# Patient Record
Sex: MALE | Race: BLACK OR AFRICAN AMERICAN | Employment: OTHER | URBAN - METROPOLITAN AREA
[De-identification: names, ages, dates, MRNs, and addresses within clinical notes are randomized per-mention and may not be internally consistent; named-entity substitution may affect disease eponyms.]

---

## 2017-10-27 ENCOUNTER — HOSPITAL ENCOUNTER (OUTPATIENT)
Dept: OTHER | Age: 62
Discharge: HOME | End: 2017-10-27
Attending: INTERNAL MEDICINE | Admitting: INTERNAL MEDICINE

## 2017-10-27 DIAGNOSIS — M47.26 OSTEOARTHRITIS OF SPINE WITH RADICULOPATHY, LUMBAR REGION: ICD-10-CM

## 2018-10-29 ENCOUNTER — HOSPITAL ENCOUNTER (OUTPATIENT)
Age: 63
Discharge: HOME | End: 2018-10-29
Payer: MEDICARE

## 2018-10-29 ENCOUNTER — HOSPITAL ENCOUNTER (OUTPATIENT)
Dept: GENERAL RADIOLOGY | Age: 63
Discharge: HOME | End: 2018-10-29
Payer: MEDICARE

## 2018-10-29 DIAGNOSIS — M47.9 OSTEOARTHRITIS OF SPINE, UNSPECIFIED SPINAL OSTEOARTHRITIS COMPLICATION STATUS, UNSPECIFIED SPINAL REGION: ICD-10-CM

## 2018-10-29 PROCEDURE — 72100 X-RAY EXAM L-S SPINE 2/3 VWS: CPT

## 2019-02-25 ENCOUNTER — HOSPITAL ENCOUNTER (EMERGENCY)
Age: 64
Discharge: HOME | End: 2019-02-25
Attending: EMERGENCY MEDICINE
Payer: MEDICARE

## 2019-02-25 ENCOUNTER — APPOINTMENT (OUTPATIENT)
Dept: GENERAL RADIOLOGY | Age: 64
End: 2019-02-25
Payer: MEDICARE

## 2019-02-25 VITALS
TEMPERATURE: 97 F | HEIGHT: 74 IN | WEIGHT: 298 LBS | SYSTOLIC BLOOD PRESSURE: 155 MMHG | DIASTOLIC BLOOD PRESSURE: 89 MMHG | OXYGEN SATURATION: 96 % | HEART RATE: 97 BPM | BODY MASS INDEX: 38.24 KG/M2 | RESPIRATION RATE: 18 BRPM

## 2019-02-25 DIAGNOSIS — J20.9 ACUTE BRONCHITIS, UNSPECIFIED ORGANISM: Primary | ICD-10-CM

## 2019-02-25 PROCEDURE — 94640 AIRWAY INHALATION TREATMENT: CPT

## 2019-02-25 PROCEDURE — 99283 EMERGENCY DEPT VISIT LOW MDM: CPT

## 2019-02-25 PROCEDURE — 94664 DEMO&/EVAL PT USE INHALER: CPT

## 2019-02-25 PROCEDURE — 94760 N-INVAS EAR/PLS OXIMETRY 1: CPT

## 2019-02-25 PROCEDURE — 71046 X-RAY EXAM CHEST 2 VIEWS: CPT

## 2019-02-25 PROCEDURE — 6360000002 HC RX W HCPCS: Performed by: EMERGENCY MEDICINE

## 2019-02-25 RX ORDER — AZITHROMYCIN 250 MG/1
250 TABLET, FILM COATED ORAL SEE ADMIN INSTRUCTIONS
Qty: 6 TABLET | Refills: 0 | Status: SHIPPED | OUTPATIENT
Start: 2019-02-25 | End: 2019-03-02

## 2019-02-25 RX ORDER — ALBUTEROL SULFATE 2.5 MG/3ML
5 SOLUTION RESPIRATORY (INHALATION) ONCE
Status: COMPLETED | OUTPATIENT
Start: 2019-02-25 | End: 2019-02-25

## 2019-02-25 RX ORDER — ALBUTEROL SULFATE 90 UG/1
2 AEROSOL, METERED RESPIRATORY (INHALATION) EVERY 4 HOURS PRN
Qty: 1 INHALER | Refills: 0 | Status: SHIPPED | OUTPATIENT
Start: 2019-02-25 | End: 2020-02-07

## 2019-02-25 RX ORDER — METOPROLOL SUCCINATE 50 MG/1
50 TABLET, EXTENDED RELEASE ORAL DAILY
COMMUNITY

## 2019-02-25 RX ORDER — PREDNISONE 10 MG/1
TABLET ORAL
Qty: 20 TABLET | Refills: 0 | Status: SHIPPED | OUTPATIENT
Start: 2019-02-25 | End: 2019-03-07

## 2019-02-25 RX ADMIN — ALBUTEROL SULFATE 5 MG: 2.5 SOLUTION RESPIRATORY (INHALATION) at 08:03

## 2019-03-13 ENCOUNTER — OFFICE VISIT (OUTPATIENT)
Dept: ORTHOPEDIC SURGERY | Age: 64
End: 2019-03-13
Payer: MEDICARE

## 2019-03-13 VITALS
RESPIRATION RATE: 16 BRPM | BODY MASS INDEX: 38.24 KG/M2 | HEART RATE: 74 BPM | DIASTOLIC BLOOD PRESSURE: 83 MMHG | WEIGHT: 298 LBS | HEIGHT: 74 IN | SYSTOLIC BLOOD PRESSURE: 136 MMHG

## 2019-03-13 DIAGNOSIS — G56.03 BILATERAL CARPAL TUNNEL SYNDROME: Primary | ICD-10-CM

## 2019-03-13 PROCEDURE — 99215 OFFICE O/P EST HI 40 MIN: CPT | Performed by: ORTHOPAEDIC SURGERY

## 2019-03-13 RX ORDER — METOPROLOL SUCCINATE 25 MG/1
TABLET, EXTENDED RELEASE ORAL
COMMUNITY
Start: 2019-02-06 | End: 2019-07-01 | Stop reason: ALTCHOICE

## 2019-03-13 RX ORDER — IBUPROFEN 800 MG/1
TABLET ORAL
COMMUNITY
Start: 2019-03-12 | End: 2019-06-06

## 2019-03-13 RX ORDER — METFORMIN HYDROCHLORIDE 500 MG/1
1000 TABLET, EXTENDED RELEASE ORAL
COMMUNITY
Start: 2019-02-06 | End: 2021-09-28

## 2019-03-13 RX ORDER — MELOXICAM 7.5 MG/1
TABLET ORAL
COMMUNITY
Start: 2019-03-12 | End: 2019-06-06

## 2019-03-13 RX ORDER — BACLOFEN 10 MG/1
TABLET ORAL
COMMUNITY
Start: 2019-02-06 | End: 2020-02-07

## 2019-03-14 ENCOUNTER — TELEPHONE (OUTPATIENT)
Dept: ORTHOPEDIC SURGERY | Age: 64
End: 2019-03-14

## 2019-04-04 ENCOUNTER — HOSPITAL ENCOUNTER (OUTPATIENT)
Dept: NEUROLOGY | Age: 64
Discharge: HOME | End: 2019-04-04
Payer: MEDICARE

## 2019-04-04 PROCEDURE — 95911 NRV CNDJ TEST 9-10 STUDIES: CPT | Performed by: PSYCHIATRY & NEUROLOGY

## 2019-04-04 PROCEDURE — 95886 MUSC TEST DONE W/N TEST COMP: CPT | Performed by: PSYCHIATRY & NEUROLOGY

## 2019-04-04 PROCEDURE — 95861 NEEDLE EMG 2 EXTREMITIES: CPT

## 2019-04-04 PROCEDURE — 95911 NRV CNDJ TEST 9-10 STUDIES: CPT

## 2019-04-24 ENCOUNTER — OFFICE VISIT (OUTPATIENT)
Dept: ORTHOPEDIC SURGERY | Age: 64
End: 2019-04-24
Payer: MEDICARE

## 2019-04-24 VITALS — HEIGHT: 74 IN | BODY MASS INDEX: 38.24 KG/M2 | RESPIRATION RATE: 16 BRPM | WEIGHT: 298 LBS

## 2019-04-24 DIAGNOSIS — G56.21 CUBITAL TUNNEL SYNDROME ON RIGHT: ICD-10-CM

## 2019-04-24 DIAGNOSIS — G56.03 BILATERAL CARPAL TUNNEL SYNDROME: Primary | ICD-10-CM

## 2019-04-24 PROCEDURE — 99214 OFFICE O/P EST MOD 30 MIN: CPT | Performed by: ORTHOPAEDIC SURGERY

## 2019-04-24 PROCEDURE — 20526 THER INJECTION CARP TUNNEL: CPT | Performed by: ORTHOPAEDIC SURGERY

## 2019-04-24 NOTE — PATIENT INSTRUCTIONS
Information & Instructions   After Finger, Hand, Wrist, or Elbow Injection    Diamond Santana MD    You have received an injection of local anesthetic (Bupivicaine without Epinephrine) for comfort & a steroid (Kenalog) for its strong anti-inflammatory effects. In order to give the medication a chance to reduce your inflammation and discomfort, it is recommended that you take it easy for a day or so. You may use your hand and arm as you feel comfortable, but you should avoid highly strenuous activity and heavy use for several days. Relief from the injection will often not begin for several days, and you may not feel full relief for up to one month. It is not uncommon to experience some local discomfort or pain at or around the injection site for a few days. To relieve these symptoms you may do the following if you feel necessary:       Apply ice to the affected area 20 minutes on and 20 minutes off. Do not apply ice directly to the skin. Use a thin layer (T-shirt, pillowcase, towel, etc.) to protect the skin. - If allowed by your other medical physicians, you may take -     2 Tylenol extra strength tablets every 4-6 hours       1-2 Aleve tablets twice a day     2-3 Advil tablets two to three times a day    If you are diabetic, the steroid medication may increase your blood sugar, so you are advised to monitor your sugar more closely so you can adjust it accordingly for a few days following your injection. If you need assistance with the control of you blood sugar, please contact you primary care physician for further advice. I will request that you please call the office one month after your injection at [phone] if you have not experienced relief of your symptoms (unless I have instructed you otherwise). If your injection has given you good relief of you symptoms as expected, then you only need to call the office if your symptoms return.

## 2019-04-24 NOTE — PROGRESS NOTES
Mr. Jana Crump returns today in follow-up of his previously treated  bilateral carpal tunnel symptoms. He was last seen in March, 2019 at which time he was treated with Conservative Measures and EMG & NCS was ordered. He experienced minimal relief of his initial symptoms. He  has noticed symptom persistence over the last several weeks. He returns today with worsened symptoms of bilateral carpal tunnel syndrome  and with unchanged symptoms of right Ulnar Nerve Entrapment, requesting further treatment. The patient's , past medical history, medications, allergies,  family history, social history, and review of systems have been reviewed and are recorded in the chart. Physical Exam:  Vitals  Resp: 16  Height: 6' 2\" (188 cm)  Weight: 298 lb (135.2 kg)  Mr. Jana Crump appears well, he is in no apparent distress, he demonstrates appropriate mood & affect. Skin: Skin color, texture, turgor normal. No rashes or lesions bilaterally  Digital range of motion is limited by pain bilaterally  Wrist range of motion is equal bilateral   Elbow range of motion is equal bilateral   There is no evidence of gross joint instability bilaterally. Sensation is subjectively tingling and painful in the Whole Hand on the Right, greater than Left and objectively normal in the same distribution bilaterally  Vascular examination reveals normal and good capillary refill bilaterally  Swelling is mild in the medial elbow, there is no tenderness at the medial epicondyle bilaterally  Examination for Carpal Tunnel Syndrome shows Carpal Tunnel Compression Test to be mildly positive on the right & mildly positive on the left. The patient displays moderate baseline symptoms to potentially confound the exam.  The thenar musculature is mildly atrophied & weakened. Examination for Cubital Tunnel Syndrome shows no tenderness to palpation at the medial epicondyle on the Right, normal on the Left.   The Ulnar Nerve rests behind the medial carpal tunnel was injected with a combination of 1 ml of 0.25% Bupivacaine without Epinephrine and 40 mg of Triamcinolone (40 mg/ml). There was good filling of the carpal tunnel. A  dry, sterile bandage was applied and he tolerated the injection without difficulty. I advised him of the expected response, possible reactions and the instructions for care of the hand. Procedure: left Carpal Tunnel Injection  [first Injection]: After full discussion of the nature of this process and outlining a treatment plan with Mr. Felicitas Lyons, we discussed the complications, limitations, expectations, alternatives, and risks of injection to the carpal tunnel. He understood this information well and verbally consented to this treatment. The skin of the symptomatic extremity was prepped with Isopropyl Alcohol and under aseptic conditions the carpal tunnel was injected with a combination of 1 ml of 0.25% Bupivacaine without Epinephrine and 40 mg of Triamcinolone (40 mg/ml). There was good filling of the carpal tunnel. A  dry, sterile bandage was applied and he tolerated the injection without difficulty. I advised him of the expected response, possible reactions and the instructions for care of the hand. I have also discussed with Mr. Felicitas Lyons the other treatment options available to him for this condition. We have today selected to proceed with treatment by injection with steroid medication. He and I have agreed that if our current course of Injection treatment does not prove to be effective over the short term future, that he will schedule a follow-up appointment to discuss and select an alternate course of therapy including possibly further conservative treatment or surgical treatment.        After discussion of the treatment options available for cubital tunnel syndrome, I have outlined for Mr. Felicitas Lyons a conservative plan of treatment consisting of: the use of wrist splints, activity modification, and the judicious use of over-the-counter anti-inflammatory medications. Appropriately sized removable carpal tunnel orthosis already has. Instructions were given regarding splint use and wear as well as suggestions for use of the other modalities discussed. He voiced an appropriate understanding of our discussion and of the expectations of treatment. I have explained to Mr. Kassi Moy that despite successful treatment (surgical decompression or otherwise) of his nerve entrapment, that due to his severe nerve damage, that he is likely to have some permanent residual symptoms that do not improve long term. I have also explained that maximal recovery of nerve function may take a full year or longer to realize. He voiced a clear understanding of this. Mr. Kassi Moy has been given a full verbal list of instructions and precautions related to his present condition. I have asked him to followup with me in the office at the prescribed time. He is also specifically requested to call or return to the office sooner if his symptoms change or worsen prior to the next scheduled appointment.

## 2019-04-24 NOTE — Clinical Note
Dear  Iesha Nurs you very much for your referral or . Helen Barbosa to me for evaluation and treatment of his Hand & Wrist condition. I appreciate your confidence in me and thank you for allowing me the opportunity to care for your patients. If I can be of any further assistance to you on this or any other patient, please do not hesitate to contact me. Sincerely,Esa Amaro MD

## 2019-06-06 ENCOUNTER — HOSPITAL ENCOUNTER (EMERGENCY)
Age: 64
Discharge: HOME | End: 2019-06-06
Payer: MEDICARE

## 2019-06-06 VITALS
RESPIRATION RATE: 18 BRPM | SYSTOLIC BLOOD PRESSURE: 175 MMHG | HEIGHT: 74 IN | TEMPERATURE: 97 F | DIASTOLIC BLOOD PRESSURE: 100 MMHG | OXYGEN SATURATION: 96 % | WEIGHT: 285 LBS | BODY MASS INDEX: 36.57 KG/M2 | HEART RATE: 94 BPM

## 2019-06-06 DIAGNOSIS — M51.36 DEGENERATIVE DISC DISEASE, LUMBAR: ICD-10-CM

## 2019-06-06 DIAGNOSIS — G89.29 ACUTE EXACERBATION OF CHRONIC LOW BACK PAIN: Primary | ICD-10-CM

## 2019-06-06 DIAGNOSIS — M54.50 ACUTE EXACERBATION OF CHRONIC LOW BACK PAIN: Primary | ICD-10-CM

## 2019-06-06 DIAGNOSIS — I10 ELEVATED BLOOD PRESSURE READING WITH DIAGNOSIS OF HYPERTENSION: ICD-10-CM

## 2019-06-06 PROCEDURE — 99282 EMERGENCY DEPT VISIT SF MDM: CPT

## 2019-06-06 RX ORDER — NAPROXEN 500 MG/1
500 TABLET ORAL 2 TIMES DAILY PRN
Qty: 20 TABLET | Refills: 0 | Status: SHIPPED | OUTPATIENT
Start: 2019-06-06 | End: 2019-07-01

## 2019-06-06 RX ORDER — METHOCARBAMOL 750 MG/1
750 TABLET, FILM COATED ORAL EVERY 8 HOURS PRN
Qty: 30 TABLET | Refills: 0 | Status: SHIPPED | OUTPATIENT
Start: 2019-06-06 | End: 2019-06-16

## 2019-06-06 RX ORDER — LIDOCAINE 50 MG/G
1 PATCH TOPICAL DAILY
Qty: 30 PATCH | Refills: 0 | Status: SHIPPED | OUTPATIENT
Start: 2019-06-06 | End: 2019-07-01

## 2019-06-06 RX ORDER — HYDROCODONE BITARTRATE AND ACETAMINOPHEN 5; 325 MG/1; MG/1
1 TABLET ORAL EVERY 6 HOURS PRN
Qty: 12 TABLET | Refills: 0 | Status: SHIPPED | OUTPATIENT
Start: 2019-06-06 | End: 2019-06-09

## 2019-06-06 ASSESSMENT — ENCOUNTER SYMPTOMS
BACK PAIN: 1
VOMITING: 0
DIARRHEA: 0
ABDOMINAL PAIN: 0
CHEST TIGHTNESS: 0
NAUSEA: 0
SHORTNESS OF BREATH: 0

## 2019-06-06 ASSESSMENT — PAIN DESCRIPTION - PAIN TYPE: TYPE: ACUTE PAIN

## 2019-06-06 ASSESSMENT — PAIN SCALES - GENERAL: PAINLEVEL_OUTOF10: 9

## 2019-06-06 ASSESSMENT — PAIN DESCRIPTION - ORIENTATION: ORIENTATION: LOWER

## 2019-06-06 ASSESSMENT — PAIN DESCRIPTION - LOCATION: LOCATION: BACK

## 2019-06-06 NOTE — ED PROVIDER NOTES
**EVALUATED BY ADVANCED PRACTICE PROVIDER**        [facility]      Pt Name: Jameson Lane  [facility]:[id number]  [place] 1955  Date of evaluation: 6/6/2019  Provider: Huey Garcia PA-C      Chief Complaint:    Chief Complaint   Patient presents with    Back Pain     Pt. comes in today with lower back pain that got worse this morning. Pt. reports he is suppose to have MRI done on Saturday. Pt. states the pain shoots down both of his legs. Pt. states it feels like pins and needles in his feet. Nursing Notes, Past Medical Hx, Past Surgical Hx, Social Hx, Allergies, and Family Hx were all reviewed and agreed with or any disagreements were addressed in the HPI.    HPI:  (Location, Duration, Timing, Severity,Quality, Assoc Sx, Context, Modifying factors)  This is a  66 y.o. male presents the emergency department with reports of continued difficulty as it pertains to low back pain. Patient goes on to report he has had difficulties with this for some time. He is under the active care of one of the physicians or the since he. He states he's having increasing pain and discomfort despite the fact that he recently had been on corticosteroids. He states he does not have any pain medicine. He reports he is taking baclofen without any significant relief. He is sent to have an upcoming MRI to see what needs to be done with his back. Patient's currently reporting pain and discomfort radiated to be 9 out of 10. He states he spell nothing to make the symptoms better and are worse and with this he presents. He has no red flags for back pain. He denies bowel or bladder dysfunction. He denies saddle anesthesia. He has numbness and tingling in his feet and pain that shoots down his legs but denies radicular symptoms.     PastMedical/Surgical History:      Diagnosis Date    Arthritis     CAD (coronary artery disease)     Hyperlipidemia     Hypertension          Procedure Laterality Date    BACK SURGERY  200    lower lumbar surgery    CORONARY ANGIOPLASTY WITH STENT PLACEMENT  2012       Medications:  Previous Medications    ALBUTEROL SULFATE HFA (PROVENTIL HFA) 108 (90 BASE) MCG/ACT INHALER    Inhale 2 puffs into the lungs every 4 hours as needed for Wheezing or Shortness of Breath (Space out to every 6 hours as symptoms improve) Space out to every 6 hours as symptoms improve. ASPIRIN 325 MG TABLET    Take 325 mg by mouth daily    ATORVASTATIN (LIPITOR) 40 MG TABLET    Take 1 tablet by mouth daily. BACLOFEN (LIORESAL) 10 MG TABLET        CARVEDILOL (COREG) 12.5 MG TABLET    Take 25 mg by mouth daily     LISINOPRIL-HYDROCHLOROTHIAZIDE (PRINZIDE;ZESTORETIC) 20-12.5 MG PER TABLET    Take 1 tablet by mouth daily. METFORMIN (GLUCOPHAGE) 500 MG TABLET    Take 1 tablet by mouth 2 times daily (with meals)    METFORMIN (GLUCOPHAGE-XR) 500 MG EXTENDED RELEASE TABLET        METOPROLOL SUCCINATE (TOPROL XL) 25 MG EXTENDED RELEASE TABLET        METOPROLOL SUCCINATE (TOPROL XL) 50 MG EXTENDED RELEASE TABLET    Take 50 mg by mouth daily    SPACER/AERO CHAMBER MOUTHPIECE MISC    1 each by Does not apply route once as needed (wheeze/cough)       Review of Systems:  Review of Systems   Constitutional: Negative for activity change, chills and fever. Respiratory: Negative for chest tightness and shortness of breath. Cardiovascular: Negative for chest pain. Gastrointestinal: Negative for abdominal pain, diarrhea, nausea and vomiting. Genitourinary: Negative for dysuria and flank pain. Musculoskeletal: Positive for back pain and myalgias. Negative for gait problem, neck pain and neck stiffness. Skin: Negative for rash and wound. Neurological: Negative for seizures, syncope and headaches. Positives and Pertinent negatives as per HPI. Except as noted above in the ROS, problem specific ROS was completed and is negative.     Physical werenegative at this time or not returned at the time of this note. RADIOLOGY:   Non-plain film images such as CT, Ultrasound and MRI are read by the radiologist. Daniel Franco PA-C have directly visualized the radiologic plain film image(s) with the below findings:        Interpretation per the Radiologist below, if available at the time of thisnote:    No orders to display        No results found. MEDICAL DECISION MAKING / ED COURSE:      PROCEDURES:   Procedures  None    Patient was given:  Medications - No data to display    The patient's detailed history of present illness is documented as above. Upon arrival to the emergency department the patient's vital signs are as documented. The patient is noted to be hemodynamically stable and afebrile. Physical examination findings are as above. Has not had any recent injuries and or falls and this is an acute exacerbation of a chronic problem for him. He recently finished a Medrol Dosepak but this did not help his symptoms. He is in need of a work note as well as medications to be able to control his symptoms and he can follow back with orthopedics. He states he did call Dr. Gaetano Mustafa and does not have an appointment at her until the middle of June. I discussed with the patient ongoing care management on an outpatient basis and follow-up with auto since he. Dispense with the medications as below. He states the baclofen is not beneficial for him so we will substitute out Robaxin in the short-term. Despite having diclofenac, ibuprofen, and meloxicam all documented on his chart he states that he intermittently takes any antiplatelet medicine. I explained him that this will be a mainstay and narcotic only for severe pain. I've also dispensed Lidoderm patches for pain relief and he will follow with auto on an outpatient basis.     I estimate there is low risk for cauda equina or central cord compression syndrome, epidural lesion or abscess, meningitis or acute/severe spinal stenosis requiring emergent treatment and or any additional pathology that would require further emergency advanced imaging or admission and therefor I consider the discharge disposition most reasonable. The patient and/or family and I have discussed the diagnosis and risks, and we agree with discharging home to follow-up with their primary doctor. We also discussed returning to the emergency department immediately if new or worsening symptoms occur. We have discussed the symptoms which are most concerning (e.g., numbness or weakness of the arms or legs, saddle anesthesia, urinary or bowel incontinence or retention, changing or worsening pain) that would necessitate an immediate return. The patient tolerated their visit well. I evaluated the patient. The physician was available for consultation as needed. The patient and / or the family were informed of the results of anytests, a time was given to answer questions, a plan was proposed and they agreed with plan. CLINICAL IMPRESSION:  1. Acute exacerbation of chronic low back pain    2. Degenerative disc disease, lumbar    3. Elevated blood pressure reading with diagnosis of hypertension        DISPOSITION Decision To Discharge 06/06/2019 11:09:05 AM      PATIENT REFERRED TO:  No follow-up provider specified. DISCHARGE MEDICATIONS:  New Prescriptions    HYDROCODONE-ACETAMINOPHEN (NORCO) 5-325 MG PER TABLET    Take 1 tablet by mouth every 6 hours as needed for Pain for up to 3 days. LIDOCAINE (LIDODERM) 5 %    Place 1 patch onto the skin daily 12 hours on, 12 hours off.     METHOCARBAMOL (ROBAXIN-750) 750 MG TABLET    Take 1 tablet by mouth every 8 hours as needed (muscle cramps or pain)    NAPROXEN (NAPROSYN) 500 MG TABLET    Take 1 tablet by mouth 2 times daily as needed for Pain       DISCONTINUED MEDICATIONS:  Discontinued Medications    DICLOFENAC SODIUM 1 % GEL         MG TABLET        MELOXICAM (MOBIC) 7.5 MG TABLET        TRAMADOL (ULTRAM) 50 MG TABLET    Take 1 tablet by mouth every 6 hours as needed for Pain            (Please note the MDM and HPI sections of this note were completed with a voice recognition program.  Efforts weremade to edit the dictations but occasionally words are mis-transcribed.)    Electronically signed, Juany Almaraz PA-C,          Richie Swain PA-C  06/06/19 1706

## 2019-06-06 NOTE — LETTER
[facility] Emergency Department  [address], [facility], [address]             June 6, 2019    Patient: Shane Gaston   YOB: 1955   Date of Visit: 6/6/2019       To Whom It May Concern:    Shane Gaston was seen and treated in our emergency department on 6/6/2019. He may return to work on 6/8/19.       Sincerely,         Andrei CHRISTIANSONN, RN

## 2019-06-06 NOTE — ED NOTES
Discharge instructions discussed with no questions or concerns. Pt. Lee Branch understanding to follow up with PCP. Pt. Ambulatory upon discharge with no signs of distress noted.        Yani Miller RN  06/06/19 [id number]

## 2019-06-08 ENCOUNTER — HOSPITAL ENCOUNTER (OUTPATIENT)
Dept: MRI IMAGING | Age: 64
Discharge: HOME | End: 2019-06-08
Payer: MEDICARE

## 2019-06-08 DIAGNOSIS — M51.36 ANNULAR TEAR OF LUMBAR DISC: ICD-10-CM

## 2019-06-08 PROCEDURE — 72148 MRI LUMBAR SPINE W/O DYE: CPT

## 2019-07-01 ENCOUNTER — APPOINTMENT (OUTPATIENT)
Dept: GENERAL RADIOLOGY | Age: 64
End: 2019-07-01
Payer: MEDICARE

## 2019-07-01 ENCOUNTER — HOSPITAL ENCOUNTER (EMERGENCY)
Age: 64
Discharge: HOME | End: 2019-07-01
Payer: MEDICARE

## 2019-07-01 VITALS
DIASTOLIC BLOOD PRESSURE: 114 MMHG | TEMPERATURE: 98 F | SYSTOLIC BLOOD PRESSURE: 174 MMHG | RESPIRATION RATE: 16 BRPM | HEART RATE: 88 BPM | OXYGEN SATURATION: 98 %

## 2019-07-01 DIAGNOSIS — M50.30 DEGENERATIVE DISC DISEASE, CERVICAL: Primary | ICD-10-CM

## 2019-07-01 LAB
EKG ATRIAL RATE: 89 BPM
EKG DIAGNOSIS: NORMAL
EKG P AXIS: 54 DEGREES
EKG P-R INTERVAL: 178 MS
EKG Q-T INTERVAL: 368 MS
EKG QRS DURATION: 104 MS
EKG QTC CALCULATION (BAZETT): 447 MS
EKG R AXIS: -28 DEGREES
EKG T AXIS: 78 DEGREES
EKG VENTRICULAR RATE: 89 BPM

## 2019-07-01 PROCEDURE — 99283 EMERGENCY DEPT VISIT LOW MDM: CPT

## 2019-07-01 PROCEDURE — 93005 ELECTROCARDIOGRAM TRACING: CPT | Performed by: EMERGENCY MEDICINE

## 2019-07-01 PROCEDURE — 6370000000 HC RX 637 (ALT 250 FOR IP): Performed by: PHYSICIAN ASSISTANT

## 2019-07-01 PROCEDURE — 72040 X-RAY EXAM NECK SPINE 2-3 VW: CPT

## 2019-07-01 PROCEDURE — 93010 ELECTROCARDIOGRAM REPORT: CPT | Performed by: INTERNAL MEDICINE

## 2019-07-01 RX ORDER — LIDOCAINE 4 G/G
1 PATCH TOPICAL DAILY
Status: DISCONTINUED | OUTPATIENT
Start: 2019-07-01 | End: 2019-07-01 | Stop reason: HOSPADM

## 2019-07-01 RX ORDER — NAPROXEN 250 MG/1
500 TABLET ORAL ONCE
Status: COMPLETED | OUTPATIENT
Start: 2019-07-01 | End: 2019-07-01

## 2019-07-01 RX ORDER — LIDOCAINE 50 MG/G
1 PATCH TOPICAL DAILY
Qty: 30 PATCH | Refills: 0 | Status: SHIPPED | OUTPATIENT
Start: 2019-07-01 | End: 2020-02-07

## 2019-07-01 RX ORDER — METHOCARBAMOL 750 MG/1
750 TABLET, FILM COATED ORAL EVERY 8 HOURS PRN
Qty: 30 TABLET | Refills: 0 | Status: SHIPPED | OUTPATIENT
Start: 2019-07-01 | End: 2019-07-11

## 2019-07-01 RX ORDER — HYDROCODONE BITARTRATE AND ACETAMINOPHEN 5; 325 MG/1; MG/1
1 TABLET ORAL EVERY 6 HOURS PRN
Status: ON HOLD | COMMUNITY
End: 2021-01-31 | Stop reason: ALTCHOICE

## 2019-07-01 RX ORDER — NAPROXEN 500 MG/1
500 TABLET ORAL 2 TIMES DAILY PRN
Qty: 20 TABLET | Refills: 0 | Status: SHIPPED | OUTPATIENT
Start: 2019-07-01 | End: 2020-02-07

## 2019-07-01 RX ADMIN — NAPROXEN 500 MG: 250 TABLET ORAL at 08:04

## 2019-07-01 ASSESSMENT — PAIN SCALES - GENERAL
PAINLEVEL_OUTOF10: 8
PAINLEVEL_OUTOF10: 8

## 2019-07-01 ASSESSMENT — ENCOUNTER SYMPTOMS
BACK PAIN: 1
SHORTNESS OF BREATH: 0
VOMITING: 0
DIARRHEA: 0
ABDOMINAL PAIN: 0
NAUSEA: 0
CHEST TIGHTNESS: 0

## 2019-07-01 ASSESSMENT — PAIN DESCRIPTION - LOCATION: LOCATION: NECK

## 2019-07-01 NOTE — ED PROVIDER NOTES
**EVALUATED BY ADVANCED PRACTICE PROVIDER**        [facility]      Pt Name: Alok Elias  EWO:[id number]  [place] 1955  Date of evaluation: 7/1/2019  Provider: Eleanor Faustin PA-C      Chief Complaint:    Chief Complaint   Patient presents with    Torticollis     has been working out on [employer] and is having neck pain that hurts to hurt from side to side since Friday morning       Nursing Notes, Past Medical Hx, Past Surgical Hx, Social Hx, Allergies, and Family Hx were all reviewed and agreed with or any disagreements were addressed in the HPI.    HPI:  (Location, Duration, Timing, Severity,Quality, Assoc Sx, Context, Modifying factors)  This is a  60 y.o. male presents the emergency department with difficulties as a pertains to cervicalgia. Patient states he has been working out regularly with a [email or web address] and has been doing some overhead activities. He is doing lap pull downs and has had increasing levels of pain in his neck since the weekend. Patient states it feels very similar to musculoskeletal pain that he is experienced in his back. He goes on to report he has associated stiffness that is increased with movement. Patient states that he has had some left over medicine in the form of a muscle relaxant from his back that he took which was noted to be beneficial.  He states he is not experiencing fevers and or chills. He is not having symptoms of meningismus and denies any is having radicular symptoms. He denies weakness in her numbness and tingling in his hands or fingers. He goes on to report he has not having difficulties with his voice and or change in relation to this. Current level of pain and discomfort rates to be 8 out of 10. He states medications in the home environment have been beneficial but he is now out of them and presents to the emergency department for evaluation and treatment.   Upon further questioning the outpatient basis. He will be discharged with medications as below. The patient has been made aware of the signs and symptoms which would necessitate an immediate return to the emergency department and verbalizes an understanding of these signs and symptoms. I estimate there is low risk for cauda equina or central cord compression syndrome, epidural lesion or abscess, meningitis or acute/severe spinal stenosis requiring emergent treatment and or any additional pathology that would require further emergency advanced imaging or admission and therefor I consider the discharge disposition most reasonable. The patient and/or family and I have discussed the diagnosis and risks, and we agree with discharging home to follow-up with their primary doctor. We also discussed returning to the emergency department immediately if new or worsening symptoms occur. We have discussed the symptoms which are most concerning (e.g., numbness or weakness of the arms or legs, saddle anesthesia, urinary or bowel incontinence or retention, changing or worsening pain) that would necessitate an immediate return. The patient tolerated their visit well. I evaluated the patient. The physician was available for consultation as needed. The patient and / or the family were informed of the results of anytests, a time was given to answer questions, a plan was proposed and they agreed with plan. CLINICAL IMPRESSION:  1. Degenerative disc disease, cervical        DISPOSITION: Discharged to home      PATIENT REFERRED TO:  [address]  [address] [place] [address]  [address] [place] [id number]  [phone]    Schedule an appointment as soon as possible for a visit       [facility] Emergency Department  [address] [facility]  [phone]    If symptoms worsen      DISCHARGE MEDICATIONS:  New Prescriptions    LIDOCAINE (LIDODERM) 5 %    Place 1 patch onto the skin daily 12 hours on, 12 hours off.     METHOCARBAMOL

## 2020-01-30 NOTE — PROGRESS NOTES
[address]   Cardiac Evaluation      Patient: Roseann Aranda  YOB: 1955       Chief Complaint   Patient presents with   Kesh[facility] Cardiologist     needs cardiac clearance for carpel tunnel surgery       Referring provider: [address] [place] [address] Drive    History of Present Illness:  Mr. Denise Alfaro is a former patient of Dr. Xiomy Martinez with a history of CAD (stent 2012 -- had back pain), HTN, HLD, A. Fib/flutter. He reuiqres a cardiology evaluation prior to carpal tunnel surgery. He is a smoker. Today, he states his main issues are multiple orthopedic pains inc back, right shoulder, knee -- he cannot exercise the way he used to. He walks a lot on his job without any chest pain or dyspnea. He denies exertional chest pain, HERNANDEZ/PND, palpitations, light-headedness, edema. He continues to smoke. With regard to medication therapy he/she has been compliant with prescribed regimen and has tolerated therapy to date. Past Medical History:   has a past medical history of Arthritis, Atrial fibrillation ([address] CAD (coronary artery disease), Hyperlipidemia, and Hypertension. Surgical History:   has a past surgical history that includes back surgery (200) and Coronary angioplasty with stent (2012). Current Outpatient Medications   Medication Sig Dispense Refill    HYDROcodone-acetaminophen (NORCO) 5-325 MG per tablet Take 1 tablet by mouth every 6 hours as needed for Pain.  metFORMIN (GLUCOPHAGE-XR) 500 MG extended release tablet Take 500 mg by mouth daily (with breakfast)       metoprolol succinate (TOPROL XL) 50 MG extended release tablet Take 50 mg by mouth daily      aspirin 325 MG tablet Take 325 mg by mouth daily      atorvastatin (LIPITOR) 40 MG tablet Take 1 tablet by mouth daily. 30 tablet 5    lisinopril-hydrochlorothiazide (PRINZIDE;ZESTORETIC) 20-12.5 MG per tablet Take 1 tablet by mouth daily.  (Patient taking differently: Take 2 tablets by mouth daily ) 30 tablet 0     Physical activity:     Days per week: Not on file     Minutes per session: Not on file    Stress: Not on file   Relationships    Social connections:     Talks on phone: Not on file     Gets together: Not on file     Attends Quaker service: Not on file     Active member of club or organization: Not on file     Attends meetings of clubs or organizations: Not on file     Relationship status: Not on file    Intimate partner violence:     Fear of current or ex partner: Not on file     Emotionally abused: Not on file     Physically abused: Not on file     Forced sexual activity: Not on file   Other Topics Concern    Not on file   Social History Narrative    Not on file       Family History:   Family History   Problem Relation Age of Onset    Heart Disease Mother     Diabetes Mother     Heart Disease Father     Diabetes Father     Diabetes Sister     Diabetes Brother     Diabetes Maternal Grandmother     Diabetes Maternal Grandfather     Diabetes Paternal Grandmother     Diabetes Paternal Grandfather      Family history has been reviewed and not pertinent except as noted above. Review of Systems:   · Constitutional: there has been no unanticipated weight loss. No change in energy or activity level   · Eyes: No visual changes   · ENT: No Headaches, hearing loss or vertigo. No mouth sores or sore throat. · Cardiovascular: Reviewed in HPI  · Respiratory: No cough or wheezing, no sputum production. · Gastrointestinal: No abdominal pain, appetite loss, blood in stools. No change in bowel or bladder habits. · Genitourinary: No nocturia, dysuria, trouble voiding  · Musculoskeletal:  No gait disturbance, weakness or joint complaints. · Integumentary: No rash or pruritis. · Neurological: No headache, change in muscle strength, numbness or tingling. No change in gait, balance, coordination, mood, affect, memory, mentation, behavior.   · Psychiatric: No anxiety or depression  · Endocrine: No malaise or fever  · Hematologic/Lymphatic: No abnormal bruising or bleeding, blood clots or swollen lymph nodes. · Allergic/Immunologic: No nasal congestion or hives. Physical Examination:    Vitals:    02/07/20 0801 02/07/20 0806   BP: (!) 156/90 (!) 154/90   Site: Right Upper Arm Right Upper Arm   Position: Sitting Sitting   Cuff Size: Large Adult Large Adult   Pulse: 66    Weight: 265 lb 6.4 oz (120.4 kg)    Height: 6' 2\" (1.88 m)      Body mass index is 34.08 kg/m². Wt Readings from Last 3 Encounters:   02/07/20 265 lb 6.4 oz (120.4 kg)   06/06/19 285 lb (129.3 kg)   04/24/19 298 lb (135.2 kg)      BP Readings from Last 3 Encounters:   02/07/20 (!) 154/90   07/01/19 (!) 174/114   06/06/19 (!) 175/100      Physical Examination:    · CONSTITUTIONAL: Well developed, well nourished  · EYES: PERRLA. No xanthelasma, sclera non icteric  · EARS,NOSE,MOUTH,THROAT:  Mucous membranes moist, normal hearing  · NECK: Supple, JVP normal, thyroid not enlarged. Carotids 2+ without bruits  · RESPIRATORY: Normal effort, no rales or rhonchi  · CARDIOVASCULAR: Normal PMI, regular rate and rhythm, no murmurs, rub or gallop. No edema. Radial pulses present and equal  · CHEST: No scar or masses  · ABDOMEN: Normal bowel sounds. No masses or tenderness. No bruit  · MUSCULOSKELETAL: No clubbing or cyanosis. Moves all extremities well. Normal gait  · SKIN:  Warm and dry. No rashes. Large raised area left mid back  · NEUROLOGIC: Cranial nerves intact. Alert and oriented  · PSYCHIATRIC: Calm affect. Appears to have normal judgement and insight    All testing and labs listed below were personally reviewed by myself. [facility] 5/9/2012 (Dr. Gilford Collins)  EF 50%  99% midLAD -- stent   50% midLAD  20% prox LAD    Assessment:    Preop CV Exam  Carpal tunnel surgery with Dr. Donna Desir. EKG today 2/7/2020>   Stable cardiac status -- may proceed with surgery. Low risk for lower risk surgery.     CAD  Stable without anginal symptoms  S/p PCI LAD in 2012  Takes adult asa -- cut back to baby asa but do not hold for surgery    Hypertension  Stable  Blood pressure (!) 154/90, pulse 66, height 6' 2\" (1.88 m), weight 265 lb 6.4 oz (120.4 kg). Hyperlipidemia  Managed per PCP  Taking Lipitor 40mg  Needs rechecked    Atrial fib/flutter  S/p a flutter/SVT ablation 2016  H/o non-compliance with asa therapy    Tobacco use  Encouraged to quit      No problem-specific Assessment & Plan notes found for this encounter. Orders Placed This Encounter   Procedures    EKG 12 lead     Plan:  1. Reduce reduce 325 to 81mg asa  2. Lipid panel soon  3. RTO 6 months  4. Letter and EKG faxed to Dr. Maria Alejandra Jaramillo office ([facility]) and to the [facility] (pt states he is going to need spinal surgery at some point). Copies given to patient as well. 4. Recommend eval by ortho for his right shoulder pain  5. Recommend dermatology to assess raised area on his back    Joselito Strickland MD    Thank you for allowing to me to participate in the care of Keara Mejia. This note was scribed in the presence of Dr. Chavez Horan by Walter Blake RN.

## 2020-02-07 ENCOUNTER — OFFICE VISIT (OUTPATIENT)
Dept: CARDIOLOGY CLINIC | Age: 65
End: 2020-02-07
Payer: MEDICARE

## 2020-02-07 VITALS
HEIGHT: 74 IN | HEART RATE: 66 BPM | DIASTOLIC BLOOD PRESSURE: 90 MMHG | BODY MASS INDEX: 34.06 KG/M2 | SYSTOLIC BLOOD PRESSURE: 154 MMHG | WEIGHT: 265.4 LBS

## 2020-02-07 PROCEDURE — 99204 OFFICE O/P NEW MOD 45 MIN: CPT | Performed by: INTERNAL MEDICINE

## 2020-02-07 PROCEDURE — 93000 ELECTROCARDIOGRAM COMPLETE: CPT | Performed by: INTERNAL MEDICINE

## 2020-02-07 RX ORDER — ASPIRIN 81 MG/1
81 TABLET ORAL DAILY
Qty: 30 TABLET | Refills: 5
Start: 2020-02-07 | End: 2020-08-07

## 2020-02-07 NOTE — LETTER
[facility] CARDIOLOGY[address]  Dept: [phone]  Dept Fax: [phone]      2020    Patient:Reno Epps  : 1955  DOS: 2020     To: Dr. Tarsha Stevens:    Donald Haney has been evaluated for cardiac clearance. Based on my exam today, he is considered at a low cardiac risk for surgery. I do not recommend he hold his baby aspirin. Please let my office know if you have any questions or concerns.             Juan Carlos Jones MD               [facility] [place] and [place]

## 2020-02-07 NOTE — LETTER
[facility] CARDIOLOGY[address]  Dept: [phone]  Dept Fax: [phone]      2020    Patient:Reno Knox  : 1955  DOS: 2020    To Dr. Madeleine Dudley:    Trisha Fernández has been evaluated for cardiac clearance. Based on my exam today, he is considered at a low cardiac risk for surgery. I do not recommend he hold his baby aspirin. Please let my office know if you have any questions or concerns.             Idalmis Lowry MD                           [facility] serving [place]

## 2020-02-09 ENCOUNTER — HOSPITAL ENCOUNTER (EMERGENCY)
Age: 65
Discharge: HOME | End: 2020-02-09
Payer: MEDICARE

## 2020-02-09 VITALS
DIASTOLIC BLOOD PRESSURE: 103 MMHG | SYSTOLIC BLOOD PRESSURE: 171 MMHG | WEIGHT: 265 LBS | OXYGEN SATURATION: 99 % | HEART RATE: 90 BPM | TEMPERATURE: 98.8 F | BODY MASS INDEX: 34.02 KG/M2 | RESPIRATION RATE: 14 BRPM

## 2020-02-09 PROCEDURE — 99282 EMERGENCY DEPT VISIT SF MDM: CPT

## 2020-02-09 PROCEDURE — 90471 IMMUNIZATION ADMIN: CPT | Performed by: PHYSICIAN ASSISTANT

## 2020-02-09 PROCEDURE — 6360000002 HC RX W HCPCS: Performed by: PHYSICIAN ASSISTANT

## 2020-02-09 PROCEDURE — 90715 TDAP VACCINE 7 YRS/> IM: CPT | Performed by: PHYSICIAN ASSISTANT

## 2020-02-09 RX ORDER — LIDOCAINE HYDROCHLORIDE AND EPINEPHRINE 20; 5 MG/ML; UG/ML
20 INJECTION, SOLUTION EPIDURAL; INFILTRATION; INTRACAUDAL; PERINEURAL ONCE
Status: DISCONTINUED | OUTPATIENT
Start: 2020-02-09 | End: 2020-02-09 | Stop reason: HOSPADM

## 2020-02-09 RX ORDER — SULFAMETHOXAZOLE AND TRIMETHOPRIM 800; 160 MG/1; MG/1
1 TABLET ORAL 2 TIMES DAILY
Qty: 20 TABLET | Refills: 0 | Status: SHIPPED | OUTPATIENT
Start: 2020-02-09 | End: 2020-02-19

## 2020-02-09 RX ORDER — LIDOCAINE HYDROCHLORIDE AND EPINEPHRINE BITARTRATE 20; .01 MG/ML; MG/ML
INJECTION, SOLUTION SUBCUTANEOUS
Status: DISCONTINUED
Start: 2020-02-09 | End: 2020-02-09 | Stop reason: HOSPADM

## 2020-02-09 RX ORDER — CEPHALEXIN 500 MG/1
500 CAPSULE ORAL 4 TIMES DAILY
Qty: 40 CAPSULE | Refills: 0 | Status: SHIPPED | OUTPATIENT
Start: 2020-02-09 | End: 2020-02-19

## 2020-02-09 RX ADMIN — TETANUS TOXOID, REDUCED DIPHTHERIA TOXOID AND ACELLULAR PERTUSSIS VACCINE, ADSORBED 0.5 ML: 5; 2.5; 8; 8; 2.5 SUSPENSION INTRAMUSCULAR at 10:37

## 2020-02-09 ASSESSMENT — ENCOUNTER SYMPTOMS
SHORTNESS OF BREATH: 0
CHEST TIGHTNESS: 0
COLOR CHANGE: 1
NAUSEA: 0
VOMITING: 0
ABDOMINAL PAIN: 0
DIARRHEA: 0

## 2020-02-09 ASSESSMENT — PAIN DESCRIPTION - PAIN TYPE: TYPE: ACUTE PAIN

## 2020-02-09 ASSESSMENT — PAIN DESCRIPTION - LOCATION: LOCATION: BACK

## 2020-02-09 NOTE — ED PROVIDER NOTES
needed for Pain. LISINOPRIL-HYDROCHLOROTHIAZIDE (PRINZIDE;ZESTORETIC) 20-12.5 MG PER TABLET    Take 1 tablet by mouth daily. METFORMIN (GLUCOPHAGE-XR) 500 MG EXTENDED RELEASE TABLET    Take 500 mg by mouth daily (with breakfast)     METOPROLOL SUCCINATE (TOPROL XL) 50 MG EXTENDED RELEASE TABLET    Take 50 mg by mouth daily       Review of Systems:  Review of Systems   Constitutional: Negative for activity change, chills and fever. Respiratory: Negative for chest tightness and shortness of breath. Cardiovascular: Negative for chest pain. Gastrointestinal: Negative for abdominal pain, diarrhea, nausea and vomiting. Genitourinary: Negative for dysuria and flank pain. Skin: Positive for color change and wound. Neurological: Negative for seizures, syncope and headaches. Positives and Pertinent negatives as per HPI. Except as noted above in the ROS, problem specific ROS was completed and is negative. Physical Exam:  Physical Exam  Vitals signs and nursing note reviewed. Constitutional:       General: He is not in acute distress. Appearance: He is well-developed. He is not diaphoretic. HENT:      Head: Normocephalic and atraumatic. Right Ear: External ear normal.      Left Ear: External ear normal.   Eyes:      General: No scleral icterus. Right eye: No discharge. Left eye: No discharge. Conjunctiva/sclera: Conjunctivae normal.   Neck:      Musculoskeletal: Normal range of motion. Vascular: No JVD. Cardiovascular:      Rate and Rhythm: Normal rate and regular rhythm. Heart sounds: No murmur. No friction rub. No gallop. Pulmonary:      Effort: Pulmonary effort is normal. No accessory muscle usage or respiratory distress. Breath sounds: Normal breath sounds. No wheezing, rhonchi or rales. Skin:     General: Skin is warm and dry. Neurological:      Mental Status: He is alert and oriented to person, place, and time.       GCS: GCS eye

## 2020-02-18 ENCOUNTER — HOSPITAL ENCOUNTER (OUTPATIENT)
Dept: MRI IMAGING | Age: 65
Discharge: HOME | End: 2020-02-18
Payer: MEDICARE

## 2020-02-18 PROCEDURE — 73221 MRI JOINT UPR EXTREM W/O DYE: CPT

## 2020-06-11 ENCOUNTER — TELEPHONE (OUTPATIENT)
Dept: CARDIOLOGY CLINIC | Age: 65
End: 2020-06-11

## 2020-06-22 ENCOUNTER — OFFICE VISIT (OUTPATIENT)
Dept: CARDIOLOGY CLINIC | Age: 65
End: 2020-06-22
Payer: MEDICAID

## 2020-06-22 VITALS
BODY MASS INDEX: 31.83 KG/M2 | WEIGHT: 248 LBS | DIASTOLIC BLOOD PRESSURE: 80 MMHG | HEART RATE: 98 BPM | HEIGHT: 74 IN | SYSTOLIC BLOOD PRESSURE: 120 MMHG | OXYGEN SATURATION: 97 %

## 2020-06-22 PROCEDURE — 4004F PT TOBACCO SCREEN RCVD TLK: CPT | Performed by: INTERNAL MEDICINE

## 2020-06-22 PROCEDURE — 99214 OFFICE O/P EST MOD 30 MIN: CPT | Performed by: INTERNAL MEDICINE

## 2020-06-22 PROCEDURE — 3017F COLORECTAL CA SCREEN DOC REV: CPT | Performed by: INTERNAL MEDICINE

## 2020-06-22 PROCEDURE — G8417 CALC BMI ABV UP PARAM F/U: HCPCS | Performed by: INTERNAL MEDICINE

## 2020-06-22 PROCEDURE — G8427 DOCREV CUR MEDS BY ELIG CLIN: HCPCS | Performed by: INTERNAL MEDICINE

## 2020-06-22 PROCEDURE — 93000 ELECTROCARDIOGRAM COMPLETE: CPT | Performed by: INTERNAL MEDICINE

## 2020-06-22 NOTE — LETTER
Physically abused: Not on file     Forced sexual activity: Not on file   Other Topics Concern    Not on file   Social History Narrative    Not on file       Family History:   Family History   Problem Relation Age of Onset    Heart Disease Mother     Diabetes Mother     Heart Disease Father     Diabetes Father     Diabetes Sister     Diabetes Brother     Diabetes Maternal Grandmother     Diabetes Maternal Grandfather     Diabetes Paternal Grandmother     Diabetes Paternal Grandfather      Family history has been reviewed and not pertinent except as noted above. Review of Systems:   · Constitutional: there has been no unanticipated weight loss. No change in energy or activity level   · Eyes: No visual changes   · ENT: No Headaches, hearing loss or vertigo. No mouth sores or sore throat. · Cardiovascular: Reviewed in HPI  · Respiratory: No cough or wheezing, no sputum production. · Gastrointestinal: No abdominal pain, appetite loss, blood in stools. No change in bowel or bladder habits. · Genitourinary: No nocturia, dysuria, trouble voiding  · Musculoskeletal:  No gait disturbance, weakness or joint complaints. · Integumentary: No rash or pruritis. · Neurological: No headache, change in muscle strength, numbness or tingling. No change in gait, balance, coordination, mood, affect, memory, mentation, behavior. · Psychiatric: No anxiety or depression  · Endocrine: No malaise or fever  · Hematologic/Lymphatic: No abnormal bruising or bleeding, blood clots or swollen lymph nodes. · Allergic/Immunologic: No nasal congestion or hives. Physical Examination:    Vitals:    06/22/20 1425   BP: 120/80   Site: Left Upper Arm   Position: Sitting   Cuff Size: Medium Adult   Pulse: 98   SpO2: 97%   Weight: 248 lb (112.5 kg)   Height: 6' 2\" (1.88 m)     Body mass index is 31.84 kg/m².      Wt Readings from Last 3 Encounters:   06/22/20 248 lb (112.5 kg)   02/09/20 265 lb (120.2 kg) 02/07/20 265 lb 6.4 oz (120.4 kg)      BP Readings from Last 3 Encounters:   06/22/20 120/80   02/09/20 (!) 171/103   02/07/20 (!) 154/90      Physical Examination:    · CONSTITUTIONAL: Well developed, well nourished  · EYES: PERRLA. No xanthelasma, sclera non icteric  · EARS,NOSE,MOUTH,THROAT:  Mucous membranes moist, normal hearing  · NECK: Supple, JVP normal, thyroid not enlarged. Carotids 2+ without bruits  · RESPIRATORY: Normal effort, no rales or rhonchi  · CARDIOVASCULAR: Normal PMI, regular rate and rhythm, no murmurs, rub or gallop. No edema. Radial pulses present and equal  · CHEST: No scar or masses  · ABDOMEN: Normal bowel sounds. No masses or tenderness. No bruit  · MUSCULOSKELETAL: No clubbing or cyanosis. Moves all extremities well. Normal gait  · SKIN:  Warm and dry. No rashes. Large raised area left mid back  · NEUROLOGIC: Cranial nerves intact. Alert and oriented  · PSYCHIATRIC: Calm affect. Appears to have normal judgement and insight    All testing and labs listed below were personally reviewed by myself. [facility] 5/9/2012 (Dr. Jayson Butts)  EF 50%  99% midLAD -- stent   50% midLAD  20% prox LAD    Assessment:    Preop CV Exam  Shoulder Surgery  Stable cardiac status -- may proceed with surgery. Low risk for low risk surgery. Please continue aspirin uninterrupted. CAD  Stable without anginal symptoms  S/p PCI LAD in 2012  Cont aspirin, statin. Hypertension  Stable  Blood pressure 120/80, pulse 98, height 6' 2\" (1.88 m), weight 248 lb (112.5 kg), SpO2 97 %. Hyperlipidemia  Managed per PCP  Taking Lipitor 40mg    Atrial fib/flutter  S/p a flutter/SVT ablation 2016  H/o non-compliance with asa therapy    Tobacco use  Encouraged to quit    Orders Placed This Encounter   Procedures    EKG 12 lead     Plan:  Continue all medications  Low risk for procedure. Follow up in 1 year    Prashant Beverly MD    Thank you for allowing to me to participate in the care of Danielle García.

## 2020-07-30 ENCOUNTER — HOSPITAL ENCOUNTER (OUTPATIENT)
Dept: CT IMAGING | Age: 65
Discharge: HOME | End: 2020-07-30
Payer: MEDICAID

## 2020-07-30 PROCEDURE — 72131 CT LUMBAR SPINE W/O DYE: CPT

## 2020-08-05 NOTE — PROGRESS NOTES
MG extended release tablet Take 50 mg by mouth daily      atorvastatin (LIPITOR) 40 MG tablet Take 1 tablet by mouth daily. 30 tablet 5    lisinopril-hydrochlorothiazide (PRINZIDE;ZESTORETIC) 20-12.5 MG per tablet Take 1 tablet by mouth daily. 30 tablet 0     No current facility-administered medications for this visit. Allergies:  Patient has no known allergies. Social History:  Social History     Socioeconomic History    Marital status: Single     Spouse name: Not on file    Number of children: Not on file    Years of education: Not on file    Highest education level: Not on file   Occupational History    Not on file   Social Needs    Financial resource strain: Not on file    Food insecurity     Worry: Not on file     Inability: Not on file    Transportation needs     Medical: Not on file     Non-medical: Not on file   Tobacco Use    Smoking status: Current Every Day Smoker     Packs/day: 1.00     Types: Cigarettes    Smokeless tobacco: Never Used   Substance and Sexual Activity    Alcohol use:  Yes     Alcohol/week: 20.0 standard drinks     Types: 20 Cans of beer per week     Comment: occasionally    Drug use: No    Sexual activity: Never   Lifestyle    Physical activity     Days per week: Not on file     Minutes per session: Not on file    Stress: Not on file   Relationships    Social connections     Talks on phone: Not on file     Gets together: Not on file     Attends Jain service: Not on file     Active member of club or organization: Not on file     Attends meetings of clubs or organizations: Not on file     Relationship status: Not on file    Intimate partner violence     Fear of current or ex partner: Not on file     Emotionally abused: Not on file     Physically abused: Not on file     Forced sexual activity: Not on file   Other Topics Concern    Not on file   Social History Narrative    Not on file       Family History:   Family History   Problem Relation Age of Onset  Heart Disease Mother     Diabetes Mother     Heart Disease Father     Diabetes Father     Diabetes Sister     Diabetes Brother     Diabetes Maternal Grandmother     Diabetes Maternal Grandfather     Diabetes Paternal Grandmother     Diabetes Paternal Grandfather      Family history has been reviewed and not pertinent except as noted above. Review of Systems:   · Constitutional: there has been no unanticipated weight loss. No change in energy or activity level   · Eyes: No visual changes   · ENT: No Headaches, hearing loss or vertigo. No mouth sores or sore throat. · Cardiovascular: Reviewed in HPI  · Respiratory: No cough or wheezing, no sputum production. · Gastrointestinal: No abdominal pain, appetite loss, blood in stools. No change in bowel or bladder habits. · Genitourinary: No nocturia, dysuria, trouble voiding  · Musculoskeletal:  No gait disturbance, weakness or joint complaints. · Integumentary: No rash or pruritis. · Neurological: No headache, change in muscle strength, numbness or tingling. No change in gait, balance, coordination, mood, affect, memory, mentation, behavior. · Psychiatric: No anxiety or depression  · Endocrine: No malaise or fever  · Hematologic/Lymphatic: No abnormal bruising or bleeding, blood clots or swollen lymph nodes. · Allergic/Immunologic: No nasal congestion or hives. Physical Examination:    Vitals:    08/07/20 0851   BP: 124/82   Site: Right Upper Arm   Position: Sitting   Cuff Size: Large Adult   Pulse: 80   SpO2: 97%   Weight: 244 lb (110.7 kg)   Height: 6' 2\" (1.88 m)     Body mass index is 31.33 kg/m². Wt Readings from Last 3 Encounters:   08/07/20 244 lb (110.7 kg)   06/22/20 248 lb (112.5 kg)   02/09/20 265 lb (120.2 kg)      BP Readings from Last 3 Encounters:   08/07/20 124/82   06/22/20 120/80   02/09/20 (!) 171/103      Physical Examination:    · CONSTITUTIONAL: Well developed, well nourished  · EYES: PERRLA.  No xanthelasma, sclera non icteric  · EARS,NOSE,MOUTH,THROAT:  Mucous membranes moist, normal hearing  · NECK: Supple, JVP normal, thyroid not enlarged. Carotids 2+ without bruits  · RESPIRATORY: Normal effort, no rales or rhonchi  · CARDIOVASCULAR: Normal PMI, regular rate and rhythm, no murmurs, rub or gallop. No edema. Radial pulses present and equal  · CHEST: No scar or masses  · ABDOMEN: Normal bowel sounds. No masses or tenderness. No bruit  · MUSCULOSKELETAL: No clubbing or cyanosis. Moves all extremities well. Normal gait  · SKIN:  Warm and dry. No rashes. · NEUROLOGIC: Cranial nerves intact. Alert and oriented  · PSYCHIATRIC: Calm affect. Appears to have normal judgement and insight    All testing and labs listed below were personally reviewed by myself. TTE 7/21/20 ([facility])  Summary:  Overall left ventricular ejection fraction is estimated to be 55-60%. There is  moderate concentric left ventricular hypertrophy. The left ventricular wall  motion is normal.  The mitral valve leaflets are mildly thickened in appearance. There is trace  mitral regurgitation. Aortic Valve leaflets appear thickened. The Aortic Valve is mildly calcified. C 5/9/2012 (Dr. Ana Brown)  EF 50%  99% midLAD -- stent   50% midLAD  20% prox LAD    Assessment:    Atrial fib/flutter  S/p a flutter/SVT ablation 2016 by Dr. Miguel Gallardo  H/o non-compliance with asa therapy  [id number] score 3, hx of CAD, HTN, DM   Afib reoccurrence post op. Now in NSR. On Eliquis, toprol xl for stroke prevention, rate control. Denies bleeding concerns but wants to stop eliquis. 30 day MCOT to evaluate afib burden    CAD  Stable without anginal symptoms  S/p PCI LAD in 2012  Cont aspirin, statin. Hypertension  Stable on current regimen. Blood pressure 124/82, pulse 80, height 6' 2\" (1.88 m), weight 244 lb (110.7 kg), SpO2 97 %.     Hyperlipidemia  Managed per PCP  Taking Lipitor 40mg    Tobacco use  Encouraged to quit    Plan:  30 day event monitor  No medication changes- continue Eliquis at this time  Can consider stopping Eliquis if no afib recurrence on 30 day monitor. If there is afib, cont eliquis and refer to EP  Follow up in 6 months          Orders Placed This Encounter   Procedures    Cardiac event monitor    EKG 12 lead     Ree Parekh MD    Thank you for allowing to me to participate in the care of Kirk Castaneda. Scribe's Attestation: This note was scribed in the presence of Dr. Lay Mosher MD by Maria Dolores Crespo RN.    I, Dr. Rebecca Seaman, personally performed the services described in this documentation, as scribed by the above signed scribe in my presence. It is both accurate and complete to my knowledge. I agree with the details independently gathered by the clinical support staff, while the remaining scribed note accurately describes my personal service to the patient.

## 2020-08-06 ENCOUNTER — HOSPITAL ENCOUNTER (OUTPATIENT)
Dept: MRI IMAGING | Age: 65
Discharge: HOME | End: 2020-08-06
Payer: MEDICAID

## 2020-08-06 PROCEDURE — 72148 MRI LUMBAR SPINE W/O DYE: CPT

## 2020-08-07 ENCOUNTER — OFFICE VISIT (OUTPATIENT)
Dept: CARDIOLOGY CLINIC | Age: 65
End: 2020-08-07
Payer: MEDICAID

## 2020-08-07 VITALS
DIASTOLIC BLOOD PRESSURE: 82 MMHG | SYSTOLIC BLOOD PRESSURE: 124 MMHG | WEIGHT: 244 LBS | OXYGEN SATURATION: 97 % | HEART RATE: 80 BPM | HEIGHT: 74 IN | BODY MASS INDEX: 31.32 KG/M2

## 2020-08-07 PROCEDURE — 93000 ELECTROCARDIOGRAM COMPLETE: CPT | Performed by: INTERNAL MEDICINE

## 2020-08-07 PROCEDURE — 99214 OFFICE O/P EST MOD 30 MIN: CPT | Performed by: INTERNAL MEDICINE

## 2020-08-07 PROCEDURE — G8427 DOCREV CUR MEDS BY ELIG CLIN: HCPCS | Performed by: INTERNAL MEDICINE

## 2020-08-07 PROCEDURE — G8417 CALC BMI ABV UP PARAM F/U: HCPCS | Performed by: INTERNAL MEDICINE

## 2020-08-07 PROCEDURE — 3017F COLORECTAL CA SCREEN DOC REV: CPT | Performed by: INTERNAL MEDICINE

## 2020-08-07 PROCEDURE — 4004F PT TOBACCO SCREEN RCVD TLK: CPT | Performed by: INTERNAL MEDICINE

## 2020-08-07 NOTE — LETTER
[address] Cardiology [place] [address] [address] [address]  Phone: [phone]  Fax: [phone]    Stan Adamson MD        August 7, 2020     [address] [place] [address] Drive  [place] [id number]    Patient: Dickson Meyer  MR Number: [id number]  YOB: 1955  Date of Visit: 8/7/2020    Dear  [facility] [address]:    Below are the relevant portions of my assessment and plan of care. [facility]   Cardiac Evaluation      Patient: Dickson Meyer  YOB: 1955       Chief Complaint   Patient presents with    Coronary Artery Disease     discuss afib and medication    Atrial Fibrillation    Follow-up      Referring provider: [address] [place] [address]    History of Present Illness:  Mr. Migdalia Kang is a former patient of Dr. Samantha Molina with a history of CAD (stent 2012 -- had back pain), HTN, HLD, A. Fib/flutter. At the last visit, he required a cardiology evaluation prior to shoulder surgery this past July. He is a smoker. Today, here is here in hospital follow up after developing Afib RVR post op after his right shoulder arthroplasty at [facility]. He was started on Eliquis and rate controlled with beta blocker. He is not sure how long he remained in afib. He can feel when he goes into afib- becomes short of breath and fatigued. He has felt good since admission and states his shoulder is doing better. Believes he has not been in afib since surgery. He denies chest pain, shortness of breath, palpitations, or dizziness. He does plan to have back surgery in the future. With regard to medication therapy he/she has been compliant with prescribed regimen and has tolerated therapy to date. Past Medical History:   has a past medical history of Arthritis, Atrial fibrillation ([address] CAD (coronary artery disease), Hyperlipidemia, and Hypertension.     Surgical History: has a past surgical history that includes back surgery (200) and Coronary angioplasty with stent (2012). Current Outpatient Medications   Medication Sig Dispense Refill    apixaban (ELIQUIS) 5 MG TABS tablet Take 5 mg by mouth 2 times daily      HYDROcodone-acetaminophen (NORCO) 5-325 MG per tablet Take 1 tablet by mouth every 6 hours as needed for Pain.  metFORMIN (GLUCOPHAGE-XR) 500 MG extended release tablet Take 500 mg by mouth daily (with breakfast)       metoprolol succinate (TOPROL XL) 50 MG extended release tablet Take 50 mg by mouth daily      atorvastatin (LIPITOR) 40 MG tablet Take 1 tablet by mouth daily. 30 tablet 5    lisinopril-hydrochlorothiazide (PRINZIDE;ZESTORETIC) 20-12.5 MG per tablet Take 1 tablet by mouth daily. 30 tablet 0     No current facility-administered medications for this visit. Allergies:  Patient has no known allergies. Social History:  Social History     Socioeconomic History    Marital status: Single     Spouse name: Not on file    Number of children: Not on file    Years of education: Not on file    Highest education level: Not on file   Occupational History    Not on file   Social Needs    Financial resource strain: Not on file    Food insecurity     Worry: Not on file     Inability: Not on file    Transportation needs     Medical: Not on file     Non-medical: Not on file   Tobacco Use    Smoking status: Current Every Day Smoker     Packs/day: 1.00     Types: Cigarettes    Smokeless tobacco: Never Used   Substance and Sexual Activity    Alcohol use:  Yes     Alcohol/week: 20.0 standard drinks     Types: 20 Cans of beer per week     Comment: occasionally    Drug use: No    Sexual activity: Never   Lifestyle    Physical activity     Days per week: Not on file     Minutes per session: Not on file    Stress: Not on file   Relationships    Social connections     Talks on phone: Not on file     Gets together: Not on file Attends Bahai service: Not on file     Active member of club or organization: Not on file     Attends meetings of clubs or organizations: Not on file     Relationship status: Not on file    Intimate partner violence     Fear of current or ex partner: Not on file     Emotionally abused: Not on file     Physically abused: Not on file     Forced sexual activity: Not on file   Other Topics Concern    Not on file   Social History Narrative    Not on file       Family History:   Family History   Problem Relation Age of Onset    Heart Disease Mother     Diabetes Mother     Heart Disease Father     Diabetes Father     Diabetes Sister     Diabetes Brother     Diabetes Maternal Grandmother     Diabetes Maternal Grandfather     Diabetes Paternal Grandmother     Diabetes Paternal Grandfather      Family history has been reviewed and not pertinent except as noted above. Review of Systems:   · Constitutional: there has been no unanticipated weight loss. No change in energy or activity level   · Eyes: No visual changes   · ENT: No Headaches, hearing loss or vertigo. No mouth sores or sore throat. · Cardiovascular: Reviewed in HPI  · Respiratory: No cough or wheezing, no sputum production. · Gastrointestinal: No abdominal pain, appetite loss, blood in stools. No change in bowel or bladder habits. · Genitourinary: No nocturia, dysuria, trouble voiding  · Musculoskeletal:  No gait disturbance, weakness or joint complaints. · Integumentary: No rash or pruritis. · Neurological: No headache, change in muscle strength, numbness or tingling. No change in gait, balance, coordination, mood, affect, memory, mentation, behavior. · Psychiatric: No anxiety or depression  · Endocrine: No malaise or fever  · Hematologic/Lymphatic: No abnormal bruising or bleeding, blood clots or swollen lymph nodes. · Allergic/Immunologic: No nasal congestion or hives.     Physical Examination:    Vitals:    08/07/20 5341 BP: 124/82   Site: Right Upper Arm   Position: Sitting   Cuff Size: Large Adult   Pulse: 80   SpO2: 97%   Weight: 244 lb (110.7 kg)   Height: 6' 2\" (1.88 m)     Body mass index is 31.33 kg/m². Wt Readings from Last 3 Encounters:   08/07/20 244 lb (110.7 kg)   06/22/20 248 lb (112.5 kg)   02/09/20 265 lb (120.2 kg)      BP Readings from Last 3 Encounters:   08/07/20 124/82   06/22/20 120/80   02/09/20 (!) 171/103      Physical Examination:    · CONSTITUTIONAL: Well developed, well nourished  · EYES: PERRLA. No xanthelasma, sclera non icteric  · EARS,NOSE,MOUTH,THROAT:  Mucous membranes moist, normal hearing  · NECK: Supple, JVP normal, thyroid not enlarged. Carotids 2+ without bruits  · RESPIRATORY: Normal effort, no rales or rhonchi  · CARDIOVASCULAR: Normal PMI, regular rate and rhythm, no murmurs, rub or gallop. No edema. Radial pulses present and equal  · CHEST: No scar or masses  · ABDOMEN: Normal bowel sounds. No masses or tenderness. No bruit  · MUSCULOSKELETAL: No clubbing or cyanosis. Moves all extremities well. Normal gait  · SKIN:  Warm and dry. No rashes. · NEUROLOGIC: Cranial nerves intact. Alert and oriented  · PSYCHIATRIC: Calm affect. Appears to have normal judgement and insight    All testing and labs listed below were personally reviewed by myself. TTE 7/21/20 ([facility])  Summary:  Overall left ventricular ejection fraction is estimated to be 55-60%. There is  moderate concentric left ventricular hypertrophy. The left ventricular wall  motion is normal.  The mitral valve leaflets are mildly thickened in appearance. There is trace  mitral regurgitation. Aortic Valve leaflets appear thickened. The Aortic Valve is mildly calcified.     [facility] 5/9/2012 (Dr. Kike Bertrand)  EF 50%  99% midLAD -- stent   50% midLAD  20% prox LAD    Assessment:    Atrial fib/flutter  S/p a flutter/SVT ablation 2016 by Dr. Indra Almaguer  H/o non-compliance with asa therapy  BIPPN3TFAT score 3, hx of CAD, HTN, DM Afib reoccurrence post op. Now in NSR. On Eliquis, toprol xl for stroke prevention, rate control. Denies bleeding concerns but wants to stop eliquis. 30 day MCOT to evaluate afib burden    CAD  Stable without anginal symptoms  S/p PCI LAD in 2012  Cont aspirin, statin. Hypertension  Stable on current regimen. Blood pressure 124/82, pulse 80, height 6' 2\" (1.88 m), weight 244 lb (110.7 kg), SpO2 97 %. Hyperlipidemia  Managed per PCP  Taking Lipitor 40mg    Tobacco use  Encouraged to quit    Plan:  30 day event monitor  No medication changes- continue Eliquis at this time  Can consider stopping Eliquis if no afib recurrence on 30 day monitor. If there is afib, cont eliquis and refer to EP  Follow up in 6 months          Orders Placed This Encounter   Procedures    Cardiac event monitor    EKG 12 lead     Williams Hanson MD    Thank you for allowing to me to participate in the care of Zbigniew Saldaña. Scribe's Attestation: This note was scribed in the presence of Dr. Raad Matute MD by Vreonica Pereira RN.    I, Dr. Nayana Sepulveda, personally performed the services described in this documentation, as scribed by the above signed scribe in my presence. It is both accurate and complete to my knowledge. I agree with the details independently gathered by the clinical support staff, while the remaining scribed note accurately describes my personal service to the patient. If you have questions, please do not hesitate to call me. I look forward to following Nadege Sampson along with you.     Sincerely,        Nayana Sepulveda MD

## 2020-08-07 NOTE — PATIENT INSTRUCTIONS
30 day event monitor  No medication changes- continue Eliquis at this time  Can consider stopping Eliquis if no afib recurrence   Follow up in 6 months no

## 2020-08-17 PROCEDURE — 93228 REMOTE 30 DAY ECG REV/REPORT: CPT | Performed by: INTERNAL MEDICINE

## 2020-09-17 PROCEDURE — 93228 REMOTE 30 DAY ECG REV/REPORT: CPT | Performed by: INTERNAL MEDICINE

## 2020-09-18 ENCOUNTER — TELEPHONE (OUTPATIENT)
Dept: CARDIOLOGY CLINIC | Age: 65
End: 2020-09-18

## 2021-01-31 ENCOUNTER — APPOINTMENT (OUTPATIENT)
Dept: GENERAL RADIOLOGY | Age: 66
DRG: 247 | End: 2021-01-31
Payer: MEDICAID

## 2021-01-31 ENCOUNTER — HOSPITAL ENCOUNTER (INPATIENT)
Age: 66
LOS: 1 days | Discharge: HOME | DRG: 247 | End: 2021-02-02
Attending: EMERGENCY MEDICINE | Admitting: INTERNAL MEDICINE
Payer: MEDICAID

## 2021-01-31 DIAGNOSIS — I24.9 ACS (ACUTE CORONARY SYNDROME) (HCC): Primary | ICD-10-CM

## 2021-01-31 PROBLEM — R07.9 CHEST PAIN: Status: ACTIVE | Noted: 2021-01-31

## 2021-01-31 LAB
ANION GAP SERPL CALCULATED.3IONS-SCNC: 13 MMOL/L (ref 3–16)
BASOPHILS ABSOLUTE: 0 K/UL (ref 0–0.2)
BASOPHILS RELATIVE PERCENT: 0.9 %
BUN BLDV-MCNC: 9 MG/DL (ref 7–20)
CALCIUM SERPL-MCNC: 8.2 MG/DL (ref 8.3–10.6)
CHLORIDE BLD-SCNC: 97 MMOL/L (ref 99–110)
CO2: 22 MMOL/L (ref 21–32)
CREAT SERPL-MCNC: 0.7 MG/DL (ref 0.8–1.3)
EKG ATRIAL RATE: 66 BPM
EKG DIAGNOSIS: NORMAL
EKG P AXIS: 50 DEGREES
EKG P-R INTERVAL: 208 MS
EKG Q-T INTERVAL: 396 MS
EKG QRS DURATION: 88 MS
EKG QTC CALCULATION (BAZETT): 415 MS
EKG R AXIS: -21 DEGREES
EKG T AXIS: 83 DEGREES
EKG VENTRICULAR RATE: 66 BPM
EOSINOPHILS ABSOLUTE: 0 K/UL (ref 0–0.6)
EOSINOPHILS RELATIVE PERCENT: 1.3 %
GFR AFRICAN AMERICAN: >60
GFR NON-AFRICAN AMERICAN: >60
GLUCOSE BLD-MCNC: 77 MG/DL (ref 70–99)
HCT VFR BLD CALC: 42.8 % (ref 40.5–52.5)
HEMOGLOBIN: 14 G/DL (ref 13.5–17.5)
LYMPHOCYTES ABSOLUTE: 0.9 K/UL (ref 1–5.1)
LYMPHOCYTES RELATIVE PERCENT: 29.1 %
MCH RBC QN AUTO: 31.9 PG (ref 26–34)
MCHC RBC AUTO-ENTMCNC: 32.7 G/DL (ref 31–36)
MCV RBC AUTO: 97.6 FL (ref 80–100)
MONOCYTES ABSOLUTE: 0.3 K/UL (ref 0–1.3)
MONOCYTES RELATIVE PERCENT: 9.6 %
NEUTROPHILS ABSOLUTE: 1.8 K/UL (ref 1.7–7.7)
NEUTROPHILS RELATIVE PERCENT: 59.1 %
PDW BLD-RTO: 16 % (ref 12.4–15.4)
PLATELET # BLD: 408 K/UL (ref 135–450)
PMV BLD AUTO: 6.4 FL (ref 5–10.5)
POTASSIUM SERPL-SCNC: 4 MMOL/L (ref 3.5–5.1)
RBC # BLD: 4.39 M/UL (ref 4.2–5.9)
SODIUM BLD-SCNC: 132 MMOL/L (ref 136–145)
TROPONIN: <0.01 NG/ML
WBC # BLD: 3 K/UL (ref 4–11)

## 2021-01-31 PROCEDURE — 84484 ASSAY OF TROPONIN QUANT: CPT

## 2021-01-31 PROCEDURE — 93010 ELECTROCARDIOGRAM REPORT: CPT | Performed by: INTERNAL MEDICINE

## 2021-01-31 PROCEDURE — 85025 COMPLETE CBC W/AUTO DIFF WBC: CPT

## 2021-01-31 PROCEDURE — G0378 HOSPITAL OBSERVATION PER HR: HCPCS

## 2021-01-31 PROCEDURE — 6370000000 HC RX 637 (ALT 250 FOR IP): Performed by: INTERNAL MEDICINE

## 2021-01-31 PROCEDURE — 93005 ELECTROCARDIOGRAM TRACING: CPT | Performed by: EMERGENCY MEDICINE

## 2021-01-31 PROCEDURE — 99284 EMERGENCY DEPT VISIT MOD MDM: CPT

## 2021-01-31 PROCEDURE — 99215 OFFICE O/P EST HI 40 MIN: CPT | Performed by: INTERNAL MEDICINE

## 2021-01-31 PROCEDURE — 94760 N-INVAS EAR/PLS OXIMETRY 1: CPT

## 2021-01-31 PROCEDURE — 85730 THROMBOPLASTIN TIME PARTIAL: CPT

## 2021-01-31 PROCEDURE — 80048 BASIC METABOLIC PNL TOTAL CA: CPT

## 2021-01-31 PROCEDURE — 71046 X-RAY EXAM CHEST 2 VIEWS: CPT

## 2021-01-31 PROCEDURE — 2580000003 HC RX 258: Performed by: INTERNAL MEDICINE

## 2021-01-31 PROCEDURE — 6360000002 HC RX W HCPCS: Performed by: INTERNAL MEDICINE

## 2021-01-31 PROCEDURE — 96365 THER/PROPH/DIAG IV INF INIT: CPT

## 2021-01-31 RX ORDER — ACETAMINOPHEN 325 MG/1
650 TABLET ORAL EVERY 6 HOURS PRN
Status: DISCONTINUED | OUTPATIENT
Start: 2021-01-31 | End: 2021-02-01

## 2021-01-31 RX ORDER — POLYETHYLENE GLYCOL 3350 17 G/17G
17 POWDER, FOR SOLUTION ORAL DAILY PRN
Status: DISCONTINUED | OUTPATIENT
Start: 2021-01-31 | End: 2021-02-02 | Stop reason: HOSPADM

## 2021-01-31 RX ORDER — SODIUM CHLORIDE 0.9 % (FLUSH) 0.9 %
10 SYRINGE (ML) INJECTION EVERY 12 HOURS SCHEDULED
Status: DISCONTINUED | OUTPATIENT
Start: 2021-01-31 | End: 2021-02-02 | Stop reason: HOSPADM

## 2021-01-31 RX ORDER — HYDROCHLOROTHIAZIDE 25 MG/1
12.5 TABLET ORAL DAILY
Status: DISCONTINUED | OUTPATIENT
Start: 2021-01-31 | End: 2021-02-02

## 2021-01-31 RX ORDER — ONDANSETRON 2 MG/ML
4 INJECTION INTRAMUSCULAR; INTRAVENOUS EVERY 6 HOURS PRN
Status: DISCONTINUED | OUTPATIENT
Start: 2021-01-31 | End: 2021-02-01

## 2021-01-31 RX ORDER — ACETAMINOPHEN 650 MG/1
650 SUPPOSITORY RECTAL EVERY 6 HOURS PRN
Status: DISCONTINUED | OUTPATIENT
Start: 2021-01-31 | End: 2021-02-02 | Stop reason: HOSPADM

## 2021-01-31 RX ORDER — LISINOPRIL AND HYDROCHLOROTHIAZIDE 20; 12.5 MG/1; MG/1
1 TABLET ORAL DAILY
Status: DISCONTINUED | OUTPATIENT
Start: 2021-01-31 | End: 2021-01-31

## 2021-01-31 RX ORDER — PROMETHAZINE HYDROCHLORIDE 25 MG/1
12.5 TABLET ORAL EVERY 6 HOURS PRN
Status: DISCONTINUED | OUTPATIENT
Start: 2021-01-31 | End: 2021-02-02 | Stop reason: HOSPADM

## 2021-01-31 RX ORDER — NICOTINE 21 MG/24HR
1 PATCH, TRANSDERMAL 24 HOURS TRANSDERMAL DAILY
Status: DISCONTINUED | OUTPATIENT
Start: 2021-01-31 | End: 2021-02-02 | Stop reason: HOSPADM

## 2021-01-31 RX ORDER — LISINOPRIL 20 MG/1
20 TABLET ORAL DAILY
Status: DISCONTINUED | OUTPATIENT
Start: 2021-01-31 | End: 2021-02-02 | Stop reason: HOSPADM

## 2021-01-31 RX ORDER — SODIUM CHLORIDE 0.9 % (FLUSH) 0.9 %
10 SYRINGE (ML) INJECTION PRN
Status: DISCONTINUED | OUTPATIENT
Start: 2021-01-31 | End: 2021-02-02 | Stop reason: HOSPADM

## 2021-01-31 RX ORDER — HEPARIN SODIUM 10000 [USP'U]/100ML
573-1000 INJECTION, SOLUTION INTRAVENOUS CONTINUOUS
Status: DISCONTINUED | OUTPATIENT
Start: 2021-01-31 | End: 2021-02-02

## 2021-01-31 RX ORDER — ASPIRIN 81 MG/1
81 TABLET, CHEWABLE ORAL DAILY
Status: DISCONTINUED | OUTPATIENT
Start: 2021-02-01 | End: 2021-02-02 | Stop reason: HOSPADM

## 2021-01-31 RX ORDER — METOPROLOL SUCCINATE 50 MG/1
50 TABLET, EXTENDED RELEASE ORAL DAILY
Status: DISCONTINUED | OUTPATIENT
Start: 2021-01-31 | End: 2021-02-02 | Stop reason: HOSPADM

## 2021-01-31 RX ORDER — HEPARIN SODIUM 1000 [USP'U]/ML
4000 INJECTION, SOLUTION INTRAVENOUS; SUBCUTANEOUS ONCE
Status: COMPLETED | OUTPATIENT
Start: 2021-01-31 | End: 2021-01-31

## 2021-01-31 RX ORDER — HEPARIN SODIUM 1000 [USP'U]/ML
2000 INJECTION, SOLUTION INTRAVENOUS; SUBCUTANEOUS PRN
Status: DISCONTINUED | OUTPATIENT
Start: 2021-01-31 | End: 2021-02-02

## 2021-01-31 RX ORDER — ATORVASTATIN CALCIUM 80 MG/1
40 TABLET, FILM COATED ORAL DAILY
Status: DISCONTINUED | OUTPATIENT
Start: 2021-02-01 | End: 2021-02-02 | Stop reason: HOSPADM

## 2021-01-31 RX ORDER — ASPIRIN 81 MG/1
81 TABLET, CHEWABLE ORAL DAILY
Status: DISCONTINUED | OUTPATIENT
Start: 2021-01-31 | End: 2021-01-31

## 2021-01-31 RX ORDER — HEPARIN SODIUM 1000 [USP'U]/ML
4000 INJECTION, SOLUTION INTRAVENOUS; SUBCUTANEOUS PRN
Status: DISCONTINUED | OUTPATIENT
Start: 2021-01-31 | End: 2021-02-02

## 2021-01-31 RX ADMIN — HEPARIN SODIUM 4000 UNITS: 1000 INJECTION INTRAVENOUS; SUBCUTANEOUS at 23:50

## 2021-01-31 RX ADMIN — HEPARIN SODIUM 12 UNITS/KG/HR: 10000 INJECTION, SOLUTION INTRAVENOUS at 23:50

## 2021-01-31 RX ADMIN — Medication 10 ML: at 20:20

## 2021-01-31 ASSESSMENT — PAIN SCALES - GENERAL
PAINLEVEL_OUTOF10: 0
PAINLEVEL_OUTOF10: 0

## 2021-01-31 NOTE — ED NOTES
Bed: 07  Expected date:   Expected time:   Means of arrival: [facility]  Comments:   [address] [facility]  01/31/21 9493

## 2021-01-31 NOTE — H&P
HOSPITALISTS HISTORY AND PHYSICAL    1/31/2021 11:28 AM    Patient Information:  Cayla Hernández is a 63 y.o. male [id number]  PCP:  Riana Tran (Tel: [phone] )    Chief complaint:    Chief Complaint   Patient presents with    Chest Pain     Pt brought in by [facility] from home. Pt reports being woke up this morning with 7/10 left sided chest pain. Per squad 324mg ASA given and nitro SL x2 given. Pt reports some relief from nitro. Also reports that his son is, \"stressing me out\"       History of Present Illness:  Chinmay Alfaro is a 63 y.o. male who 7:38 AM with stressed out about pain rent and had aches in his midsternal chest that was 5 out of 10 did not radiate anywhere was associated with sob lightheaded dizziness shakes and weakness but no sweats or nausea. The patient was able to walk into his house laid in the bed and symptoms did not resolve thus called EMS. EMS rhythm strips, show ST elevation patient was given nitroglycerin aspirin repeat strips reviewed showed resolved ST changes EKG at the hospital did not show any ST elevation this was discussed with cardiology the ED provider. Cardiology recommended observing the patient. The patient had coronary disease with stent to the LAD in 2012 supposed to be on aspirin per last office visit with cardiology states is not taking aspirin anymore is taken off of it. Patient was also told to be on Eliquis for A. fib state is not taking it and anymore although cardiology notes shows patient was on Eliquis    Does drink 2 beers a day and smokes a pack a day          REVIEW OF SYSTEMS:   Constitutional: Negative for fever,chills or night sweats  ENT: Negative for rhinorrhea, epistaxis, hoarseness, sore throat.   Respiratory: see aboive  Cardiovascular: see above   Gastrointestinal: Negative for nausea, vomiting, diarrhea  Genitourinary: and sister; Heart Disease in his father and mother. ,     Physical Exam:  BP (!) 141/82   Pulse 75   Temp 98.2 °F (36.8 °C) (Oral)   Resp 15   Ht 6' 2\" (1.88 m)   Wt 245 lb (111.1 kg)   SpO2 98%   BMI 31.46 kg/m²     General appearance:  Appears comfortable. AAOx3  HEENT: atraumatic, Pupils equal, muscous membranes moist, no masses appreciated  Cardiovascular: Regular rate and rhythm no murmurs appreciated  Respiratory: CTAB no wheezing  Gastrointestinal: Abdomen soft, non-tender, BS+  EXT: no edema  Neurology: no gross focal deficts  Psychiatry: Appropriate affect. Not agitated  Skin: Warm, dry, no rashes appreciated    Labs:  CBC:   Lab Results   Component Value Date    WBC 3.0 01/31/2021    RBC 4.39 01/31/2021    HGB 14.0 01/31/2021    HCT 42.8 01/31/2021    MCV 97.6 01/31/2021    MCH 31.9 01/31/2021    MCHC 32.7 01/31/2021    RDW 16.0 01/31/2021     01/31/2021    MPV 6.4 01/31/2021     BMP:    Lab Results   Component Value Date     01/31/2021    K 4.0 01/31/2021    CL 97 01/31/2021    CO2 22 01/31/2021    BUN 9 01/31/2021    CREATININE 0.7 01/31/2021    CALCIUM 8.2 01/31/2021    GFRAA >60 01/31/2021    GFRAA >60 02/19/2013    LABGLOM >60 01/31/2021    GLUCOSE 77 01/31/2021     XR CHEST (2 VW)   Final Result   No acute abnormality. Findings noted peripherally in the lungs which are similar to previous exam   and may be related to interstitial lung disease. Recent imaging reviewed    Problem List  Active Problems:    Chest pain  Resolved Problems:    * No resolved hospital problems.  *        Assessment/Plan:   Chest  Pain with ekg changes: now resolved  - intital troponin negative, will trend  - cards consult pending    Leukopenia: repeat laps in ab    Chronic afib s/p ablation in 2016, contineu home meds, will continue eliquis although patient states not taking but last note from cardiology states continue will have cards decide outpatient regimen    Cad sp stent 2012, not taking asa at home will restart    htn : home meds  DVT prophylaxis eliquis  Code status full code            Please note that some part of this chart was generated using Dragon dictation software. Although every effort was made to ensure the accuracy of this automated transcription, some errors in transcription may have occurred inadvertently. If you may need any clarification, please do not hesitate to contact me through [facility].        Hudson Dominique MD    1/31/2021 11:28 AM

## 2021-01-31 NOTE — ED NOTES
Report given to Ute RN at bedside, no further questions at this time. Pt alert and oriented and shows no signs of distress at time of report.      Macrina Cannon RN  01/31/21 7723

## 2021-01-31 NOTE — ED PROVIDER NOTES
200    lower lumbar surgery    CORONARY ANGIOPLASTY WITH STENT PLACEMENT  2012     Family History   Problem Relation Age of Onset    Heart Disease Mother     Diabetes Mother     Heart Disease Father     Diabetes Father     Diabetes Sister     Diabetes Brother     Diabetes Maternal Grandmother     Diabetes Maternal Grandfather     Diabetes Paternal Grandmother     Diabetes Paternal Grandfather      Social History     Socioeconomic History    Marital status: Single     Spouse name: Not on file    Number of children: Not on file    Years of education: Not on file    Highest education level: Not on file   Occupational History    Not on file   Social Needs    Financial resource strain: Not on file    Food insecurity     Worry: Not on file     Inability: Not on file   [employer] needs     Medical: Not on file     Non-medical: Not on file   Tobacco Use    Smoking status: Current Every Day Smoker     Packs/day: 1.00     Types: Cigarettes    Smokeless tobacco: Never Used   Substance and Sexual Activity    Alcohol use:  Yes     Alcohol/week: 20.0 standard drinks     Types: 20 Cans of beer per week     Comment: occasionally    Drug use: No    Sexual activity: Never   Lifestyle    Physical activity     Days per week: Not on file     Minutes per session: Not on file    Stress: Not on file   Relationships    Social connections     Talks on phone: Not on file     Gets together: Not on file     Attends [employer] service: Not on file     Active member of club or organization: Not on file     Attends meetings of clubs or organizations: Not on file     Relationship status: Not on file    Intimate partner violence     Fear of current or ex partner: Not on file     Emotionally abused: Not on file     Physically abused: Not on file     Forced sexual activity: Not on file   Other Topics Concern    Not on file   Social History Narrative    Not on file     No current facility-administered medications for this encounter. Current Outpatient Medications   Medication Sig Dispense Refill    aspirin 81 MG chewable tablet Take 1 tablet by mouth daily 30 tablet 3    clopidogrel (PLAVIX) 75 MG tablet Take 1 tablet by mouth daily 30 tablet 3    lisinopril (PRINIVIL;ZESTRIL) 20 MG tablet Take 2 tablets by mouth daily 30 tablet 3    NIFEdipine (PROCARDIA XL) 30 MG extended release tablet Take 1 tablet by mouth daily 90 tablet 0    metFORMIN (GLUCOPHAGE-XR) 500 MG extended release tablet Take 500 mg by mouth daily (with breakfast)       metoprolol succinate (TOPROL XL) 50 MG extended release tablet Take 50 mg by mouth daily      atorvastatin (LIPITOR) 40 MG tablet Take 1 tablet by mouth daily. 30 tablet 5     No Known Allergies    Nursing Notes Reviewed    Physical Exam:  Triage VS:    ED Triage Vitals [01/31/21 0900]   Enc Vitals Group      BP (!) 158/96      Pulse 66      Resp 18      Temp 98.2 °F (36.8 °C)      Temp Source Oral      SpO2 97 %      Weight 245 lb (111.1 kg)      Height 6' 2\" (1.88 m)      Head Circumference       Peak Flow       Pain Score       Pain Loc       Pain Edu? Excl. in [address]? My pulse ox interpretation is - normal    General appearance:  No acute distress. Skin:  Warm. Dry. Eye:  Extraocular movements intact. Ears, nose, mouth and throat:  Oral mucosa moist   Neck:  Trachea midline. Extremity:  No swelling. Normal ROM     Heart:  Regular rate and rhythm, normal S1 & S2, no extra heart sounds. Perfusion:  intact  Respiratory:  Lungs clear to auscultation bilaterally. Respirations nonlabored. Abdominal:  Normal bowel sounds. Soft. Nontender. Non distended. Neurological:  Alert and oriented times 3.              Psychiatric:  Appropriate    I have reviewed and interpreted all of the currently available lab results from this visit (if applicable):  Results for orders placed or performed during the hospital encounter of 58/31/43   Basic Metabolic Panel   Result Value Ref Range    Sodium 132 (L) 136 - 145 mmol/L    Potassium 4.0 3.5 - 5.1 mmol/L    Chloride 97 (L) 99 - 110 mmol/L    CO2 22 21 - 32 mmol/L    Anion Gap 13 3 - 16    Glucose 77 70 - 99 mg/dL    BUN 9 7 - 20 mg/dL    CREATININE 0.7 (L) 0.8 - 1.3 mg/dL    GFR Non-African American >60 >60    GFR African American >60 >60    Calcium 8.2 (L) 8.3 - 10.6 mg/dL   Troponin   Result Value Ref Range    Troponin <0.01 <0.01 ng/mL   CBC Auto Differential   Result Value Ref Range    WBC 3.0 (L) 4.0 - 11.0 K/uL    RBC 4.39 4.20 - 5.90 M/uL    Hemoglobin 14.0 13.5 - 17.5 g/dL    Hematocrit 42.8 40.5 - 52.5 %    MCV 97.6 80.0 - 100.0 fL    MCH 31.9 26.0 - 34.0 pg    MCHC 32.7 31.0 - 36.0 g/dL    RDW 16.0 (H) 12.4 - 15.4 %    Platelets 868 938 - 799 K/uL    MPV 6.4 5.0 - 10.5 fL    Neutrophils % 59.1 %    Lymphocytes % 29.1 %    Monocytes % 9.6 %    Eosinophils % 1.3 %    Basophils % 0.9 %    Neutrophils Absolute 1.8 1.7 - 7.7 K/uL    Lymphocytes Absolute 0.9 (L) 1.0 - 5.1 K/uL    Monocytes Absolute 0.3 0.0 - 1.3 K/uL    Eosinophils Absolute 0.0 0.0 - 0.6 K/uL    Basophils Absolute 0.0 0.0 - 0.2 K/uL   Troponin   Result Value Ref Range    Troponin <0.01 <0.01 ng/mL   Troponin   Result Value Ref Range    Troponin <0.01 <0.01 ng/mL   Troponin   Result Value Ref Range    Troponin <0.01 <0.01 ng/mL   Basic Metabolic Panel w/ Reflex to MG   Result Value Ref Range    Sodium 129 (L) 136 - 145 mmol/L    Potassium reflex Magnesium 4.5 3.5 - 5.1 mmol/L    Chloride 94 (L) 99 - 110 mmol/L    CO2 25 21 - 32 mmol/L    Anion Gap 10 3 - 16    Glucose 96 70 - 99 mg/dL    BUN 14 7 - 20 mg/dL    CREATININE 0.9 0.8 - 1.3 mg/dL    GFR Non-African American >60 >60    GFR African American >60 >60    Calcium 9.1 8.3 - 10.6 mg/dL   CBC auto differential   Result Value Ref Range    WBC 3.2 (L) 4.0 - 11.0 K/uL    RBC 4.09 (L) 4.20 - 5.90 M/uL    Hemoglobin 13.4 (L) 13.5 - 17.5 g/dL    Hematocrit 39.7 (L) 40.5 - 52.5 %    MCV 96.9 80.0 - 100.0 fL    MCH 32.7 26.0 - 34.0 pg    MCHC 33.8 31.0 - 36.0 g/dL    RDW 16.0 (H) 12.4 - 15.4 %    Platelets 982 606 - 374 K/uL    MPV 6.1 5.0 - 10.5 fL    Neutrophils % 53.0 %    Lymphocytes % 34.4 %    Monocytes % 10.1 %    Eosinophils % 1.7 %    Basophils % 0.8 %    Neutrophils Absolute 1.7 1.7 - 7.7 K/uL    Lymphocytes Absolute 1.1 1.0 - 5.1 K/uL    Monocytes Absolute 0.3 0.0 - 1.3 K/uL    Eosinophils Absolute 0.1 0.0 - 0.6 K/uL    Basophils Absolute 0.0 0.0 - 0.2 K/uL   APTT   Result Value Ref Range    aPTT 32.5 24.2 - 36.2 sec   APTT   Result Value Ref Range    aPTT 78.3 (H) 24.2 - 36.2 sec   CBC auto differential   Result Value Ref Range    WBC 2.5 (L) 4.0 - 11.0 K/uL    RBC 3.98 (L) 4.20 - 5.90 M/uL    Hemoglobin 12.6 (L) 13.5 - 17.5 g/dL    Hematocrit 38.4 (L) 40.5 - 52.5 %    MCV 96.6 80.0 - 100.0 fL    MCH 31.8 26.0 - 34.0 pg    MCHC 32.9 31.0 - 36.0 g/dL    RDW 15.9 (H) 12.4 - 15.4 %    Platelets 286 891 - 446 K/uL    MPV 6.6 5.0 - 10.5 fL    Neutrophils % 62.0 %    Lymphocytes % 21.8 %    Monocytes % 13.6 %    Eosinophils % 1.8 %    Basophils % 0.8 %    Neutrophils Absolute 1.6 (L) 1.7 - 7.7 K/uL    Lymphocytes Absolute 0.5 (L) 1.0 - 5.1 K/uL    Monocytes Absolute 0.3 0.0 - 1.3 K/uL    Eosinophils Absolute 0.0 0.0 - 0.6 K/uL    Basophils Absolute 0.0 0.0 - 0.2 K/uL   Basic Metabolic Panel w/ Reflex to MG   Result Value Ref Range    Sodium 129 (L) 136 - 145 mmol/L    Potassium reflex Magnesium 4.1 3.5 - 5.1 mmol/L    Chloride 94 (L) 99 - 110 mmol/L    CO2 25 21 - 32 mmol/L    Anion Gap 10 3 - 16    Glucose 84 70 - 99 mg/dL    BUN 12 7 - 20 mg/dL    CREATININE 0.9 0.8 - 1.3 mg/dL    GFR Non-African American >60 >60    GFR African American >60 >60    Calcium 8.9 8.3 - 10.6 mg/dL   POC ACT-Low Range   Result Value Ref Range    POC ACT  Not Establised sec   POC ACT-Low Range   Result Value Ref Range    POC ACT  Not Establised sec   POC ACT-Low Range   Result Value Ref Range    POC ACT  Not Establised sec POC ACT-Low Range   Result Value Ref Range    POC ACT  Not Establised sec   EKG 12 Lead   Result Value Ref Range    Ventricular Rate 66 BPM    Atrial Rate 66 BPM    P-R Interval 208 ms    QRS Duration 88 ms    Q-T Interval 396 ms    QTc Calculation (Bazett) 415 ms    P Axis 50 degrees    R Axis -21 degrees    T Axis 83 degrees    Diagnosis       Sinus rhythm with Premature atrial complexesAnteroseptal infarct , age undeterminedNonspecific T wave abnormalityAbnormal ECGConfirmed by SHANIA Alba MDERSON ([id number]) on 1/31/2021 2:14:54 PM   EKG 12 Lead   Result Value Ref Range    Ventricular Rate 65 BPM    Atrial Rate 65 BPM    P-R Interval 184 ms    QRS Duration 88 ms    Q-T Interval 398 ms    QTc Calculation (Bazett) 413 ms    P Axis 46 degrees    R Axis -19 degrees    T Axis 75 degrees    Diagnosis       Sinus rhythm with marked sinus arrhythmiaAnteroseptal infarct , age undeterminedAbnormal ECGNo change in ECG dating back to 36Confirmed by CASTRO SHARIF, [address] [facility] ([id number]) on 2/1/2021 10:08:31 AM      Radiographs (if obtained):  Radiologist's Report Reviewed:  Echo Complete    Result Date: 2/1/2021  Transthoracic Echocardiography Report (TTE)  Demographics   Patient Name      Shraddha Fjuul   Date of Study     02/01/2021          Gender              Male   Patient Number    [id number]          Date of Birth       1955   Visit Number      [id number]           Age                 63 year(s)   Accession Number  [id number]          Room Number         3495   Corporate ID      [id number]            Wilian Moran DO  Interpreting        Hayden Bonilla MD,  Physician                             Physician           [facility] [place], [address]  Procedure Type of Study   TTE procedure:ECHOCARDIOGRAM COMPLETE 2D W DOPPLER W COLOR.   Procedure Date Date: 02/01/2021 Start: 02:54 PM Study Location: [facility] - Echo Lab Technical Quality: Adequate visualization Indications:Chest pain. Patient Status: Routine Height: 74 inches Weight: 249.01 pounds BSA: 2.39 m2 BMI: 31.97 kg/m2 BP: 159/95 mmHg  Conclusions   Summary  -Normal left ventricle size and systolic function with an estimated ejection  fraction of 55%. There is moderate to severe concentric left ventricular  hypertrophy. Grade I diastolic dysfunction with normal LV filling pressures. -Mild mitral regurgitation. There is a trivial localized near right ventricle pericardial effusion  noted. Signature   ------------------------------------------------------------------  Electronically signed by Genevieve Delaney MD, [facility] [place], [address]  (Interpreting physician) on 02/01/2021 at 03:49 PM  ------------------------------------------------------------------   Findings   Left Ventricle  Normal left ventricle size and systolic function with an estimated ejection  fraction of 55%. There is moderate to severe concentric left ventricular  hypertrophy. Grade I diastolic dysfunction with normal LV filling pressures. Mitral Valve  The mitral valve normal in structure. Mild mitral regurgitation. Left Atrium  The left atrium is normal in size. Aortic Valve  The aortic valve is structurally normal. There is no significant aortic  valve regurgitation or stenosis. Aorta  The aortic root is normal in size. Right Ventricle  The right ventricle is normal in size and function. Tricuspid Valve  The tricuspid valve is normal in structure. Trivial tricuspid regurgitation. Right Atrium  The right atrial size is normal.   Pulmonic Valve  The pulmonic valve is not well visualized. There is no evidence of pulmonic valve regurgitation or stenosis. Pericardial Effusion  There is a trivial localized near right ventricle pericardial effusion  noted. Pleural Effusion  No pleural effusion. Miscellaneous  The inferior vena cava appears normal in size with normal respiratory  variation. M-Mode/2D Measurements (cm)   LV Diastolic Dimension: 8.91 cm LV Systolic Dimension: 8.26 cm  LV Septum Diastolic: 6.55 cm  LV PW Diastolic: 7.54 cm        AO Root Dimension: 3.1 cm                                  LA Dimension: 3.7 cm                                  LA Area: 20.9 cm2                                  LA volume/Index: 63.8 ml /27 ml/m2  Doppler Measurements                                  MV Peak E-Wave: 53.6 cm/s  LVOT Peak Velocity: 77.6 cm/s  MV Peak A-Wave: 65.1 cm/s                                 MV E/A Ratio: 0.82  TR Velocity:200 cm/s  TR Gradient:16 mmHg   E' Septal Velocity: 5.87 cm/s  E' Lateral Velocity: 6.09 cm/s  Aorta   Aortic Root: 3.1 cm      Xr Chest (2 Vw)    Result Date: 1/31/2021  EXAMINATION: TWO XRAY VIEWS OF THE CHEST 1/31/2021 8:51 am COMPARISON: February 25, 2019 HISTORY: ORDERING SYSTEM PROVIDED HISTORY: cp TECHNOLOGIST PROVIDED HISTORY: Reason for exam:->cp Reason for Exam: chest pain Acuity: Acute Type of Exam: Initial FINDINGS: Cardiac silhouette is normal in size. No pneumothorax. No pleural effusion. No consolidation. No acute bony abnormality. Again noted is somewhat coarsened appearance of interstitial markings peripherally, similar to previous exam.  No acute bony abnormality. No acute abnormality. Findings noted peripherally in the lungs which are similar to previous exam and may be related to interstitial lung disease. EKG (if obtained): (All EKG's are interpreted by myself in the absence of a cardiologist)  Sinus rhythm, normal axis, normal intervals nonspecific ST segment changes    MDM:  Patient presented to the ER for evaluation of her high pretest probability associated cardiac event he had a transient inferior lead ST segment elevation which was possible spasm versus resolved thrombus, he also had a single episode of this in the ER as well which was unable to be captured due to the transient nature of this.   Multiple EKGs were performed his initial troponin was unremarkable consultation with cardiology was performed as well as the admitting hospitalist physician, he was placed on Nitropaste weight-based heparin and will likely undergo cardiac catheterization for evaluation of suspected ACS. Total critical care time provided today was 31 minutes. This excludes seperately billable procedures and family discussion time. Critical care time provided for obtaining history, conducting a physical exam, performing and monitoring interventions, ordering, collecting and interpreting tests, and establishing medical decision-making. There was a potential for life/limb threatening pathology requiring close evaluation and intervention with concern for patient decompensation. Clinical Impression:  1. ACS (acute coronary syndrome) ([facility]      Disposition referral (if applicable):   [address]  [address] [facility] [address]  [phone]    In 1 week  follow up     Osmel Johnson MD  [address] [facility]  [phone]    In 1 week  follow up     Ailyn Nunes DO  [address] [place] [address]  [phone]    In 1 week  follow up     Disposition medications (if applicable):  Discharge Medication List as of 2/2/2021 10:16 AM      START taking these medications    Details   aspirin 81 MG chewable tablet Take 1 tablet by mouth daily, Disp-30 tablet, R-3Normal      clopidogrel (PLAVIX) 75 MG tablet Take 1 tablet by mouth daily, Disp-30 tablet, R-3Normal      lisinopril (PRINIVIL;ZESTRIL) 20 MG tablet Take 2 tablets by mouth daily, Disp-30 tablet, R-3Normal      NIFEdipine (PROCARDIA XL) 30 MG extended release tablet Take 1 tablet by mouth daily, Disp-90 tablet, R-0Normal             Comment: Please note this report has been produced using speech recognition software and may contain errors related to that system including errors in grammar, punctuation, and spelling, as well as words and phrases that may be

## 2021-02-01 LAB
ANION GAP SERPL CALCULATED.3IONS-SCNC: 10 MMOL/L (ref 3–16)
APTT: 32.5 SEC (ref 24.2–36.2)
APTT: 78.3 SEC (ref 24.2–36.2)
BASOPHILS ABSOLUTE: 0 K/UL (ref 0–0.2)
BASOPHILS RELATIVE PERCENT: 0.8 %
BUN BLDV-MCNC: 14 MG/DL (ref 7–20)
CALCIUM SERPL-MCNC: 9.1 MG/DL (ref 8.3–10.6)
CHLORIDE BLD-SCNC: 94 MMOL/L (ref 99–110)
CO2: 25 MMOL/L (ref 21–32)
CREAT SERPL-MCNC: 0.9 MG/DL (ref 0.8–1.3)
EKG ATRIAL RATE: 65 BPM
EKG DIAGNOSIS: NORMAL
EKG P AXIS: 46 DEGREES
EKG P-R INTERVAL: 184 MS
EKG Q-T INTERVAL: 398 MS
EKG QRS DURATION: 88 MS
EKG QTC CALCULATION (BAZETT): 413 MS
EKG R AXIS: -19 DEGREES
EKG T AXIS: 75 DEGREES
EKG VENTRICULAR RATE: 65 BPM
EOSINOPHILS ABSOLUTE: 0.1 K/UL (ref 0–0.6)
EOSINOPHILS RELATIVE PERCENT: 1.7 %
GFR AFRICAN AMERICAN: >60
GFR NON-AFRICAN AMERICAN: >60
GLUCOSE BLD-MCNC: 96 MG/DL (ref 70–99)
HCT VFR BLD CALC: 39.7 % (ref 40.5–52.5)
HEMOGLOBIN: 13.4 G/DL (ref 13.5–17.5)
LV EF: 55 %
LVEF MODALITY: NORMAL
LYMPHOCYTES ABSOLUTE: 1.1 K/UL (ref 1–5.1)
LYMPHOCYTES RELATIVE PERCENT: 34.4 %
MCH RBC QN AUTO: 32.7 PG (ref 26–34)
MCHC RBC AUTO-ENTMCNC: 33.8 G/DL (ref 31–36)
MCV RBC AUTO: 96.9 FL (ref 80–100)
MONOCYTES ABSOLUTE: 0.3 K/UL (ref 0–1.3)
MONOCYTES RELATIVE PERCENT: 10.1 %
NEUTROPHILS ABSOLUTE: 1.7 K/UL (ref 1.7–7.7)
NEUTROPHILS RELATIVE PERCENT: 53 %
PDW BLD-RTO: 16 % (ref 12.4–15.4)
PLATELET # BLD: 400 K/UL (ref 135–450)
PMV BLD AUTO: 6.1 FL (ref 5–10.5)
POC ACT LR: 209 SEC
POC ACT LR: 254 SEC
POC ACT LR: 277 SEC
POC ACT LR: 283 SEC
POTASSIUM REFLEX MAGNESIUM: 4.5 MMOL/L (ref 3.5–5.1)
RBC # BLD: 4.09 M/UL (ref 4.2–5.9)
SODIUM BLD-SCNC: 129 MMOL/L (ref 136–145)
TROPONIN: <0.01 NG/ML
WBC # BLD: 3.2 K/UL (ref 4–11)

## 2021-02-01 PROCEDURE — 2720000010 HC SURG SUPPLY STERILE

## 2021-02-01 PROCEDURE — 85730 THROMBOPLASTIN TIME PARTIAL: CPT

## 2021-02-01 PROCEDURE — C1769 GUIDE WIRE: HCPCS

## 2021-02-01 PROCEDURE — 99152 MOD SED SAME PHYS/QHP 5/>YRS: CPT

## 2021-02-01 PROCEDURE — 36415 COLL VENOUS BLD VENIPUNCTURE: CPT

## 2021-02-01 PROCEDURE — 027135Z DILATION OF CORONARY ARTERY, TWO ARTERIES WITH TWO DRUG-ELUTING INTRALUMINAL DEVICES, PERCUTANEOUS APPROACH: ICD-10-PCS | Performed by: INTERNAL MEDICINE

## 2021-02-01 PROCEDURE — 93306 TTE W/DOPPLER COMPLETE: CPT

## 2021-02-01 PROCEDURE — 6370000000 HC RX 637 (ALT 250 FOR IP): Performed by: INTERNAL MEDICINE

## 2021-02-01 PROCEDURE — C1887 CATHETER, GUIDING: HCPCS

## 2021-02-01 PROCEDURE — 93454 CORONARY ARTERY ANGIO S&I: CPT

## 2021-02-01 PROCEDURE — 80048 BASIC METABOLIC PNL TOTAL CA: CPT

## 2021-02-01 PROCEDURE — 85347 COAGULATION TIME ACTIVATED: CPT

## 2021-02-01 PROCEDURE — 4A023N7 MEASUREMENT OF CARDIAC SAMPLING AND PRESSURE, LEFT HEART, PERCUTANEOUS APPROACH: ICD-10-PCS | Performed by: INTERNAL MEDICINE

## 2021-02-01 PROCEDURE — B2111ZZ FLUOROSCOPY OF MULTIPLE CORONARY ARTERIES USING LOW OSMOLAR CONTRAST: ICD-10-PCS | Performed by: INTERNAL MEDICINE

## 2021-02-01 PROCEDURE — 92928 PRQ TCAT PLMT NTRAC ST 1 LES: CPT | Performed by: INTERNAL MEDICINE

## 2021-02-01 PROCEDURE — 99024 POSTOP FOLLOW-UP VISIT: CPT | Performed by: INTERNAL MEDICINE

## 2021-02-01 PROCEDURE — 93571 IV DOP VEL&/PRESS C FLO 1ST: CPT

## 2021-02-01 PROCEDURE — 6360000004 HC RX CONTRAST MEDICATION: Performed by: INTERNAL MEDICINE

## 2021-02-01 PROCEDURE — 96366 THER/PROPH/DIAG IV INF ADDON: CPT

## 2021-02-01 PROCEDURE — 93571 IV DOP VEL&/PRESS C FLO 1ST: CPT | Performed by: INTERNAL MEDICINE

## 2021-02-01 PROCEDURE — C1725 CATH, TRANSLUMIN NON-LASER: HCPCS

## 2021-02-01 PROCEDURE — 92928 PRQ TCAT PLMT NTRAC ST 1 LES: CPT

## 2021-02-01 PROCEDURE — 2500000003 HC RX 250 WO HCPCS

## 2021-02-01 PROCEDURE — 2580000003 HC RX 258: Performed by: INTERNAL MEDICINE

## 2021-02-01 PROCEDURE — 84484 ASSAY OF TROPONIN QUANT: CPT

## 2021-02-01 PROCEDURE — C1894 INTRO/SHEATH, NON-LASER: HCPCS

## 2021-02-01 PROCEDURE — 2709999900 HC NON-CHARGEABLE SUPPLY

## 2021-02-01 PROCEDURE — 2580000003 HC RX 258

## 2021-02-01 PROCEDURE — C1874 STENT, COATED/COV W/DEL SYS: HCPCS

## 2021-02-01 PROCEDURE — 99153 MOD SED SAME PHYS/QHP EA: CPT

## 2021-02-01 PROCEDURE — 4A033BC MEASUREMENT OF ARTERIAL PRESSURE, CORONARY, PERCUTANEOUS APPROACH: ICD-10-PCS | Performed by: INTERNAL MEDICINE

## 2021-02-01 PROCEDURE — 93010 ELECTROCARDIOGRAM REPORT: CPT | Performed by: INTERNAL MEDICINE

## 2021-02-01 PROCEDURE — 85025 COMPLETE CBC W/AUTO DIFF WBC: CPT

## 2021-02-01 PROCEDURE — 93454 CORONARY ARTERY ANGIO S&I: CPT | Performed by: INTERNAL MEDICINE

## 2021-02-01 PROCEDURE — 6370000000 HC RX 637 (ALT 250 FOR IP)

## 2021-02-01 PROCEDURE — 1200000000 HC SEMI PRIVATE

## 2021-02-01 PROCEDURE — 6360000002 HC RX W HCPCS

## 2021-02-01 RX ORDER — OXYCODONE HYDROCHLORIDE AND ACETAMINOPHEN 5; 325 MG/1; MG/1
1 TABLET ORAL EVERY 4 HOURS PRN
Status: DISCONTINUED | OUTPATIENT
Start: 2021-02-01 | End: 2021-02-02 | Stop reason: HOSPADM

## 2021-02-01 RX ORDER — OXYCODONE HYDROCHLORIDE AND ACETAMINOPHEN 5; 325 MG/1; MG/1
2 TABLET ORAL EVERY 4 HOURS PRN
Status: DISCONTINUED | OUTPATIENT
Start: 2021-02-01 | End: 2021-02-02 | Stop reason: HOSPADM

## 2021-02-01 RX ORDER — ONDANSETRON 2 MG/ML
4 INJECTION INTRAMUSCULAR; INTRAVENOUS EVERY 6 HOURS PRN
Status: DISCONTINUED | OUTPATIENT
Start: 2021-02-01 | End: 2021-02-02 | Stop reason: HOSPADM

## 2021-02-01 RX ORDER — CLOPIDOGREL BISULFATE 75 MG/1
75 TABLET ORAL DAILY
Status: DISCONTINUED | OUTPATIENT
Start: 2021-02-02 | End: 2021-02-02 | Stop reason: HOSPADM

## 2021-02-01 RX ORDER — SODIUM CHLORIDE 0.9 % (FLUSH) 0.9 %
10 SYRINGE (ML) INJECTION EVERY 12 HOURS SCHEDULED
Status: DISCONTINUED | OUTPATIENT
Start: 2021-02-01 | End: 2021-02-02 | Stop reason: HOSPADM

## 2021-02-01 RX ORDER — SODIUM CHLORIDE 9 MG/ML
INJECTION, SOLUTION INTRAVENOUS CONTINUOUS
Status: DISCONTINUED | OUTPATIENT
Start: 2021-02-01 | End: 2021-02-01

## 2021-02-01 RX ORDER — ACETAMINOPHEN 325 MG/1
650 TABLET ORAL EVERY 4 HOURS PRN
Status: DISCONTINUED | OUTPATIENT
Start: 2021-02-01 | End: 2021-02-02 | Stop reason: HOSPADM

## 2021-02-01 RX ORDER — SODIUM CHLORIDE 0.9 % (FLUSH) 0.9 %
10 SYRINGE (ML) INJECTION PRN
Status: DISCONTINUED | OUTPATIENT
Start: 2021-02-01 | End: 2021-02-02 | Stop reason: HOSPADM

## 2021-02-01 RX ORDER — SODIUM CHLORIDE 9 MG/ML
INJECTION, SOLUTION INTRAVENOUS CONTINUOUS
Status: ACTIVE | OUTPATIENT
Start: 2021-02-01 | End: 2021-02-01

## 2021-02-01 RX ADMIN — ACETAMINOPHEN 650 MG: 325 TABLET ORAL at 08:17

## 2021-02-01 RX ADMIN — ACETAMINOPHEN 650 MG: 325 TABLET ORAL at 00:03

## 2021-02-01 RX ADMIN — IOPAMIDOL 133 ML: 755 INJECTION, SOLUTION INTRAVENOUS at 11:31

## 2021-02-01 RX ADMIN — LISINOPRIL 20 MG: 20 TABLET ORAL at 08:17

## 2021-02-01 RX ADMIN — SODIUM CHLORIDE: 9 INJECTION, SOLUTION INTRAVENOUS at 11:44

## 2021-02-01 RX ADMIN — ASPIRIN 81 MG: 81 TABLET, CHEWABLE ORAL at 08:18

## 2021-02-01 RX ADMIN — ATORVASTATIN CALCIUM 40 MG: 80 TABLET, FILM COATED ORAL at 08:18

## 2021-02-01 RX ADMIN — METOPROLOL SUCCINATE 50 MG: 50 TABLET, EXTENDED RELEASE ORAL at 08:18

## 2021-02-01 RX ADMIN — ACETAMINOPHEN 650 MG: 325 TABLET ORAL at 21:20

## 2021-02-01 RX ADMIN — Medication 10 ML: at 08:19

## 2021-02-01 ASSESSMENT — PAIN DESCRIPTION - LOCATION
LOCATION: BACK
LOCATION: SHOULDER

## 2021-02-01 ASSESSMENT — PAIN DESCRIPTION - PAIN TYPE
TYPE: CHRONIC PAIN
TYPE: CHRONIC PAIN

## 2021-02-01 ASSESSMENT — PAIN SCALES - GENERAL
PAINLEVEL_OUTOF10: 8
PAINLEVEL_OUTOF10: 0
PAINLEVEL_OUTOF10: 9
PAINLEVEL_OUTOF10: 4

## 2021-02-01 ASSESSMENT — PAIN DESCRIPTION - ORIENTATION: ORIENTATION: RIGHT

## 2021-02-01 NOTE — PROGRESS NOTES
moist, no masses appreciated  Cardiovascular: Regular rate and rhythm no murmurs appreciated  Respiratory: CTAB no wheezing  Gastrointestinal: Abdomen soft, non-tender, BS+  EXT: no edema  Neurology: no gross focal deficts  Psychiatry: Appropriate affect. Not agitated  Skin: Warm, dry, no rashes appreciated    Labs and Tests:  CBC:   Recent Labs     01/31/21  0913 02/01/21  0443   WBC 3.0* 3.2*   HGB 14.0 13.4*    400     BMP:    Recent Labs     01/31/21  0913 02/01/21  0443   * 129*   K 4.0 4.5   CL 97* 94*   CO2 22 25   BUN 9 14   CREATININE 0.7* 0.9   GLUCOSE 77 96     Hepatic: No results for input(s): AST, ALT, ALB, BILITOT, ALKPHOS in the last 72 hours. XR CHEST (2 VW)   Final Result   No acute abnormality. Findings noted peripherally in the lungs which are similar to previous exam   and may be related to interstitial lung disease. Recent imaging reviewed    Problem List  Active Problems:    Chest pain  Resolved Problems:    * No resolved hospital problems.  *       Assessment & Plan:   Typical angina:    -  Asa, stating, bb , heparin, plan for cath    Hyponatremia: IVF: repeat labs in am    Afib: monitor per cards not on anticoagulation as ouatpeint    Nsvt: tele, keep mag>2 , k+>4    Leukopenia: improving     Diet: Diet NPO, After Midnight Exceptions are: Sips of Water with Meds, Ice Chips  Code:Full Code  DVT PPXheparin gtt  Disposition home 1-2 days      Wicho Stevenson MD   2/1/2021 9:06 AM

## 2021-02-01 NOTE — OP NOTE
Patient:  Francis Lucero   :   1955    Procedural Summary  ~Consent:   Obtained written and verbal consent      Risks/benefits explained in detail  ~Procedure:    Left Heart Catheterization  ~Medications:    Procedural sedation with minimal conscious sedation  ~Complications:   None  ~Blood Loss:    <10cc  ~Specimens:    None obtained  ~Pre-sedation re-evaluation: Performed immediately prior to procedure. Medication and Procedural Reconciliation:  An independent trained observer pushed medications at my direction. We monitored the patient's level of consciousness and vital signs/physiologic status throughout the procedure duration (see start and stop times below). Sedation: 5 mg Versed, 200 mcg Fentanyl  Sedation start: 952  Sedation stop:     Cardiac Cath PCI, FFR:  Anatomy:   LM-nml   LAD-mid 60%. D1 prox 90%. D2 ostial occluded (stent custodial) with R to L collaterals  Cx-dominant, normal  OM- nml  RCA-non dominant prox 99%  LPDA- nml    FFR LAD 0.87    Intervention  ~Successful PCI to D1 with 2.5x15 DORIE to 16tam. PCI to RCA with 2.5x12 DORIE to 18atm. Excellent Result. Contrast: 133  Flouro Time: 23.5  Access: R radial a    Impression  ~Coronary Angiography w/ Severe branch CAD  ~Successful complex angioplasty and stenting of D1, RCA        Recommendation  ~Aggressive medical treatment and risk factor modification  ~1. Stop heparin gtt. Post cath IVF. Bedrest.  2. Recommend beta blocker, high potency statin, aspirin and plavix for 6-12 months  3. Referral to cardiac rehab placed  4. Patient has been advised on the importance of regular exercise of at least 20-30 minutes daily. 5. Patient counseled about and offered assistance for smoking cessation   6. OK to discharge home today. IM to address hyponatremia.  Follow up in 1-2 weeks with cardiology            Jami Hay MD 2021 11:23 AM

## 2021-02-01 NOTE — CONSULTS
[address] [facility]  [phone]      Chief Complaint   Patient presents with    Chest Pain     Pt brought in by [facility] from home. Pt reports being woke up this morning with 7/10 left sided chest pain. Per squad 324mg ASA given and nitro SL x2 given. Pt reports some relief from nitro. Also reports that his son is, \"stressing me out\"       History of Present Illness:  Priya Snell is a 63 y.o. patient who presented to the hospital with complaints of chest pain. He reports that on 2021 he developed central chest pressure with exertion after having emotional encounter with his son. He states that he was short of breath and dizzy. He reports that he called 911. He was given a nitroglycerin, which improved his pain. He states that it lasted several minutes. There was concern for ST elevation on the ekg and elevated blood pressure. Windy Alex He reports that he is currently pain free. He believes that he had \"too much medicine. \" He states that his wife recently  and he has been depressed about that. He also worries about his son who has psychosis and using drugs. Past Medical History:   has a past medical history of Arthritis, Atrial fibrillation ([address] CAD (coronary artery disease), Hyperlipidemia, and Hypertension. Surgical History:   has a past surgical history that includes back surgery () and Coronary angioplasty with stent (). Social History:   reports that he has been smoking cigarettes. He has been smoking about 1.00 pack per day. He has never used smokeless tobacco. He reports current alcohol use of about 20.0 standard drinks of alcohol per week. He reports that he does not use drugs. Family History:  No family history of premature coronary artery disease, aortic disease, or valve disease. Home Medications:  Were reviewed and are listed in nursing record. and/or listed below  Prior to Admission medications    Medication Sig Start Date End Date Taking?  Authorizing Provider   metFORMIN (GLUCOPHAGE-XR) 500 MG extended release tablet Take 500 mg by mouth daily (with breakfast)  2/6/19  Yes Historical Provider, MD   metoprolol succinate (TOPROL XL) 50 MG extended release tablet Take 50 mg by mouth daily   Yes Historical Provider, MD   atorvastatin (LIPITOR) 40 MG tablet Take 1 tablet by mouth daily. 2/20/14  Yes DOUGLAS Urbina CNP   lisinopril-hydrochlorothiazide (PRINZIDE;ZESTORETIC) 20-12.5 MG per tablet Take 1 tablet by mouth daily.  8/20/13  Yes DOUGLAS Michel CNP        Current Medications:  Current Facility-Administered Medications   Medication Dose Route Frequency Provider Last Rate Last Admin    [START ON 2/1/2021] atorvastatin (LIPITOR) tablet 40 mg  40 mg Oral Daily Thanh Mcmahon MD        metoprolol succinate (TOPROL XL) extended release tablet 50 mg  50 mg Oral Daily Thanh Mcmahon MD        sodium chloride flush 0.9 % injection 10 mL  10 mL Intravenous 2 times per day Thanh Mcmahon MD   10 mL at 01/31/21 2020    sodium chloride flush 0.9 % injection 10 mL  10 mL Intravenous PRN Thanh Mcmahon MD        promethazine (PHENERGAN) tablet 12.5 mg  12.5 mg Oral Q6H PRN Thanh Mcmahon MD        Or    ondansetron (ZOFRAN) injection 4 mg  4 mg Intravenous Q6H PRN Thanh Mcmahon MD        polyethylene glycol (GLYCOLAX) packet 17 g  17 g Oral Daily PRN Thanh Mcmahon MD        acetaminophen (TYLENOL) tablet 650 mg  650 mg Oral Q6H PRN Thanh Mcmahon MD        Or    acetaminophen (TYLENOL) suppository 650 mg  650 mg Rectal Q6H PRN MD Reyna Rodriguez Ko ON 2/1/2021] aspirin chewable tablet 81 mg  81 mg Oral Daily Thanh Mcmahon MD        lisinopril (PRINIVIL;ZESTRIL) tablet 20 mg  20 mg Oral Daily Thanh Mcmahon MD        hydroCHLOROthiazide (HYDRODIURIL) tablet 12.5 mg  12.5 mg Oral Daily Thanh Mcmahon MD        nicotine (NICODERM CQ) 21 MG/24HR 1 patch  1 patch Transdermal Daily Neeraj Sams MD CHIVO   1 patch at 01/31/21 1902        Allergies:  Patient has no known allergies. Review of Systems:     · Constitutional: there has been no unanticipated weight loss. There's been no change in energy level, sleep pattern, or activity level. · Eyes: No visual changes or diplopia. No scleral icterus. · ENT: No Headaches, hearing loss or vertigo. No mouth sores or sore throat. · Cardiovascular: +chest pain, + short of breath, +dizzy  · Respiratory: No cough or wheezing, no sputum production. No hematemesis. · Gastrointestinal: No abdominal pain, appetite loss, blood in stools. No change in bowel or bladder habits. · Genitourinary: No dysuria, trouble voiding, or hematuria. · Musculoskeletal:  No gait disturbance, weakness or joint complaints. · Integumentary: No rash or pruritis. · Neurological: No headache, diplopia, change in muscle strength, numbness or tingling. No change in gait, balance, coordination, mood, affect, memory, mentation, behavior. · Psychiatric: No anxiety, +depression  · Endocrine: No malaise, fatigue or temperature intolerance. No excessive thirst, fluid intake, or urination. No tremor. · Hematologic/Lymphatic: No abnormal bruising or bleeding, blood clots or swollen lymph nodes. · Allergic/Immunologic: No nasal congestion or hives.   ·     Physical Examination:    Vitals:    01/31/21 2201   BP:    Pulse:    Resp:    Temp:    SpO2: 97%    Weight: 252 lb 6.8 oz (114.5 kg)         General Appearance:  Alert, cooperative, no distress, appears stated age   Head:  Normocephalic, without obvious abnormality, atraumatic   Eyes:  PERRL, conjunctiva/corneas clear       Nose: Nares normal, no drainage or sinus tenderness   Throat: Lips, mucosa, and tongue normal   Neck: Supple, symmetrical, trachea midline, no adenopathy, thyroid: not enlarged, symmetric, no tenderness/mass/nodules, no carotid bruit or JVD       Lungs:   Clear to auscultation bilaterally, respirations unlabored   Chest Wall:  No tenderness or deformity   Heart:  Regular rate and rhythm, S1, S2 normal, no murmur, rub or gallop   Abdomen:   Soft, non-tender, bowel sounds active all four quadrants,  no masses, no organomegaly           Extremities: Extremities normal, atraumatic, no cyanosis or edema   Pulses: 2+ and symmetric   Skin: Skin color, texture, turgor normal, no rashes or lesions   Pysch: Normal mood and affect   Neurologic: Normal gross motor and sensory exam.         Labs  CBC:   Lab Results   Component Value Date    WBC 3.0 01/31/2021    RBC 4.39 01/31/2021    HGB 14.0 01/31/2021    HCT 42.8 01/31/2021    MCV 97.6 01/31/2021    RDW 16.0 01/31/2021     01/31/2021     CMP:    Lab Results   Component Value Date     01/31/2021    K 4.0 01/31/2021    CL 97 01/31/2021    CO2 22 01/31/2021    BUN 9 01/31/2021    CREATININE 0.7 01/31/2021    GFRAA >60 01/31/2021    GFRAA >60 02/19/2013    AGRATIO 1.2 04/14/2016    LABGLOM >60 01/31/2021    GLUCOSE 77 01/31/2021    PROT 7.4 04/14/2016    PROT 7.2 02/19/2013    CALCIUM 8.2 01/31/2021    BILITOT 1.2 04/14/2016    ALKPHOS 86 04/14/2016    AST 24 04/14/2016    ALT 23 04/14/2016     PT/INR:  No results found for: PTINR  Lab Results   Component Value Date    TROPONINI <0.01 01/31/2021       EKG:  I have reviewed EKG with the following interpretation:  Impression:  Sinus rhythm, anterior infarct    Echo: Technically marginal two dimensional echocardiogram with Doppler.    Left ventricular size is normal.  There is left ventricular   hypertrophy.  Overall left ventricular function is probably low   normal with ejection fraction around 50%.  No obvious regional   abnormalities are seen.  The mitral valve appears relatively normal.   The left atrium is normal.  The aortic valve is normal.  The aortic   root is slightly enlarged.  Right ventricular size and function is   normal.  The right atrium is normal.  The tricuspid and pulmonic   valves are normal.  There is no pericardial effusion.  Doppler study   reveals mild mitral insufficiency.     Technically marginal two dimensional echocardiogram with Doppler.    Left ventricular size is normal.  There is left ventricular   hypertrophy.  Overall left ventricular function is probably low   normal with ejection fraction around 50%.  No obvious regional   abnormalities are seen.  The mitral valve appears relatively normal.   The left atrium is normal.  The aortic valve is normal.  The aortic   root is slightly enlarged.  Right ventricular size and function is   normal.  The right atrium is normal.  The tricuspid and pulmonic   valves are normal.  There is no pericardial effusion.  Doppler study   reveals mild mitral insufficiency.       Stress:none  Cath: ~LM                 normal  ~LAD               prox 20%, mid 50%, mid 99% involving diagonal  ~Cx                  lumenal irregs, dominant  ~RCA               normal  ~LVG               50%(single beat, check echo)     Impression  ~Angiography w/ severe obstructive mid LAD lesion  ~LVEDP 12  ~LVG with 50%  MRI/EP/Other: nsvt, no afib  Old notes reviewed  Telemetry reviewd  Ekg personally interpreted  Chest xray personally reviewed  Echo and cath lab reports reviewed  Medications and labs reviewed  Moderate complexity/medical decision making due to extensive data review, extensive history review, independent review of data  Moderate  risk due to acute illness, evaluation of drug-drug interactions, medication management and diagnostic interventions      Assessment  Patient Active Problem List   Diagnosis    CAD (coronary artery disease)    Hypercholesteremia    Tobacco use    Paroxysmal atrial fibrillation ([address]    Tobacco abuse    Typical atrial flutter    Coronary artery disease due to lipid rich plaque    Morbid obesity due to excess calories (HCC)    Essential hypertension    Bilateral carpal tunnel syndrome    Chest pain         Plan:    I had the opportunity to review the clinical symptoms and presentation of Jignesh Alfaro. Assessment/Plan:  1. Typical angina  - high pretest probability, worsen with emotion, improved with nitro, ekg change, recent NSVT  - history of CAD with pci of lad, not taking aspirin   - smokes  Plan:  - trend troponin  - aspirin, statin, betablocker, heparin drip  - npo after midnight for cardiac cath  - echo    2. NSVT  - new problem  - on recent monitor  Plan:  - monitor on telemetry  - k >4 and mg >2    3. History of Atrial fibrillation  - not on anticoagulation  Plan:  - monitor on telemetry    I will address the patient's cardiac risk factors and adjusted pharmacologic treatment as needed. In addition, I have reinforced the need for patient directed risk factor modification. Tobacco use was discussed with the patient and educated on the negative effects. I have asked the patient to not utilize these agents. Thank you for allowing to us to participate in the care or Jignesh Alfaro. Further evaluation will be based upon the patient's clinical course and testing results. All questions and concerns were addressed to the patient/family. Alternatives to my treatment were discussed. The note was completed using EMR. Every effort was made to ensure accuracy; however, inadvertent computerized transcription errors may be present. All questions and concerns were addressed to the patient/family. Alternatives to my treatment were discussed. The note was completed using EMR. Every effort was made to ensure accuracy; however, inadvertent computerized transcription errors may be present.   Saira Rubi MD 1/31/2021 10:42 PM

## 2021-02-01 NOTE — PROGRESS NOTES
Shift assessment completed. VSS. Pt denies chest pain. POC updated with pt. Information sheet provided on angiogram. Heparin gtt started per MD order, rate at 12Units/kg/hr. Next aPTT check at 0600. Urinal provided per pt request. Pt being monitored on telemetry. Will continue to monitor.

## 2021-02-01 NOTE — PRE SEDATION
Willie Yeung MD   40 mg at 02/01/21 0818    metoprolol succinate (TOPROL XL) extended release tablet 50 mg  50 mg Oral Daily Federico Guzmán MD   50 mg at 02/01/21 0818    sodium chloride flush 0.9 % injection 10 mL  10 mL Intravenous 2 times per day Federico uGzmán MD   10 mL at 02/01/21 4533    sodium chloride flush 0.9 % injection 10 mL  10 mL Intravenous PRN Federico Guzmán MD        promethazine (PHENERGAN) tablet 12.5 mg  12.5 mg Oral Q6H PRN Federico Guzmán MD        Or    ondansetron (ZOFRAN) injection 4 mg  4 mg Intravenous Q6H PRN Federico Guzmán MD        polyethylene glycol (GLYCOLAX) packet 17 g  17 g Oral Daily PRN Federico Guzmán MD        acetaminophen (TYLENOL) tablet 650 mg  650 mg Oral Q6H PRN Federico Guzmán MD   650 mg at 02/01/21 8498    Or    acetaminophen (TYLENOL) suppository 650 mg  650 mg Rectal Q6H PRN Federico Guzmán MD        aspirin chewable tablet 81 mg  81 mg Oral Daily Federico Guzmán MD   81 mg at 02/01/21 0818    lisinopril (PRINIVIL;ZESTRIL) tablet 20 mg  20 mg Oral Daily Federico Guzmán MD   20 mg at 02/01/21 0817    hydroCHLOROthiazide (HYDRODIURIL) tablet 12.5 mg  12.5 mg Oral Daily Federico Guzmán MD        nicotine (NICODERM CQ) 21 MG/24HR 1 patch  1 patch Transdermal Daily Lidia WALDRON MD   1 patch at 02/01/21 0818    heparin (porcine) injection 4,000 Units  4,000 Units Intravenous PRN Petra Pedro, DO        heparin (porcine) injection 2,000 Units  2,000 Units Intravenous PRN Petra Pedro, DO        heparin 25,000 units in dextrose 5% 250 mL (premix) infusion  573-1,000 Units/hr Intravenous Continuous Petra Sanonaac, DO 11.5 mL/hr at 02/01/21 0508 10 Units/kg/hr at 02/01/21 5178    perflutren lipid microspheres (DEFINITY) injection 1.65 mg  1.5 mL Intravenous ONCE PRN Petra Sanonaac, DO               Pre-Sedation:    Pre-Sedation Documentation and Exam:  I have assessed the patient and agree with the H&P present on the chart.     Prior History of Anesthesia Complications:   none    Modified Mallampati:  II (soft palate, uvula, fauces visible)    ASA Classification:  Class 2 - A normal healthy patient with mild systemic disease      Charis Scale: Activity:  2 - Able to move 4 extremities voluntarily on command  Respiration:  2 - Able to breathe deeply and cough freely  Circulation:  2 - BP+/- 20mmHg of normal  Consciousness:  2 - Fully awake  Oxygen Saturation (color):  2 - Able to maintain oxygen saturation >92% on room air    Sedation/Anesthesia Plan:  Guard the patient's safety and welfare. Minimize physical discomfort and pain. Minimize negative psychological responses to treatment by providing sedation and analgesia and maximize the potential amnesia. Patient to meet pre-procedure discharge plan.     Medication Planned:  midazolam intravenously and fentanyl intravenously    Patient is an appropriate candidate for plan of sedation: yes      Electronically signed by Doris Snyder MD on 2/1/2021 at 9:28 AM

## 2021-02-01 NOTE — CARE COORDINATION
Patient admitted as  with an anticipated short hospitalization length of stay. Chart reviewed and it appears that patient has minimal needs for discharge at this time. Discussed with patients nurse and requested that case management be notified if discharge needs are identified. For Cardiac Cath   Case management will continue to follow progress and update discharge plan as needed.

## 2021-02-02 VITALS
HEIGHT: 74 IN | HEART RATE: 74 BPM | DIASTOLIC BLOOD PRESSURE: 81 MMHG | WEIGHT: 247.7 LBS | BODY MASS INDEX: 31.79 KG/M2 | OXYGEN SATURATION: 96 % | SYSTOLIC BLOOD PRESSURE: 172 MMHG | TEMPERATURE: 97.4 F | RESPIRATION RATE: 18 BRPM

## 2021-02-02 LAB
ANION GAP SERPL CALCULATED.3IONS-SCNC: 10 MMOL/L (ref 3–16)
BASOPHILS ABSOLUTE: 0 K/UL (ref 0–0.2)
BASOPHILS RELATIVE PERCENT: 0.8 %
BUN BLDV-MCNC: 12 MG/DL (ref 7–20)
CALCIUM SERPL-MCNC: 8.9 MG/DL (ref 8.3–10.6)
CHLORIDE BLD-SCNC: 94 MMOL/L (ref 99–110)
CO2: 25 MMOL/L (ref 21–32)
CREAT SERPL-MCNC: 0.9 MG/DL (ref 0.8–1.3)
EOSINOPHILS ABSOLUTE: 0 K/UL (ref 0–0.6)
EOSINOPHILS RELATIVE PERCENT: 1.8 %
GFR AFRICAN AMERICAN: >60
GFR NON-AFRICAN AMERICAN: >60
GLUCOSE BLD-MCNC: 84 MG/DL (ref 70–99)
HCT VFR BLD CALC: 38.4 % (ref 40.5–52.5)
HEMOGLOBIN: 12.6 G/DL (ref 13.5–17.5)
LYMPHOCYTES ABSOLUTE: 0.5 K/UL (ref 1–5.1)
LYMPHOCYTES RELATIVE PERCENT: 21.8 %
MCH RBC QN AUTO: 31.8 PG (ref 26–34)
MCHC RBC AUTO-ENTMCNC: 32.9 G/DL (ref 31–36)
MCV RBC AUTO: 96.6 FL (ref 80–100)
MONOCYTES ABSOLUTE: 0.3 K/UL (ref 0–1.3)
MONOCYTES RELATIVE PERCENT: 13.6 %
NEUTROPHILS ABSOLUTE: 1.6 K/UL (ref 1.7–7.7)
NEUTROPHILS RELATIVE PERCENT: 62 %
PDW BLD-RTO: 15.9 % (ref 12.4–15.4)
PLATELET # BLD: 350 K/UL (ref 135–450)
PMV BLD AUTO: 6.6 FL (ref 5–10.5)
POTASSIUM REFLEX MAGNESIUM: 4.1 MMOL/L (ref 3.5–5.1)
RBC # BLD: 3.98 M/UL (ref 4.2–5.9)
SODIUM BLD-SCNC: 129 MMOL/L (ref 136–145)
WBC # BLD: 2.5 K/UL (ref 4–11)

## 2021-02-02 PROCEDURE — 36415 COLL VENOUS BLD VENIPUNCTURE: CPT

## 2021-02-02 PROCEDURE — 6370000000 HC RX 637 (ALT 250 FOR IP): Performed by: INTERNAL MEDICINE

## 2021-02-02 PROCEDURE — 94760 N-INVAS EAR/PLS OXIMETRY 1: CPT

## 2021-02-02 PROCEDURE — 80048 BASIC METABOLIC PNL TOTAL CA: CPT

## 2021-02-02 PROCEDURE — 2580000003 HC RX 258: Performed by: INTERNAL MEDICINE

## 2021-02-02 PROCEDURE — 99233 SBSQ HOSP IP/OBS HIGH 50: CPT | Performed by: INTERNAL MEDICINE

## 2021-02-02 PROCEDURE — 85025 COMPLETE CBC W/AUTO DIFF WBC: CPT

## 2021-02-02 RX ORDER — CLOPIDOGREL BISULFATE 75 MG/1
75 TABLET ORAL DAILY
Qty: 30 TABLET | Refills: 3 | Status: SHIPPED | OUTPATIENT
Start: 2021-02-03 | End: 2021-05-05 | Stop reason: SDUPTHER

## 2021-02-02 RX ORDER — NIFEDIPINE 30 MG/1
30 TABLET, EXTENDED RELEASE ORAL DAILY
Qty: 90 TABLET | Refills: 0 | Status: SHIPPED | OUTPATIENT
Start: 2021-02-02 | End: 2021-05-05

## 2021-02-02 RX ORDER — NIFEDIPINE 30 MG/1
30 TABLET, EXTENDED RELEASE ORAL DAILY
Status: DISCONTINUED | OUTPATIENT
Start: 2021-02-02 | End: 2021-02-02 | Stop reason: HOSPADM

## 2021-02-02 RX ORDER — ASPIRIN 81 MG/1
81 TABLET, CHEWABLE ORAL DAILY
Qty: 30 TABLET | Refills: 3 | Status: SHIPPED | OUTPATIENT
Start: 2021-02-03 | End: 2021-09-28

## 2021-02-02 RX ORDER — LISINOPRIL 20 MG/1
40 TABLET ORAL DAILY
Qty: 30 TABLET | Refills: 3 | Status: SHIPPED | OUTPATIENT
Start: 2021-02-03 | End: 2022-10-21

## 2021-02-02 RX ADMIN — ATORVASTATIN CALCIUM 40 MG: 80 TABLET, FILM COATED ORAL at 08:33

## 2021-02-02 RX ADMIN — METOPROLOL SUCCINATE 50 MG: 50 TABLET, EXTENDED RELEASE ORAL at 08:33

## 2021-02-02 RX ADMIN — LISINOPRIL 20 MG: 20 TABLET ORAL at 08:32

## 2021-02-02 RX ADMIN — CLOPIDOGREL BISULFATE 75 MG: 75 TABLET ORAL at 08:33

## 2021-02-02 RX ADMIN — Medication 10 ML: at 08:33

## 2021-02-02 RX ADMIN — ASPIRIN 81 MG: 81 TABLET, CHEWABLE ORAL at 08:32

## 2021-02-02 RX ADMIN — Medication 10 ML: at 08:34

## 2021-02-02 ASSESSMENT — PAIN SCALES - GENERAL
PAINLEVEL_OUTOF10: 0

## 2021-02-02 NOTE — PROGRESS NOTES
[facility] Daily Progress Note      Admit Date:  1/31/2021    Chief Complaint   Patient presents with    Chest Pain     Pt brought in by [facility] from home. Pt reports being woke up this morning with 7/10 left sided chest pain. Per squad 324mg ASA given and nitro SL x2 given. Pt reports some relief from nitro. Also reports that his son is, \"stressing me out\"        Subjective:  Mr. Kaden Hamlin denies exertional chest pain, SOB/HERNANDEZ, PND, palpitations, light-headedness, or edema.      Objective:   BP (!) 172/81   Pulse 74   Temp 97.4 °F (36.3 °C) (Oral)   Resp 18   Ht 6' 2\" (1.88 m)   Wt 247 lb 11.2 oz (112.4 kg)   SpO2 96%   BMI 31.80 kg/m²       Intake/Output Summary (Last 24 hours) at 2/2/2021 1019  Last data filed at 2/1/2021 2135  Gross per 24 hour   Intake --   Output 1720 ml   Net -1720 ml       TELEMETRY: Sinus     Physical Exam:  General:  Awake, alert, oriented x 3, NAD  Skin:  Warm and dry  Neck:  JVD none  Chest:  normal air entry  Cardiovascular:  RRR S1S2, no S3, no mrmr  Abdomen:  Soft, ND, NT, No HSM  Extremities:  No edema    Medications:    NIFEdipine  30 mg Oral Daily    sodium chloride flush  10 mL Intravenous 2 times per day    clopidogrel  75 mg Oral Daily    atorvastatin  40 mg Oral Daily    metoprolol succinate  50 mg Oral Daily    sodium chloride flush  10 mL Intravenous 2 times per day    aspirin  81 mg Oral Daily    lisinopril  20 mg Oral Daily    nicotine  1 patch Transdermal Daily       sodium chloride flush, acetaminophen, oxyCODONE-acetaminophen **OR** oxyCODONE-acetaminophen, ondansetron, sodium chloride flush, promethazine **OR** [DISCONTINUED] ondansetron, polyethylene glycol, [DISCONTINUED] acetaminophen **OR** acetaminophen, perflutren lipid microspheres    Lab Data:  CBC:   Recent Labs     01/31/21  0913 02/01/21  0443 02/02/21  0529   WBC 3.0* 3.2* 2.5*   HGB 14.0 13.4* 12.6*   HCT 42.8 39.7* 38.4*   MCV 97.6 96.9 96.6    400 350     BMP:   Recent Labs     01/31/21  0913 02/01/21  0443 02/02/21  0529   * 129* 129*   K 4.0 4.5 4.1   CL 97* 94* 94*   CO2 22 25 25   BUN 9 14 12   CREATININE 0.7* 0.9 0.9     LIVER PROFILE: No results for input(s): AST, ALT, LIPASE, BILIDIR, BILITOT, ALKPHOS in the last 72 hours. Invalid input(s): AMYLASE,  ALB  PT/INR: No results for input(s): PROTIME, INR in the last 72 hours. APTT:   Recent Labs     01/31/21  2346 02/01/21  0443   APTT 32.5 78.3*     BNP:  No results for input(s): BNP in the last 72 hours. IMAGING:  Summary   -Normal left ventricle size and systolic function with an estimated ejection   fraction of 55%. There is moderate to severe concentric left ventricular   hypertrophy. Grade I diastolic dysfunction with normal LV filling pressures. -Mild mitral regurgitation. There is a trivial localized near right ventricle pericardial effusion   noted. Cardiac Cath PCI, FFR:  Anatomy:   LM-nml   LAD-mid 60%. D1 prox 90%. D2 ostial occluded (stent halfway) with R to L collaterals  Cx-dominant, normal  OM- nml  RCA-non dominant prox 99%  LPDA- nml     FFR LAD 0.87     Intervention  ~Successful PCI to D1 with 2.5x15 DORIE to 16tam. PCI to RCA with 2.5x12 DORIE to 18atm. Excellent Result. Assessment/Plan:  Active Problems:    Chest pain  Plan: typical angina. S/p LHC with PCI to D1, RCA. Cont DAPT with plavix. CAD. DAPT  HLD: statin  BP: still elevated. Cont home meds. Reevaluate as outpatient. May need to increase medication doses. OK to discharge home. FU with cardio as outpatient.         Froylan Fernando MD 2/2/2021 10:19 AM

## 2021-02-02 NOTE — PROGRESS NOTES
Data- discharge order received, pt verbalized agreement to discharge, disposition to previous residence, no needs for HHC/DME. Action- discharge instructions prepared and given to pt and daughter, pt verbalized understanding. Medication information packet given r/t NEW and/or CHANGED prescriptions emphasizing name/purpose/side effects, pt verbalized understanding. Discharge instruction summary: Diet- cardiac, Activity- as tolerated, following post cath protocol, Primary Care Physician as follows: Yaz Le [phone] f/u appointment in one week. Follow up with cardiology in one week, follow up with nephrology in one week, immunizations reviewed and updated, prescription medications filled home pharmacy. Inpatient surgical procedure precautions reviewed    Response- Pt belongings gathered, IV removed. Disposition is home (no HHC/DME needs), transported with daughter, taken to lobby via w/c w/ RN, no complications.

## 2021-02-02 NOTE — PROGRESS NOTES
Pt raising voice at RN stating they are leaving. Still awaiting sign off from nephrology before this pt can safely dc. Pt refusing to give urine sample ordered by nephrology. Nephrologist notified. Pt very upset. RN provided emotional support. RN called family to update. Pt requested IV be removed and tele be taken off.

## 2021-02-02 NOTE — DISCHARGE SUMMARY
Hospital Medicine Discharge Summary    Patient: Wendy Zavala     Gender: male  : 1955   Age: 63 y.o. MRN: [id number]    Admitting Physician: Yevgeniy Ann MD  Discharge Physician: Yevgeniy Ann MD     Code Status: Full Code     Admit Date: 2021   Discharge Date:   2021    Disposition:  Home  Time spent arranging discharge: 35 minutes    Discharge Diagnoses: Active Hospital Problems    Diagnosis Date Noted    Chest pain [R07.9] 2021   CAD  hyponatremia  Accelerated htn        Condition at Discharge:  [facility] [place] Course:   Patient was admitted to hospital with chest pain. Patient was started on heparin drip underwent cardiac cath with  Successful PCI to D1 with 2.5x15 DORIE to 16tam. PCI to RCA with 2.5x12 DORIE to 18atm    Patient was cleared for discharge by cardiology with aspirin and Plavix. Patient Eliquis was discontinued by cardiology. Did have hyponatremia stable at 129 patient did not want to stay for further work-up HCTZ was discontinued nifedipine was added instead. Patient will follow up with nephrology as outpatient    Discharge Exam:    BP (!) 172/81   Pulse 74   Temp 97.4 °F (36.3 °C) (Oral)   Resp 18   Ht 6' 2\" (1.88 m)   Wt 247 lb 11.2 oz (112.4 kg)   SpO2 96%   BMI 31.80 kg/m²   General appearance:  Appears comfortable. AAOx3  HEENT: atraumatic, Pupils equal, muscous membranes moist, no masses appreciated  Cardiovascular: Regular rate and rhythm no murmurs appreciated  Respiratory: CTAB no wheezing  Gastrointestinal: Abdomen soft, non-tender, BS+  EXT: no edema  Neurology: no gross focal deficts  Psychiatry: Appropriate affect.  Not agitated  Skin: Warm, dry, no rashes appreciated    Discharge Medications:   Current Discharge Medication List      START taking these medications    Details   aspirin 81 MG chewable tablet Take 1 tablet by mouth daily  Qty: 30 tablet, Refills: 3      clopidogrel (PLAVIX) 75 MG tablet Take 1 tablet by mouth daily  Qty: 30 tablet, Refills: 3      lisinopril (PRINIVIL;ZESTRIL) 20 MG tablet Take 2 tablets by mouth daily  Qty: 30 tablet, Refills: 3      NIFEdipine (PROCARDIA XL) 30 MG extended release tablet Take 1 tablet by mouth daily  Qty: 90 tablet, Refills: 0           Current Discharge Medication List        Current Discharge Medication List      CONTINUE these medications which have NOT CHANGED    Details   metFORMIN (GLUCOPHAGE-XR) 500 MG extended release tablet Take 500 mg by mouth daily (with breakfast)       metoprolol succinate (TOPROL XL) 50 MG extended release tablet Take 50 mg by mouth daily      atorvastatin (LIPITOR) 40 MG tablet Take 1 tablet by mouth daily. Qty: 30 tablet, Refills: 5           Current Discharge Medication List      STOP taking these medications       lisinopril-hydrochlorothiazide (PRINZIDE;ZESTORETIC) 20-12.5 MG per tablet Comments:   Reason for Stopping:               Labs:  For convenience and continuity at follow-up the following most recent labs are provided:    Lab Results   Component Value Date    WBC 2.5 02/02/2021    HGB 12.6 02/02/2021    HCT 38.4 02/02/2021    MCV 96.6 02/02/2021     02/02/2021     02/02/2021    K 4.1 02/02/2021    CL 94 02/02/2021    CO2 25 02/02/2021    BUN 12 02/02/2021    CREATININE 0.9 02/02/2021    CALCIUM 8.9 02/02/2021    ALKPHOS 86 04/14/2016    ALT 23 04/14/2016    AST 24 04/14/2016    BILITOT 1.2 04/14/2016    LABALBU 4.0 04/14/2016    LDLCALC 137 02/18/2014    TRIG 151 02/18/2014     No results found for: INR    Radiology:  Echo Complete    Result Date: 2/1/2021  Transthoracic Echocardiography Report (TTE)  Demographics   Patient Name      Shraddha GuideSpark   Date of Study     02/01/2021          Gender              Male   Patient Number    [id number]          Date of Birth       1955   Visit Number      [id number]           Age                 63 year(s)   Accession Number  [id number]          Room Number         7749   Corporate ID [id number]            [place] Madisyn,                                                            RDCS, RDMS   Ordering          Terrell Tai DO  Interpreting        Genevieve Delaney MD,  Physician                             Physician           [facility], RPVI  Procedure Type of Study   TTE procedure:ECHOCARDIOGRAM COMPLETE 2D W DOPPLER W COLOR. Procedure Date Date: 02/01/2021 Start: 02:54 PM Study Location: [facility] - Echo Lab Technical Quality: Adequate visualization Indications:Chest pain. Patient Status: Routine Height: 74 inches Weight: 249.01 pounds BSA: 2.39 m2 BMI: 31.97 kg/m2 BP: 159/95 mmHg  Conclusions   Summary  -Normal left ventricle size and systolic function with an estimated ejection  fraction of 55%. There is moderate to severe concentric left ventricular  hypertrophy. Grade I diastolic dysfunction with normal LV filling pressures. -Mild mitral regurgitation. There is a trivial localized near right ventricle pericardial effusion  noted. Signature   ------------------------------------------------------------------  Electronically signed by Genevieve Delaney MD, [facility], [address]  (Interpreting physician) on 02/01/2021 at 03:49 PM  ------------------------------------------------------------------   Findings   Left Ventricle  Normal left ventricle size and systolic function with an estimated ejection  fraction of 55%. There is moderate to severe concentric left ventricular  hypertrophy. Grade I diastolic dysfunction with normal LV filling pressures. Mitral Valve  The mitral valve normal in structure. Mild mitral regurgitation. Left Atrium  The left atrium is normal in size. Aortic Valve  The aortic valve is structurally normal. There is no significant aortic  valve regurgitation or stenosis. Aorta  The aortic root is normal in size. Right Ventricle  The right ventricle is normal in size and function. Tricuspid Valve  The tricuspid valve is normal in structure. Trivial tricuspid regurgitation. Right Atrium  The right atrial size is normal.   Pulmonic Valve  The pulmonic valve is not well visualized. There is no evidence of pulmonic valve regurgitation or stenosis. Pericardial Effusion  There is a trivial localized near right ventricle pericardial effusion  noted. Pleural Effusion  No pleural effusion. Miscellaneous  The inferior vena cava appears normal in size with normal respiratory  variation. M-Mode/2D Measurements (cm)   LV Diastolic Dimension: 4.06 cm LV Systolic Dimension: 0.56 cm  LV Septum Diastolic: 9.59 cm  LV PW Diastolic: 5.01 cm        AO Root Dimension: 3.1 cm                                  LA Dimension: 3.7 cm                                  LA Area: 20.9 cm2                                  LA volume/Index: 63.8 ml /27 ml/m2  Doppler Measurements                                  MV Peak E-Wave: 53.6 cm/s  LVOT Peak Velocity: 77.6 cm/s  MV Peak A-Wave: 65.1 cm/s                                 MV E/A Ratio: 0.82  TR Velocity:200 cm/s  TR Gradient:16 mmHg   E' Septal Velocity: 5.87 cm/s  E' Lateral Velocity: 6.09 cm/s  Aorta   Aortic Root: 3.1 cm      Xr Chest (2 Vw)    Result Date: 1/31/2021  EXAMINATION: TWO XRAY VIEWS OF THE CHEST 1/31/2021 8:51 am COMPARISON: February 25, 2019 HISTORY: ORDERING SYSTEM PROVIDED HISTORY: cp TECHNOLOGIST PROVIDED HISTORY: Reason for exam:->cp Reason for Exam: chest pain Acuity: Acute Type of Exam: Initial FINDINGS: Cardiac silhouette is normal in size. No pneumothorax. No pleural effusion. No consolidation. No acute bony abnormality. Again noted is somewhat coarsened appearance of interstitial markings peripherally, similar to previous exam.  No acute bony abnormality. No acute abnormality. Findings noted peripherally in the lungs which are similar to previous exam and may be related to interstitial lung disease.          Signed:    Wicho Stevenson MD   2/2/2021

## 2021-02-02 NOTE — CONSULTS
Office : [phone]     Fax :[phone]       Nephrology Consult Note      Patient's Name: Radha Peng  8:14 AM  2/2/2021    Reason for Consult:  Hyponatremia       Requesting Physician: [id number]      Chief Complaint:    Chief Complaint   Patient presents with    Chest Pain     Pt brought in by [facility] from home. Pt reports being woke up this morning with 7/10 left sided chest pain. Per squad 324mg ASA given and nitro SL x2 given. Pt reports some relief from nitro. Also reports that his son is, \"stressing me out\"           History of Present Ilness:    Radha Peng is a 63 y.o. male with h/o    CAD, HTN who presented th c/o chest pain . His lab work showed sodium level of 129 . I/O last 3 completed shifts:  In: -   Out: 1720 [Urine:1720]    Past Medical History:   Diagnosis Date    Arthritis     Atrial fibrillation ([address]     CAD (coronary artery disease)     Hyperlipidemia     Hypertension        Past Surgical History:   Procedure Laterality Date    BACK SURGERY  200    lower lumbar surgery    CORONARY ANGIOPLASTY WITH STENT PLACEMENT  2012       Family History   Problem Relation Age of Onset    Heart Disease Mother     Diabetes Mother     Heart Disease Father     Diabetes Father     Diabetes Sister     Diabetes Brother     Diabetes Maternal Grandmother     Diabetes Maternal Grandfather     Diabetes Paternal Grandmother     Diabetes Paternal Grandfather         reports that he has been smoking cigarettes. He has been smoking about 1.00 pack per day. He has never used smokeless tobacco. He reports current alcohol use of about 20.0 standard drinks of alcohol per week. He reports that he does not use drugs.         Allergies:  Patient has no 01/31/21  0913 02/01/21  0443 02/02/21  0529   WBC 3.0* 3.2* 2.5*   HGB 14.0 13.4* 12.6*    400 350     BMP:    Recent Labs     01/31/21  0913 02/01/21  0443 02/02/21  0529   * 129* 129*   K 4.0 4.5 4.1   CL 97* 94* 94*   CO2 22 25 25   BUN 9 14 12   CREATININE 0.7* 0.9 0.9   GLUCOSE 77 96 84     Ca/Mg/Phos:   Recent Labs     01/31/21  0913 02/01/21  0443 02/02/21  0529   CALCIUM 8.2* 9.1 8.9     Hepatic: No results for input(s): AST, ALT, ALB, BILITOT, ALKPHOS in the last 72 hours. Troponin:   Recent Labs     01/31/21  1253 01/31/21  2105 02/01/21  0222   TROPONINI <0.01 <0.01 <0.01     BNP: No results for input(s): BNP in the last 72 hours. Lipids: No results for input(s): CHOL, TRIG, HDL, LDLCALC, LABVLDL in the last 72 hours. ABGs: No results for input(s): PHART, PO2ART, FJA4KFO in the last 72 hours. INR: No results for input(s): INR in the last 72 hours. UA:No results for input(s): [place], GLUCOSEU, BILIRUBINUR, Darwyn Pineda, BLOODU, PHUR, PROTEINU, UROBILINOGEN, NITRU, LEUKOCYTESUR, LABMICR, URINETYPE in the last 72 hours. Urine Microscopic: No results for input(s): LABCAST, BACTERIA, COMU, HYALCAST, WBCUA, RBCUA, EPIU in the last 72 hours. Urine Culture: No results for input(s): LABURIN in the last 72 hours. Urine Chemistry: No results for input(s): Kallie Docker, PROTEINUR, NAUR in the last 72 hours. IMAGING:  XR CHEST (2 VW)   Final Result   No acute abnormality. Findings noted peripherally in the lungs which are similar to previous exam   and may be related to interstitial lung disease. Assessment/Plan :      1. Hyponatremia   DC HCTZ  Recheck levels     2. HTN  On lisinopril ,metoprolol.   Add amlodipine     He wants to go home  If cleared to g home then will follow up as outpt       D/w primary team      Thank you for allowing us to participate in care of Hernan Cox         Electronically signed by: Diane Gregorio MD, 2/2/2021, 8:14 AM      [facility] [address]  Office : [phone]  Fax :[phone]

## 2021-05-05 ENCOUNTER — OFFICE VISIT (OUTPATIENT)
Dept: CARDIOLOGY CLINIC | Age: 66
End: 2021-05-05
Payer: MEDICAID

## 2021-05-05 VITALS
HEIGHT: 74 IN | BODY MASS INDEX: 30.83 KG/M2 | HEART RATE: 108 BPM | SYSTOLIC BLOOD PRESSURE: 120 MMHG | DIASTOLIC BLOOD PRESSURE: 62 MMHG | WEIGHT: 240.2 LBS | OXYGEN SATURATION: 99 %

## 2021-05-05 DIAGNOSIS — E78.2 MIXED HYPERLIPIDEMIA: ICD-10-CM

## 2021-05-05 DIAGNOSIS — I10 ESSENTIAL HYPERTENSION: ICD-10-CM

## 2021-05-05 DIAGNOSIS — I25.10 CORONARY ARTERY DISEASE INVOLVING NATIVE CORONARY ARTERY OF NATIVE HEART WITHOUT ANGINA PECTORIS: Primary | ICD-10-CM

## 2021-05-05 DIAGNOSIS — R00.0 TACHYCARDIA: ICD-10-CM

## 2021-05-05 PROCEDURE — 99214 OFFICE O/P EST MOD 30 MIN: CPT | Performed by: NURSE PRACTITIONER

## 2021-05-05 PROCEDURE — G8417 CALC BMI ABV UP PARAM F/U: HCPCS | Performed by: NURSE PRACTITIONER

## 2021-05-05 PROCEDURE — G8427 DOCREV CUR MEDS BY ELIG CLIN: HCPCS | Performed by: NURSE PRACTITIONER

## 2021-05-05 PROCEDURE — 4004F PT TOBACCO SCREEN RCVD TLK: CPT | Performed by: NURSE PRACTITIONER

## 2021-05-05 PROCEDURE — 3017F COLORECTAL CA SCREEN DOC REV: CPT | Performed by: NURSE PRACTITIONER

## 2021-05-05 PROCEDURE — 93000 ELECTROCARDIOGRAM COMPLETE: CPT | Performed by: NURSE PRACTITIONER

## 2021-05-05 RX ORDER — TAMSULOSIN HYDROCHLORIDE 0.4 MG/1
0.2 CAPSULE ORAL DAILY
COMMUNITY

## 2021-05-05 RX ORDER — SPIRONOLACTONE 25 MG/1
25 TABLET ORAL DAILY
COMMUNITY
End: 2021-09-28

## 2021-05-05 RX ORDER — CLOPIDOGREL BISULFATE 75 MG/1
75 TABLET ORAL DAILY
Qty: 30 TABLET | Refills: 5 | Status: SHIPPED | OUTPATIENT
Start: 2021-05-05 | End: 2021-09-28

## 2021-05-05 RX ORDER — IBUPROFEN 800 MG/1
800 TABLET ORAL 2 TIMES DAILY PRN
COMMUNITY
End: 2021-09-28 | Stop reason: ALTCHOICE

## 2021-05-05 NOTE — PROGRESS NOTES
[facility]     Outpatient Follow Up Note    Lawyer Valenzuela is 63 y.o. male who presents today with a history of CAD s/p PTCA LAD '12, s/p PTCA diag-1 & RCA Jan '21, HTN, hyperlipidemia and atrial fib / flutter s/p SVT ablation '16. His other hx includes: hyponatremia with level 129, HCTZ stopped Jan '21    CHIEF COMPLAINT / HPI:  Follow Up secondary to coronary artery disease  His symptoms stent were SOB and couldn't stabilize his heart described as trying to calm himself down through breathing. Subjective:     He feels better some days than others. He feels listless which he attributes to his medications. He no longer snores since having an ablation '16. He denies significant chest pain. There is SOB at times experienced doing his normal routine. He likes to multi-task. The patient denies orthopnea/PND. The patient does not have swelling. The patients weight is stable . The patient is not experiencing palpitations. Sometimes he may get a little dizzy after exertion. He hasn't been checking his BP for the past week. It was running 180/ . He called the nurse's line at the clinic with c/o feeling jittery & dizzy. He was told to stop nifedipine and see his PCP. He then was started on spironolactone. These symptoms show no change since the last OV. With regard to medication therapy the patient has been compliant with prescribed regimen. They have tolerated therapy to date.      Past Medical History:   Diagnosis Date    Arthritis     Atrial fibrillation ([address]     CAD (coronary artery disease)     Hyperlipidemia     Hypertension      Social History:    Social History     Tobacco Use   Smoking Status Current Every Day Smoker    Packs/day: 1.00    Types: Cigarettes   Smokeless Tobacco Never Used     Current Medications:  Current Outpatient Medications   Medication Sig Dispense Refill    spironolactone (ALDACTONE) 25 MG tablet Take 25 mg by mouth daily      ibuprofen (ADVIL;MOTRIN) 800 standpoint    I have addresed the patient's cardiac risk factors and adjusted pharmacologic treatment as needed. In addition, I have reinforced the need for patient directed risk factor modification. Further evaluation will be based upon the patient's clinical course and testing results. All questions and concerns were addressed to the patient. Alternatives to my treatment were discussed. The patient is currently smoking: 3/4 ppd. The risks related to smoking were reviewed with the patient. Recommend maintaining a smoke-free lifestyle. Products available for smoking cessation were discussed in detail. Patient is on a beta-blocker  Patient is on an ace-i/ARB  Patient is on a statin    Dual Antiplatelet therapy has been recommended / prescribed for this patient. Education conducted on adverse reactions including bleeding was discussed. The patient verbalizes understanding not to stop medications without discussing with us. Discussed exercise: 30-60 minutes 7 days/week  Discussed Low saturated fat/STEPHANIE diet. Thank you for allowing to us to participate in the care of Tj Ragland.     DOUGLAS Mccabe    Documentation of today's visit sent to PCP

## 2021-08-18 ENCOUNTER — TELEPHONE (OUTPATIENT)
Dept: CARDIOLOGY CLINIC | Age: 66
End: 2021-08-18

## 2021-08-18 NOTE — TELEPHONE ENCOUNTER
[facility] is requesting office notes from 5/5/21 and 8/7/20.  Please fax to [phone]

## 2021-09-17 ENCOUNTER — APPOINTMENT (OUTPATIENT)
Dept: MRI IMAGING | Age: 66
End: 2021-09-17
Payer: MEDICAID

## 2021-09-17 ENCOUNTER — HOSPITAL ENCOUNTER (OUTPATIENT)
Dept: MRI IMAGING | Age: 66
Discharge: HOME | End: 2021-09-17
Payer: MEDICAID

## 2021-09-17 DIAGNOSIS — H47.291: ICD-10-CM

## 2021-09-17 LAB
CREAT SERPL-MCNC: 1 MG/DL (ref 0.8–1.3)
GFR AFRICAN AMERICAN: >60
GFR NON-AFRICAN AMERICAN: >60

## 2021-09-17 PROCEDURE — 36415 COLL VENOUS BLD VENIPUNCTURE: CPT

## 2021-09-17 PROCEDURE — 82565 ASSAY OF CREATININE: CPT

## 2021-09-28 ENCOUNTER — OFFICE VISIT (OUTPATIENT)
Dept: CARDIOLOGY CLINIC | Age: 66
End: 2021-09-28
Payer: MEDICAID

## 2021-09-28 VITALS
WEIGHT: 238.7 LBS | OXYGEN SATURATION: 96 % | BODY MASS INDEX: 30.63 KG/M2 | DIASTOLIC BLOOD PRESSURE: 80 MMHG | HEIGHT: 74 IN | SYSTOLIC BLOOD PRESSURE: 132 MMHG | HEART RATE: 82 BPM

## 2021-09-28 DIAGNOSIS — I10 ESSENTIAL HYPERTENSION: ICD-10-CM

## 2021-09-28 DIAGNOSIS — R00.0 TACHYCARDIA: ICD-10-CM

## 2021-09-28 DIAGNOSIS — E78.2 MIXED HYPERLIPIDEMIA: ICD-10-CM

## 2021-09-28 DIAGNOSIS — I25.10 CORONARY ARTERY DISEASE INVOLVING NATIVE CORONARY ARTERY OF NATIVE HEART WITHOUT ANGINA PECTORIS: Primary | ICD-10-CM

## 2021-09-28 PROCEDURE — G8427 DOCREV CUR MEDS BY ELIG CLIN: HCPCS | Performed by: NURSE PRACTITIONER

## 2021-09-28 PROCEDURE — 99214 OFFICE O/P EST MOD 30 MIN: CPT | Performed by: NURSE PRACTITIONER

## 2021-09-28 PROCEDURE — G8417 CALC BMI ABV UP PARAM F/U: HCPCS | Performed by: NURSE PRACTITIONER

## 2021-09-28 PROCEDURE — 4004F PT TOBACCO SCREEN RCVD TLK: CPT | Performed by: NURSE PRACTITIONER

## 2021-09-28 PROCEDURE — 3017F COLORECTAL CA SCREEN DOC REV: CPT | Performed by: NURSE PRACTITIONER

## 2021-09-28 RX ORDER — ACETAMINOPHEN 325 MG/1
650 TABLET ORAL EVERY 6 HOURS PRN
COMMUNITY
End: 2021-09-28 | Stop reason: ALTCHOICE

## 2021-09-28 RX ORDER — CELECOXIB 200 MG/1
200 CAPSULE ORAL DAILY
COMMUNITY
Start: 2021-09-10 | End: 2022-04-19

## 2021-09-28 RX ORDER — ASPIRIN 325 MG
325 TABLET ORAL DAILY
Status: ON HOLD | COMMUNITY
End: 2022-09-21 | Stop reason: HOSPADM

## 2021-09-28 NOTE — PROGRESS NOTES
[facility]     Outpatient Follow Up Note    Stephanie Blackwell is 61 y.o. male who presents today with a history of CAD s/p PTCA LAD '12, s/p PTCA diag-1 & RCA Jan '21, HTN, hyperlipidemia and atrial fib / flutter s/p SVT ablation '16. His other hx includes: hyponatremia with level 129, HCTZ stopped Jan '21    CHIEF COMPLAINT / HPI:  Follow Up secondary to coronary artery disease  His symptoms prior to his stent were SOB and couldn't stabilize his heart described as trying to calm himself down through breathing. He denies recurrence. He remembers being under a lot of stress at that time. Subjective:   He's had medication changes since last seen. He's off plavix, lisinopril dose decreased and ibuprofen changed to mobic  Since making these changes, he's felt great. He denies significant chest pain. There is no SOB The patient denies orthopnea/PND. The patient does not have swelling. The patients weight is stable . The patient is not experiencing palpitations. These symptoms show no change since the last OV. With regard to medication therapy the patient has (?) been compliant with prescribed regimen. They have tolerated therapy to date.      Past Medical History:   Diagnosis Date    Arthritis     Atrial fibrillation ([address]     CAD (coronary artery disease)     Hyperlipidemia     Hypertension      Social History:    Social History     Tobacco Use   Smoking Status Current Every Day Smoker    Packs/day: 1.00    Types: Cigarettes   Smokeless Tobacco Never Used     Current Medications:  Current Outpatient Medications   Medication Sig Dispense Refill    celecoxib (CELEBREX) 200 MG capsule Take 200 mg by mouth daily       aspirin 325 MG tablet Take 325 mg by mouth daily      tamsulosin (FLOMAX) 0.4 MG capsule Take 0.2 mg by mouth daily      lisinopril (PRINIVIL;ZESTRIL) 20 MG tablet Take 2 tablets by mouth daily (Patient taking differently: Take 40 mg by mouth nightly ) 30 tablet 3    metoprolol succinate (TOPROL XL) 50 MG extended release tablet Take 50 mg by mouth daily      atorvastatin (LIPITOR) 40 MG tablet Take 1 tablet by mouth daily. 30 tablet 5     No current facility-administered medications for this visit. REVIEW OF SYSTEMS:    CONSTITUTIONAL: No major weight gain or loss, fatigue, weakness, night sweats or fever. HEENT: No new vision difficulties or ringing in the ears. RESPIRATORY: No new SOB, PND, orthopnea or cough. CARDIOVASCULAR: See HPI  GI: No nausea, vomiting, diarrhea, constipation, abdominal pain or changes in bowel habits. : No urinary frequency, urgency, incontinence hematuria or dysuria. SKIN: No cyanosis or skin lesions. MUSCULOSKELETAL: No new muscle or joint pain. NEUROLOGICAL: No syncope or TIA-like symptoms. PSYCHIATRIC: No anxiety, pain, insomnia or depression    Objective:   PHYSICAL EXAM:        Vitals:    09/28/21 1101 09/28/21 1117   BP: 130/82 132/80   Site: Right Upper Arm    Position: Sitting    Cuff Size: Medium Adult    Pulse: 82    SpO2: 96%    Weight: 238 lb 11.2 oz (108.3 kg)    Height: 6' 2\" (1.88 m)        VITALS:  /82 (Site: Right Upper Arm, Position: Sitting, Cuff Size: Medium Adult)   Pulse 82   Ht 6' 2\" (1.88 m)   Wt 238 lb 11.2 oz (108.3 kg)   SpO2 96%   BMI 30.65 kg/m²   CONSTITUTIONAL: Cooperative, no apparent distress, and appears well nourished / developed  NEUROLOGIC:  Awake and orientated to person, place and time. PSYCH: Calm affect. SKIN: Warm and dry. HEENT: Sclera non-icteric, normocephalic, neck supple, no elevation of JVP, normal carotid pulses with no bruits and thyroid normal size. LUNGS:  No increased work of breathing and clear to auscultation, no crackles or wheezing  CARDIOVASCULAR:  Regular rate with ectopy 80 and rhythm with no murmurs, gallops, rubs, or abnormal heart sounds, normal PMI. The apical impulses not displaced  JVP less than 8 cm H2O  Heart tones are crisp and normal  Cervical veins are not engorged  The carotid upstroke is normal in amplitude and contour without delay or bruit  JVP is not elevated  ABDOMEN:  Normal bowel sounds, non-distended and non-tender to palpation  EXT: No edema, no calf tenderness. Pulses are present bilaterally. DATA:    Lab Results   Component Value Date    ALT 23 04/14/2016    AST 24 04/14/2016    ALKPHOS 86 04/14/2016    BILITOT 1.2 (H) 04/14/2016     Lab Results   Component Value Date    CREATININE 1.0 09/17/2021    BUN 12 02/02/2021     (L) 02/02/2021    K 4.1 02/02/2021    CL 94 (L) 02/02/2021    CO2 25 02/02/2021     No results found for: TSH, R4SIVMA, X3CQOGA, THYROIDAB  Lab Results   Component Value Date    WBC 2.5 (L) 02/02/2021    HGB 12.6 (L) 02/02/2021    HCT 38.4 (L) 02/02/2021    MCV 96.6 02/02/2021     02/02/2021     No components found for: CHLPL  Lab Results   Component Value Date    TRIG 151 (H) 02/18/2014    TRIG 190 (H) 02/19/2013    TRIG 176 (H) 05/09/2012     Lab Results   Component Value Date    HDL 45 02/18/2014    HDL 41 02/19/2013    HDL 33 (L) 05/09/2012     Lab Results   Component Value Date    LDLCALC 137 (H) 02/18/2014    LDLCALC 122 (H) 02/19/2013    LDLCALC 127 (H) 05/09/2012     Lab Results   Component Value Date    LABVLDL 30 02/18/2014    LABVLDL 38 02/19/2013    LABVLDL 35 05/09/2012     LABS: 3/5/21:   Na+ 137 K+ 4.2 chl 102 CO2 19 BUN 11 creatinine 1.01 glu 67    Radiology Review:  Pertinent images / reports were reviewed as a part of this visit and reveals the following:    Last Echo: Feb '21:  Summary   -Normal left ventricle size and systolic function with an estimated ejection   fraction of 55%. There is moderate to severe concentric left ventricular   hypertrophy. Grade I diastolic dysfunction with normal LV filling pressures. -Mild mitral regurgitation. There is a trivial localized near right ventricle pericardial effusion   noted. Last Angiogram: 2/2/21  Anatomy:   LM-nml   LAD-mid 60%. D1 prox 90%.  D2 ostial occluded (stent FCI) with R to L collaterals  Cx-dominant, normal  OM- nml  RCA-non dominant prox 99%  LPDA- nml     FFR LAD 0.87     Intervention  ~Successful PCI to D1 with 2.5x15 DORIE to 16tam. PCI to RCA with 2.5x12 DORIE to 18atm. Excellent Result. Assessment/Plan:  Active Problems:    Chest pain  Plan: typical angina. S/p LHC with PCI to D1, RCA. Cont DAPT with plavix.      Assessment:      Diagnosis Orders   1. Coronary artery disease involving native coronary artery of native heart without angina pectoris   ~stable : denies angina  ~s/p PTCA LAD '12, s/p PTCA diag-1 & RCA Jan '21  ~ASA / statin / BB    2. Tachycardia   ~stable : denies palpitations  ~AP ~ 80  ~hx of SVT ablation '16  ~metoprolol     3. Essential hypertension   ~controlled   ~reported nifedipine changed to spironolactone which is not currently taking    4. Mixed hyperlipidemia   ~profile unavailable  ~remains on atorvastatin         I had the opportunity to review the clinical symptoms and presentation of Tashi Rodriguez. Plan:     1. I have called his PCP for his records and for med clarification  2. F/U in 6 months    Overall the patient is stable from CV standpoint    I have addresed the patient's cardiac risk factors and adjusted pharmacologic treatment as needed. In addition, I have reinforced the need for patient directed risk factor modification. Further evaluation will be based upon the patient's clinical course and testing results. All questions and concerns were addressed to the patient. Alternatives to my treatment were discussed. The patient is currently smoking: 3/4 ppd. The risks related to smoking were reviewed with the patient. Recommend maintaining a smoke-free lifestyle. Products available for smoking cessation were discussed in detail.   He plans to check with his PCP about using Chantix    Patient is on a beta-blocker  Patient is on an ace-i/ARB  Patient is on a statin    Antiplatelet therapy has been recommended / prescribed for this patient. Education conducted on adverse reactions including bleeding was discussed. The patient verbalizes understanding not to stop medications without discussing with us. Discussed exercise: 30-60 minutes 7 days/week  Discussed Low saturated fat/STEPHANIE diet. Off metformin ; doesn't check BS    Thank you for allowing to us to participate in the care of Toi Hernández.     Sharla Holstein, DOUGLAS    Documentation of today's visit sent to PCP

## 2021-11-24 ENCOUNTER — HOSPITAL ENCOUNTER (OUTPATIENT)
Dept: CT IMAGING | Age: 66
Discharge: HOME | End: 2021-11-24
Payer: MEDICAID

## 2021-11-24 ENCOUNTER — TELEPHONE (OUTPATIENT)
Dept: CASE MANAGEMENT | Age: 66
End: 2021-11-24

## 2021-11-24 DIAGNOSIS — F17.210 CIGARETTE SMOKER: ICD-10-CM

## 2021-11-24 PROCEDURE — 71271 CT THORAX LUNG CANCER SCR C-: CPT

## 2021-12-01 ENCOUNTER — HOSPITAL ENCOUNTER (OUTPATIENT)
Age: 66
Discharge: HOME | End: 2021-12-01
Payer: MEDICAID

## 2021-12-01 ENCOUNTER — HOSPITAL ENCOUNTER (OUTPATIENT)
Dept: CT IMAGING | Age: 66
Discharge: HOME | End: 2021-12-01
Payer: MEDICAID

## 2021-12-01 DIAGNOSIS — H47.291 TEMPORAL PALLOR OF OPTIC DISC, RIGHT: ICD-10-CM

## 2021-12-01 LAB
BUN BLDV-MCNC: 23 MG/DL (ref 7–20)
CREAT SERPL-MCNC: 1.1 MG/DL (ref 0.8–1.3)
GFR AFRICAN AMERICAN: >60
GFR NON-AFRICAN AMERICAN: >60

## 2021-12-01 PROCEDURE — 36415 COLL VENOUS BLD VENIPUNCTURE: CPT

## 2021-12-01 PROCEDURE — 70482 CT ORBIT/EAR/FOSSA W/O&W/DYE: CPT

## 2021-12-01 PROCEDURE — 6360000004 HC RX CONTRAST MEDICATION: Performed by: OPHTHALMOLOGY

## 2021-12-01 PROCEDURE — 84520 ASSAY OF UREA NITROGEN: CPT

## 2021-12-01 PROCEDURE — 82565 ASSAY OF CREATININE: CPT

## 2021-12-01 PROCEDURE — 70470 CT HEAD/BRAIN W/O & W/DYE: CPT

## 2021-12-01 RX ADMIN — IOPAMIDOL 75 ML: 755 INJECTION, SOLUTION INTRAVENOUS at 08:03

## 2022-04-19 ENCOUNTER — OFFICE VISIT (OUTPATIENT)
Dept: CARDIOLOGY CLINIC | Age: 67
End: 2022-04-19
Payer: MEDICAID

## 2022-04-19 VITALS
SYSTOLIC BLOOD PRESSURE: 130 MMHG | HEART RATE: 84 BPM | RESPIRATION RATE: 19 BRPM | DIASTOLIC BLOOD PRESSURE: 70 MMHG | HEIGHT: 71 IN | OXYGEN SATURATION: 97 % | BODY MASS INDEX: 35 KG/M2 | WEIGHT: 250 LBS

## 2022-04-19 DIAGNOSIS — I25.10 CORONARY ARTERY DISEASE INVOLVING NATIVE CORONARY ARTERY OF NATIVE HEART WITHOUT ANGINA PECTORIS: Primary | ICD-10-CM

## 2022-04-19 DIAGNOSIS — I10 PRIMARY HYPERTENSION: ICD-10-CM

## 2022-04-19 DIAGNOSIS — R00.0 TACHYCARDIA: ICD-10-CM

## 2022-04-19 DIAGNOSIS — E78.2 MIXED HYPERLIPIDEMIA: ICD-10-CM

## 2022-04-19 PROCEDURE — G8427 DOCREV CUR MEDS BY ELIG CLIN: HCPCS | Performed by: NURSE PRACTITIONER

## 2022-04-19 PROCEDURE — 3017F COLORECTAL CA SCREEN DOC REV: CPT | Performed by: NURSE PRACTITIONER

## 2022-04-19 PROCEDURE — 99214 OFFICE O/P EST MOD 30 MIN: CPT | Performed by: NURSE PRACTITIONER

## 2022-04-19 PROCEDURE — G8417 CALC BMI ABV UP PARAM F/U: HCPCS | Performed by: NURSE PRACTITIONER

## 2022-04-19 PROCEDURE — 4004F PT TOBACCO SCREEN RCVD TLK: CPT | Performed by: NURSE PRACTITIONER

## 2022-04-19 RX ORDER — UMECLIDINIUM 62.5 UG/1
AEROSOL, POWDER ORAL
COMMUNITY
Start: 2022-03-16

## 2022-04-19 RX ORDER — CARBOXYMETHYLCELLULOSE SODIUM AND GLYCERIN 10; 9 MG/ML; MG/ML
SOLUTION/ DROPS OPHTHALMIC
COMMUNITY
Start: 2022-04-06

## 2022-04-19 RX ORDER — NICOTINE 21 MG/24HR
PATCH, TRANSDERMAL 24 HOURS TRANSDERMAL
COMMUNITY
Start: 2022-01-17 | End: 2022-04-19

## 2022-04-19 RX ORDER — GABAPENTIN 300 MG/1
CAPSULE ORAL
COMMUNITY
Start: 2022-03-15 | End: 2022-04-19

## 2022-04-19 RX ORDER — IBUPROFEN 800 MG/1
800 TABLET ORAL EVERY 6 HOURS PRN
COMMUNITY
End: 2022-04-19

## 2022-04-19 NOTE — PROGRESS NOTES
[facility]     Outpatient Follow Up Note    Janine Rome is 61 y.o. male who presents today with a history of CAD s/p PTCA LAD '12, s/p PTCA diag-1 & RCA Jan '21, HTN, hyperlipidemia and atrial fib / flutter s/p SVT ablation '16. His other hx includes: hyponatremia with level 129, HCTZ stopped Jan '21    CHIEF COMPLAINT / HPI:  Follow Up secondary to coronary artery disease    Subjective:   His symptoms prior to his stent were SOB and couldn't stabilize his heart described as trying to calm himself down through breathing. He denies recurrence. He denies significant chest pain. There is no SOB The patient denies orthopnea/PND. The patient does not have swelling. The patients weight is stable . The patient is not experiencing palpitations. These symptoms show no change since the last OV. With regard to medication therapy the patient has been (historically questionable) compliant with prescribed regimen. They have tolerated therapy to date. Past Medical History:   Diagnosis Date    Arthritis     Atrial fibrillation ([address]     CAD (coronary artery disease)     Hyperlipidemia     Hypertension      Social History:    Social History     Tobacco Use   Smoking Status Current Every Day Smoker    Packs/day: 1.00    Years: 40.00    Pack years: 40.00    Types: Cigarettes   Smokeless Tobacco Never Used     Current Medications:  Current Outpatient Medications   Medication Sig Dispense Refill    REFRESH OPTIVE 1-0.9 % GEL       diclofenac sodium (VOLTAREN) 1 % GEL       aspirin 325 MG tablet Take 325 mg by mouth daily      tamsulosin (FLOMAX) 0.4 MG capsule Take 0.2 mg by mouth daily      lisinopril (PRINIVIL;ZESTRIL) 20 MG tablet Take 2 tablets by mouth daily (Patient taking differently: Take 40 mg by mouth nightly ) 30 tablet 3    metoprolol succinate (TOPROL XL) 50 MG extended release tablet Take 50 mg by mouth daily      atorvastatin (LIPITOR) 40 MG tablet Take 1 tablet by mouth daily. 30 tablet 5    INCRUSE ELLIPTA 62.5 MCG/INH AEPB  (Patient not taking: Reported on 4/19/2022)       No current facility-administered medications for this visit. REVIEW OF SYSTEMS:    CONSTITUTIONAL: No major weight gain or loss, fatigue, weakness, night sweats or fever. HEENT: No new vision difficulties or ringing in the ears. RESPIRATORY: No new SOB, PND, orthopnea or cough. CARDIOVASCULAR: See HPI  GI: No nausea, vomiting, diarrhea, constipation, abdominal pain or changes in bowel habits. : No urinary frequency, urgency, incontinence hematuria or dysuria. SKIN: No cyanosis or skin lesions. MUSCULOSKELETAL: No new muscle or joint pain. NEUROLOGICAL: No syncope or TIA-like symptoms. PSYCHIATRIC: No anxiety, pain, insomnia or depression    Objective:   PHYSICAL EXAM:        Vitals:    04/19/22 1027 04/19/22 1056   BP: 128/82 130/70   Site: Left Upper Arm    Position: Sitting    Cuff Size: Large Adult    Pulse: 84    Resp: 19    SpO2: 97%    Weight: 250 lb (113.4 kg)    Height: 5' 11\" (1.803 m)        VITALS:  /82 (Site: Left Upper Arm, Position: Sitting, Cuff Size: Large Adult)   Pulse 84   Resp 19   Ht 5' 11\" (1.803 m)   Wt 250 lb (113.4 kg)   SpO2 97%   BMI 34.87 kg/m²   CONSTITUTIONAL: Cooperative, no apparent distress, and appears well nourished / developed  NEUROLOGIC:  Awake and orientated to person, place and time. PSYCH: Calm affect. SKIN: Warm and dry. HEENT: Sclera non-icteric, normocephalic, neck supple, no elevation of JVP, normal carotid pulses with no bruits and thyroid normal size. LUNGS:  No increased work of breathing and clear to auscultation, no crackles or wheezing  CARDIOVASCULAR:  Regular rate with ectopy 80 and rhythm with no murmurs, gallops, rubs, or abnormal heart sounds, normal PMI. The apical impulses not displaced  JVP less than 8 cm H2O  Heart tones are crisp and normal  Cervical veins are not engorged  The carotid upstroke is normal in amplitude and contour without delay or bruit  JVP is not elevated  ABDOMEN:  Normal bowel sounds, non-distended and non-tender to palpation  EXT: No edema, no calf tenderness. Pulses are present bilaterally. DATA:      Lab Results   Component Value Date    CREATININE 1.1 12/01/2021    BUN 23 (H) 12/01/2021     (L) 02/02/2021    K 4.1 02/02/2021    CL 94 (L) 02/02/2021    CO2 25 02/02/2021     No results found for: TSH, H7VEXSD, F5BQIHD, THYROIDAB  Lab Results   Component Value Date    WBC 2.5 (L) 02/02/2021    HGB 12.6 (L) 02/02/2021    HCT 38.4 (L) 02/02/2021    MCV 96.6 02/02/2021     02/02/2021     No components found for: CHLPL  Lab Results   Component Value Date    TRIG 151 (H) 02/18/2014    TRIG 190 (H) 02/19/2013    TRIG 176 (H) 05/09/2012     Lab Results   Component Value Date    HDL 45 02/18/2014    HDL 41 02/19/2013    HDL 33 (L) 05/09/2012     Lab Results   Component Value Date    LDLCALC 137 (H) 02/18/2014    LDLCALC 122 (H) 02/19/2013    LDLCALC 127 (H) 05/09/2012     Lab Results   Component Value Date    LABVLDL 30 02/18/2014    LABVLDL 38 02/19/2013    LABVLDL 35 05/09/2012     LABS: 3/5/21:   Na+ 137 K+ 4.2 chl 102 CO2 19 BUN 11 creatinine 1.01 glu 67    Radiology Review:  Pertinent images / reports were reviewed as a part of this visit and reveals the following:    Echo: Feb '21:  Summary   -Normal left ventricle size and systolic function with an estimated ejection   fraction of 55%. There is moderate to severe concentric left ventricular   hypertrophy. Grade I diastolic dysfunction with normal LV filling pressures. -Mild mitral regurgitation. There is a trivial localized near right ventricle pericardial effusion   noted. Angiogram: 2/2/21  Anatomy:   LM-nml   LAD-mid 60%. D1 prox 90%.  D2 ostial occluded (stent senior care) with R to L collaterals  Cx-dominant, normal  OM- nml  RCA-non dominant prox 99%  LPDA- nml     FFR LAD 0.87     Intervention  PCI to D1 with 2.5x15 DORIE to 16tam.   PCI to RCA with 2.5x12 DORIE to 18atm.        Assessment:      Diagnosis Orders   1. Coronary artery disease involving native coronary artery of native heart without angina pectoris   ~stable : denies recurrence of angina  ~s/p PTCA LAD '12, s/p PTCA diag-1 & RCA Jan '21  ~ASA / statin / BB  ~RF: tobacco abuse    2. Tachycardia   ~stable : denies palpitations  ~hx of SVT ablation '16  ~metoprolol / ASA    3. Primary hypertension   ~controlled on lisinopril / metoprolol   ~previously reported to had nifedipine changed to spironolactone : was not nor is taking. Would not be a good option with hx of non-compliance     4. Mixed hyperlipidemia   ~profile unavailable for review  ~remains on atorvastatin         I had the opportunity to review the clinical symptoms and presentation of Veronica Mane. Plan:     1. CMP / lipid profile today as routine   2. F/U in 6 months with Dr. Ethel Rosario    Overall the patient is stable from CV standpoint    I have addresed the patient's cardiac risk factors and adjusted pharmacologic treatment as needed. In addition, I have reinforced the need for patient directed risk factor modification. Further evaluation will be based upon the patient's clinical course and testing results. All questions and concerns were addressed to the patient. Alternatives to my treatment were discussed. The patient is currently smoking: ~ 1 ppd. The risks related to smoking were reviewed with the patient. Recommend maintaining a smoke-free lifestyle. Products available for smoking cessation were discussed in detail. Not ready to use Nicoderm    Patient is on a beta-blocker  Patient is on an ace-i/ARB  Patient is on a statin    Antiplatelet therapy has been recommended / prescribed for this patient. Education conducted on adverse reactions including bleeding was discussed. The patient verbalizes understanding not to stop medications without discussing with us.     Discussed exercise: 30-60 minutes 7 days/week : 3 x / week for 60 min  Discussed Low saturated fat/STEPHANIE diet. Thank you for allowing to us to participate in the care of Timur Mcfarland.     DOUGLAS Dawn    Documentation of today's visit sent to PCP

## 2022-08-22 ENCOUNTER — HOSPITAL ENCOUNTER (EMERGENCY)
Age: 67
Discharge: HOME | End: 2022-08-22
Payer: MEDICARE

## 2022-08-22 VITALS
HEART RATE: 78 BPM | WEIGHT: 252 LBS | DIASTOLIC BLOOD PRESSURE: 97 MMHG | HEIGHT: 74 IN | BODY MASS INDEX: 32.34 KG/M2 | OXYGEN SATURATION: 97 % | RESPIRATION RATE: 16 BRPM | TEMPERATURE: 97.8 F | SYSTOLIC BLOOD PRESSURE: 173 MMHG

## 2022-08-22 DIAGNOSIS — M54.50 ACUTE EXACERBATION OF CHRONIC LOW BACK PAIN: Primary | ICD-10-CM

## 2022-08-22 DIAGNOSIS — M54.9 MUSCULOSKELETAL BACK PAIN: ICD-10-CM

## 2022-08-22 DIAGNOSIS — G89.29 ACUTE EXACERBATION OF CHRONIC LOW BACK PAIN: Primary | ICD-10-CM

## 2022-08-22 PROCEDURE — 99283 EMERGENCY DEPT VISIT LOW MDM: CPT

## 2022-08-22 RX ORDER — PREDNISONE 10 MG/1
TABLET ORAL
Qty: 44 TABLET | Refills: 0 | Status: SHIPPED | OUTPATIENT
Start: 2022-08-22 | End: 2022-09-01

## 2022-08-22 RX ORDER — LIDOCAINE 50 MG/G
1 PATCH TOPICAL DAILY
Qty: 30 PATCH | Refills: 0 | Status: SHIPPED | OUTPATIENT
Start: 2022-08-22

## 2022-08-22 RX ORDER — METHOCARBAMOL 500 MG/1
500 TABLET, FILM COATED ORAL 3 TIMES DAILY PRN
Qty: 40 TABLET | Refills: 0 | Status: SHIPPED | OUTPATIENT
Start: 2022-08-22 | End: 2022-09-01

## 2022-08-22 RX ORDER — MELOXICAM 7.5 MG/1
7.5 TABLET ORAL DAILY
Qty: 90 TABLET | Refills: 1 | Status: SHIPPED | OUTPATIENT
Start: 2022-08-22

## 2022-08-22 ASSESSMENT — PAIN DESCRIPTION - PAIN TYPE: TYPE: CHRONIC PAIN

## 2022-08-22 ASSESSMENT — ENCOUNTER SYMPTOMS
NAUSEA: 0
WHEEZING: 0
DIARRHEA: 0
ABDOMINAL PAIN: 0
COUGH: 0
SHORTNESS OF BREATH: 0
VOMITING: 0
COLOR CHANGE: 0
BACK PAIN: 1

## 2022-08-22 ASSESSMENT — PAIN DESCRIPTION - LOCATION: LOCATION: BACK

## 2022-08-22 ASSESSMENT — PAIN DESCRIPTION - ORIENTATION: ORIENTATION: POSTERIOR

## 2022-08-22 ASSESSMENT — LIFESTYLE VARIABLES
HOW OFTEN DO YOU HAVE A DRINK CONTAINING ALCOHOL: 4 OR MORE TIMES A WEEK
HOW MANY STANDARD DRINKS CONTAINING ALCOHOL DO YOU HAVE ON A TYPICAL DAY: 3 OR 4

## 2022-08-22 ASSESSMENT — PAIN DESCRIPTION - FREQUENCY: FREQUENCY: INTERMITTENT

## 2022-08-22 ASSESSMENT — PAIN DESCRIPTION - DESCRIPTORS: DESCRIPTORS: ACHING

## 2022-08-22 ASSESSMENT — PAIN - FUNCTIONAL ASSESSMENT: PAIN_FUNCTIONAL_ASSESSMENT: 0-10

## 2022-08-22 ASSESSMENT — PAIN SCALES - GENERAL: PAINLEVEL_OUTOF10: 10

## 2022-08-22 NOTE — ED PROVIDER NOTES
**ADVANCED PRACTICE PROVIDER, I HAVE EVALUATED THIS PATIENT**        [facility]      Pt Name: Daryle Margarita  THP:[id number]  Jacobgfjanette 1955  Date of evaluation: 8/22/2022  Provider: DOUGLAS Arias CNP      Chief Complaint:    Chief Complaint   Patient presents with    Back Pain     Pt coming from home pt states that his lower back pain is seizing up his lower extremities when he tries to walk but also states that he is out of his medications,pt also states that he has pain in his (R) side, pt states that his pain is ongoing for a month , pt states that he has had surgey in the past and states that it never healed properly and that it has gotten worse. Pt has refferal to [facility] with no appointment yet. Pt has ortho doc that he has seen 2 weeks ago. Pt states that he needs          Nursing Notes, Past Medical Hx, Past Surgical Hx, Social Hx, Allergies, and Family Hx were all reviewed and agreed with or any disagreements were addressed in the HPI.    HPI: (Location, Duration, Timing, Severity, Quality, Assoc Sx, Context, Modifying factors)    Chief Complaint of right sided lower back pain     This is a  74 y.o. male who presents to the emergency department lower back pain, states that his back keeps easing up on him and then causing his lower extremities to be uncomfortable as well, patient is stating he is having more pain on the right side of his back rating down his leg has been going on for the past month, he has had back surgery in the past however states is never healed properly. Patient has had a referral to [address] [place] [address] however he is not followed up with an appointment. He does have an appointment with an orthopedic specialist he supposed to be in the next 2 weeks. He states that he is actually out of his medications as he not been on anything for couple months.   He rates that back pain a 10 on a 10, states it is mostly in the middle of his back that radiates in the lower back but more on the right side of the paraspinal muscles. Patient denies any new numbness or tingling or paresthesias, no loss of bowel bladder control or urinary retention. He denies any recent falls traumas or injuries. Rates the pain a 10 out of 10, states ambulation and movement worsens the pain. He has been taking Motrin around-the-clock, states it does ease up the pain. He denies any cough, congestion, fever or chills, no chest pain pleuritic chest pain or shortness of breath. He denies any additional symptoms or complaints. No additional aggravating relieving factors. Patient presents awake, alert and in no acute respiratory distress or toxic appearance    PastMedical/Surgical History:      Diagnosis Date    Arthritis     Atrial fibrillation (HCC)     CAD (coronary artery disease)     Hyperlipidemia     Hypertension          Procedure Laterality Date    BACK SURGERY  200    lower lumbar surgery    CORONARY ANGIOPLASTY WITH STENT PLACEMENT  2012       Medications:  Previous Medications    ASPIRIN 325 MG TABLET    Take 325 mg by mouth daily    ATORVASTATIN (LIPITOR) 40 MG TABLET    Take 1 tablet by mouth daily. DICLOFENAC SODIUM (VOLTAREN) 1 % GEL        INCRUSE ELLIPTA 62.5 MCG/INH AEPB        LISINOPRIL (PRINIVIL;ZESTRIL) 20 MG TABLET    Take 2 tablets by mouth daily    METOPROLOL SUCCINATE (TOPROL XL) 50 MG EXTENDED RELEASE TABLET    Take 50 mg by mouth daily    REFRESH OPTIVE 1-0.9 % GEL        TAMSULOSIN (FLOMAX) 0.4 MG CAPSULE    Take 0.2 mg by mouth daily         Review of Systems:  (2-9 systems needed)  Review of Systems   Constitutional:  Negative for chills and fever. HENT:  Negative for congestion. Respiratory:  Negative for cough, shortness of breath and wheezing. Cardiovascular:  Negative for chest pain. Gastrointestinal:  Negative for abdominal pain, diarrhea, nausea and vomiting.    Genitourinary:  Negative for difficulty urinating, dysuria and frequency. Musculoskeletal:  Positive for back pain and myalgias. Patient complains of lower back pain, states that his back keeps easing up on him and then causing his lower extremities to be uncomfortable as well, patient is stating he is having more pain on the right side of his back rating down his leg has been going on for the past month, he has had back surgery in the past however states is never healed properly. Skin:  Negative for color change. Neurological:  Negative for weakness, numbness and headaches. \"Positives and Pertinent negatives as per HPI\"    Physical Exam:  Physical Exam  Vitals and nursing note reviewed. Constitutional:       Appearance: He is well-developed. He is not diaphoretic. HENT:      Head: Normocephalic. Right Ear: External ear normal.      Left Ear: External ear normal.   Eyes:      General: No scleral icterus. Right eye: No discharge. Left eye: No discharge. Cardiovascular:      Rate and Rhythm: Normal rate. Comments: Normal S1 and 2, peripheral pulses 2+, no edema observed  Pulmonary:      Effort: Pulmonary effort is normal. No respiratory distress. Breath sounds: Normal breath sounds. Abdominal:      Palpations: Abdomen is soft. Musculoskeletal:         General: Tenderness present. Normal range of motion. Cervical back: Normal range of motion and neck supple. Comments: Patient has reproducible tenderness to the paraspinal muscles of the thoracic and lumbar spine, there is no rashes or lesions, no obvious deformity or break in skin integrity. He has no central cervical thoracic or lumbar spine tenderness or step-off and unremarkable neurological exam, he is able to ambulate without any difficulty. He has no numbness tingling or paresthesias, no saddle anesthesia, no neurodeficit   Skin:     General: Skin is warm. Capillary Refill: Capillary refill takes less than 2 seconds. Coloration: Skin is not pale. Neurological:      General: No focal deficit present. Mental Status: He is alert and oriented to person, place, and time. GCS: GCS eye subscore is 4. GCS verbal subscore is 5. GCS motor subscore is 6. Cranial Nerves: Cranial nerves are intact. Sensory: Sensation is intact. Motor: Motor function is intact. Deep Tendon Reflexes:      Reflex Scores:       Achilles reflexes are 2+ on the right side and 2+ on the left side. Comments: Patient is awake, alert, following commands correctly, neurologic intact no focal deficits. No numbness tingling or paresthesias. Unremarkable neurological exam with no acute focal deficits. Psychiatric:         Behavior: Behavior normal.       MEDICAL DECISION MAKING    Vitals:    Vitals:    08/22/22 1156 08/22/22 1203   BP: (!) 173/97 (!) 173/97   Pulse: 77 78   Resp: 16    Temp: 97.8 °F (36.6 °C) 97.8 °F (36.6 °C)   TempSrc: Oral Oral   SpO2: 97%    Weight: 252 lb (114.3 kg)    Height: 6' 2\" (1.88 m)        LABS:Labs Reviewed - No data to display     Remainder of labs reviewed and were negative at this time or not returned at the time of this note. RADIOLOGY:   Non-plain film images such as CT, Ultrasound and MRI are read by the radiologist. Sammie PALOMARES APRN - CNP have directly visualized the radiologic plain film image(s) with the below findings:      Interpretation per the Radiologist below, if available at the time of this note:    No orders to display        No results found.        MEDICAL DECISION MAKING / ED COURSE:      PROCEDURES:   Procedures    None    Patient was given:  Medications - No data to display    Patient complains of lower back pain, states that his back keeps easing up on him and then causing his lower extremities to be uncomfortable as well, patient is stating he is having more pain on the right side of his back rating down his leg has been going on for the past month, he has had back surgery in the past however states is never healed properly. After evaluation and examination the patient, it appears he is having exacerbations of his acute on chronic pain, I can see where he has been referred to [facility] however he has not made an appointment for follow-up. He had an MRI back in 2020 of his lumbar spine which shows eventually it was a stable exam with some degenerative disc. He also has some stenosis at that time. He states that the pain is not changed, he is not had any new neurodeficits, and he has no central cervical thoracic or lumbar spine tenderness or step-off, at this time I do not think  to do any additional diagnostic studies or imaging, I do however believe that he is having exacerbation of his pain and more importantly he needs to follow-up with a back specialist. Pt denies any history of new numbness, weakness, incontinence of bowel or bladder, constipation, saddle anesthesia or paresthesias. I estimate there is LOW risk for ABDOMINAL AORTIC ANEURYSM, CAUDA EQUINA SYNDROME, EPIDURAL MASS LESION, OR CORD COMPRESSION, thus I consider the discharge disposition reasonable. Therefore, shared medical decision was made between the patient and myself and we agreed the patient could be discharged home with outpatient follow-up. Patient was discharged home with referral back to PCP more importantly follow-up with a back specialist I gave him along with going to physical therapy. He was discharged home with prednisone, Robaxin, meloxicam and Lidoderm patches. Educated take all medicine as prescribed. He was also given various exercises to perform to help with some of the discomfort. Return to the ER for worsening or concerning symptoms. The patient tolerated their visit well. I evaluated the patient. The physician was available for consultation as needed.   The patient and / or the family were informed of the results of any tests, a time was given to answer questions, a plan was proposed and they agreed with plan. Patient verbalized understanding of discharge instructions and the patient was discharged from the department in stable condition    I am the Primary Clinician of Record. CLINICAL IMPRESSION:  1. Acute exacerbation of chronic low back pain    2. Musculoskeletal back pain        DISPOSITION Decision To Discharge 08/22/2022 12:20:15 PM      PATIENT REFERRED TO:  [address] [place] [address] Drive  [address] [facility]  [id number]  [facility]    Schedule an appointment as soon as possible for a visit   As needed    [address] [place] [address]  [phone]    Call the back specialist first thing today make an appointment    [address] [place] [address] [address]        DISCHARGE MEDICATIONS:  New Prescriptions    LIDOCAINE (LIDODERM) 5 %    Place 1 patch onto the skin daily 12 hours on, 12 hours off.     MELOXICAM (MOBIC) 7.5 MG TABLET    Take 1 tablet by mouth daily    METHOCARBAMOL (ROBAXIN) 500 MG TABLET    Take 1 tablet by mouth 3 times daily as needed (Musculoskeletal back pain)    PREDNISONE (DELTASONE) 10 MG TABLET    60 mg po x 5 days then   40 mg po x 2 days then  20 mg po x 2 days then  10 mg po x 2 days total of 11 days       DISCONTINUED MEDICATIONS:  Discontinued Medications    No medications on file              (Please note the MDM and HPI sections of this note were completed with a voice recognition program.  Efforts were made to edit the dictations but occasionally words are mis-transcribed.)    Electronically signed, DOUGLAS Grove CNP,           DOUGLAS Grove CNP  08/22/22 [id number]

## 2022-08-25 ENCOUNTER — TELEPHONE (OUTPATIENT)
Dept: ORTHOPEDIC SURGERY | Age: 67
End: 2022-08-25

## 2022-08-25 NOTE — TELEPHONE ENCOUNTER
Attempted to contact patient twice to get an appointment set up with a back and spine specialist. LVM with the back and spine call center for patient to callback at their convenience. [phone].

## 2022-09-07 ENCOUNTER — HOSPITAL ENCOUNTER (EMERGENCY)
Age: 67
Discharge: HOME | DRG: 092 | End: 2022-09-07
Payer: MEDICARE

## 2022-09-07 VITALS
WEIGHT: 256 LBS | HEIGHT: 74 IN | OXYGEN SATURATION: 99 % | RESPIRATION RATE: 18 BRPM | BODY MASS INDEX: 32.85 KG/M2 | TEMPERATURE: 97.6 F | HEART RATE: 80 BPM | SYSTOLIC BLOOD PRESSURE: 158 MMHG | DIASTOLIC BLOOD PRESSURE: 94 MMHG

## 2022-09-07 DIAGNOSIS — M54.41 CHRONIC RIGHT-SIDED LOW BACK PAIN WITH BILATERAL SCIATICA: Primary | ICD-10-CM

## 2022-09-07 DIAGNOSIS — G89.29 CHRONIC RIGHT-SIDED LOW BACK PAIN WITH BILATERAL SCIATICA: Primary | ICD-10-CM

## 2022-09-07 DIAGNOSIS — M25.511 CHRONIC PAIN OF BOTH SHOULDERS: ICD-10-CM

## 2022-09-07 DIAGNOSIS — M25.512 CHRONIC PAIN OF BOTH SHOULDERS: ICD-10-CM

## 2022-09-07 DIAGNOSIS — M54.42 CHRONIC RIGHT-SIDED LOW BACK PAIN WITH BILATERAL SCIATICA: Primary | ICD-10-CM

## 2022-09-07 DIAGNOSIS — G89.29 CHRONIC PAIN OF BOTH SHOULDERS: ICD-10-CM

## 2022-09-07 PROCEDURE — 6360000002 HC RX W HCPCS: Performed by: PHYSICIAN ASSISTANT

## 2022-09-07 RX ORDER — IBUPROFEN 400 MG/1
400 TABLET ORAL EVERY 6 HOURS PRN
Qty: 30 TABLET | Refills: 0 | Status: ON HOLD | OUTPATIENT
Start: 2022-09-07 | End: 2022-09-09 | Stop reason: HOSPADM

## 2022-09-07 RX ORDER — KETOROLAC TROMETHAMINE 30 MG/ML
15 INJECTION, SOLUTION INTRAMUSCULAR; INTRAVENOUS ONCE
Status: COMPLETED | OUTPATIENT
Start: 2022-09-07 | End: 2022-09-07

## 2022-09-07 RX ADMIN — KETOROLAC TROMETHAMINE 15 MG: 30 INJECTION, SOLUTION INTRAMUSCULAR at 11:00

## 2022-09-07 ASSESSMENT — PAIN - FUNCTIONAL ASSESSMENT: PAIN_FUNCTIONAL_ASSESSMENT: 0-10

## 2022-09-07 ASSESSMENT — LIFESTYLE VARIABLES
HOW OFTEN DO YOU HAVE A DRINK CONTAINING ALCOHOL: PATIENT DECLINED
HOW MANY STANDARD DRINKS CONTAINING ALCOHOL DO YOU HAVE ON A TYPICAL DAY: PATIENT DECLINED

## 2022-09-07 ASSESSMENT — PAIN SCALES - GENERAL: PAINLEVEL_OUTOF10: 10

## 2022-09-07 ASSESSMENT — PAIN DESCRIPTION - ORIENTATION: ORIENTATION: RIGHT;LEFT

## 2022-09-07 ASSESSMENT — PAIN DESCRIPTION - LOCATION: LOCATION: ARM;BACK

## 2022-09-07 NOTE — ED PROVIDER NOTES
LEILANI. I have evaluated this patient. My supervising physician was available for consultation. Chief Complaint:   Chief Complaint   Patient presents with    Numbness     Pt reports left arm numbness times one week, with back and shoulder pain. Pt also reports numbness in right arm. Pt states that he wants referral for spine doctor because this is an ongoing problem    Back Pain       ED Course & Medical Decision Making  74 y.o. male presenting with worsening back pain, shoulder pain, referral request      -toradol    MDM: This 53-year-old male presents with worsening low back pain, and shoulder pain. Has a long history of these problems. Recently worked up at [address] [place] [address] for shoulder pain and hand numbness. He was disappointed that they did not address his back pain at that visit. He wanted a referral to a \"good spine surgeon \", so they can address his low back pain. He is currently followed by an orthopedist, cardiologist, primary care provider. He is not interested in physical therapy. His problems are chronic in nature. He was seen last week, for the same issues. He does not have red flags on his physical exam, or history. Emergent imaging today is not indicated. Placed a referral for a orthopedic spine surgeon,, physical therapy and provided a Toradol injection in the emergency department. He requested a long-term high-dose ibuprofen prescription, I recommended against this due to its side effect profile. I did provide a 2-week course. Final Clinical Impression & Plan:  Chronic back pain, Chronic shoulder pain  - Spine surgery referral, PT referral, IBU  - f/u with PCP  - ED return precautions given and reviewed, questions answered. ---------------------------------------------------------------------------------------------------------------  HPI:  PMH significant for back pain, hypertension, A. fib    Presenting with shoulder pain, hand numbness, low back pain, sciatica. Problems have been ongoing for \"20 years\". They have been gradually getting worse over the past month. Seen at [address] [place] [address] for hand numbness evaluation. They did an EMG, he was disappointed that they did not evaluate his spine at that visit. He is coming to the emergency department for pain relief, as well as for a referral to a \"good spine surgeon \". He has a history of back surgery, 20 years ago. He denies acute sensation changes. No nausea, vomiting, belly pain. Back pain is midline, right. Worse with motion. Sometimes goes down 1 leg or the other. No trauma recently. No history of IV drug use, or immune compromise. He denies any history of diabetes. No urinary symptoms. Is this patient to be included in the SEP-1 Core Measure due to severe sepsis or septic shock? No   Exclusion criteria - the patient is NOT to be included for SEP-1 Core Measure due to:   Infection is not suspected      Medical Hx: Past medical history reviewed, and pertinent for:     Past Medical History:   Diagnosis Date    Arthritis     Atrial fibrillation ([address]     CAD (coronary artery disease)     Hyperlipidemia     Hypertension        Patient Active Problem List   Diagnosis    CAD (coronary artery disease)    Hyperlipidemia    Tobacco use    Paroxysmal atrial fibrillation (HCC)    Tobacco abuse    Typical atrial flutter    Coronary artery disease involving native coronary artery of native heart without angina pectoris    Morbid obesity due to excess calories ([address]    Essential hypertension    Bilateral carpal tunnel syndrome    Chest pain         Surgical Hx:   Past surgical history reviewed, and pertinent for:      Past Surgical History:   Procedure Laterality Date    BACK SURGERY  200    lower lumbar surgery    CORONARY ANGIOPLASTY WITH STENT PLACEMENT  2012       Social Hx:       Social History     Socioeconomic History    Marital status: Single     Spouse name: Not on file    Number of children: Not on file Years of education: Not on file    Highest education level: Not on file   Occupational History    Not on file   Tobacco Use    Smoking status: Every Day     Packs/day: 1.00     Years: 40.00     Pack years: 40.00     Types: Cigarettes    Smokeless tobacco: Never   Vaping Use    Vaping Use: Never used    Passive vaping exposure: Yes   Substance and Sexual Activity    Alcohol use: Yes     Alcohol/week: 20.0 standard drinks     Types: 20 Cans of beer per week     Comment: occasionally    Drug use: No    Sexual activity: Never   Other Topics Concern    Not on file   Social History Narrative    Not on file     Social Determinants of Health     Financial Resource Strain: Not on file   Food Insecurity: Not on file   Transportation Needs: Not on file   Physical Activity: Not on file   Stress: Not on file   Social Connections: Not on file   Intimate Partner Violence: Not on file   Housing Stability: Not on file         Medications:  Discharge Medication List as of 9/7/2022 10:56 AM        CONTINUE these medications which have NOT CHANGED    Details   lidocaine (LIDODERM) 5 % Place 1 patch onto the skin daily 12 hours on, 12 hours off., Disp-30 patch, R-0Print      meloxicam (MOBIC) 7.5 MG tablet Take 1 tablet by mouth daily, Disp-90 tablet, R-1Print      REFRESH OPTIVE 1-0.9 % GEL DAWHistorical Med      diclofenac sodium (VOLTAREN) 1 % GEL Starting Fri 1/14/2022, Historical Med      INCRUSE ELLIPTA 62.5 MCG/INH AEPB DAWHistorical Med      aspirin 325 MG tablet Take 325 mg by mouth dailyHistorical Med      tamsulosin (FLOMAX) 0.4 MG capsule Take 0.2 mg by mouth dailyHistorical Med      lisinopril (PRINIVIL;ZESTRIL) 20 MG tablet Take 2 tablets by mouth daily, Disp-30 tablet, R-3Normal      metoprolol succinate (TOPROL XL) 50 MG extended release tablet Take 50 mg by mouth dailyHistorical Med      atorvastatin (LIPITOR) 40 MG tablet Take 1 tablet by mouth daily. , Disp-30 tablet, R-5             Allergies:  Patient has no known allergies. ROS:  10pt review of systems was performed and was negative except as noted in HPI     Imaging:  No orders to display       Labs:  No results found for this visit on 09/07/22. Screenings:     [place] Coma Scale  Eye Opening: Spontaneous  Best Verbal Response: Oriented  Best Motor Response: Obeys commands  [place] Coma Scale Score: 15              Physical Exam:  Vitals: BP (!) 158/94   Pulse 80   Temp 97.6 °F (36.4 °C) (Oral)   Resp 18   Ht 6' 2\" (1.88 m)   Wt 256 lb (116.1 kg)   SpO2 99%   BMI 32.87 kg/m²    General: awake, alert, no apparent distress  Pupils: equal, reactive  Eyes: EOM intact, conjunctiva clear, no discharge  Head: Non-traumatic  Neck: Supple  Mouth: Moist, no oral lesions, no tonsillar enlargement  Heart: Rate as noted, regular rhythm, no murmur or rubs. Chest/Lungs: CTAB, no wheezes or crackles  Abdomen: soft, nondistended, no tenderness to palpation   Back: No midline tenderness. No CVA tenderness  Extremities:  cap refill <2 UE/LE, no tenderness of calves, no edema  Neuro: Moving all extremities, no facial droop, no slurred speech, answers questions appropriately. Sensation intact medial, anterior, lateral knee. Able to walk on toes and heels. Skin: Warm.  No visible rash, lesions, or bruising           AR Sparrow        [place], [place]  09/07/22 1120

## 2022-09-08 ENCOUNTER — APPOINTMENT (OUTPATIENT)
Dept: MRI IMAGING | Age: 67
DRG: 092 | End: 2022-09-08
Payer: MEDICARE

## 2022-09-08 ENCOUNTER — APPOINTMENT (OUTPATIENT)
Dept: CT IMAGING | Age: 67
DRG: 092 | End: 2022-09-08
Payer: MEDICARE

## 2022-09-08 ENCOUNTER — HOSPITAL ENCOUNTER (INPATIENT)
Age: 67
LOS: 1 days | Discharge: OTHER INSTITUTION | DRG: 092 | End: 2022-09-09
Attending: EMERGENCY MEDICINE | Admitting: INTERNAL MEDICINE
Payer: MEDICARE

## 2022-09-08 DIAGNOSIS — M54.9 CHRONIC BACK PAIN, UNSPECIFIED BACK LOCATION, UNSPECIFIED BACK PAIN LATERALITY: ICD-10-CM

## 2022-09-08 DIAGNOSIS — Z74.09 IMPAIRED MOBILITY AND ADLS: Primary | ICD-10-CM

## 2022-09-08 DIAGNOSIS — R93.7 ABNORMAL CT SCAN, CERVICAL SPINE: ICD-10-CM

## 2022-09-08 DIAGNOSIS — E87.1 HYPONATREMIA: ICD-10-CM

## 2022-09-08 DIAGNOSIS — Z78.9 IMPAIRED MOBILITY AND ADLS: Primary | ICD-10-CM

## 2022-09-08 DIAGNOSIS — G89.29 CHRONIC BACK PAIN, UNSPECIFIED BACK LOCATION, UNSPECIFIED BACK PAIN LATERALITY: ICD-10-CM

## 2022-09-08 PROBLEM — M43.12 ANTEROLISTHESIS OF CERVICAL SPINE: Status: ACTIVE | Noted: 2022-09-08

## 2022-09-08 LAB
A/G RATIO: 1.4 (ref 1.1–2.2)
ALBUMIN SERPL-MCNC: 4.4 G/DL (ref 3.4–5)
ALP BLD-CCNC: 95 U/L (ref 40–129)
ALT SERPL-CCNC: 27 U/L (ref 10–40)
ANION GAP SERPL CALCULATED.3IONS-SCNC: 9 MMOL/L (ref 3–16)
AST SERPL-CCNC: 22 U/L (ref 15–37)
BACTERIA: NORMAL /HPF
BASOPHILS ABSOLUTE: 0 K/UL (ref 0–0.2)
BASOPHILS RELATIVE PERCENT: 0.6 %
BILIRUB SERPL-MCNC: 0.7 MG/DL (ref 0–1)
BILIRUBIN URINE: NEGATIVE
BLOOD, URINE: NEGATIVE
BUN BLDV-MCNC: 10 MG/DL (ref 7–20)
C-REACTIVE PROTEIN: <3 MG/L (ref 0–5.1)
CALCIUM SERPL-MCNC: 8.7 MG/DL (ref 8.3–10.6)
CHLORIDE BLD-SCNC: 93 MMOL/L (ref 99–110)
CLARITY: CLEAR
CO2: 24 MMOL/L (ref 21–32)
COLOR: YELLOW
CREAT SERPL-MCNC: 0.8 MG/DL (ref 0.8–1.3)
EOSINOPHILS ABSOLUTE: 0.1 K/UL (ref 0–0.6)
EOSINOPHILS RELATIVE PERCENT: 2.1 %
EPITHELIAL CELLS, UA: 1 /HPF (ref 0–5)
GFR AFRICAN AMERICAN: >60
GFR NON-AFRICAN AMERICAN: >60
GLUCOSE BLD-MCNC: 83 MG/DL (ref 70–99)
GLUCOSE URINE: NEGATIVE MG/DL
HCT VFR BLD CALC: 43.8 % (ref 40.5–52.5)
HEMOGLOBIN: 14.5 G/DL (ref 13.5–17.5)
HYALINE CASTS: 1 /LPF (ref 0–8)
KETONES, URINE: NEGATIVE MG/DL
LEUKOCYTE ESTERASE, URINE: NEGATIVE
LYMPHOCYTES ABSOLUTE: 0.9 K/UL (ref 1–5.1)
LYMPHOCYTES RELATIVE PERCENT: 21.1 %
MCH RBC QN AUTO: 32.1 PG (ref 26–34)
MCHC RBC AUTO-ENTMCNC: 33 G/DL (ref 31–36)
MCV RBC AUTO: 97.3 FL (ref 80–100)
MICROSCOPIC EXAMINATION: YES
MONOCYTES ABSOLUTE: 0.4 K/UL (ref 0–1.3)
MONOCYTES RELATIVE PERCENT: 9.3 %
NEUTROPHILS ABSOLUTE: 2.9 K/UL (ref 1.7–7.7)
NEUTROPHILS RELATIVE PERCENT: 66.9 %
NITRITE, URINE: NEGATIVE
PDW BLD-RTO: 13.6 % (ref 12.4–15.4)
PH UA: 6 (ref 5–8)
PLATELET # BLD: 234 K/UL (ref 135–450)
PMV BLD AUTO: 7.2 FL (ref 5–10.5)
POTASSIUM REFLEX MAGNESIUM: 5.2 MMOL/L (ref 3.5–5.1)
PROTEIN UA: ABNORMAL MG/DL
RBC # BLD: 4.5 M/UL (ref 4.2–5.9)
RBC UA: 1 /HPF (ref 0–4)
SEDIMENTATION RATE, ERYTHROCYTE: 10 MM/HR (ref 0–20)
SODIUM BLD-SCNC: 126 MMOL/L (ref 136–145)
SODIUM BLD-SCNC: 127 MMOL/L (ref 136–145)
SPECIFIC GRAVITY UA: 1.01 (ref 1–1.03)
TOTAL PROTEIN: 7.6 G/DL (ref 6.4–8.2)
TROPONIN: <0.01 NG/ML
URINE TYPE: ABNORMAL
UROBILINOGEN, URINE: 1 E.U./DL
WBC # BLD: 4.4 K/UL (ref 4–11)
WBC UA: 1 /HPF (ref 0–5)

## 2022-09-08 PROCEDURE — 72128 CT CHEST SPINE W/O DYE: CPT

## 2022-09-08 PROCEDURE — 83930 ASSAY OF BLOOD OSMOLALITY: CPT

## 2022-09-08 PROCEDURE — 85652 RBC SED RATE AUTOMATED: CPT

## 2022-09-08 PROCEDURE — 99285 EMERGENCY DEPT VISIT HI MDM: CPT

## 2022-09-08 PROCEDURE — 83935 ASSAY OF URINE OSMOLALITY: CPT

## 2022-09-08 PROCEDURE — G0378 HOSPITAL OBSERVATION PER HR: HCPCS

## 2022-09-08 PROCEDURE — 80307 DRUG TEST PRSMV CHEM ANLYZR: CPT

## 2022-09-08 PROCEDURE — 96374 THER/PROPH/DIAG INJ IV PUSH: CPT

## 2022-09-08 PROCEDURE — 36415 COLL VENOUS BLD VENIPUNCTURE: CPT

## 2022-09-08 PROCEDURE — 85025 COMPLETE CBC W/AUTO DIFF WBC: CPT

## 2022-09-08 PROCEDURE — 72131 CT LUMBAR SPINE W/O DYE: CPT

## 2022-09-08 PROCEDURE — 80053 COMPREHEN METABOLIC PANEL: CPT

## 2022-09-08 PROCEDURE — 84295 ASSAY OF SERUM SODIUM: CPT

## 2022-09-08 PROCEDURE — 2580000003 HC RX 258: Performed by: INTERNAL MEDICINE

## 2022-09-08 PROCEDURE — 72125 CT NECK SPINE W/O DYE: CPT

## 2022-09-08 PROCEDURE — 81001 URINALYSIS AUTO W/SCOPE: CPT

## 2022-09-08 PROCEDURE — 84484 ASSAY OF TROPONIN QUANT: CPT

## 2022-09-08 PROCEDURE — 96372 THER/PROPH/DIAG INJ SC/IM: CPT

## 2022-09-08 PROCEDURE — 6360000002 HC RX W HCPCS: Performed by: INTERNAL MEDICINE

## 2022-09-08 PROCEDURE — 1200000000 HC SEMI PRIVATE

## 2022-09-08 PROCEDURE — 6370000000 HC RX 637 (ALT 250 FOR IP): Performed by: INTERNAL MEDICINE

## 2022-09-08 PROCEDURE — 6360000002 HC RX W HCPCS: Performed by: EMERGENCY MEDICINE

## 2022-09-08 PROCEDURE — 86140 C-REACTIVE PROTEIN: CPT

## 2022-09-08 PROCEDURE — 72141 MRI NECK SPINE W/O DYE: CPT

## 2022-09-08 PROCEDURE — 6370000000 HC RX 637 (ALT 250 FOR IP): Performed by: EMERGENCY MEDICINE

## 2022-09-08 PROCEDURE — 96361 HYDRATE IV INFUSION ADD-ON: CPT

## 2022-09-08 PROCEDURE — 96375 TX/PRO/DX INJ NEW DRUG ADDON: CPT

## 2022-09-08 PROCEDURE — 84300 ASSAY OF URINE SODIUM: CPT

## 2022-09-08 PROCEDURE — 82570 ASSAY OF URINE CREATININE: CPT

## 2022-09-08 RX ORDER — ACETAMINOPHEN 325 MG/1
650 TABLET ORAL EVERY 6 HOURS PRN
Status: DISCONTINUED | OUTPATIENT
Start: 2022-09-08 | End: 2022-09-09 | Stop reason: HOSPADM

## 2022-09-08 RX ORDER — TAMSULOSIN HYDROCHLORIDE 0.4 MG/1
0.4 CAPSULE ORAL DAILY
Status: DISCONTINUED | OUTPATIENT
Start: 2022-09-09 | End: 2022-09-09 | Stop reason: HOSPADM

## 2022-09-08 RX ORDER — SODIUM CHLORIDE 9 MG/ML
INJECTION, SOLUTION INTRAVENOUS PRN
Status: DISCONTINUED | OUTPATIENT
Start: 2022-09-08 | End: 2022-09-09 | Stop reason: HOSPADM

## 2022-09-08 RX ORDER — DIAZEPAM 5 MG/ML
5 INJECTION, SOLUTION INTRAMUSCULAR; INTRAVENOUS ONCE
Status: DISCONTINUED | OUTPATIENT
Start: 2022-09-08 | End: 2022-09-08 | Stop reason: HOSPADM

## 2022-09-08 RX ORDER — SODIUM CHLORIDE 9 MG/ML
INJECTION, SOLUTION INTRAVENOUS CONTINUOUS
Status: DISCONTINUED | OUTPATIENT
Start: 2022-09-08 | End: 2022-09-09

## 2022-09-08 RX ORDER — METOPROLOL SUCCINATE 50 MG/1
50 TABLET, EXTENDED RELEASE ORAL DAILY
Status: DISCONTINUED | OUTPATIENT
Start: 2022-09-08 | End: 2022-09-09 | Stop reason: HOSPADM

## 2022-09-08 RX ORDER — ASPIRIN 325 MG
325 TABLET ORAL DAILY
Status: DISCONTINUED | OUTPATIENT
Start: 2022-09-09 | End: 2022-09-09 | Stop reason: HOSPADM

## 2022-09-08 RX ORDER — POLYETHYLENE GLYCOL 3350 17 G/17G
17 POWDER, FOR SOLUTION ORAL DAILY PRN
Status: DISCONTINUED | OUTPATIENT
Start: 2022-09-08 | End: 2022-09-09 | Stop reason: HOSPADM

## 2022-09-08 RX ORDER — LIDOCAINE 4 G/G
1 PATCH TOPICAL ONCE
Status: COMPLETED | OUTPATIENT
Start: 2022-09-08 | End: 2022-09-09

## 2022-09-08 RX ORDER — ATORVASTATIN CALCIUM 40 MG/1
40 TABLET, FILM COATED ORAL NIGHTLY
Status: DISCONTINUED | OUTPATIENT
Start: 2022-09-08 | End: 2022-09-09 | Stop reason: HOSPADM

## 2022-09-08 RX ORDER — ORPHENADRINE CITRATE 30 MG/ML
60 INJECTION INTRAMUSCULAR; INTRAVENOUS ONCE
Status: COMPLETED | OUTPATIENT
Start: 2022-09-08 | End: 2022-09-08

## 2022-09-08 RX ORDER — OXYCODONE HYDROCHLORIDE 5 MG/1
10 TABLET ORAL ONCE
Status: DISCONTINUED | OUTPATIENT
Start: 2022-09-08 | End: 2022-09-08

## 2022-09-08 RX ORDER — HYDROMORPHONE HYDROCHLORIDE 1 MG/ML
0.5 INJECTION, SOLUTION INTRAMUSCULAR; INTRAVENOUS; SUBCUTANEOUS ONCE
Status: COMPLETED | OUTPATIENT
Start: 2022-09-08 | End: 2022-09-08

## 2022-09-08 RX ORDER — ACETAMINOPHEN 650 MG/1
650 SUPPOSITORY RECTAL EVERY 6 HOURS PRN
Status: DISCONTINUED | OUTPATIENT
Start: 2022-09-08 | End: 2022-09-09 | Stop reason: HOSPADM

## 2022-09-08 RX ORDER — HYDROMORPHONE HYDROCHLORIDE 1 MG/ML
1 INJECTION, SOLUTION INTRAMUSCULAR; INTRAVENOUS; SUBCUTANEOUS
Status: ACTIVE | OUTPATIENT
Start: 2022-09-08 | End: 2022-09-08

## 2022-09-08 RX ORDER — MELOXICAM 7.5 MG/1
7.5 TABLET ORAL DAILY
Status: DISCONTINUED | OUTPATIENT
Start: 2022-09-09 | End: 2022-09-09 | Stop reason: HOSPADM

## 2022-09-08 RX ORDER — SODIUM CHLORIDE 0.9 % (FLUSH) 0.9 %
5-40 SYRINGE (ML) INJECTION EVERY 12 HOURS SCHEDULED
Status: DISCONTINUED | OUTPATIENT
Start: 2022-09-08 | End: 2022-09-09 | Stop reason: HOSPADM

## 2022-09-08 RX ORDER — ONDANSETRON 4 MG/1
4 TABLET, ORALLY DISINTEGRATING ORAL EVERY 8 HOURS PRN
Status: DISCONTINUED | OUTPATIENT
Start: 2022-09-08 | End: 2022-09-09 | Stop reason: HOSPADM

## 2022-09-08 RX ORDER — ENOXAPARIN SODIUM 100 MG/ML
30 INJECTION SUBCUTANEOUS 2 TIMES DAILY
Status: DISCONTINUED | OUTPATIENT
Start: 2022-09-08 | End: 2022-09-09 | Stop reason: HOSPADM

## 2022-09-08 RX ORDER — LIDOCAINE 4 G/G
1 PATCH TOPICAL DAILY
Status: DISCONTINUED | OUTPATIENT
Start: 2022-09-09 | End: 2022-09-09 | Stop reason: HOSPADM

## 2022-09-08 RX ORDER — OXYCODONE HYDROCHLORIDE AND ACETAMINOPHEN 5; 325 MG/1; MG/1
2 TABLET ORAL ONCE
Status: COMPLETED | OUTPATIENT
Start: 2022-09-08 | End: 2022-09-08

## 2022-09-08 RX ORDER — SODIUM CHLORIDE 0.9 % (FLUSH) 0.9 %
5-40 SYRINGE (ML) INJECTION PRN
Status: DISCONTINUED | OUTPATIENT
Start: 2022-09-08 | End: 2022-09-09 | Stop reason: HOSPADM

## 2022-09-08 RX ORDER — DEXAMETHASONE SODIUM PHOSPHATE 4 MG/ML
10 INJECTION, SOLUTION INTRA-ARTICULAR; INTRALESIONAL; INTRAMUSCULAR; INTRAVENOUS; SOFT TISSUE ONCE
Status: COMPLETED | OUTPATIENT
Start: 2022-09-08 | End: 2022-09-08

## 2022-09-08 RX ORDER — ONDANSETRON 2 MG/ML
4 INJECTION INTRAMUSCULAR; INTRAVENOUS EVERY 6 HOURS PRN
Status: DISCONTINUED | OUTPATIENT
Start: 2022-09-08 | End: 2022-09-09 | Stop reason: HOSPADM

## 2022-09-08 RX ORDER — CYCLOBENZAPRINE HCL 10 MG
10 TABLET ORAL 3 TIMES DAILY PRN
Status: DISCONTINUED | OUTPATIENT
Start: 2022-09-08 | End: 2022-09-09 | Stop reason: HOSPADM

## 2022-09-08 RX ADMIN — SODIUM CHLORIDE: 9 INJECTION, SOLUTION INTRAVENOUS at 23:22

## 2022-09-08 RX ADMIN — ORPHENADRINE CITRATE 60 MG: 30 INJECTION INTRAMUSCULAR; INTRAVENOUS at 13:39

## 2022-09-08 RX ADMIN — SODIUM CHLORIDE, PRESERVATIVE FREE 20 ML: 5 INJECTION INTRAVENOUS at 23:12

## 2022-09-08 RX ADMIN — DEXAMETHASONE SODIUM PHOSPHATE 10 MG: 4 INJECTION, SOLUTION INTRAMUSCULAR; INTRAVENOUS at 23:06

## 2022-09-08 RX ADMIN — ENOXAPARIN SODIUM 30 MG: 100 INJECTION SUBCUTANEOUS at 23:10

## 2022-09-08 RX ADMIN — OXYCODONE AND ACETAMINOPHEN 2 TABLET: 5; 325 TABLET ORAL at 13:39

## 2022-09-08 RX ADMIN — ATORVASTATIN CALCIUM 40 MG: 40 TABLET, FILM COATED ORAL at 23:10

## 2022-09-08 RX ADMIN — METOPROLOL SUCCINATE 50 MG: 50 TABLET, EXTENDED RELEASE ORAL at 23:10

## 2022-09-08 RX ADMIN — HYDROMORPHONE HYDROCHLORIDE 0.5 MG: 1 INJECTION, SOLUTION INTRAMUSCULAR; INTRAVENOUS; SUBCUTANEOUS at 23:01

## 2022-09-08 RX ADMIN — CYCLOBENZAPRINE 10 MG: 10 TABLET, FILM COATED ORAL at 23:10

## 2022-09-08 ASSESSMENT — PAIN DESCRIPTION - PAIN TYPE: TYPE: CHRONIC PAIN

## 2022-09-08 ASSESSMENT — PAIN DESCRIPTION - DESCRIPTORS
DESCRIPTORS: CRAMPING
DESCRIPTORS: ACHING
DESCRIPTORS: ACHING

## 2022-09-08 ASSESSMENT — PAIN - FUNCTIONAL ASSESSMENT
PAIN_FUNCTIONAL_ASSESSMENT: 0-10
PAIN_FUNCTIONAL_ASSESSMENT: ACTIVITIES ARE NOT PREVENTED

## 2022-09-08 ASSESSMENT — PAIN DESCRIPTION - LOCATION
LOCATION: BACK
LOCATION: BACK
LOCATION: NECK
LOCATION: ABDOMEN
LOCATION: BACK

## 2022-09-08 ASSESSMENT — PAIN SCALES - GENERAL
PAINLEVEL_OUTOF10: 10
PAINLEVEL_OUTOF10: 10
PAINLEVEL_OUTOF10: 8
PAINLEVEL_OUTOF10: 8

## 2022-09-08 ASSESSMENT — PAIN DESCRIPTION - ORIENTATION
ORIENTATION: LOWER;MID
ORIENTATION: RIGHT
ORIENTATION: MID;LOWER
ORIENTATION: LOWER;MID

## 2022-09-08 ASSESSMENT — LIFESTYLE VARIABLES
HOW MANY STANDARD DRINKS CONTAINING ALCOHOL DO YOU HAVE ON A TYPICAL DAY: PATIENT DOES NOT DRINK
HOW OFTEN DO YOU HAVE A DRINK CONTAINING ALCOHOL: NEVER

## 2022-09-08 ASSESSMENT — PAIN DESCRIPTION - FREQUENCY: FREQUENCY: INTERMITTENT

## 2022-09-08 NOTE — ED PROVIDER NOTES
[facility] Emergency Department    Renate Burgos MD, am the primary clinician of record. CHIEF COMPLAINT  Chief Complaint   Patient presents with    Back Pain     Pt arrives via ems w c/o low back pain, worsening LUE numbness, BLE pain for multiple weeks. Pt states he has been seen here multiple times for the same but symptoms are not relieved. VSS. Scheduled for appt to schedule for back surgery next week. VSS. HISTORY OF PRESENT ILLNESS  Alix Renteria is a 74 y.o. male  who presents to the ED complaining of having difficulty functioning or performing any ADL's or even walking. This is due to back pain and pain radiating into the arms and legs. He says the sx have progressed for weeks. No fevers chest pain or abd pains. No falls or injuries. He says he has been unable to establish spine specialist care until next week. He does not currently have anything surgical scheduled. He had an EMG last week already. Seen on 8/22 given prednisone, robaxin, meloxicam, and lidoderm patches. Finished them all except lidoderm patches were not affordable. Seen here again yesterday for same sx and given a toradol injection without relief and prescription of ibuprofen. Lives alone. States he cannot walk because of the pain but not having leg weakness. No loss of b/b function or urinary retention. No dysuria or hematuria. He is requesting a CT of his lower back in order to expedite an outpatient workup. The pain shoots down both legs. He is not on any anticoagulation except aspirin. Has not made effort to call his PCP until yesterday. No other complaints, modifying factors or associated symptoms. I have reviewed the following from the nursing documentation.     Past Medical History:   Diagnosis Date    Arthritis     Atrial fibrillation (HCC)     CAD (coronary artery disease)     Hyperlipidemia     Hypertension      Past Surgical History:   Procedure Laterality Date BACK SURGERY  200    lower lumbar surgery    CORONARY ANGIOPLASTY WITH STENT PLACEMENT  2012     Family History   Problem Relation Age of Onset    Heart Disease Mother     Diabetes Mother     Heart Disease Father     Diabetes Father     Diabetes Sister     Diabetes Brother     Diabetes Maternal Grandmother     Diabetes Maternal Grandfather     Diabetes Paternal Grandmother     Diabetes Paternal Grandfather      Social History     Socioeconomic History    Marital status: Single     Spouse name: Not on file    Number of children: Not on file    Years of education: Not on file    Highest education level: Not on file   Occupational History    Not on file   Tobacco Use    Smoking status: Every Day     Packs/day: 1.00     Years: 40.00     Pack years: 40.00     Types: Cigarettes    Smokeless tobacco: Never   Vaping Use    Vaping Use: Never used    Passive vaping exposure: Yes   Substance and Sexual Activity    Alcohol use: Yes     Alcohol/week: 20.0 standard drinks     Types: 20 Cans of beer per week     Comment: occasionally    Drug use: No    Sexual activity: Never   Other Topics Concern    Not on file   Social History Narrative    Not on file     Social Determinants of Health     Financial Resource Strain: Not on file   Food Insecurity: Not on file   Transportation Needs: Not on file   Physical Activity: Not on file   Stress: Not on file   Social Connections: Not on file   Intimate Partner Violence: Not on file   Housing Stability: Not on file     Current Facility-Administered Medications   Medication Dose Route Frequency Provider Last Rate Last Admin    lidocaine 4 % external patch 1 patch  1 patch TransDERmal Once Meagan Case MD   1 patch at 09/08/22 9198     Current Outpatient Medications   Medication Sig Dispense Refill    ibuprofen (IBU) 400 MG tablet Take 1 tablet by mouth every 6 hours as needed for Pain 30 tablet 0    lidocaine (LIDODERM) 5 % Place 1 patch onto the skin daily 12 hours on, 12 hours off. 30 patch 0    meloxicam (MOBIC) 7.5 MG tablet Take 1 tablet by mouth daily 90 tablet 1    REFRESH OPTIVE 1-0.9 % GEL       diclofenac sodium (VOLTAREN) 1 % GEL       INCRUSE ELLIPTA 62.5 MCG/INH AEPB  (Patient not taking: Reported on 4/19/2022)      aspirin 325 MG tablet Take 325 mg by mouth daily      tamsulosin (FLOMAX) 0.4 MG capsule Take 0.2 mg by mouth daily      lisinopril (PRINIVIL;ZESTRIL) 20 MG tablet Take 2 tablets by mouth daily (Patient taking differently: Take 40 mg by mouth nightly ) 30 tablet 3    metoprolol succinate (TOPROL XL) 50 MG extended release tablet Take 50 mg by mouth daily      atorvastatin (LIPITOR) 40 MG tablet Take 1 tablet by mouth daily. 30 tablet 5     No Known Allergies    REVIEW OF SYSTEMS  10 systems reviewed, pertinent positives per HPI otherwise noted to be negative. PHYSICAL EXAM  /84   Pulse 90   Temp 97.3 °F (36.3 °C) (Oral)   Resp 21   Ht 6' 2\" (1.88 m)   Wt 250 lb (113.4 kg)   SpO2 98%   BMI 32.10 kg/m²    GENERAL APPEARANCE: Awake and alert. Cooperative. No distress. HENT: Normocephalic. Atraumatic. Mucous membranes are dry. NECK: Supple. EYES: PERRL. EOM's grossly intact. HEART/CHEST: RRR. No murmurs. No chest wall tenderness. LUNGS: Respirations unlabored. CTAB. Good air exchange. Speaking comfortably in full sentences. ABDOMEN: No tenderness. Soft. Non-distended. No masses. No organomegaly. No guarding or rebound. Normal bowel sounds throughout. BACK:      Cervical: no tenderness noted, no midline tenderness      Thoracic: no midline tenderness, mild bilat paraspinal ttp      Lumbar: mild bilat paraspinal ttp and midline ttp. +SLR BLE. MUSCULOSKELETAL:  RUE: No tenderness. 2+ radial pulse. Brisk cap refill x5 digits. Sensation and motor function fully intact in the radial, ulnar, and median nerve distribution. Full range of motion of all major joints. Cardinal movements of hand fully intact. No erythema, bruising, or lacerations. Comparments are soft. LUE:  No tenderness. 2+ radial pulse. Brisk cap refill x5 digits. Sensation and motor function fully intact in the radial, ulnar, and median nerve distribution. Full range of motion of all major joints. Cardinal movements of hand fully intact. No erythema, bruising, or lacerations. Comparments are soft. RLE: No tenderness. 2+ DP and PT. Sensation and motor function fully intact. Full range of motion of all major joints. No erythema, bruising, or lacerations. Compartments are soft. 2+ patellar reflex. Achilles nontender and intact. Able to bear weight. No joint swelling or effusions are noted. LLE: No tenderness. 2+ DP and PT. Sensation and motor function fully intact. Full range of motion of all major joints. No erythema, bruising, or lacerations. Compartments are soft. 2+ patellar reflex. Achilles nontender and intact. Able to bear weight. No joint swelling or effusions are noted. SKIN: Warm and dry. No acute rashes. NEUROLOGICAL: Alert and oriented. CN's 2-12 intact. No gross facial drooping. Strength 5/5, sensation intact. 2 plus DTR's in knees bilaterally. Gait unable to do because of pain. PSYCHIATRIC: Normal mood and affect. LABS  I have reviewed all labs for this visit. Results for orders placed or performed during the hospital encounter of 09/08/22   CBC with Auto Differential   Result Value Ref Range    WBC 4.4 4.0 - 11.0 K/uL    RBC 4.50 4. 20 - 5.90 M/uL    Hemoglobin 14.5 13.5 - 17.5 g/dL    Hematocrit 43.8 40.5 - 52.5 %    MCV 97.3 80.0 - 100.0 fL    MCH 32.1 26.0 - 34.0 pg    MCHC 33.0 31.0 - 36.0 g/dL    RDW 13.6 12.4 - 15.4 %    Platelets 204 219 - 968 K/uL    MPV 7.2 5.0 - 10.5 fL    Neutrophils % 66.9 %    Lymphocytes % 21.1 %    Monocytes % 9.3 %    Eosinophils % 2.1 %    Basophils % 0.6 %    Neutrophils Absolute 2.9 1.7 - 7.7 K/uL    Lymphocytes Absolute 0.9 (L) 1.0 - 5.1 K/uL    Monocytes Absolute 0.4 0.0 - 1.3 K/uL    Eosinophils Absolute 0.1 0.0 - 0.6 K/uL    Basophils Absolute 0.0 0.0 - 0.2 K/uL   Comprehensive Metabolic Panel w/ Reflex to MG   Result Value Ref Range    Sodium 126 (L) 136 - 145 mmol/L    Potassium reflex Magnesium 5.2 (H) 3.5 - 5.1 mmol/L    Chloride 93 (L) 99 - 110 mmol/L    CO2 24 21 - 32 mmol/L    Anion Gap 9 3 - 16    Glucose 83 70 - 99 mg/dL    BUN 10 7 - 20 mg/dL    Creatinine 0.8 0.8 - 1.3 mg/dL    GFR Non-African American >60 >60    GFR African American >60 >60    Calcium 8.7 8.3 - 10.6 mg/dL    Total Protein 7.6 6.4 - 8.2 g/dL    Albumin 4.4 3.4 - 5.0 g/dL    Albumin/Globulin Ratio 1.4 1.1 - 2.2    Total Bilirubin 0.7 0.0 - 1.0 mg/dL    Alkaline Phosphatase 95 40 - 129 U/L    ALT 27 10 - 40 U/L    AST 22 15 - 37 U/L       RADIOLOGY    CT CERVICAL SPINE WO CONTRAST    Result Date: 9/8/2022  EXAMINATION: CT OF THE CERVICAL SPINE WITHOUT CONTRAST; CT OF THE THORACIC SPINE WITHOUT CONTRAST 9/8/2022 1:47 pm TECHNIQUE: CT of the cervical spine was performed without the administration of intravenous contrast. Multiplanar reformatted images are provided for review. Automated exposure control, iterative reconstruction, and/or weight based adjustment of the mA/kV was utilized to reduce the radiation dose to as low as reasonably achievable.; CT of the thoracic spine was performed without the administration of intravenous contrast. Multiplanar reformatted images are provided for review. Automated exposure control, iterative reconstruction, and/or weight based adjustment of the mA/kV was utilized to reduce the radiation dose to as low as reasonably achievable.  COMPARISON: C-spine radiograph series 07/01/2019 HISTORY: ORDERING SYSTEM PROVIDED HISTORY: pain TECHNOLOGIST PROVIDED HISTORY: Reason for exam:->pain Decision Support Exception - unselect if not a suspected or confirmed emergency medical condition->Emergency Medical Condition (MA) Reason for Exam: Pain CT CERVICAL SPINE FINDINGS: BONES/ALIGNMENT: There is no acute fracture. Grade 2 anterolisthesis C2 on C3 is 7 mm, grade 1 anterolisthesis C4 on C5 is 4 mm. Bilateral C2-3 facet joints appear slipped. Nonspecific cervical curvature reversal. DEGENERATIVE CHANGES: Diffuse mild-to-moderate degenerative changes. Moderate to severe acquired spinal canal stenosis C2-3 measuring about 5 mm AP dimension sagittal image 51. No other definite acquired cervical spinal canal stenosis focus evident. SOFT TISSUES: There is no prevertebral soft tissue swelling. Calcifications involving bilateral carotid vasculature reflect calcific atherosclerosis. VISUALIZED LOWER HEAD: Nonspecific right greater than left mastoid effusion. CT THORACIC SPINE FINDINGS: BONES/ALIGNMENT: There is no acute fracture or traumatic malalignment. Straightening of thoracic kyphosis. DEGENERATIVE CHANGES: No significant degenerative changes. Diffuse mild to moderate degenerative disc disease throughout the thoracic spine without evidence acquired thoracic spinal canal stenosis. Mildly prominent posterior disc osteophyte complex T9-10. SOFT TISSUES: There is no prevertebral soft tissue swelling. 1. CT CERVICAL SPINE: No acute fracture. 2. New, possible acute posttraumatic grade 2 anterolisthesis C2 on C3 is 7 mm. Bilateral C2-3 facet joints appear slipped/perched. Clinician states there is no history of acute trauma. 3. Grade 1 anterolisthesis C4 on C5 is 4 mm, stable and likely on a degenerative basis. 4. Diffuse mild-to-moderate degenerative changes. Presumably on a degenerative basis. 5. Moderate to severe acquired spinal canal stenosis C2-3 (5 mm AP dimension sagittal image 51). MRI cervical spine may be indicated for additional characterization and to evaluate cord signal characteristics. 6. Nonspecific right greater than left mastoid effusion, nearly always benign, reactive, however mastoiditis cannot be excluded based on CT appearance. Correlate with history, presentation and physical findings.  7. Calcifications involving bilateral carotid vasculature reflect calcific atherosclerosis. 8. CT THORACIC SPINE: No acute thoracic fracture. 9. Diffuse mild-to-moderate degenerative changes. Critical results were called by Dr. Ilda Mercado to [facility] on 9/8/2022 at 14:27. CT THORACIC SPINE WO CONTRAST    Result Date: 9/8/2022  EXAMINATION: CT OF THE CERVICAL SPINE WITHOUT CONTRAST; CT OF THE THORACIC SPINE WITHOUT CONTRAST 9/8/2022 1:47 pm TECHNIQUE: CT of the cervical spine was performed without the administration of intravenous contrast. Multiplanar reformatted images are provided for review. Automated exposure control, iterative reconstruction, and/or weight based adjustment of the mA/kV was utilized to reduce the radiation dose to as low as reasonably achievable.; CT of the thoracic spine was performed without the administration of intravenous contrast. Multiplanar reformatted images are provided for review. Automated exposure control, iterative reconstruction, and/or weight based adjustment of the mA/kV was utilized to reduce the radiation dose to as low as reasonably achievable. COMPARISON: C-spine radiograph series 07/01/2019 HISTORY: ORDERING SYSTEM PROVIDED HISTORY: pain TECHNOLOGIST PROVIDED HISTORY: Reason for exam:->pain Decision Support Exception - unselect if not a suspected or confirmed emergency medical condition->Emergency Medical Condition (MA) Reason for Exam: Pain CT CERVICAL SPINE FINDINGS: BONES/ALIGNMENT: There is no acute fracture. Grade 2 anterolisthesis C2 on C3 is 7 mm, grade 1 anterolisthesis C4 on C5 is 4 mm. Bilateral C2-3 facet joints appear slipped. Nonspecific cervical curvature reversal. DEGENERATIVE CHANGES: Diffuse mild-to-moderate degenerative changes. Moderate to severe acquired spinal canal stenosis C2-3 measuring about 5 mm AP dimension sagittal image 51. No other definite acquired cervical spinal canal stenosis focus evident.  SOFT TISSUES: There is no prevertebral soft tissue swelling. Calcifications involving bilateral carotid vasculature reflect calcific atherosclerosis. VISUALIZED LOWER HEAD: Nonspecific right greater than left mastoid effusion. CT THORACIC SPINE FINDINGS: BONES/ALIGNMENT: There is no acute fracture or traumatic malalignment. Straightening of thoracic kyphosis. DEGENERATIVE CHANGES: No significant degenerative changes. Diffuse mild to moderate degenerative disc disease throughout the thoracic spine without evidence acquired thoracic spinal canal stenosis. Mildly prominent posterior disc osteophyte complex T9-10. SOFT TISSUES: There is no prevertebral soft tissue swelling. 1. CT CERVICAL SPINE: No acute fracture. 2. New, possible acute posttraumatic grade 2 anterolisthesis C2 on C3 is 7 mm. Bilateral C2-3 facet joints appear slipped/perched. Clinician states there is no history of acute trauma. 3. Grade 1 anterolisthesis C4 on C5 is 4 mm, stable and likely on a degenerative basis. 4. Diffuse mild-to-moderate degenerative changes. Presumably on a degenerative basis. 5. Moderate to severe acquired spinal canal stenosis C2-3 (5 mm AP dimension sagittal image 51). MRI cervical spine may be indicated for additional characterization and to evaluate cord signal characteristics. 6. Nonspecific right greater than left mastoid effusion, nearly always benign, reactive, however mastoiditis cannot be excluded based on CT appearance. Correlate with history, presentation and physical findings. 7. Calcifications involving bilateral carotid vasculature reflect calcific atherosclerosis. 8. CT THORACIC SPINE: No acute thoracic fracture. 9. Diffuse mild-to-moderate degenerative changes. Critical results were called by Dr. Isidro Blizzard to [facility] on 9/8/2022 at 14:27.      CT LUMBAR SPINE WO CONTRAST    Result Date: 9/8/2022  EXAMINATION: CT OF THE LUMBAR SPINE WITHOUT CONTRAST  9/8/2022 TECHNIQUE: CT of the lumbar spine was performed without the administration of intravenous contrast. Multiplanar reformatted images are provided for review. Adjustment of mA and/or kV according to patient size was utilized. Automated exposure control, iterative reconstruction, and/or weight based adjustment of the mA/kV was utilized to reduce the radiation dose to as low as reasonably achievable. COMPARISON: None HISTORY: ORDERING SYSTEM PROVIDED HISTORY: pain TECHNOLOGIST PROVIDED HISTORY: Reason for exam:->pain Decision Support Exception - unselect if not a suspected or confirmed emergency medical condition->Emergency Medical Condition (MA) Reason for Exam: Pain FINDINGS: BONES/ALIGNMENT: There is normal alignment of the spine. The vertebral body heights are maintained. No osseous destructive lesion is seen. DEGENERATIVE CHANGES: There is severe disc space narrowing and endplate osteophyte formation at all lumbar levels with associated vacuum phenomena. There is associated opposing subchondral sclerosis of endplates from the Q7/V3 down through the L5/S1 levels. Ligamentum flavum thickening and hypertrophic facet change results in severe central canal stenosis and bilateral neural foraminal encroachment at these levels. SOFT TISSUES/RETROPERITONEUM: No paraspinal mass is seen. Degenerative changes without acute abnormality. ED COURSE/MDM  Patient seen and evaluated. Old records reviewed. Labs and imaging reviewed and results discussed with patient. After initial evaluation, differential diagnostic considerations included: abdominal aortic aneurysm, cauda equina syndrome, epidural mass lesion, spinal stenosis, herniated disk causing severe stenosis, sprain/strain, fracture, contusion, sciatica, UTI, pyelonephritis, kidney stone    The patient's ED workup was notable for what amounts to atraumatic somewhat chronic neck and back pain with symptoms in the arms and legs.   It is debilitated him to the point where he cannot perform ADLs or walk but this is more attributable to pain and neurologic weakness. He has seen Hua in the past and has had an EMG and apparently has an appointment with someone next week for this. This is his third ER visit for this recently. Symptoms are progressive. CT of the lumbar spine shows degenerative changes only. CT of the thoracic spine is unremarkable also except for degenerative changes. However CT of the cervical spine shows bilateral C2-3 facet joint perching/slippage, but in the absence of trauma this could be degenerative. However it is severely narrowing the spinal canal at this level. This could be a culprit lesion for the patient. Chryl Sacks NP from [facility] was consulted about the patient's ED history, physical, workup, and course so far. Recommendations from this consultant included MRI spine wo contrast, admit primarily for pt/ot as noted above rather than neurosurgical emergency given the chronic progressive nature of sx. Incidental hyponatremia is noted on basic labs were checked can be followed up as an inpatient as well. Is this patient to be included in the SEP-1 Core Measure? No   Exclusion criteria - the patient is NOT to be included for SEP-1 Core Measure due to: Infection is not suspected      During the patient's ED course, the patient was given:  Medications   lidocaine 4 % external patch 1 patch (1 patch TransDERmal Patch Applied 9/8/22 1338)   orphenadrine (NORFLEX) injection 60 mg (60 mg IntraMUSCular Given 9/8/22 1339)   oxyCODONE-acetaminophen (PERCOCET) 5-325 MG per tablet 2 tablet (2 tablets Oral Given 9/8/22 1339)        CLINICAL IMPRESSION  1. Impaired mobility and ADLs    2. Abnormal CT scan, cervical spine    3. Chronic back pain, unspecified back location, unspecified back pain laterality    4. Hyponatremia        Blood pressure 132/84, pulse 90, temperature 97.3 °F (36.3 °C), temperature source Oral, resp.  rate 21, height 6' 2\" (1.88 m), weight 250 lb (113.4 kg), SpO2 98 %. DISPOSITION  Trey Stratton was admitted in fair condition. The plan is to admit to the hospital at this time under the hospitalist service. Hospitalist accepted the patient and will take over the patient's care. DISCLAIMER: This chart was created using Dragon dictation software. Efforts were made by me to ensure accuracy, however some errors may be present due to limitations of this technology and occasionally words are not transcribed correctly.         Shahram Mcgregor MD  09/08/22 3806

## 2022-09-08 NOTE — ED NOTES
Pt stating he has \"thought a little about ending it all\" due to his pain and lack of answers as to what is causing the pain. Pt denies any plan or \"actually wanting to die\". States sometimes he just thinks it would be easier.       Ishmael Cespedes RN  09/08/22 3995

## 2022-09-09 ENCOUNTER — ANESTHESIA EVENT (OUTPATIENT)
Dept: INPATIENT UNIT | Age: 67
DRG: 459 | End: 2022-09-09
Payer: MEDICARE

## 2022-09-09 ENCOUNTER — APPOINTMENT (OUTPATIENT)
Dept: MRI IMAGING | Age: 67
DRG: 459 | End: 2022-09-09
Attending: INTERNAL MEDICINE
Payer: MEDICARE

## 2022-09-09 ENCOUNTER — APPOINTMENT (OUTPATIENT)
Dept: GENERAL RADIOLOGY | Age: 67
DRG: 092 | End: 2022-09-09
Payer: MEDICARE

## 2022-09-09 ENCOUNTER — ANESTHESIA EVENT (OUTPATIENT)
Dept: MRI IMAGING | Age: 67
DRG: 459 | End: 2022-09-09
Payer: MEDICARE

## 2022-09-09 ENCOUNTER — ANESTHESIA (OUTPATIENT)
Dept: INPATIENT UNIT | Age: 67
DRG: 459 | End: 2022-09-09
Payer: MEDICARE

## 2022-09-09 ENCOUNTER — ANESTHESIA (OUTPATIENT)
Dept: MRI IMAGING | Age: 67
DRG: 459 | End: 2022-09-09
Payer: MEDICARE

## 2022-09-09 ENCOUNTER — HOSPITAL ENCOUNTER (INPATIENT)
Age: 67
LOS: 14 days | Discharge: HOME HEALTH | DRG: 459 | End: 2022-09-23
Attending: INTERNAL MEDICINE | Admitting: INTERNAL MEDICINE
Payer: MEDICARE

## 2022-09-09 VITALS
DIASTOLIC BLOOD PRESSURE: 94 MMHG | TEMPERATURE: 97.9 F | HEIGHT: 74 IN | BODY MASS INDEX: 32.08 KG/M2 | WEIGHT: 250 LBS | HEART RATE: 85 BPM | SYSTOLIC BLOOD PRESSURE: 170 MMHG | RESPIRATION RATE: 18 BRPM | OXYGEN SATURATION: 97 %

## 2022-09-09 DIAGNOSIS — M48.02 CERVICAL STENOSIS OF SPINE: ICD-10-CM

## 2022-09-09 DIAGNOSIS — G95.20 CORD COMPRESSION (HCC): Primary | ICD-10-CM

## 2022-09-09 PROBLEM — E87.1 HYPONATREMIA: Status: ACTIVE | Noted: 2022-09-09

## 2022-09-09 LAB
AMPHETAMINE SCREEN, URINE: ABNORMAL
ANION GAP SERPL CALCULATED.3IONS-SCNC: 12 MMOL/L (ref 3–16)
ANION GAP SERPL CALCULATED.3IONS-SCNC: 13 MMOL/L (ref 3–16)
BARBITURATE SCREEN URINE: ABNORMAL
BASOPHILS ABSOLUTE: 0 K/UL (ref 0–0.2)
BASOPHILS ABSOLUTE: 0 K/UL (ref 0–0.2)
BASOPHILS RELATIVE PERCENT: 0.6 %
BASOPHILS RELATIVE PERCENT: 0.8 %
BENZODIAZEPINE SCREEN, URINE: ABNORMAL
BUN BLDV-MCNC: 11 MG/DL (ref 7–20)
BUN BLDV-MCNC: 18 MG/DL (ref 7–20)
CALCIUM SERPL-MCNC: 9.3 MG/DL (ref 8.3–10.6)
CALCIUM SERPL-MCNC: 9.4 MG/DL (ref 8.3–10.6)
CANNABINOID SCREEN URINE: ABNORMAL
CHLORIDE BLD-SCNC: 98 MMOL/L (ref 99–110)
CHLORIDE BLD-SCNC: 99 MMOL/L (ref 99–110)
CO2: 19 MMOL/L (ref 21–32)
CO2: 24 MMOL/L (ref 21–32)
COCAINE METABOLITE SCREEN URINE: ABNORMAL
CREAT SERPL-MCNC: 0.8 MG/DL (ref 0.8–1.3)
CREAT SERPL-MCNC: 0.9 MG/DL (ref 0.8–1.3)
CREATININE URINE: 162.2 MG/DL (ref 39–259)
EOSINOPHILS ABSOLUTE: 0 K/UL (ref 0–0.6)
EOSINOPHILS ABSOLUTE: 0 K/UL (ref 0–0.6)
EOSINOPHILS RELATIVE PERCENT: 0 %
EOSINOPHILS RELATIVE PERCENT: 0 %
FENTANYL SCREEN, URINE: ABNORMAL
GFR AFRICAN AMERICAN: >60
GFR AFRICAN AMERICAN: >60
GFR NON-AFRICAN AMERICAN: >60
GFR NON-AFRICAN AMERICAN: >60
GLUCOSE BLD-MCNC: 83 MG/DL (ref 70–99)
GLUCOSE BLD-MCNC: 99 MG/DL (ref 70–99)
HCT VFR BLD CALC: 44.4 % (ref 40.5–52.5)
HCT VFR BLD CALC: 44.7 % (ref 40.5–52.5)
HEMOGLOBIN: 14.9 G/DL (ref 13.5–17.5)
HEMOGLOBIN: 14.9 G/DL (ref 13.5–17.5)
LYMPHOCYTES ABSOLUTE: 0.4 K/UL (ref 1–5.1)
LYMPHOCYTES ABSOLUTE: 0.4 K/UL (ref 1–5.1)
LYMPHOCYTES RELATIVE PERCENT: 10.5 %
LYMPHOCYTES RELATIVE PERCENT: 8.1 %
Lab: ABNORMAL
MCH RBC QN AUTO: 32.4 PG (ref 26–34)
MCH RBC QN AUTO: 32.9 PG (ref 26–34)
MCHC RBC AUTO-ENTMCNC: 33.2 G/DL (ref 31–36)
MCHC RBC AUTO-ENTMCNC: 33.4 G/DL (ref 31–36)
MCV RBC AUTO: 97.6 FL (ref 80–100)
MCV RBC AUTO: 98.4 FL (ref 80–100)
METHADONE SCREEN, URINE: ABNORMAL
MONOCYTES ABSOLUTE: 0.1 K/UL (ref 0–1.3)
MONOCYTES ABSOLUTE: 0.2 K/UL (ref 0–1.3)
MONOCYTES RELATIVE PERCENT: 1.9 %
MONOCYTES RELATIVE PERCENT: 4.9 %
NEUTROPHILS ABSOLUTE: 3.5 K/UL (ref 1.7–7.7)
NEUTROPHILS ABSOLUTE: 4 K/UL (ref 1.7–7.7)
NEUTROPHILS RELATIVE PERCENT: 84 %
NEUTROPHILS RELATIVE PERCENT: 89.2 %
OPIATE SCREEN URINE: ABNORMAL
OSMOLALITY URINE: 324 MOSM/KG (ref 390–1070)
OSMOLALITY: 269 MOSM/KG (ref 280–301)
OXYCODONE URINE: POSITIVE
PDW BLD-RTO: 13.4 % (ref 12.4–15.4)
PDW BLD-RTO: 13.4 % (ref 12.4–15.4)
PH UA: 6
PHENCYCLIDINE SCREEN URINE: ABNORMAL
PLATELET # BLD: 225 K/UL (ref 135–450)
PLATELET # BLD: 237 K/UL (ref 135–450)
PMV BLD AUTO: 7.8 FL (ref 5–10.5)
PMV BLD AUTO: 7.8 FL (ref 5–10.5)
POTASSIUM REFLEX MAGNESIUM: 4.8 MMOL/L (ref 3.5–5.1)
POTASSIUM REFLEX MAGNESIUM: 4.8 MMOL/L (ref 3.5–5.1)
RBC # BLD: 4.52 M/UL (ref 4.2–5.9)
RBC # BLD: 4.58 M/UL (ref 4.2–5.9)
SODIUM BLD-SCNC: 131 MMOL/L (ref 136–145)
SODIUM BLD-SCNC: 134 MMOL/L (ref 136–145)
SODIUM URINE: 40 MMOL/L
WBC # BLD: 4.2 K/UL (ref 4–11)
WBC # BLD: 4.5 K/UL (ref 4–11)

## 2022-09-09 PROCEDURE — 7100000001 HC PACU RECOVERY - ADDTL 15 MIN

## 2022-09-09 PROCEDURE — 72141 MRI NECK SPINE W/O DYE: CPT

## 2022-09-09 PROCEDURE — 2580000003 HC RX 258: Performed by: FAMILY MEDICINE

## 2022-09-09 PROCEDURE — 6370000000 HC RX 637 (ALT 250 FOR IP): Performed by: INTERNAL MEDICINE

## 2022-09-09 PROCEDURE — 80048 BASIC METABOLIC PNL TOTAL CA: CPT

## 2022-09-09 PROCEDURE — 7100000000 HC PACU RECOVERY - FIRST 15 MIN

## 2022-09-09 PROCEDURE — 2580000003 HC RX 258: Performed by: NURSE PRACTITIONER

## 2022-09-09 PROCEDURE — 6360000002 HC RX W HCPCS: Performed by: FAMILY MEDICINE

## 2022-09-09 PROCEDURE — 85025 COMPLETE CBC W/AUTO DIFF WBC: CPT

## 2022-09-09 PROCEDURE — 2060000000 HC ICU INTERMEDIATE R&B

## 2022-09-09 PROCEDURE — 96361 HYDRATE IV INFUSION ADD-ON: CPT

## 2022-09-09 PROCEDURE — 72146 MRI CHEST SPINE W/O DYE: CPT

## 2022-09-09 PROCEDURE — 6360000002 HC RX W HCPCS: Performed by: NURSE ANESTHETIST, CERTIFIED REGISTERED

## 2022-09-09 PROCEDURE — 2500000003 HC RX 250 WO HCPCS: Performed by: NURSE ANESTHETIST, CERTIFIED REGISTERED

## 2022-09-09 PROCEDURE — 2580000003 HC RX 258: Performed by: INTERNAL MEDICINE

## 2022-09-09 PROCEDURE — 36415 COLL VENOUS BLD VENIPUNCTURE: CPT

## 2022-09-09 PROCEDURE — 72148 MRI LUMBAR SPINE W/O DYE: CPT

## 2022-09-09 PROCEDURE — G0378 HOSPITAL OBSERVATION PER HR: HCPCS

## 2022-09-09 PROCEDURE — 6360000002 HC RX W HCPCS: Performed by: NURSE PRACTITIONER

## 2022-09-09 PROCEDURE — 99223 1ST HOSP IP/OBS HIGH 75: CPT | Performed by: INTERNAL MEDICINE

## 2022-09-09 PROCEDURE — 2580000003 HC RX 258: Performed by: NURSE ANESTHETIST, CERTIFIED REGISTERED

## 2022-09-09 PROCEDURE — 3700000001 HC ADD 15 MINUTES (ANESTHESIA)

## 2022-09-09 PROCEDURE — 96372 THER/PROPH/DIAG INJ SC/IM: CPT

## 2022-09-09 PROCEDURE — 1200000000 HC SEMI PRIVATE

## 2022-09-09 PROCEDURE — 71045 X-RAY EXAM CHEST 1 VIEW: CPT

## 2022-09-09 PROCEDURE — 3700000000 HC ANESTHESIA ATTENDED CARE

## 2022-09-09 PROCEDURE — 6360000002 HC RX W HCPCS: Performed by: INTERNAL MEDICINE

## 2022-09-09 RX ORDER — MELOXICAM 7.5 MG/1
7.5 TABLET ORAL DAILY
Status: DISCONTINUED | OUTPATIENT
Start: 2022-09-10 | End: 2022-09-23 | Stop reason: HOSPADM

## 2022-09-09 RX ORDER — ONDANSETRON 4 MG/1
4 TABLET, ORALLY DISINTEGRATING ORAL EVERY 8 HOURS PRN
Status: DISCONTINUED | OUTPATIENT
Start: 2022-09-09 | End: 2022-09-23 | Stop reason: HOSPADM

## 2022-09-09 RX ORDER — SODIUM CHLORIDE 0.9 % (FLUSH) 0.9 %
5-40 SYRINGE (ML) INJECTION EVERY 12 HOURS SCHEDULED
Status: DISCONTINUED | OUTPATIENT
Start: 2022-09-09 | End: 2022-09-15 | Stop reason: SDUPTHER

## 2022-09-09 RX ORDER — SODIUM CHLORIDE 0.9 % (FLUSH) 0.9 %
5-40 SYRINGE (ML) INJECTION PRN
Status: DISCONTINUED | OUTPATIENT
Start: 2022-09-09 | End: 2022-09-15 | Stop reason: SDUPTHER

## 2022-09-09 RX ORDER — LISINOPRIL 40 MG/1
40 TABLET ORAL DAILY
Status: DISCONTINUED | OUTPATIENT
Start: 2022-09-10 | End: 2022-09-23 | Stop reason: HOSPADM

## 2022-09-09 RX ORDER — METHOCARBAMOL 750 MG/1
750 TABLET, FILM COATED ORAL 4 TIMES DAILY
Status: DISCONTINUED | OUTPATIENT
Start: 2022-09-10 | End: 2022-09-15 | Stop reason: ALTCHOICE

## 2022-09-09 RX ORDER — ROCURONIUM BROMIDE 10 MG/ML
INJECTION, SOLUTION INTRAVENOUS PRN
Status: DISCONTINUED | OUTPATIENT
Start: 2022-09-09 | End: 2022-09-09 | Stop reason: SDUPTHER

## 2022-09-09 RX ORDER — OXYCODONE HYDROCHLORIDE 5 MG/1
10 TABLET ORAL PRN
Status: ACTIVE | OUTPATIENT
Start: 2022-09-09 | End: 2022-09-09

## 2022-09-09 RX ORDER — ACETAMINOPHEN 650 MG/1
650 SUPPOSITORY RECTAL EVERY 6 HOURS PRN
Status: DISCONTINUED | OUTPATIENT
Start: 2022-09-09 | End: 2022-09-15 | Stop reason: SDUPTHER

## 2022-09-09 RX ORDER — SODIUM CHLORIDE 9 MG/ML
25 INJECTION, SOLUTION INTRAVENOUS PRN
Status: DISCONTINUED | OUTPATIENT
Start: 2022-09-09 | End: 2022-09-12

## 2022-09-09 RX ORDER — DEXAMETHASONE 4 MG/1
8 TABLET ORAL EVERY 12 HOURS SCHEDULED
Status: DISCONTINUED | OUTPATIENT
Start: 2022-09-09 | End: 2022-09-09

## 2022-09-09 RX ORDER — FENTANYL CITRATE 50 UG/ML
25 INJECTION, SOLUTION INTRAMUSCULAR; INTRAVENOUS EVERY 5 MIN PRN
Status: DISCONTINUED | OUTPATIENT
Start: 2022-09-09 | End: 2022-09-12

## 2022-09-09 RX ORDER — SODIUM CHLORIDE 0.9 % (FLUSH) 0.9 %
5-40 SYRINGE (ML) INJECTION EVERY 12 HOURS SCHEDULED
Status: DISCONTINUED | OUTPATIENT
Start: 2022-09-09 | End: 2022-09-12

## 2022-09-09 RX ORDER — POLYETHYLENE GLYCOL 3350 17 G/17G
17 POWDER, FOR SOLUTION ORAL DAILY PRN
Status: DISCONTINUED | OUTPATIENT
Start: 2022-09-09 | End: 2022-09-23 | Stop reason: HOSPADM

## 2022-09-09 RX ORDER — SODIUM CHLORIDE 9 MG/ML
INJECTION, SOLUTION INTRAVENOUS CONTINUOUS PRN
Status: DISCONTINUED | OUTPATIENT
Start: 2022-09-09 | End: 2022-09-09 | Stop reason: SDUPTHER

## 2022-09-09 RX ORDER — ONDANSETRON 2 MG/ML
4 INJECTION INTRAMUSCULAR; INTRAVENOUS
Status: ACTIVE | OUTPATIENT
Start: 2022-09-09 | End: 2022-09-09

## 2022-09-09 RX ORDER — SODIUM CHLORIDE 0.9 % (FLUSH) 0.9 %
5-40 SYRINGE (ML) INJECTION PRN
Status: DISCONTINUED | OUTPATIENT
Start: 2022-09-09 | End: 2022-09-12

## 2022-09-09 RX ORDER — METOPROLOL SUCCINATE 50 MG/1
50 TABLET, EXTENDED RELEASE ORAL DAILY
Status: DISCONTINUED | OUTPATIENT
Start: 2022-09-10 | End: 2022-09-13

## 2022-09-09 RX ORDER — DEXAMETHASONE 4 MG/1
8 TABLET ORAL EVERY 12 HOURS SCHEDULED
Status: DISCONTINUED | OUTPATIENT
Start: 2022-09-09 | End: 2022-09-14

## 2022-09-09 RX ORDER — ATORVASTATIN CALCIUM 40 MG/1
40 TABLET, FILM COATED ORAL DAILY
Status: DISCONTINUED | OUTPATIENT
Start: 2022-09-09 | End: 2022-09-23 | Stop reason: HOSPADM

## 2022-09-09 RX ORDER — SUCCINYLCHOLINE CHLORIDE 20 MG/ML
INJECTION INTRAMUSCULAR; INTRAVENOUS PRN
Status: DISCONTINUED | OUTPATIENT
Start: 2022-09-09 | End: 2022-09-09 | Stop reason: SDUPTHER

## 2022-09-09 RX ORDER — PROPOFOL 10 MG/ML
INJECTION, EMULSION INTRAVENOUS PRN
Status: DISCONTINUED | OUTPATIENT
Start: 2022-09-09 | End: 2022-09-09 | Stop reason: SDUPTHER

## 2022-09-09 RX ORDER — MEPERIDINE HYDROCHLORIDE 25 MG/ML
12.5 INJECTION INTRAMUSCULAR; INTRAVENOUS; SUBCUTANEOUS EVERY 5 MIN PRN
Status: DISCONTINUED | OUTPATIENT
Start: 2022-09-09 | End: 2022-09-12

## 2022-09-09 RX ORDER — LABETALOL HYDROCHLORIDE 5 MG/ML
10 INJECTION, SOLUTION INTRAVENOUS
Status: COMPLETED | OUTPATIENT
Start: 2022-09-09 | End: 2022-09-10

## 2022-09-09 RX ORDER — OXYCODONE HYDROCHLORIDE 5 MG/1
5 TABLET ORAL PRN
Status: ACTIVE | OUTPATIENT
Start: 2022-09-09 | End: 2022-09-09

## 2022-09-09 RX ORDER — ENOXAPARIN SODIUM 100 MG/ML
30 INJECTION SUBCUTANEOUS 2 TIMES DAILY
Status: DISCONTINUED | OUTPATIENT
Start: 2022-09-09 | End: 2022-09-15

## 2022-09-09 RX ORDER — DIPHENHYDRAMINE HYDROCHLORIDE 50 MG/ML
12.5 INJECTION INTRAMUSCULAR; INTRAVENOUS
Status: ACTIVE | OUTPATIENT
Start: 2022-09-09 | End: 2022-09-09

## 2022-09-09 RX ORDER — ASPIRIN 325 MG
325 TABLET ORAL DAILY
Status: DISCONTINUED | OUTPATIENT
Start: 2022-09-10 | End: 2022-09-15

## 2022-09-09 RX ORDER — LORAZEPAM 1 MG/1
1 TABLET ORAL
Status: ACTIVE | OUTPATIENT
Start: 2022-09-09 | End: 2022-09-09

## 2022-09-09 RX ORDER — ONDANSETRON 2 MG/ML
4 INJECTION INTRAMUSCULAR; INTRAVENOUS EVERY 6 HOURS PRN
Status: DISCONTINUED | OUTPATIENT
Start: 2022-09-09 | End: 2022-09-23 | Stop reason: HOSPADM

## 2022-09-09 RX ORDER — ACETAMINOPHEN 325 MG/1
650 TABLET ORAL EVERY 6 HOURS PRN
Status: DISCONTINUED | OUTPATIENT
Start: 2022-09-09 | End: 2022-09-15 | Stop reason: SDUPTHER

## 2022-09-09 RX ORDER — SODIUM CHLORIDE 9 MG/ML
INJECTION, SOLUTION INTRAVENOUS CONTINUOUS
Status: DISCONTINUED | OUTPATIENT
Start: 2022-09-09 | End: 2022-09-10

## 2022-09-09 RX ORDER — PROCHLORPERAZINE EDISYLATE 5 MG/ML
5 INJECTION INTRAMUSCULAR; INTRAVENOUS
Status: ACTIVE | OUTPATIENT
Start: 2022-09-09 | End: 2022-09-09

## 2022-09-09 RX ORDER — SODIUM CHLORIDE 9 MG/ML
INJECTION, SOLUTION INTRAVENOUS PRN
Status: DISCONTINUED | OUTPATIENT
Start: 2022-09-09 | End: 2022-09-15 | Stop reason: SDUPTHER

## 2022-09-09 RX ORDER — ENOXAPARIN SODIUM 100 MG/ML
40 INJECTION SUBCUTANEOUS DAILY
Status: DISCONTINUED | OUTPATIENT
Start: 2022-09-09 | End: 2022-09-09 | Stop reason: DRUGHIGH

## 2022-09-09 RX ORDER — LORAZEPAM 2 MG/ML
0.5 INJECTION INTRAMUSCULAR
Status: DISPENSED | OUTPATIENT
Start: 2022-09-09 | End: 2022-09-09

## 2022-09-09 RX ORDER — DIAZEPAM 5 MG/1
5 TABLET ORAL EVERY 6 HOURS PRN
Status: DISCONTINUED | OUTPATIENT
Start: 2022-09-09 | End: 2022-09-15

## 2022-09-09 RX ADMIN — SUGAMMADEX 200 MG: 100 INJECTION, SOLUTION INTRAVENOUS at 18:36

## 2022-09-09 RX ADMIN — ENOXAPARIN SODIUM 30 MG: 100 INJECTION SUBCUTANEOUS at 09:32

## 2022-09-09 RX ADMIN — DEXAMETHASONE 8 MG: 4 TABLET ORAL at 21:53

## 2022-09-09 RX ADMIN — PHENYLEPHRINE HYDROCHLORIDE 100 MCG: 10 INJECTION INTRAVENOUS at 17:38

## 2022-09-09 RX ADMIN — TAMSULOSIN HYDROCHLORIDE 0.4 MG: 0.4 CAPSULE ORAL at 09:32

## 2022-09-09 RX ADMIN — SODIUM CHLORIDE: 9 INJECTION, SOLUTION INTRAVENOUS at 21:59

## 2022-09-09 RX ADMIN — SUCCINYLCHOLINE CHLORIDE 100 MG: 20 INJECTION, SOLUTION INTRAMUSCULAR; INTRAVENOUS; PARENTERAL at 17:24

## 2022-09-09 RX ADMIN — CYCLOBENZAPRINE 10 MG: 10 TABLET, FILM COATED ORAL at 09:32

## 2022-09-09 RX ADMIN — SODIUM CHLORIDE: 9 INJECTION, SOLUTION INTRAVENOUS at 17:18

## 2022-09-09 RX ADMIN — HYDROMORPHONE HYDROCHLORIDE 0.5 MG: 1 INJECTION, SOLUTION INTRAMUSCULAR; INTRAVENOUS; SUBCUTANEOUS at 19:31

## 2022-09-09 RX ADMIN — ROCURONIUM BROMIDE 20 MG: 10 INJECTION INTRAVENOUS at 17:33

## 2022-09-09 RX ADMIN — SODIUM CHLORIDE, PRESERVATIVE FREE 10 ML: 5 INJECTION INTRAVENOUS at 09:32

## 2022-09-09 RX ADMIN — ROCURONIUM BROMIDE 30 MG: 10 INJECTION INTRAVENOUS at 17:27

## 2022-09-09 RX ADMIN — PHENYLEPHRINE HYDROCHLORIDE 150 MCG: 10 INJECTION INTRAVENOUS at 17:35

## 2022-09-09 RX ADMIN — METOPROLOL SUCCINATE 50 MG: 50 TABLET, EXTENDED RELEASE ORAL at 09:32

## 2022-09-09 RX ADMIN — METHOCARBAMOL 1000 MG: 100 INJECTION, SOLUTION INTRAMUSCULAR; INTRAVENOUS at 18:48

## 2022-09-09 RX ADMIN — ASPIRIN 325 MG: 325 TABLET ORAL at 09:32

## 2022-09-09 RX ADMIN — MELOXICAM 7.5 MG: 7.5 TABLET ORAL at 09:32

## 2022-09-09 RX ADMIN — PROPOFOL 160 MG: 10 INJECTION, EMULSION INTRAVENOUS at 17:24

## 2022-09-09 ASSESSMENT — PAIN DESCRIPTION - ORIENTATION
ORIENTATION: MID;RIGHT;LEFT
ORIENTATION: MID

## 2022-09-09 ASSESSMENT — ENCOUNTER SYMPTOMS
NAUSEA: 0
SHORTNESS OF BREATH: 0

## 2022-09-09 ASSESSMENT — PAIN DESCRIPTION - DESCRIPTORS
DESCRIPTORS: OTHER (COMMENT);NAGGING
DESCRIPTORS: ACHING

## 2022-09-09 ASSESSMENT — PAIN DESCRIPTION - FREQUENCY
FREQUENCY: CONTINUOUS
FREQUENCY: INTERMITTENT

## 2022-09-09 ASSESSMENT — PAIN SCALES - GENERAL
PAINLEVEL_OUTOF10: 7
PAINLEVEL_OUTOF10: 5

## 2022-09-09 ASSESSMENT — LIFESTYLE VARIABLES
HOW OFTEN DO YOU HAVE A DRINK CONTAINING ALCOHOL: 2-3 TIMES A WEEK
HOW MANY STANDARD DRINKS CONTAINING ALCOHOL DO YOU HAVE ON A TYPICAL DAY: 1 OR 2

## 2022-09-09 ASSESSMENT — PAIN DESCRIPTION - LOCATION
LOCATION: BACK
LOCATION: BACK;LEG

## 2022-09-09 ASSESSMENT — PAIN - FUNCTIONAL ASSESSMENT: PAIN_FUNCTIONAL_ASSESSMENT: PREVENTS OR INTERFERES WITH MANY ACTIVE NOT PASSIVE ACTIVITIES

## 2022-09-09 ASSESSMENT — PAIN DESCRIPTION - PAIN TYPE
TYPE: ACUTE PAIN
TYPE: CHRONIC PAIN;ACUTE PAIN

## 2022-09-09 ASSESSMENT — PAIN DESCRIPTION - ONSET: ONSET: ON-GOING

## 2022-09-09 NOTE — PROGRESS NOTES
Admission and shift assessment completed. Pt alert and oriented x4. . Vitals stable. All needs met. Medicated for pain per Mar. Per MD notes patient to be transferred to [facility] for neuro surgery evaluation and severe cord compression per MRI notes. Pt made aware.

## 2022-09-09 NOTE — DISCHARGE SUMMARY
Please see H&P for other details, patient discharged on the same day of admission to [facility]. for neurosurgery evaluation for severe cord compression

## 2022-09-09 NOTE — H&P
Internal Medicine  MS3  History & Physical      CC : Back pain    History Obtained From:  patient    HISTORY OF PRESENT ILLNESS:  Ana Rosa Baker a 74 y.o. M with PMHx of anterolisthesis of cervical spine, A.fib s/p ablation, HTN, HLD, CAD, BPH, Carpal Tunnel, and herniated disc p/w back pain, numbness in arms and legs, and weakness in arms and legs. Patient has had progressive cervical pain for past two months which he has been seeing someone, unable to find documentation. He is scheduled for a surgery next week. Back pain is worse when laying down. He had a herniated disc in 2000 s/p spinal surgery and reports intermittent back pain since then. Back pain has been accompanied with numbness and weakness in arms and legs. Pain in legs was described as shooting pain down to toes. Leg pain also associated with involuntary muscle spasms causing him to be off balance. Patient came in with Na of 131. Was initially 126 while in [facility] 09/08. Patient reported dizziness, fatigue, and sometimes confusion. Denied headache and nausea. Patient has fluid on knee that is drained every 3 months for last 2 years. He had a carpal tunnel release surgery on right hand. Still has hand weakness and numbness B/L. Patient was seen at [facility] ED where he received 10 mg IV Decadron, Dilaudid, Robaxin. He had no relief. CT of cervical, thoracic, and lumbar spine was performed. Patient became anxious during imaging and only Cervical CT was clear for impression.     Past Medical History:        Diagnosis Date    Arthritis     Atrial fibrillation ([address]     CAD (coronary artery disease)     Hyperlipidemia     Hypertension        Past Surgical History:        Procedure Laterality Date    BACK SURGERY  200    lower lumbar surgery    CORONARY ANGIOPLASTY WITH STENT PLACEMENT  2012       Medications Priorto Admission:    Medications Prior to Admission: lidocaine (LIDODERM) 5 %, Place 1 patch onto the skin daily 12 hours on, 12 hours off. (Patient not taking: Reported on 9/9/2022)  meloxicam (MOBIC) 7.5 MG tablet, Take 1 tablet by mouth daily  REFRESH OPTIVE 1-0.9 % GEL,   diclofenac sodium (VOLTAREN) 1 % GEL,   INCRUSE ELLIPTA 62.5 MCG/INH AEPB,   aspirin 325 MG tablet, Take 325 mg by mouth daily  tamsulosin (FLOMAX) 0.4 MG capsule, Take 0.2 mg by mouth daily  lisinopril (PRINIVIL;ZESTRIL) 20 MG tablet, Take 2 tablets by mouth daily (Patient taking differently: Take 40 mg by mouth nightly)  metoprolol succinate (TOPROL XL) 50 MG extended release tablet, Take 50 mg by mouth daily  atorvastatin (LIPITOR) 40 MG tablet, Take 1 tablet by mouth daily. Allergies:  Patient has no known allergies. Social History:   TOBACCO:   reports that he has been smoking cigarettes. He has a 40.00 pack-year smoking history. He has never used smokeless tobacco.  ETOH:  reports current alcohol use of 1 to 2 cans of malt beer per day. Patient currently lives alone    Family History:       Problem Relation Age of Onset    Heart Disease Mother     Diabetes Mother     Heart Disease Father     Diabetes Father     Diabetes Sister     Diabetes Brother     Diabetes Maternal Grandmother     Diabetes Maternal Grandfather     Diabetes Paternal Grandmother     Diabetes Paternal Grandfather     Lung Cancer Mother        Review of Systems   Constitutional:  Positive for activity change and fatigue. Negative for unexpected weight change. HENT:  Negative for hearing loss. Eyes:  Positive for visual disturbance. Reported blurry vision in R eye. Being seen by doctor. Has prescription glasses   Respiratory:  Negative for shortness of breath. Cardiovascular:  Negative for palpitations. Gastrointestinal:  Negative for nausea. Musculoskeletal:  Positive for arthralgias and neck pain. Negative for myalgias. Neurological:  Positive for dizziness. Negative for headaches. Psychiatric/Behavioral:  Positive for confusion.       ROS: A 10 point review of systems was conducted, significant findings as noted in HPI. Physical Exam  Constitutional:       Appearance: Normal appearance. HENT:      Head: Normocephalic. Mouth/Throat:      Mouth: Mucous membranes are moist.   Eyes:      Extraocular Movements: Extraocular movements intact. Cardiovascular:      Rate and Rhythm: Normal rate and regular rhythm. Pulmonary:      Effort: Pulmonary effort is normal.      Breath sounds: Normal breath sounds. Abdominal:      General: Abdomen is flat. Palpations: Abdomen is soft. Musculoskeletal:         General: Deformity present. Comments: Deformity on DIP of L 2nd digit on hand. Power 3/5 in arms. 4/5 in legs. Skin:     General: Skin is warm and dry. Neurological:      General: No focal deficit present. Mental Status: He is alert and oriented to person, place, and time. Psychiatric:         Mood and Affect: Mood normal.         Behavior: Behavior normal.     Physical exam:       Vitals:    09/09/22 1407   BP: (!) 157/91   Pulse: 73   Resp: 18   Temp: 98.3 °F (36.8 °C)       DATA:    Labs:  CBC:   Recent Labs     09/08/22  1445 09/09/22  0346   WBC 4.4 4.5   HGB 14.5 14.9   HCT 43.8 44.4    225       BMP:   Recent Labs     09/08/22  1445 09/08/22  2131 09/09/22  0346   * 127* 131*   K 5.2*  --  4.8   CL 93*  --  99   CO2 24  --  19*   BUN 10  --  11   CREATININE 0.8  --  0.8   GLUCOSE 83  --  99     LFT's:   Recent Labs     09/08/22  1445   AST 22   ALT 27   BILITOT 0.7   ALKPHOS 95     Troponin:   Recent Labs     09/08/22  2131   Huma Salm <0.01     BNP:No results for input(s): BNP in the last 72 hours. ABGs: No results for input(s): PHART, RDE0WAL, PO2ART in the last 72 hours. INR: No results for input(s): INR in the last 72 hours.     U/A:  Recent Labs     09/08/22  2330   COLORU Yellow   PHUR 6.0  6.0   WBCUA 1   RBCUA 1   BACTERIA None Seen   CLARITYU Clear   SPECGRAV 1.015   LEUKOCYTESUR Negative   UROBILINOGEN 1.0 BILIRUBINUR Negative   BLOODU Negative   GLUCOSEU Negative       MRI CERVICAL SPINE WO CONTRAST    (Results Pending)   MRI LUMBAR SPINE WO CONTRAST    (Results Pending)   MRI THORACIC SPINE WO CONTRAST    (Results Pending)       ASSESSMENT AND PLAN:  Guy Curtis a 74 y.o. M with PMHx of anterolisthesis of cervical spine, A.fib s/p ablation, HTN, HLD, CAD, BPH, Carpal Tunnel syndrome s/p unilateral surgery, and herniated disc (2000) s/p surgery p/w back pain, numbness in arms and legs, and weakness in arms and legs. Cervical cord compression with edema  -Patient presented to [facility] with back pain (9/8/22) where he was directly admitted to [facility] [place]note 2[facility] for Neurosurgery. Patient reports back pain that radiates down arms and legs. Cervial CT showed Grade 2 anterolisthesis of C2 on C3 and Grade 1 anterolisthesis C4 on C5  -Unable to complete MRI in [facility] as he was unable to lie flat due to severe back pain  -MRI cervical, thoracic spine and lumbar spine pending   -Neurosurgery following: recs completing whole spine MRI under [facility]  -Decadron 8 mg PO every 12 hours  - Robaxin 1000 mg IV at 200mL/hr over 30 minutes every 8 hours  -Ativan (1mg PO PRN) for anxiety during CT  -Dilaudid pain panel  -Acetaminophen 650 mg PO PRN     Hyponatremia   -Serum Osm 269, serum Na 126, Urine Osm 324, Urine Na 40 in MFF. -Hyponatremia is improving, Na 134 09/08. Patient reports dizziness, fatigue and confusion. -drinks 3-4 cans of beer daily  -Monitor Sodium Levels    Chronic Conditions    Essential Hypertension:   -Continue home medication: Lisinopril 40 mg PO daily    Hyperlipidemia:   -Ct home medication: atorvastatin 40 PO Daily    Paroxysmal Atrial fibrilliation status post ablation:  Continue Home Medication:  - Metoprolol succinate 50 mg PO daily  - Aspirin 325 mg PO Daily - being held by provider    Class I obesity: Complicating assessment and treatment.  Placing patient at risk for multiple co-morbidities as well as early death and contributing to the patient's presentation. Education, and counseling when appropriate    Carpal Tunnel syndrome:  -Seeing physician, previous carpal tunnel surgery on R hand. Pending surgery on L hand. Arthritis:  -Meloxicam 7.5 mg PO Daily    Benign Prostatic Hyperplasia:  - Ct home: Tamsulosin  0.4 g PO     Will discuss with attending physician Dr. Ximena Carlin MD    Code Status: Full code  FEN: ADULT DIET; Regular  PPX: LOVENOX - Being held by provider  DISPO: [facility], [facility]  9/9/2022,  3:50 PM    I agree with the documentation on the history, physical examination, assessment and plan.     Siria Arroyo MD

## 2022-09-09 NOTE — PLAN OF CARE
Problem: Discharge Planning  Goal: Discharge to home or other facility with appropriate resources  Outcome: Progressing  Flowsheets  Taken 9/8/2022 2021  Discharge to home or other facility with appropriate resources:   Identify barriers to discharge with patient and caregiver   Identify discharge learning needs (meds, wound care, etc)  Taken 9/8/2022 1945  Discharge to home or other facility with appropriate resources:   Identify barriers to discharge with patient and caregiver   Arrange for needed discharge resources and transportation as appropriate   Identify discharge learning needs (meds, wound care, etc)     Problem: Pain  Goal: Verbalizes/displays adequate comfort level or baseline comfort level  Outcome: Progressing     Problem: Safety - Adult  Goal: Free from fall injury  Outcome: Progressing

## 2022-09-09 NOTE — DISCHARGE SUMMARY
Hospital Discharge Summary    Patient's PCP: Vilma Owusu  Admit Date: 9/8/2022   Discharge Date: 9/9/2022    Admitting Physician: Dr. Sherri Jackson MD  Discharge Physician: Dr. Betito Lopez MD   Consults: nephrology and neurosurgery    Brief HPI:     .74 o. male who presented to [facility] with past medical history of arthritis, CAD, hypertension, hyperlipidemia, atrial fibrillation, BPH presented to the ED for difficulty functioning and ADL or even walking. Patient reported that the back pain started around 1 week ago progressively worsening however today has gone worse and that he is having upper extremity weakness and pain that is radicular going from the low back to bilateral legs. Patient denied having any incontinence urinary or rectal, no perineal numbness. Patient however is reporting that weakness is now causing him to not been able to even walk or carry out ADLs. Patient reports that the back pain is very severe specially in the low back and the low neck. Exacerbated with extending, alleviated with rest.  No associate with fever chills nausea vomiting chest pain abdominal pain or dysuria. Patient denied having any trauma or falls at home. Patient reports that he follows up with spine and did not have any surgery scheduled. Patient was given prednisone and Robaxin Lidoderm patches on 08/22 and was taking them except for the patch due to being expensive with minimal improvement. Patient lives alone and functions independently prior to this. In the ED, patient was noted acute on chronic back pain, spine was consulted recommended MRI and admission to [facility] with PT OT. Patient then underwent MRI and was not able to proceed due to severe back pain especially when laying flat. MRI was able to be obtained showing a C2-C3 cord compression with edema.         Brief hospital course:     -Acute cervical cord compression with edema:  10 mg IV Decadron given  Dilaudid, tablet by mouth daily     metoprolol succinate 50 MG extended release tablet  Commonly known as: TOPROL XL     Refresh Optive 1-0.9 % Gel  Generic drug: Carboxymethylcellul-Glycerin     tamsulosin 0.4 MG capsule  Commonly known as: FLOMAX              Activity: activity as tolerated  Diet: ADULT DIET; Regular      Disposition: Transferred to [facility]. for neurosurgery eval  Discharged Condition: Stable  Follow Up:   No follow-up provider specified. Code status:  Full Code         Total time spent on discharge, finalizing medications, referrals and arranging outpatient follow up was more than 45 minutes      Thank you Dr. Angelo Munoz for the opportunity to be involved in this patients care.

## 2022-09-09 NOTE — DISCHARGE INSTR - COC
Encounters:   22 250 lb (113.4 kg)     Mental Status:  {IP PT MENTAL STATUS:56288}    IV Access:  { ELHAM IV ACCESS:208507863}    Nursing Mobility/ADLs:  Walking   {CHP DME IPLN:227189260}  Transfer  {CHP DME WYCT:419319881}  Bathing  {CHP DME HMES:482284325}  Dressing  {CHP DME WWCB:305447270}  Toileting  {CHP DME TGHW:808054895}  Feeding  {CHP DME EAMY:379764740}  Med Admin  {CHP DME MEIA:550800622}  Med Delivery   { ELHAM MED Delivery:261151589}    Wound Care Documentation and Therapy:        Elimination:  Continence: Bowel: {YES / MELONY:99925}  Bladder: {YES / NF:02538}  Urinary Catheter: {Urinary Catheter:063239051}   Colostomy/Ileostomy/Ileal Conduit: {YES / LH:21433}       Date of Last BM: ***    Intake/Output Summary (Last 24 hours) at 2022 0725  Last data filed at 2022 0559  Gross per 24 hour   Intake 240 ml   Output 1100 ml   Net -860 ml     I/O last 3 completed shifts:   In: 240 [P.O.:240]  Out: 80 [Urine:1100]    Safety Concerns:     [employer] Safety Concerns:550448306}    Impairments/Disabilities:      [employer] Impairments/Disabilities:849240719}    Nutrition Therapy:  Current Nutrition Therapy:   [employer] Diet List:442586081}    Routes of Feeding: {P DME Other Feedings:309252189}  Liquids: {Slp liquid thickness:91842}  Daily Fluid Restriction: {CHP DME Yes amt example:680633287}  Last Modified Barium Swallow with Video (Video Swallowing Test): {Done Not Done OKHO:040193641}    Treatments at the Time of Hospital Discharge:   Respiratory Treatments: ***  Oxygen Therapy:  {Therapy; copd oxygen:36194}  Ventilator:    {[facility] Vent DIVYA:615288581}    Rehab Therapies: {THERAPEUTIC INTERVENTION:1224335720}  Weight Bearing Status/Restrictions: [address]  Weight Bearin}  Other Medical Equipment (for information only, NOT a DME order):  {EQUIPMENT:048784209}  Other Treatments: ***    Patient's personal belongings (please select all that are sent with patient):  {[facility] DME Belongings:766141681}    RN SIGNATURE:  {Esignature:545146514}    CASE MANAGEMENT/SOCIAL WORK SECTION    Inpatient Status Date: ***    Readmission Risk Assessment Score:  Readmission Risk              Risk of Unplanned Readmission:  9           Discharging to Facility/ Agency   Name:   Address:  Phone:  Fax:    Dialysis Facility (if applicable)   Name:  Address:  Dialysis Schedule:  Phone:  Fax:    / signature: {Esignature:101672081}    PHYSICIAN SECTION    Prognosis: Good    Condition at Discharge: Stable    Rehab Potential (if transferring to Rehab): Good    Recommended Labs or Other Treatments After Discharge:     Physician Certification: I certify the above information and transfer of Alejo Almendarez  is necessary for the continuing treatment of the diagnosis listed and that he requires transfer to another facility for higher level of care    Update Admission H&P: No change in H&P    PHYSICIAN SIGNATURE:  Electronically signed by Tomas Yip DO on 9/9/22 at 7:26 AM EDT

## 2022-09-09 NOTE — ANESTHESIA POSTPROCEDURE EVALUATION
Department of Anesthesiology  Postprocedure Note    Patient: Yonis Henderson  MRN: [id number]  [place]: 1955  Date of evaluation: 9/9/2022      Procedure Summary     Date: 09/09/22 Room / Location: [facility] MRI    Anesthesia Start: 1718 Anesthesia Stop: 9525    Procedure: MRI CERVICAL SPINE WO CONTRAST Diagnosis: (pain and numbness)    Scheduled Providers:  Responsible Provider: Nuria Ram MD    Anesthesia Type: general ASA Status: 2          Anesthesia Type: No value filed.     Charis Phase I: Charis Score: 10    Charis Phase II:        Anesthesia Post Evaluation    Patient location during evaluation: PACU  Level of consciousness: awake  Complications: no  Multimodal analgesia pain management approach

## 2022-09-09 NOTE — ANESTHESIA PRE PROCEDURE
Department of Anesthesiology  Preprocedure Note       Name:  Oswaldo Moy   Age:  74 y.o.  :  1955                                          MRN:  [id number]         Date:  2022      Surgeon: * No surgeons listed *    Procedure: * No procedures listed *    Medications prior to admission:   Prior to Admission medications    Medication Sig Start Date End Date Taking? Authorizing Provider   ibuprofen (IBU) 400 MG tablet Take 1 tablet by mouth every 6 hours as needed for Pain 22  AR [place]   lidocaine (LIDODERM) 5 % Place 1 patch onto the skin daily 12 hours on, 12 hours off. Patient not taking: Reported on 2022   DOUGLAS Ramon CNP   meloxicam (MOBIC) 7.5 MG tablet Take 1 tablet by mouth daily 22   DOUGLAS Ramon CNP   REFRESH OPTIVE 1-0.9 % GEL  22   Historical Provider, MD   diclofenac sodium (VOLTAREN) 1 % GEL  22   Historical Provider, MD   INCKENYATTA ELLIPTA 62.5 MCG/INH AEPB  3/16/22   Historical Provider, MD   aspirin 325 MG tablet Take 325 mg by mouth daily    Historical Provider, MD   tamsulosin (FLOMAX) 0.4 MG capsule Take 0.2 mg by mouth daily    Historical Provider, MD   lisinopril (PRINIVIL;ZESTRIL) 20 MG tablet Take 2 tablets by mouth daily  Patient taking differently: Take 40 mg by mouth nightly 2/3/21   Arminda Padilla MD   metoprolol succinate (TOPROL XL) 50 MG extended release tablet Take 50 mg by mouth daily    Historical Provider, MD   atorvastatin (LIPITOR) 40 MG tablet Take 1 tablet by mouth daily.  14   DOUGLAS Carias CNP       Current medications:    Current Facility-Administered Medications   Medication Dose Route Frequency Provider Last Rate Last Admin    [Held by provider] aspirin tablet 325 mg  325 mg Oral Daily Beauty Fret, DO        atorvastatin (LIPITOR) tablet 40 mg  40 mg Oral Daily Beauty Fret, DO        [START ON 9/10/2022] lisinopril (PRINIVIL;ZESTRIL) tablet 40 mg  40 mg Oral Daily Beauty Fret, DO        [START ON 9/10/2022] meloxicam (MOBIC) tablet 7.5 mg  7.5 mg Oral Daily Beauty Fret, DO        [START ON 9/10/2022] metoprolol succinate (TOPROL XL) extended release tablet 50 mg  50 mg Oral Daily Beauty Fret, DO        sodium chloride flush 0.9 % injection 5-40 mL  5-40 mL IntraVENous 2 times per day Beauty Fret, DO        sodium chloride flush 0.9 % injection 5-40 mL  5-40 mL IntraVENous PRN Beauty Fret, DO        0.9 % sodium chloride infusion   IntraVENous PRN Beauty Fret, DO        ondansetron (ZOFRAN-ODT) disintegrating tablet 4 mg  4 mg Oral Q8H PRN Beauty Fret, DO        Or    ondansetron (ZOFRAN) injection 4 mg  4 mg IntraVENous Q6H PRN Beauty Fret, DO        polyethylene glycol (GLYCOLAX) packet 17 g  17 g Oral Daily PRN Beauty Fret, DO        acetaminophen (TYLENOL) tablet 650 mg  650 mg Oral Q6H PRN Beauty Fret, DO        Or    acetaminophen (TYLENOL) suppository 650 mg  650 mg Rectal Q6H PRN Beauty Fret, DO        HYDROmorphone (DILAUDID) injection 0.25 mg  0.25 mg IntraVENous Q4H PRN Beauty Fret, DO        Or    HYDROmorphone (DILAUDID) injection 0.5 mg  0.5 mg IntraVENous Q3H PRN Beauty Fret, DO        LORazepam (ATIVAN) tablet 1 mg  1 mg Oral Once PRN Beauty Fret, DO        [Held by provider] enoxaparin Sodium (LOVENOX) injection 30 mg  30 mg SubCUTAneous BID Sary Kate DO        methocarbamol (ROBAXIN) 1,000 mg in dextrose 5 % 100 mL IVPB  1,000 mg IntraVENous Q8H Kesha Reasons Rupard, APRN - CNP        Followed by   Robson Franks ON 9/10/2022] methocarbamol (ROBAXIN) tablet 750 mg  750 mg Oral 4x Daily Kesha Reasons Rupard, APRN - CNP        diazePAM (VALIUM) tablet 5 mg  5 mg Oral Q6H PRN Kesha Reasons Rupard, APRN - CNP         Facility-Administered Medications Ordered in Other Encounters   Medication Dose Route Frequency Provider Last Rate Last Admin    phenylephrine (CHARLENE-SYNEPHRINE) injection   IntraVENous PRN Unrulyla Adinmaritza, APRN - CRNA   100 mcg at 09/09/22 1738    propofol injection   IntraVENous PRN Unrulyla Gin, APRN - CRNA   160 mg at 09/09/22 1724    succinylcholine (ANECTINE) injection   IntraVENous PRN Unrulyla Adinie, APRN - CRNA   100 mg at 09/09/22 1724    rocuronium (ZEMURON) injection   IntraVENous PRN Unrulyla Gin, APRN - CRNA   20 mg at 09/09/22 1733    0.9 % sodium chloride infusion   IntraVENous Continuous PRN Unrulyla Adinmaritza, APRN - CRNA   New Bag at 09/09/22 1718       Allergies:  No Known Allergies    Problem List:    Patient Active Problem List   Diagnosis Code    CAD (coronary artery disease) I25.10    Hyperlipidemia E78.5    Tobacco use Z72.0    Paroxysmal atrial fibrillation (HCC) I48.0    Tobacco abuse Z72.0    Typical atrial flutter I48.3    Coronary artery disease involving native coronary artery of native heart without angina pectoris I25.10    Morbid obesity due to excess calories (HCC) E66.01    Essential hypertension I10    Bilateral carpal tunnel syndrome G56.03    Chest pain R07.9    Anterolisthesis of cervical spine M43.12    Hyponatremia E87.1    Cord compression (HCC) G95.20       Past Medical History:        Diagnosis Date    Arthritis     Atrial fibrillation ([facility] [address]     CAD (coronary artery disease)     Hyperlipidemia     Hypertension        Past Surgical History:        Procedure Laterality Date    BACK SURGERY  200    lower lumbar surgery    CORONARY ANGIOPLASTY WITH STENT PLACEMENT  2012       Social History:    Social History     Tobacco Use    Smoking status: Every Day     Packs/day: 1.00     Years: 40.00     Pack years: 40.00     Types: Cigarettes    Smokeless tobacco: Never   Substance Use Topics    Alcohol use:  Yes     Alcohol/week: 20.0 standard drinks     Types: 20 Cans of beer per week     Comment: occasionally                                Ready to quit: Not Answered  Counseling given: Not Answered      Vital Signs (Current):   Vitals:    09/09/22 1407   BP: (!) 157/91   Pulse: 73   Resp: 18   Temp: 98.3 °F (36.8 °C)   TempSrc: Oral                                              BP Readings from Last 3 Encounters:   09/09/22 (!) 157/91   09/09/22 (!) 170/94   09/07/22 (!) 158/94       NPO Status:                                                                                 BMI:   Wt Readings from Last 3 Encounters:   09/08/22 250 lb (113.4 kg)   09/08/22 250 lb (113.4 kg)   09/07/22 256 lb (116.1 kg)     There is no height or weight on file to calculate BMI.    CBC:   Lab Results   Component Value Date/Time    WBC 4.2 09/09/2022 04:04 PM    RBC 4.58 09/09/2022 04:04 PM    HGB 14.9 09/09/2022 04:04 PM    HCT 44.7 09/09/2022 04:04 PM    MCV 97.6 09/09/2022 04:04 PM    RDW 13.4 09/09/2022 04:04 PM     09/09/2022 04:04 PM       CMP:   Lab Results   Component Value Date/Time     09/09/2022 04:04 PM    K 4.8 09/09/2022 04:04 PM    CL 98 09/09/2022 04:04 PM    CO2 24 09/09/2022 04:04 PM    BUN 18 09/09/2022 04:04 PM    CREATININE 0.9 09/09/2022 04:04 PM    GFRAA >60 09/09/2022 04:04 PM    GFRAA >60 02/19/2013 10:39 AM    AGRATIO 1.4 09/08/2022 02:45 PM    LABGLOM >60 09/09/2022 04:04 PM    GLUCOSE 83 09/09/2022 04:04 PM    PROT 7.6 09/08/2022 02:45 PM    PROT 7.2 02/19/2013 10:39 AM    CALCIUM 9.4 09/09/2022 04:04 PM    BILITOT 0.7 09/08/2022 02:45 PM    ALKPHOS 95 09/08/2022 02:45 PM    AST 22 09/08/2022 02:45 PM    ALT 27 09/08/2022 02:45 PM       POC Tests: No results for input(s): POCGLU, POCNA, POCK, POCCL, POCBUN, POCHEMO, POCHCT in the last 72 hours.     Coags:   Lab Results   Component Value Date/Time    APTT 78.3 02/01/2021 04:43 AM       HCG (If Applicable): No results found for: PREGTESTUR, PREGSERUM, HCG, HCGQUANT     ABGs: No results found for: PHART, PO2ART, NGG2BHJ, DOT1BTU, BEART, V3KWNCPI     Type & Screen (If Applicable):  No results found for: LABABO, LABRH    Drug/Infectious Status (If Applicable):  No results found for: HIV, HEPCAB    COVID-19 Screening (If Applicable): No results found for: COVID19        Anesthesia Evaluation    Airway: Mallampati: II  TM distance: >3 FB   Neck ROM: full  Mouth opening: > = 3 FB   Dental:          Pulmonary:                              Cardiovascular:    (+) hypertension:, CAD:, dysrhythmias: atrial fibrillation,         Rhythm: regular  Rate: normal                    Neuro/Psych:   (+) neuromuscular disease:,             GI/Hepatic/Renal:             Endo/Other:                     Abdominal:             Vascular: Other Findings: myelopathic          Anesthesia Plan      general     ASA 2       Induction: intravenous. Anesthetic plan and risks discussed with patient. Plan discussed with CRNA.                     Marivel Stokes MD   9/9/2022

## 2022-09-09 NOTE — PROGRESS NOTES
PACU Transfer Note    Vitals:    09/09/22 1938   BP:    Pulse: 71   Resp: 21   Temp: 97.6 °F (36.4 °C)   SpO2: 99%       In: 255 [P.O.:120;  I.V.:135]  Out: 0     Pain assessment:  receiving treatment      Report given to Receiving unit RN.    9/9/2022 7:39 PM

## 2022-09-09 NOTE — ANESTHESIA PRE PROCEDURE
Department of Anesthesiology  Preprocedure Note       Name:  Kim Sheehan   Age:  74 y.o.  :  1955                                          MRN:  [id number]         Date:  2022      Surgeon: * No surgeons listed *    Procedure: * No procedures listed *    Medications prior to admission:   Prior to Admission medications    Medication Sig Start Date End Date Taking? Authorizing Provider   ibuprofen (IBU) 400 MG tablet Take 1 tablet by mouth every 6 hours as needed for Pain 22  AR [place]   lidocaine (LIDODERM) 5 % Place 1 patch onto the skin daily 12 hours on, 12 hours off. Patient not taking: Reported on 2022   DOUGLAS Ramon CNP   meloxicam (MOBIC) 7.5 MG tablet Take 1 tablet by mouth daily 22   DOUGLAS Ramon CNP   REFRESH OPTIVE 1-0.9 % GEL  22   Historical Provider, MD   diclofenac sodium (VOLTAREN) 1 % GEL  22   Historical Provider, MD   INCKENYATTA ELLIPTA 62.5 MCG/INH AEPB  3/16/22   Historical Provider, MD   aspirin 325 MG tablet Take 325 mg by mouth daily    Historical Provider, MD   tamsulosin (FLOMAX) 0.4 MG capsule Take 0.2 mg by mouth daily    Historical Provider, MD   lisinopril (PRINIVIL;ZESTRIL) 20 MG tablet Take 2 tablets by mouth daily  Patient taking differently: Take 40 mg by mouth nightly 2/3/21   Vaibhav Garcia MD   metoprolol succinate (TOPROL XL) 50 MG extended release tablet Take 50 mg by mouth daily    Historical Provider, MD   atorvastatin (LIPITOR) 40 MG tablet Take 1 tablet by mouth daily. 14   DOUGLAS Flowers CNP       Current medications:    No current facility-administered medications for this visit. No current outpatient medications on file.      Facility-Administered Medications Ordered in Other Visits   Medication Dose Route Frequency Provider Last Rate Last Admin    [Held by provider] aspirin tablet 325 mg  325 mg Oral Daily Tena Hannah DO        atorvastatin (LIPITOR) tablet 40 mg  40 mg Oral Daily Kathy Cooney, DO        [START ON 9/10/2022] lisinopril (PRINIVIL;ZESTRIL) tablet 40 mg  40 mg Oral Daily Kathy Cooney, DO        [START ON 9/10/2022] meloxicam (MOBIC) tablet 7.5 mg  7.5 mg Oral Daily Kathy Cooney, DO        [START ON 9/10/2022] metoprolol succinate (TOPROL XL) extended release tablet 50 mg  50 mg Oral Daily Kathy Cooney, DO        sodium chloride flush 0.9 % injection 5-40 mL  5-40 mL IntraVENous 2 times per day Kathy Cooney, DO        sodium chloride flush 0.9 % injection 5-40 mL  5-40 mL IntraVENous PRN Kathy Cooney, DO        0.9 % sodium chloride infusion   IntraVENous PRN Kathy Cooney, DO        ondansetron (ZOFRAN-ODT) disintegrating tablet 4 mg  4 mg Oral Q8H PRN Kathy Cooney, DO        Or    ondansetron (ZOFRAN) injection 4 mg  4 mg IntraVENous Q6H PRN Kathy Cooney, DO        polyethylene glycol (GLYCOLAX) packet 17 g  17 g Oral Daily PRN Kathy Cooney, DO        acetaminophen (TYLENOL) tablet 650 mg  650 mg Oral Q6H PRN Kathy Cooney, DO        Or    acetaminophen (TYLENOL) suppository 650 mg  650 mg Rectal Q6H PRN Kathy Cooney, DO        HYDROmorphone (DILAUDID) injection 0.25 mg  0.25 mg IntraVENous Q4H PRN Kathy Cooney, DO        Or    HYDROmorphone (DILAUDID) injection 0.5 mg  0.5 mg IntraVENous Q3H PRN Kathy Cooney, DO        LORazepam (ATIVAN) tablet 1 mg  1 mg Oral Once PRN Kathy Cooney, DO        [Held by provider] enoxaparin Sodium (LOVENOX) injection 30 mg  30 mg SubCUTAneous BID Sary Kate DO        methocarbamol (ROBAXIN) 1,000 mg in dextrose 5 % 100 mL IVPB  1,000 mg IntraVENous Q8H DOUGLAS Gomez -  mL/hr at 09/09/22 1848 1,000 mg at 09/09/22 1848    Followed by   Sera Haskins [START ON 9/10/2022] methocarbamol (ROBAXIN) tablet 750 mg  750 mg Oral 4x Daily Sha Orantes, APRN - CNP        diazePAM (VALIUM) tablet 5 mg  5 mg Oral Q6H PRN Vignesha Harmeet Yangpard, APRN - CNP           Allergies:  No Known Allergies    Problem List:    Patient Active Problem List   Diagnosis Code    CAD (coronary artery disease) I25.10    Hyperlipidemia E78.5    Tobacco use Z72.0    Paroxysmal atrial fibrillation (HCC) I48.0    Tobacco abuse Z72.0    Typical atrial flutter I48.3    Coronary artery disease involving native coronary artery of native heart without angina pectoris I25.10    Morbid obesity due to excess calories (HCC) E66.01    Essential hypertension I10    Bilateral carpal tunnel syndrome G56.03    Chest pain R07.9    Anterolisthesis of cervical spine M43.12    Hyponatremia E87.1    Cord compression (HCC) G95.20       Past Medical History:        Diagnosis Date    Arthritis     Atrial fibrillation ([facility] [address]     CAD (coronary artery disease)     Hyperlipidemia     Hypertension        Past Surgical History:        Procedure Laterality Date    BACK SURGERY  200    lower lumbar surgery    CORONARY ANGIOPLASTY WITH STENT PLACEMENT  2012       Social History:    Social History     Tobacco Use    Smoking status: Every Day     Packs/day: 1.00     Years: 40.00     Pack years: 40.00     Types: Cigarettes    Smokeless tobacco: Never   Substance Use Topics    Alcohol use: Yes     Alcohol/week: 20.0 standard drinks     Types: 20 Cans of beer per week     Comment: occasionally                                Ready to quit: Not Answered  Counseling given: Not Answered      Vital Signs (Current): There were no vitals filed for this visit.                                            BP Readings from Last 3 Encounters:   09/09/22 (!) 157/91   09/09/22 (!) 170/94   09/07/22 (!) 158/94       NPO Status:                                                                                 BMI:   Wt Readings from Last 3 Encounters:   09/08/22 250 lb (113.4 kg)   09/08/22 250 lb (113.4 kg)   09/07/22 256 lb (116.1 kg)     There is no height or weight on file to calculate BMI.    CBC:   Lab Results   Component Value Date/Time    WBC 4.2 09/09/2022 04:04 PM    RBC 4.58 09/09/2022 04:04 PM    HGB 14.9 09/09/2022 04:04 PM    HCT 44.7 09/09/2022 04:04 PM    MCV 97.6 09/09/2022 04:04 PM    RDW 13.4 09/09/2022 04:04 PM     09/09/2022 04:04 PM       CMP:   Lab Results   Component Value Date/Time     09/09/2022 04:04 PM    K 4.8 09/09/2022 04:04 PM    CL 98 09/09/2022 04:04 PM    CO2 24 09/09/2022 04:04 PM    BUN 18 09/09/2022 04:04 PM    CREATININE 0.9 09/09/2022 04:04 PM    GFRAA >60 09/09/2022 04:04 PM    GFRAA >60 02/19/2013 10:39 AM    AGRATIO 1.4 09/08/2022 02:45 PM    LABGLOM >60 09/09/2022 04:04 PM    GLUCOSE 83 09/09/2022 04:04 PM    PROT 7.6 09/08/2022 02:45 PM    PROT 7.2 02/19/2013 10:39 AM    CALCIUM 9.4 09/09/2022 04:04 PM    BILITOT 0.7 09/08/2022 02:45 PM    ALKPHOS 95 09/08/2022 02:45 PM    AST 22 09/08/2022 02:45 PM    ALT 27 09/08/2022 02:45 PM       POC Tests: No results for input(s): POCGLU, POCNA, POCK, POCCL, POCBUN, POCHEMO, POCHCT in the last 72 hours.     Coags:   Lab Results   Component Value Date/Time    APTT 78.3 02/01/2021 04:43 AM       HCG (If Applicable): No results found for: PREGTESTUR, PREGSERUM, HCG, HCGQUANT     ABGs: No results found for: PHART, PO2ART, RIO6YJO, HOK7FJP, BEART, V6BLALZS     Type & Screen (If Applicable):  No results found for: LABABO, LABRH    Drug/Infectious Status (If Applicable):  No results found for: HIV, HEPCAB    COVID-19 Screening (If Applicable): No results found for: COVID19        Anesthesia Evaluation    Airway: Mallampati: II  TM distance: >3 FB   Neck ROM: full  Mouth opening: > = 3 FB   Dental:          Pulmonary:                              Cardiovascular:    (+) hypertension:, CAD:, dysrhythmias: atrial fibrillation,         Rhythm: regular  Rate: normal                    Neuro/Psych:   (+) neuromuscular disease:,             GI/Hepatic/Renal:             Endo/Other:                     Abdominal:             Vascular: Other Findings: myelopathic            Anesthesia Plan      general     ASA 2       Induction: intravenous. Anesthetic plan and risks discussed with patient. Plan discussed with CRNA.                     Jerry Hernández MD   9/9/2022

## 2022-09-09 NOTE — CONSULTS
Office : [phone]     Fax :[phone]       Nephrology Consult Note      Patient's Name: Renetta Carr  10:53 AM  9/9/2022    Reason for Consult:  hyponatremia       Requesting Physician: [id number]      Chief Complaint:    Chief Complaint   Patient presents with    Back Pain     Pt arrives via ems w c/o low back pain, worsening LUE numbness, BLE pain for multiple weeks. Pt states he has been seen here multiple times for the same but symptoms are not relieved. VSS. Scheduled for appt to schedule for back surgery next week. VSS. History of Present Ilness:    Renetta Carr is a 74 y.o. male with prior history of a. Fib, HTN , BPH presented to the ED for difficulty functioning and ADL or even walking. Patient reported that the back pain started around 1 week ago progressively worsening however today has gone worse and that he is having upper extremity weakness and pain that is radicular going from the low back to bilateral legs. Patient denied having any incontinence urinary or rectal, no perineal numbness. Patient however is reporting that weakness is now causing him to not been able to even walk or carry out ADLs. Patient reports that the back pain is very severe specially in the low back and the low neck. In the ED, patient was noted acute on chronic back pain, spine was consulted recommended MRI and admission to [facility] with PT OT. Patient then underwent MRI and was not able to proceed due to severe back pain especially when laying flat. MRI was able to be obtained showing a C2-C3 cord compression with edema. Initial blood work showed sodium level of 126. Drinks 3-4 cans of beer daily     I/O last 3 completed shifts:   In: 240 PORTABLE   Final Result   No sign of focal pneumonia or CHF. MRI CERVICAL SPINE WO CONTRAST   Final Result   C2-C3, anterolisthesis, severe spinal canal stenosis, cord compression, and   focal cord edema. Incomplete MRI. CT LUMBAR SPINE WO CONTRAST   Final Result   Degenerative changes without acute abnormality. CT THORACIC SPINE WO CONTRAST   Final Result   1. CT CERVICAL SPINE: No acute fracture. 2. New, possible acute posttraumatic grade 2 anterolisthesis C2 on C3 is 7   mm. Bilateral C2-3 facet joints appear slipped/perched. Clinician states   there is no history of acute trauma. 3. Grade 1 anterolisthesis C4 on C5 is 4 mm, stable and likely on a   degenerative basis. 4. Diffuse mild-to-moderate degenerative changes. Presumably on a   degenerative basis. 5. Moderate to severe acquired spinal canal stenosis C2-3 (5 mm AP dimension   sagittal image 51). MRI cervical spine may be indicated for additional   characterization and to evaluate cord signal characteristics. 6. Nonspecific right greater than left mastoid effusion, nearly always   benign, reactive, however mastoiditis cannot be excluded based on CT   appearance. Correlate with history, presentation and physical findings. 7. Calcifications involving bilateral carotid vasculature reflect calcific   atherosclerosis. 8. CT THORACIC SPINE: No acute thoracic fracture. 9. Diffuse mild-to-moderate degenerative changes. Critical results were called by Dr. Kevin Anderson to [facility] on   9/8/2022 at 14:27. CT CERVICAL SPINE WO CONTRAST   Final Result   1. CT CERVICAL SPINE: No acute fracture. 2. New, possible acute posttraumatic grade 2 anterolisthesis C2 on C3 is 7   mm. Bilateral C2-3 facet joints appear slipped/perched. Clinician states   there is no history of acute trauma. 3. Grade 1 anterolisthesis C4 on C5 is 4 mm, stable and likely on a   degenerative basis.    4. Diffuse mild-to-moderate degenerative changes. Presumably on a   degenerative basis. 5. Moderate to severe acquired spinal canal stenosis C2-3 (5 mm AP dimension   sagittal image 51). MRI cervical spine may be indicated for additional   characterization and to evaluate cord signal characteristics. 6. Nonspecific right greater than left mastoid effusion, nearly always   benign, reactive, however mastoiditis cannot be excluded based on CT   appearance. Correlate with history, presentation and physical findings. 7. Calcifications involving bilateral carotid vasculature reflect calcific   atherosclerosis. 8. CT THORACIC SPINE: No acute thoracic fracture. 9. Diffuse mild-to-moderate degenerative changes. Critical results were called by Dr. Lisa Murcia to [facility] on   9/8/2022 at 14:27. MRI THORACIC SPINE WO CONTRAST    (Results Pending)   MRI LUMBAR SPINE WO CONTRAST    (Results Pending)   MRI CERVICAL SPINE WO CONTRAST    (Results Pending)           Assessment/Plan :      1. Hyponatremia. Likely 2/2 fluid intake . Drinks excess beer  Limit daily fluid intake to 1500 ml   Stop IV fluids   Urine sodium and urine osmolality. 2. HTN. Stop fluids   Pain control     3. Acute cervical cord compression with edema:  10 mg IV Decadron given  Neurosurgery consulted.   Being Transferred to [facility]       Monitor sodium levels         D/w primary team      Thank you for allowing us to participate in care of Guy Curtis         Electronically signed by: Bao Andersen MD, 9/9/2022, 10:53 AM      [facility] [address]  Office : [phone]  Fax :[phone]

## 2022-09-09 NOTE — PLAN OF CARE
Problem: Discharge Planning  Goal: Discharge to home or other facility with appropriate resources  Outcome: Progressing  Flowsheets  Taken 9/9/2022 1516  Discharge to home or other facility with appropriate resources:   Identify barriers to discharge with patient and caregiver   Identify discharge learning needs (meds, wound care, etc)  Taken 9/9/2022 1444  Discharge to home or other facility with appropriate resources:   Identify barriers to discharge with patient and caregiver   Identify discharge learning needs (meds, wound care, etc)  Taken 9/9/2022 1407  Discharge to home or other facility with appropriate resources:   Identify barriers to discharge with patient and caregiver   Arrange for needed discharge resources and transportation as appropriate     Problem: Pain  Goal: Verbalizes/displays adequate comfort level or baseline comfort level  Outcome: Progressing  Flowsheets  Taken 9/9/2022 1516  Verbalizes/displays adequate comfort level or baseline comfort level:   Encourage patient to monitor pain and request assistance   Administer analgesics based on type and severity of pain and evaluate response  Taken 9/9/2022 1431  Verbalizes/displays adequate comfort level or baseline comfort level:   Encourage patient to monitor pain and request assistance   Administer analgesics based on type and severity of pain and evaluate response

## 2022-09-09 NOTE — PROGRESS NOTES
Pharmacist Review and Automatic Dose Adjustment of Prophylactic Enoxaparin    The reviewing pharmacist has made an adjustment to the ordered enoxaparin dose or converted to UFH per the approved [facility] protocol and table as identified below. Luisa Georges is a 74 y.o. male. Recent Labs     09/08/22  1445 09/09/22  0346   CREATININE 0.8 0.8       Estimated Creatinine Clearance: 123 mL/min (based on SCr of 0.8 mg/dL). Recent Labs     09/08/22  1445 09/09/22  0346   HGB 14.5 14.9   HCT 43.8 44.4    225     No results for input(s): INR in the last 72 hours. Height:   Ht Readings from Last 1 Encounters:   09/08/22 6' 2\" (1.88 m)     Weight:  Wt Readings from Last 1 Encounters:   09/08/22 250 lb (113.4 kg)               Plan: Based upon the patient's weight and renal function, the ordered enoxaparin dose of 40 mg daily has been changed/converted to 30 mg BID.       Thank you,  Kwabena Cordova [facility]  9/9/2022, 3:17 PM

## 2022-09-09 NOTE — PROGRESS NOTES
Call received from [place] [id number] transportation, states pt is above weight capacity for current 0415 transportation, therefore pt's pickup time is pushed back to 12:45 PM to accommodate patient's weight.

## 2022-09-09 NOTE — PLAN OF CARE
Neurosurgery Plan of Care:  Per ED Physician note:  Patient was seen at [facility] ED c/o having difficulty functioning or performing any ADL's or even walking 2/2 back pain and pain radiating into the arms and legs. He says the sx have progressed for weeks. No fevers chest pain or abd pains. No falls or injuries. He says he has been unable to establish spine specialist care until next week. He does not currently have anything surgical scheduled. He had an EMG last week already. Seen on 8/22 given prednisone, robaxin, meloxicam, and lidoderm patches. Finished them all except lidoderm patches were not affordable. Seen here again yesterday for same sx and given a toradol injection without relief and prescription of ibuprofen. Lives alone. States he cannot walk because of the pain but not having leg weakness. No loss of b/b function or urinary retention. No dysuria or hematuria. Alert and oriented. CN's 2-12 intact. No gross facial drooping. Strength 5/5, sensation intact. 2 plus DTR's in knees bilaterally. Gait unable to do because of pain. Patient had incomplete MRI Cervical at [facility] that read C2-C3, anterolisthesis, severe spinal canal stenosis, cord compression, and focal cord edema. Incomplete MRI. Dr. Myranda Stein was consulted overnight and recommended patient get full spine MRI under [facility]. Patient was transferred to [facility] from [facility] to get MRI of entire spine under [facility]. Patient was given cervical collar that should remain on at all times to limit the possibility of further injury to spinal cord. Please contact Neurosurgeon on call after MRI is complete to review and provide further recs.      Electronically signed by DOUGLAS Hylton CNP on 9/9/2022 at 4:46 PM

## 2022-09-09 NOTE — PLAN OF CARE
Report called to [address] at [facility] Report given with all questions answered and no further questions.

## 2022-09-09 NOTE — CARE COORDINATION
Per Chart Review:Patient Transferring to [facility] for higher level of care and for neurosurgery evaluation. SW confirmed with patient's RN. No other case management needs at this time.     Electronically signed by ELO Boyd on 9/9/2022 at 9:03 AM

## 2022-09-09 NOTE — H&P
Hospital Medicine History & Physical      PCP: Kyaw Chen    Date of Admission: 9/8/2022    Date of Service: Pt seen/examined on 9/8/2022    Pt seen/examined face to face on and admitted as inpatient with expected LOS greater than two midnights due to medical therapy      Chief Complaint:    Chief Complaint   Patient presents with    Back Pain     Pt arrives via ems w c/o low back pain, worsening LUE numbness, BLE pain for multiple weeks. Pt states he has been seen here multiple times for the same but symptoms are not relieved. VSS. Scheduled for appt to schedule for back surgery next week. VSS. History Of Present Illness:      74 y.o. male who presented to [facility] with past medical history of arthritis, CAD, hypertension, hyperlipidemia, atrial fibrillation, BPH presented to the ED for difficulty functioning and ADL or even walking. Patient reported that the back pain started around 1 week ago progressively worsening however today has gone worse and that he is having upper extremity weakness and pain that is radicular going from the low back to bilateral legs. Patient denied having any incontinence urinary or rectal, no perineal numbness. Patient however is reporting that weakness is now causing him to not been able to even walk or carry out ADLs. Patient reports that the back pain is very severe specially in the low back and the low neck. Exacerbated with extending, alleviated with rest.  No associate with fever chills nausea vomiting chest pain abdominal pain or dysuria. Patient denied having any trauma or falls at home. Patient reports that he follows up with spine and did not have any surgery scheduled. Patient was given prednisone and Robaxin Lidoderm patches on 08/22 and was taking them except for the patch due to being expensive with minimal improvement. Patient lives alone and functions independently prior to this.       In the ED, patient was noted acute on chronic back pain, spine was consulted recommended MRI and admission to [facility] with PT OT. Patient then underwent MRI and was not able to proceed due to severe back pain especially when laying flat. MRI was able to be obtained showing a C2-C3 cord compression with edema. Past Medical History:          Diagnosis Date    Arthritis     Atrial fibrillation ([address]     CAD (coronary artery disease)     Hyperlipidemia     Hypertension        Past Surgical History:          Procedure Laterality Date    BACK SURGERY  200    lower lumbar surgery    CORONARY ANGIOPLASTY WITH STENT PLACEMENT  2012       Medications Prior to Admission:      Prior to Admission medications    Medication Sig Start Date End Date Taking? Authorizing Provider   ibuprofen (IBU) 400 MG tablet Take 1 tablet by mouth every 6 hours as needed for Pain 9/7/22 10/7/22  AR Hernandez   lidocaine (LIDODERM) 5 % Place 1 patch onto the skin daily 12 hours on, 12 hours off. 8/22/22   DOUGLAS Ramon CNP   meloxicam (MOBIC) 7.5 MG tablet Take 1 tablet by mouth daily 8/22/22   DOUGLAS Ramon CNP   REFRESH OPTIVE 1-0.9 % GEL  4/6/22   Historical Provider, MD   diclofenac sodium (VOLTAREN) 1 % GEL  1/14/22   Historical Provider, MD   INCRU ELLIPTA 62.5 MCG/INH AEPB  3/16/22   Historical Provider, MD   aspirin 325 MG tablet Take 325 mg by mouth daily    Historical Provider, MD   tamsulosin (FLOMAX) 0.4 MG capsule Take 0.2 mg by mouth daily    Historical Provider, MD   lisinopril (PRINIVIL;ZESTRIL) 20 MG tablet Take 2 tablets by mouth daily  Patient taking differently: Take 40 mg by mouth nightly 2/3/21   Shira Rizo MD   metoprolol succinate (TOPROL XL) 50 MG extended release tablet Take 50 mg by mouth daily    Historical Provider, MD   atorvastatin (LIPITOR) 40 MG tablet Take 1 tablet by mouth daily. 2/20/14   DOUGLAS Siegel CNP       Allergies:  Patient has no known allergies.     Social History: TOBACCO:   reports that he has been smoking cigarettes. He has a 40.00 pack-year smoking history. He has never used smokeless tobacco.  ETOH:   reports current alcohol use of about 20.0 standard drinks per week. E-cigarette/Vaping       Questions Responses    E-cigarette/Vaping Use Never User    Start Date     Passive Exposure Yes    Quit Date     Counseling Given     Comments               Family History:      Family History reviewed with patient, and positive for heart disease        Problem Relation Age of Onset    Heart Disease Mother     Diabetes Mother     Heart Disease Father     Diabetes Father     Diabetes Sister     Diabetes Brother     Diabetes Maternal Grandmother     Diabetes Maternal Grandfather     Diabetes Paternal Grandmother     Diabetes Paternal Grandfather        REVIEW OF SYSTEMS:     Constitutional:  No Fever, No Chills, No Night Sweats  ENT/Mouth:  No Nasal Congestion,  No Hoarseness, No new mouth lesion  Eyes:  No Eye Pain, No Redness, No Discharge  Cardiovascular:  No Chest Pain, No Orthopnea, No Palpitations  Respiratory:  No Cough, No Sputum, No Dyspnea  Gastrointestinal: No Vomiting, No Diarrhea, No abdominal pain  Genitourinary: No Urinary Frequency, No Hematuria, No Urinary pain  Musculoskeletal: + Worsening Arthralgias, No worsening Myalgias,  Skin:  No new Skin Lesions, No new skin rash  Neuro: + Weakness, + numbness. Psych:  No suicial ideation, No Violence ideation    PHYSICAL EXAM PERFORMED:    /88   Pulse 79   Temp 97.7 °F (36.5 °C) (Oral)   Resp 20   Ht 6' 2\" (1.88 m)   Wt 250 lb (113.4 kg)   SpO2 98%   BMI 32.10 kg/m²     General appearance:  mild acute distress, appears older than stated age  HEENT:   atraumatic, sclera anicteric, Conjunctivae clear. Neck: Supple,Trachea midline, no goiter  Respiratory:minimal accessory muscle usage, Normal respiratory effort.  Clear to auscultation, bilaterally without wheezing  Cardiovascular:  Regular rate and rhythm, Clinician states   there is no history of acute trauma. 3. Grade 1 anterolisthesis C4 on C5 is 4 mm, stable and likely on a   degenerative basis. 4. Diffuse mild-to-moderate degenerative changes. Presumably on a   degenerative basis. 5. Moderate to severe acquired spinal canal stenosis C2-3 (5 mm AP dimension   sagittal image 51). MRI cervical spine may be indicated for additional   characterization and to evaluate cord signal characteristics. 6. Nonspecific right greater than left mastoid effusion, nearly always   benign, reactive, however mastoiditis cannot be excluded based on CT   appearance. Correlate with history, presentation and physical findings. 7. Calcifications involving bilateral carotid vasculature reflect calcific   atherosclerosis. 8. CT THORACIC SPINE: No acute thoracic fracture. 9. Diffuse mild-to-moderate degenerative changes. Critical results were called by Dr. Felisa Chandra to [facility] on   9/8/2022 at 14:27. CT CERVICAL SPINE WO CONTRAST   Final Result   1. CT CERVICAL SPINE: No acute fracture. 2. New, possible acute posttraumatic grade 2 anterolisthesis C2 on C3 is 7   mm. Bilateral C2-3 facet joints appear slipped/perched. Clinician states   there is no history of acute trauma. 3. Grade 1 anterolisthesis C4 on C5 is 4 mm, stable and likely on a   degenerative basis. 4. Diffuse mild-to-moderate degenerative changes. Presumably on a   degenerative basis. 5. Moderate to severe acquired spinal canal stenosis C2-3 (5 mm AP dimension   sagittal image 51). MRI cervical spine may be indicated for additional   characterization and to evaluate cord signal characteristics. 6. Nonspecific right greater than left mastoid effusion, nearly always   benign, reactive, however mastoiditis cannot be excluded based on CT   appearance. Correlate with history, presentation and physical findings.    7. Calcifications involving bilateral carotid vasculature reflect calcific   atherosclerosis. 8. CT THORACIC SPINE: No acute thoracic fracture. 9. Diffuse mild-to-moderate degenerative changes. Critical results were called by Dr. Felisa Chandra to [facility] on   9/8/2022 at 14:27. MRI THORACIC SPINE WO CONTRAST    (Results Pending)   MRI LUMBAR SPINE WO CONTRAST    (Results Pending)   MRI CERVICAL SPINE WO CONTRAST    (Results Pending)       ASSESSMENT AND PLAN:    Active Hospital Problems    Diagnosis Date Noted    Anterolisthesis of cervical spine [M43.12] 09/08/2022     Priority: Medium     Acute cervical cord compression with edema:  10 mg IV Decadron given  Dilaudid, Robaxin  Neurochecks every 4 hours  Neurosurgery consulted, much appreciated, agreed to transfer patient to [facility] I spoke with hospitalist and agreed with transfer. Acute hyponatremia:  Serum OSM, neuro lites pending  Patient denied diarrhea, reported decreased urinary stream but expected with BPH, not on diuretics  Patient was given IVF  Repeat BMP showed minimal change to 127 from 126    Paroxysmal Atrial fibrilliation status post ablation: unspecified and clinically unable to determine etiology. Controlled on home medication. Not anticoagulated and Monitored on tele  Essential Hypertension: Continue home medication  Hyperlipidemia: Controlled on home Statin. Outpatient PCP follow up post-discharge  Class I obesity: Complicating assessment and treatment. Placing patient at risk for multiple co-morbidities as well as early death and contributing to the patient's presentation.  Education, and counseling    Diet: NPO except meds ordered    DVT Prophylaxis: Held    Dispo: Transfer to [facility] for higher level of care and for neurosurgery evaluation    Expected LOS greater than two [address] [place] [address]

## 2022-09-09 NOTE — PROGRESS NOTES
Patient wants collar off. States it is just on for the MRI. 4S nurse called - reports to keep on at all times unless eating or bathing. Dr Kang Merrill on call person called.

## 2022-09-10 ENCOUNTER — APPOINTMENT (OUTPATIENT)
Dept: GENERAL RADIOLOGY | Age: 67
DRG: 459 | End: 2022-09-10
Attending: INTERNAL MEDICINE
Payer: MEDICARE

## 2022-09-10 ENCOUNTER — APPOINTMENT (OUTPATIENT)
Dept: CT IMAGING | Age: 67
DRG: 459 | End: 2022-09-10
Attending: INTERNAL MEDICINE
Payer: MEDICARE

## 2022-09-10 LAB
ANION GAP SERPL CALCULATED.3IONS-SCNC: 13 MMOL/L (ref 3–16)
APTT: 26.1 SEC (ref 23–34.3)
BASOPHILS ABSOLUTE: 0 K/UL (ref 0–0.2)
BASOPHILS RELATIVE PERCENT: 0.2 %
BUN BLDV-MCNC: 16 MG/DL (ref 7–20)
CALCIUM SERPL-MCNC: 9 MG/DL (ref 8.3–10.6)
CHLORIDE BLD-SCNC: 101 MMOL/L (ref 99–110)
CO2: 20 MMOL/L (ref 21–32)
CREAT SERPL-MCNC: 0.8 MG/DL (ref 0.8–1.3)
EOSINOPHILS ABSOLUTE: 0 K/UL (ref 0–0.6)
EOSINOPHILS RELATIVE PERCENT: 0 %
GFR AFRICAN AMERICAN: >60
GFR NON-AFRICAN AMERICAN: >60
GLUCOSE BLD-MCNC: 134 MG/DL (ref 70–99)
HCT VFR BLD CALC: 40.9 % (ref 40.5–52.5)
HEMOGLOBIN: 13.7 G/DL (ref 13.5–17.5)
INR BLD: 1.02 (ref 0.87–1.14)
LYMPHOCYTES ABSOLUTE: 0.3 K/UL (ref 1–5.1)
LYMPHOCYTES RELATIVE PERCENT: 7.6 %
MCH RBC QN AUTO: 32.6 PG (ref 26–34)
MCHC RBC AUTO-ENTMCNC: 33.6 G/DL (ref 31–36)
MCV RBC AUTO: 97.2 FL (ref 80–100)
MONOCYTES ABSOLUTE: 0.1 K/UL (ref 0–1.3)
MONOCYTES RELATIVE PERCENT: 2.1 %
NEUTROPHILS ABSOLUTE: 3.5 K/UL (ref 1.7–7.7)
NEUTROPHILS RELATIVE PERCENT: 90.1 %
PDW BLD-RTO: 13.6 % (ref 12.4–15.4)
PLATELET # BLD: 211 K/UL (ref 135–450)
PMV BLD AUTO: 7.6 FL (ref 5–10.5)
POTASSIUM REFLEX MAGNESIUM: 4.6 MMOL/L (ref 3.5–5.1)
PROTHROMBIN TIME: 13.3 SEC (ref 11.7–14.5)
RBC # BLD: 4.2 M/UL (ref 4.2–5.9)
SODIUM BLD-SCNC: 134 MMOL/L (ref 136–145)
WBC # BLD: 3.9 K/UL (ref 4–11)

## 2022-09-10 PROCEDURE — 6370000000 HC RX 637 (ALT 250 FOR IP): Performed by: NURSE PRACTITIONER

## 2022-09-10 PROCEDURE — 85610 PROTHROMBIN TIME: CPT

## 2022-09-10 PROCEDURE — 85730 THROMBOPLASTIN TIME PARTIAL: CPT

## 2022-09-10 PROCEDURE — 2580000003 HC RX 258

## 2022-09-10 PROCEDURE — 72125 CT NECK SPINE W/O DYE: CPT

## 2022-09-10 PROCEDURE — 2580000003 HC RX 258: Performed by: FAMILY MEDICINE

## 2022-09-10 PROCEDURE — 2500000003 HC RX 250 WO HCPCS: Performed by: FAMILY MEDICINE

## 2022-09-10 PROCEDURE — 80048 BASIC METABOLIC PNL TOTAL CA: CPT

## 2022-09-10 PROCEDURE — 72040 X-RAY EXAM NECK SPINE 2-3 VW: CPT

## 2022-09-10 PROCEDURE — 6360000002 HC RX W HCPCS: Performed by: INTERNAL MEDICINE

## 2022-09-10 PROCEDURE — 85025 COMPLETE CBC W/AUTO DIFF WBC: CPT

## 2022-09-10 PROCEDURE — 99233 SBSQ HOSP IP/OBS HIGH 50: CPT | Performed by: INTERNAL MEDICINE

## 2022-09-10 PROCEDURE — 2580000003 HC RX 258: Performed by: NURSE PRACTITIONER

## 2022-09-10 PROCEDURE — 6360000002 HC RX W HCPCS

## 2022-09-10 PROCEDURE — 6360000002 HC RX W HCPCS: Performed by: NURSE PRACTITIONER

## 2022-09-10 PROCEDURE — 36415 COLL VENOUS BLD VENIPUNCTURE: CPT

## 2022-09-10 PROCEDURE — 6360000002 HC RX W HCPCS: Performed by: STUDENT IN AN ORGANIZED HEALTH CARE EDUCATION/TRAINING PROGRAM

## 2022-09-10 PROCEDURE — 6370000000 HC RX 637 (ALT 250 FOR IP): Performed by: STUDENT IN AN ORGANIZED HEALTH CARE EDUCATION/TRAINING PROGRAM

## 2022-09-10 PROCEDURE — 2060000000 HC ICU INTERMEDIATE R&B

## 2022-09-10 PROCEDURE — 2580000003 HC RX 258: Performed by: STUDENT IN AN ORGANIZED HEALTH CARE EDUCATION/TRAINING PROGRAM

## 2022-09-10 RX ORDER — SODIUM CHLORIDE 0.9 % (FLUSH) 0.9 %
5-40 SYRINGE (ML) INJECTION EVERY 12 HOURS SCHEDULED
Status: DISCONTINUED | OUTPATIENT
Start: 2022-09-10 | End: 2022-09-15 | Stop reason: SDUPTHER

## 2022-09-10 RX ORDER — SODIUM CHLORIDE 9 MG/ML
INJECTION, SOLUTION INTRAVENOUS PRN
Status: DISCONTINUED | OUTPATIENT
Start: 2022-09-10 | End: 2022-09-15 | Stop reason: SDUPTHER

## 2022-09-10 RX ORDER — SODIUM CHLORIDE 0.9 % (FLUSH) 0.9 %
5-40 SYRINGE (ML) INJECTION PRN
Status: DISCONTINUED | OUTPATIENT
Start: 2022-09-10 | End: 2022-09-15 | Stop reason: SDUPTHER

## 2022-09-10 RX ORDER — THIAMINE HYDROCHLORIDE 100 MG/ML
100 INJECTION, SOLUTION INTRAMUSCULAR; INTRAVENOUS DAILY
Status: DISCONTINUED | OUTPATIENT
Start: 2022-09-10 | End: 2022-09-13

## 2022-09-10 RX ADMIN — MELOXICAM 7.5 MG: 7.5 TABLET ORAL at 08:43

## 2022-09-10 RX ADMIN — HYDROMORPHONE HYDROCHLORIDE 0.5 MG: 1 INJECTION, SOLUTION INTRAMUSCULAR; INTRAVENOUS; SUBCUTANEOUS at 17:40

## 2022-09-10 RX ADMIN — SODIUM CHLORIDE, PRESERVATIVE FREE 10 ML: 5 INJECTION INTRAVENOUS at 20:07

## 2022-09-10 RX ADMIN — DEXAMETHASONE 8 MG: 4 TABLET ORAL at 20:12

## 2022-09-10 RX ADMIN — LISINOPRIL 40 MG: 40 TABLET ORAL at 08:02

## 2022-09-10 RX ADMIN — HYDROMORPHONE HYDROCHLORIDE 0.5 MG: 1 INJECTION, SOLUTION INTRAMUSCULAR; INTRAVENOUS; SUBCUTANEOUS at 08:33

## 2022-09-10 RX ADMIN — HYDROMORPHONE HYDROCHLORIDE 0.5 MG: 1 INJECTION, SOLUTION INTRAMUSCULAR; INTRAVENOUS; SUBCUTANEOUS at 20:13

## 2022-09-10 RX ADMIN — SODIUM CHLORIDE, PRESERVATIVE FREE 10 ML: 5 INJECTION INTRAVENOUS at 08:36

## 2022-09-10 RX ADMIN — THIAMINE HYDROCHLORIDE 100 MG: 100 INJECTION, SOLUTION INTRAMUSCULAR; INTRAVENOUS at 10:25

## 2022-09-10 RX ADMIN — METOPROLOL SUCCINATE 50 MG: 50 TABLET, FILM COATED, EXTENDED RELEASE ORAL at 08:02

## 2022-09-10 RX ADMIN — METHOCARBAMOL 750 MG: 750 TABLET ORAL at 20:12

## 2022-09-10 RX ADMIN — DEXAMETHASONE 8 MG: 4 TABLET ORAL at 08:02

## 2022-09-10 RX ADMIN — SODIUM CHLORIDE, PRESERVATIVE FREE 10 ML: 5 INJECTION INTRAVENOUS at 08:07

## 2022-09-10 RX ADMIN — LABETALOL HYDROCHLORIDE 10 MG: 5 INJECTION, SOLUTION INTRAVENOUS at 17:12

## 2022-09-10 RX ADMIN — DIAZEPAM 5 MG: 5 TABLET ORAL at 10:34

## 2022-09-10 RX ADMIN — METHOCARBAMOL 1000 MG: 100 INJECTION, SOLUTION INTRAMUSCULAR; INTRAVENOUS at 08:47

## 2022-09-10 RX ADMIN — LABETALOL HYDROCHLORIDE 10 MG: 5 INJECTION, SOLUTION INTRAVENOUS at 09:00

## 2022-09-10 RX ADMIN — ATORVASTATIN CALCIUM 40 MG: 40 TABLET, FILM COATED ORAL at 08:02

## 2022-09-10 RX ADMIN — SODIUM CHLORIDE, PRESERVATIVE FREE 10 ML: 5 INJECTION INTRAVENOUS at 10:26

## 2022-09-10 RX ADMIN — METHOCARBAMOL 1000 MG: 100 INJECTION, SOLUTION INTRAMUSCULAR; INTRAVENOUS at 01:50

## 2022-09-10 ASSESSMENT — PAIN DESCRIPTION - DESCRIPTORS
DESCRIPTORS: ACHING
DESCRIPTORS: ACHING;CRAMPING;THROBBING
DESCRIPTORS: SPASM

## 2022-09-10 ASSESSMENT — PAIN DESCRIPTION - LOCATION
LOCATION: KNEE;NECK
LOCATION: KNEE
LOCATION: NECK;KNEE
LOCATION: NECK
LOCATION: NECK

## 2022-09-10 ASSESSMENT — PAIN SCALES - GENERAL
PAINLEVEL_OUTOF10: 7
PAINLEVEL_OUTOF10: 10
PAINLEVEL_OUTOF10: 8
PAINLEVEL_OUTOF10: 7
PAINLEVEL_OUTOF10: 6
PAINLEVEL_OUTOF10: 0

## 2022-09-10 ASSESSMENT — PAIN DESCRIPTION - ORIENTATION
ORIENTATION: LEFT;MID
ORIENTATION: MID
ORIENTATION: POSTERIOR
ORIENTATION: LEFT

## 2022-09-10 NOTE — PROGRESS NOTES
Office : [phone]     Fax :[phone]       Nephrology  Note      Patient's Name: Bubba Ken  1:10 PM  9/10/2022    Reason for Consult:  hyponatremia       Requesting Physician: Patricia LOJA [facility]      09/10/22    Na better  No N/V/D         I/O last 3 completed shifts: In: 6760 [P.O.:320; I.V.:935]  Out: 80 [Urine:625]    Past Medical History:   Diagnosis Date    Arthritis     Atrial fibrillation (HCC)     CAD (coronary artery disease)     Hyperlipidemia     Hypertension        Past Surgical History:   Procedure Laterality Date    BACK SURGERY  200    lower lumbar surgery    CORONARY ANGIOPLASTY WITH STENT PLACEMENT  2012       Family History   Problem Relation Age of Onset    Heart Disease Mother     Diabetes Mother     Heart Disease Father     Diabetes Father     Diabetes Sister     Diabetes Brother     Diabetes Maternal Grandmother     Diabetes Maternal Grandfather     Diabetes Paternal Grandmother     Diabetes Paternal Grandfather         reports that he has been smoking cigarettes. He has a 40.00 pack-year smoking history. He has never used smokeless tobacco. He reports current alcohol use of about 20.0 standard drinks per week. He reports that he does not use drugs. Allergies:  Patient has no known allergies.     Current Medications:    thiamine (B-1) injection 100 mg, Daily  sodium chloride flush 0.9 % injection 5-40 mL, 2 times per day  sodium chloride flush 0.9 % injection 5-40 mL, PRN  0.9 % sodium chloride infusion, PRN  [Held by provider] aspirin tablet 325 mg, Daily  atorvastatin (LIPITOR) tablet 40 mg, Daily  lisinopril (PRINIVIL;ZESTRIL) tablet 40 mg, Daily  meloxicam (MOBIC) tablet 7.5 mg, Daily  metoprolol succinate (TOPROL XL) extended release tablet 50 mg, Daily  sodium chloride flush 0.9 % injection 5-40 mL, 2 times per day  sodium chloride flush 0.9 % injection 5-40 mL, PRN  0.9 % sodium chloride infusion, PRN  ondansetron (ZOFRAN-ODT) disintegrating tablet 4 mg, Q8H PRN   Or  ondansetron (ZOFRAN) injection 4 mg, Q6H PRN  polyethylene glycol (GLYCOLAX) packet 17 g, Daily PRN  acetaminophen (TYLENOL) tablet 650 mg, Q6H PRN   Or  acetaminophen (TYLENOL) suppository 650 mg, Q6H PRN  HYDROmorphone (DILAUDID) injection 0.25 mg, Q4H PRN   Or  HYDROmorphone (DILAUDID) injection 0.5 mg, Q3H PRN  [Held by provider] enoxaparin Sodium (LOVENOX) injection 30 mg, BID  methocarbamol (ROBAXIN) tablet 750 mg, 4x Daily  diazePAM (VALIUM) tablet 5 mg, Q6H PRN  sodium chloride flush 0.9 % injection 5-40 mL, 2 times per day  sodium chloride flush 0.9 % injection 5-40 mL, PRN  0.9 % sodium chloride infusion, PRN  meperidine (DEMEROL) injection 12.5 mg, Q5 Min PRN  fentaNYL (SUBLIMAZE) injection 25 mcg, Q5 Min PRN  HYDROmorphone (DILAUDID) injection 0.5 mg, Q5 Min PRN  labetalol (NORMODYNE;TRANDATE) injection 10 mg, Q15 Min PRN  dexamethasone (DECADRON) tablet 8 mg, 2 times per day          Physical exam:     Vitals:  BP (!) 151/80   Pulse 76   Temp 97.9 °F (36.6 °C)   Resp 18   Ht 6' 2\" (1.88 m)   Wt 250 lb (113.4 kg)   SpO2 97%   BMI 32.10 kg/m²   Constitutional:  OAA X3 NAD  Skin: no rash, turgor wnl  Heent:  eomi, mmm  Neck: no bruits or jvd noted  Cardiovascular:  S1, S2 without m/r/g  Respiratory: CTA B without w/r/r  Abdomen:  +bs, soft, nt, nd  Ext: no  lower extremity edema  Psychiatric: mood and affect appropriate  Musculoskeletal:  Rom, muscular strength intact    Labs:  CBC:   Recent Labs     09/09/22  0346 09/09/22  1604 09/10/22  0537   WBC 4.5 4.2 3.9*   HGB 14.9 14.9 13.7    237 211       BMP:    Recent Labs     09/09/22  0346 09/09/22  1604 09/10/22  0537   * 134* 134*   K 4.8 4.8 4.6   CL 99 98* 101   CO2 19* 24 20*   BUN 11 18 16 CREATININE 0.8 0.9 0.8   GLUCOSE 99 83 134*       Ca/Mg/Phos:   Recent Labs     09/09/22  0346 09/09/22  1604 09/10/22  0537   CALCIUM 9.3 9.4 9.0       Hepatic:   Recent Labs     09/08/22  1445   AST 22   ALT 27   BILITOT 0.7   ALKPHOS 95       Troponin:   Recent Labs     09/08/22  2131   Norris Quiroz <0.01       BNP: No results for input(s): BNP in the last 72 hours. Lipids: No results for input(s): CHOL, TRIG, HDL, LDLCALC, LABVLDL in the last 72 hours. ABGs: No results for input(s): PHART, PO2ART, ALZ5RVM in the last 72 hours. INR:   Recent Labs     09/10/22  0537   INR 1.02     UA:  Recent Labs     09/08/22  2330   COLORU Yellow   CLARITYU Clear   GLUCOSEU Negative   BILIRUBINUR Negative   KETUA Negative   SPECGRAV 1.015   BLOODU Negative   PHUR 6.0  6.0   PROTEINU TRACE*   UROBILINOGEN 1.0   NITRU Negative   LEUKOCYTESUR Negative   LABMICR YES   URINETYPE NotGiven        Urine Microscopic:   Recent Labs     09/08/22  2330   BACTERIA None Seen   HYALCAST 1   WBCUA 1   RBCUA 1   EPIU 1       Urine Culture: No results for input(s): LABURIN in the last 72 hours. Urine Chemistry:   Recent Labs     09/08/22  2330   LABCREA 162.2   NAUR 40                IMAGING:  XR CERVICAL SPINE (2-3 VIEWS)   Final Result      Grade 1 anterolisthesis of C2 on C3. Multilevel moderate to severe degenerative disc disease. MRI LUMBAR SPINE WO CONTRAST   Final Result   1. Multilevel severe central canal and foraminal stenosis L2-3 through L5-S1, secondary to multifactorial degenerative changes and posterior epidural lipomatosis   2. Redundancy in edematous nerve roots at the L1-2 level secondary to chronic lumbar central canal stenosis. 2. Extruded disc herniation L3-L4 with left lateral recess and subarticular migration      Compared prior study there is been progression of spinal stenosis secondary to chronic multifactorial degenerative changes posterior epidural fat.  No change in the degree of facet arthropathy and foraminal stenosis. MRI THORACIC SPINE WO CONTRAST   Final Result      1. Mild scoliosis and degenerative disc disease concordant with prior CT   2. No abnormal cord signal, central canal, compression or canal stenosis         MRI CERVICAL SPINE WO CONTRAST   Final Result      1. Cord myelopathy, cord edema with hyperintense signal noted on STIR sequence at the C2-3 and C3 level   2. Severe central canal stenosis C2-C3 which is improved with the patient's collar on compared to prior study   3. Severe canal stenosis C3-C4 with central disc herniation, also slightly improved with the patient's collar on during the study   4. Multilevel bilateral foraminal stenosis         CT CERVICAL SPINE WO CONTRAST    (Results Pending)           Assessment/Plan :      1. Hyponatremia. Likely 2/2 fluid intake . Drinks excess beer  Limit daily fluid intake to 1200 ml   Stop IV fluids   Urine sodium and urine osmolality. 2. HTN. Off IVF   Pain control     3. Acute cervical cord compression with edema:  10 mg IV Decadron given  Neurosurgery consulted.         Monitor sodium levels               Thank you for allowing us to participate in care of Marcelo Davis         Electronically signed by: Nelli Rodriguez MD, 9/10/2022, 1:10 PM      [facility] [address]  Office : [phone]  Fax :[phone]

## 2022-09-10 NOTE — CONSULTS
Neurosurgery Consult:    Patient Name: Sonja Frye YOB: 1955   Sex: Male Age: 74 yrs     Medical Record Number: [id number] Acct Number: [de-identified]   Room Number: 1133/1732-43 Hospital Day: Hospital Day: 2     Requesting physician: Alex Nowak MD    Reason for consultation: cord compression    History of present illness: Patient is a 74 y.o. male  has a past medical history of Arthritis, Atrial fibrillation ([address] CAD (coronary artery disease), Hyperlipidemia, and Hypertension. Who presented to [address] [place] [address] due to back and leg pain and difficulty performing ADLs and even walking. Also reports \"weakness\" of his arms. On further questioning, he feels stiff and like his range of motion is limited in his arms. Reports he had an EMG last week at [address] Denies loss bowel/bladder function. Says sometimes he has pain down the back of his legs and sometimes numb/tingling. Was not wearing cervical collar when I entered room. Discussed risk of further cord damage and paralysis if he takes it off. He understood and agreed to wear it. ROS:   Denies fever or recent illness. Denies chest pain  Denies shortness of breath  Denies changes in bowel or bladder function    VITAL SIGNS   BP (!) 151/80   Pulse 76   Temp 97.9 °F (36.6 °C)   Resp 18   Ht 6' 2\" (1.88 m)   Wt 250 lb (113.4 kg)   SpO2 97%   BMI 32.10 kg/m²    Height Height: 6' 2\" (188 cm)   Weight Weight: 250 lb (113.4 kg)        Allergies No Known Allergies   NPO Status ADULT DIET; Regular; 1200 ml   Isolation No active isolations     MEDICAL HISTORY   Past Medical History       Diagnosis Date    Arthritis     Atrial fibrillation ([address]     CAD (coronary artery disease)     Hyperlipidemia     Hypertension       Surgical History    has a past surgical history that includes back surgery (200) and Coronary angioplasty with stent (2012).    Social History   Social History     Occupational History    Not on file   Tobacco Use Smoking status: Every Day     Packs/day: 1.00     Years: 40.00     Pack years: 40.00     Types: Cigarettes    Smokeless tobacco: Never   Vaping Use    Vaping Use: Never used    Passive vaping exposure: Yes   Substance and Sexual Activity    Alcohol use: Yes     Alcohol/week: 20.0 standard drinks     Types: 20 Cans of beer per week     Comment: occasionally    Drug use: No    Sexual activity: Never        The medical history was obtained from the patient & the medical records. The nursing notes, primary physician's notes, and old charts (if applicable) were reviewed.     LABS   Basic Metabolic Profile Recent Labs     09/09/22  0346 09/09/22  1604 09/10/22  0537   * 134* 134*   CL 99 98* 101   CO2 19* 24 20*   BUN 11 18 16   CREATININE 0.8 0.9 0.8   GLUCOSE 99 83 134*      Complete Blood Count Recent Labs     09/09/22  0346 09/09/22  1604 09/10/22  0537   WBC 4.5 4.2 3.9*   RBC 4.52 4.58 4.20      Coagulation Studies Recent Labs     09/10/22  0537   INR 1.02        MEDICATIONS   Inpatient Medications     thiamine, 100 mg, IntraVENous, Daily    sodium chloride flush, 5-40 mL, IntraVENous, 2 times per day    [Held by provider] aspirin, 325 mg, Oral, Daily    atorvastatin, 40 mg, Oral, Daily    lisinopril, 40 mg, Oral, Daily    meloxicam, 7.5 mg, Oral, Daily    metoprolol succinate, 50 mg, Oral, Daily    sodium chloride flush, 5-40 mL, IntraVENous, 2 times per day    [Held by provider] enoxaparin, 30 mg, SubCUTAneous, BID    [COMPLETED] methocarbamol IVPB, 1,000 mg, IntraVENous, Q8H **FOLLOWED BY** methocarbamol, 750 mg, Oral, 4x Daily    sodium chloride flush, 5-40 mL, IntraVENous, 2 times per day    dexamethasone, 8 mg, Oral, 2 times per day   Infusions    sodium chloride      sodium chloride      sodium chloride        PRN   sodium chloride flush, 5-40 mL, IntraVENous, PRN  sodium chloride, , IntraVENous, PRN  sodium chloride flush, 5-40 mL, IntraVENous, PRN  sodium chloride, , IntraVENous, PRN  ondansetron, 4 mg, Oral, Q8H PRN   Or  ondansetron, 4 mg, IntraVENous, Q6H PRN  polyethylene glycol, 17 g, Oral, Daily PRN  acetaminophen, 650 mg, Oral, Q6H PRN   Or  acetaminophen, 650 mg, Rectal, Q6H PRN  HYDROmorphone, 0.25 mg, IntraVENous, Q4H PRN   Or  HYDROmorphone, 0.5 mg, IntraVENous, Q3H PRN  diazePAM, 5 mg, Oral, Q6H PRN  sodium chloride flush, 5-40 mL, IntraVENous, PRN  sodium chloride, 25 mL, IntraVENous, PRN  meperidine, 12.5 mg, IntraVENous, Q5 Min PRN  fentanNYL, 25 mcg, IntraVENous, Q5 Min PRN  HYDROmorphone, 0.5 mg, IntraVENous, Q5 Min PRN  labetalol, 10 mg, IntraVENous, Q15 Min PRN       Antibiotics   Recent Abx Admin        No antibiotic orders with administrations found. PHYSICAL EXAMINATION     GENERAL: NAD, alert, appears stated age, and cooperative  HEENT: Normocephalic, neck supple, trachea midline, lips without lesions  RESP: Easy WOB, symmetric chest rise  EXTREMITIES: Extremities WWP  SKIN: Warm and dry  NEURO: A&Ox3, FC - appendicular   CN II-XII grossly intact   VELOZ spontaneously, FROM, Strength 5/5 in BLEs   4+/5 bilaterally. Finger abduction very weak 2/5. L bicep, tricep and deltoid 4+/5. Tone normal throughout   Sensation intact to light touch throughout   Neg Arriaga's. No clonus. Gait exam deferred 2/2 patient condition    IMAGING   I personally reviewed the patient's imaging which consists of MRI C/T and L spine as well as CT C spine. MRI cervical spine at [facility] without collar shows anterolisthesis of C2 on C3 with severe stenosis and cord compression. MRI with collar at [facility] shows cord signal change, but no significant compression, still with severely narrowed canal.  Severe kyphosis of cervical spine centered at C5 without collar, significantly improved on follow up image with collar, but still overall kyphotic. MRI T spine with mild degenerative changes. No severe stenosis.   MRI L spine with severe degenerative changes with mutilevel severe central and foraminal stenosis. ASSESSMENT & PLAN   74yo M with anterolisthesis C2 on C3 with STIR signal change posteriorly. No fracture seen on CT. Xrays just completed now, however, do appear to show a fracture. Plan:  MUST remain in hard cervical collar at all times. MUST remain on BEDREST  Hold ASA  Repeat CT cervical spine ordered. Will need to be moved in spine precautions (log roll onto table)  Plan for likely OR on Thursday given ASA he was taking prior to today. 6.   Will need medical clearance for surgery. Thank you for the consultation.     Mitchell Ley. [address]

## 2022-09-10 NOTE — PROGRESS NOTES
Patient is refusing to wear neck brace. He has been educated by PACU, doctorss, and this RN on the importance of wearing it and that he could become paralyzed. He understands the risks but refusing at this time. Residents have been messaged about this.

## 2022-09-10 NOTE — PROGRESS NOTES
Medical clearance for surgery    Patient will have surgical intervention for his anterolisthesis C2 on C3 with STIR signal . ECG reviewed showed NSR with no acute ischemic changes. Patient with history of HFpEF, G1DD, history of ischemic cardiomyopathy. No history CVA and no preoperative treatment with insulin needed. Cr is 0.8. Based on the information described above patient class II risk of 10.1%. May proceed with surgery as benefits outweighs risks.       Electronically signed by Bo Avila MD on 9/10/22 at 6:26 PM EDT

## 2022-09-10 NOTE — PROGRESS NOTES
Progress Note    Admit Date: 9/9/2022  Day: 1  Diet: ADULT DIET; Regular; 1200 ml    CC: Back pain    Interval history:  No acute events overnight. Patient seen and examined at been side. Patient reports back pain and numbness has not improved  Patient denies shortness of breath, chest pain, chills, nausea, vomiting He denies urinary frequency, dysuria. Patient is hemodynamically stable and afebrile. HPI:   Luis E Mortensen a 74 y.o. M with PMHx of anterolisthesis of cervical spine, A.fib s/p ablation, HTN, HLD, CAD, BPH, Carpal Tunnel, and herniated disc p/w back pain, numbness in arms and legs, and weakness in arms and legs. Patient has had progressive cervical pain for past two months which he has been seeing someone, unable to find documentation. He is scheduled for a surgery next week. Back pain is worse when laying down. He had a herniated disc in 2000 s/p spinal surgery and reports intermittent back pain since then. Back pain has been accompanied with numbness and weakness in arms and legs. Pain in legs was described as shooting pain down to toes. Leg pain also associated with involuntary muscle spasms causing him to be off balance. Patient came in with Na of 131. Was initially 126 while in [facility] 09/08. Patient reported dizziness, fatigue, and sometimes confusion. Denied headache and nausea. Patient has fluid on knee that is drained every 3 months for last 2 years. He had a carpal tunnel release surgery on right hand. Still has hand weakness and numbness B/L. Patient was seen at [facility] ED where he received 10 mg IV Decadron, Dilaudid, Robaxin. He had no relief. CT of cervical, thoracic, and lumbar spine was performed. Patient became anxious during imaging and only Cervical CT was clear for impression.     Medications:     Scheduled Meds:   thiamine  100 mg IntraVENous Daily    sodium chloride flush  5-40 mL IntraVENous 2 times per day    [Held by provider] aspirin  325 mg Oral Daily    atorvastatin  40 mg Oral Daily    lisinopril  40 mg Oral Daily    meloxicam  7.5 mg Oral Daily    metoprolol succinate  50 mg Oral Daily    sodium chloride flush  5-40 mL IntraVENous 2 times per day    [Held by provider] enoxaparin  30 mg SubCUTAneous BID    methocarbamol  750 mg Oral 4x Daily    sodium chloride flush  5-40 mL IntraVENous 2 times per day    dexamethasone  8 mg Oral 2 times per day     Continuous Infusions:   sodium chloride      sodium chloride      sodium chloride       PRN Meds:sodium chloride flush, sodium chloride, sodium chloride flush, sodium chloride, ondansetron **OR** ondansetron, polyethylene glycol, acetaminophen **OR** acetaminophen, HYDROmorphone **OR** HYDROmorphone, diazePAM, sodium chloride flush, sodium chloride, meperidine, fentanNYL, HYDROmorphone, labetalol    Objective:   Vitals:   T-max:  Patient Vitals for the past 8 hrs:   BP Temp Temp src Pulse Resp SpO2   09/10/22 1029 (!) 151/80 97.9 °F (36.6 °C) -- 76 18 97 %   09/10/22 0903 (!) 156/94 97.9 °F (36.6 °C) -- 71 18 97 %   09/10/22 0833 (!) 171/105 97.9 °F (36.6 °C) -- 67 18 97 %   09/10/22 0407 (!) 148/97 98 °F (36.7 °C) Axillary 78 18 99 %       Intake/Output Summary (Last 24 hours) at 9/10/2022 1129  Last data filed at 9/10/2022 0150  Gross per 24 hour   Intake 1255 ml   Output 625 ml   Net 630 ml       ROS: A 10 point review of systems was conducted, significant findings as noted in HPI. Physical Exam  Constitutional:       Appearance: Normal appearance. HENT:      Head: Normocephalic. Mouth/Throat:      Mouth: Mucous membranes are moist.   Eyes:      Extraocular Movements: Extraocular movements intact. Conjunctiva/sclera: Conjunctivae normal.      Pupils: Pupils are equal, round, and reactive to light. Cardiovascular:      Rate and Rhythm: Normal rate and regular rhythm. Pulses: Normal pulses. Heart sounds: Normal heart sounds.    Pulmonary:      Effort: Pulmonary effort is normal. Breath sounds: Normal breath sounds. Abdominal:      General: Abdomen is flat. Bowel sounds are normal.      Palpations: Abdomen is soft. Musculoskeletal:         General: Deformity (Deformity on DIP of L 2nd digit on hand. Power 3/5 in arms. 4/5 in legs.) present. Normal range of motion. Cervical back: Normal range of motion. Neurological:      General: No focal deficit present. Mental Status: He is alert and oriented to person, place, and time. Mental status is at baseline. Psychiatric:         Mood and Affect: Mood normal.         Behavior: Behavior normal.       LABS:    CBC:   Recent Labs     09/09/22  0346 09/09/22  1604 09/10/22  0537   WBC 4.5 4.2 3.9*   HGB 14.9 14.9 13.7   HCT 44.4 44.7 40.9    237 211   MCV 98.4 97.6 97.2     Renal:    Recent Labs     09/09/22  0346 09/09/22  1604 09/10/22  0537   * 134* 134*   K 4.8 4.8 4.6   CL 99 98* 101   CO2 19* 24 20*   BUN 11 18 16   CREATININE 0.8 0.9 0.8   GLUCOSE 99 83 134*   CALCIUM 9.3 9.4 9.0   ANIONGAP 13 12 13     Hepatic:   Recent Labs     09/08/22  1445   AST 22   ALT 27   BILITOT 0.7   PROT 7.6   LABALBU 4.4   ALKPHOS 95     Troponin:   Recent Labs     09/08/22  2131   Jacelyn Chough <0.01     BNP: No results for input(s): BNP in the last 72 hours. Lipids: No results for input(s): CHOL, HDL in the last 72 hours. Invalid input(s): LDLCALCU, TRIGLYCERIDE  ABGs:  No results for input(s): PHART, YAN2FCI, PO2ART, KLV5ZDY, BEART, THGBART, E9QPMAEA, JCQ9AHU in the last 72 hours. INR:   Recent Labs     09/10/22  0537   INR 1.02     Lactate: No results for input(s): LACTATE in the last 72 hours. Cultures:  -----------------------------------------------------------------  RAD:   MRI LUMBAR SPINE WO CONTRAST   Final Result   1. Multilevel severe central canal and foraminal stenosis L2-3 through L5-S1, secondary to multifactorial degenerative changes and posterior epidural lipomatosis   2.  Redundancy in edematous nerve roots at the L1-2 level secondary to chronic lumbar central canal stenosis. 2. Extruded disc herniation L3-L4 with left lateral recess and subarticular migration      Compared prior study there is been progression of spinal stenosis secondary to chronic multifactorial degenerative changes posterior epidural fat. No change in the degree of facet arthropathy and foraminal stenosis. MRI THORACIC SPINE WO CONTRAST   Final Result      1. Mild scoliosis and degenerative disc disease concordant with prior CT   2. No abnormal cord signal, central canal, compression or canal stenosis         MRI CERVICAL SPINE WO CONTRAST   Final Result      1. Cord myelopathy, cord edema with hyperintense signal noted on STIR sequence at the C2-3 and C3 level   2. Severe central canal stenosis C2-C3 which is improved with the patient's collar on compared to prior study   3. Severe canal stenosis C3-C4 with central disc herniation, also slightly improved with the patient's collar on during the study   4. Multilevel bilateral foraminal stenosis         XR CERVICAL SPINE (2-3 VIEWS)    (Results Pending)       Assessment/Plan:   Jose Barbosa a 74 y.o. M with PMHx of anterolisthesis of cervical spine, A.fib s/p ablation, HTN, HLD, CAD, BPH, Carpal Tunnel syndrome s/p unilateral surgery, and herniated disc (2000) s/p surgery p/w back pain, numbness in arms and legs, and weakness in arms and legs. Patient was admitted for further work up and management of spinal stenosis      Cervical cord compression with edema  -Patient presented to [facility] with back pain (9/8/22) where he was directly admitted to [facility]. for Neurosurgery. Patient reports back pain that radiates down arms and legs. MRI spine showing. Severe central canal and foraminal stenosis L2-3, L5-S1, edematous nerve roots L1-2. Disc herniation L3-4.  Severe canal stenosis C2-C4 with central disc herniation  -Patient refusing to wear cervical collar   -Neurosurgery following, appreciate recs   -Decadron 8 mg PO every 12 hours  - Robaxin 1000 mg IV at 200mL/hr over 30 minutes every 8 hours  -Ativan (1mg PO PRN) for anxiety during CT  -Dilaudid pain panel  -Acetaminophen 650 mg PO PRN     Hyponatremia (improving)  -Na 134 today  -Hyponatremia is improving, Na 134 09/08. Patient reports dizziness, fatigue and confusion. Chronic Conditions    Alcohol consumption  -drinks 3-4 cans of beer daily  -Last drink 2 days ago  -Thiamine IV daily   -F/u B12, folate  -CIWA protocol   -Valium 5 mg prn    Essential Hypertension:   -Continue home medication: Lisinopril 40 mg PO daily    Hyperlipidemia:   -Ct home medication: atorvastatin 40 PO Daily     Paroxysmal Atrial fibrilliation status post ablation:  Continue Home Medication:  - Metoprolol succinate 50 mg PO daily  - Aspirin 325 mg PO Daily - being held by provider    Class I obesity: Complicating assessment and treatment. Placing patient at risk for multiple co-morbidities as well as early death and contributing to the patient's presentation. Education, and counseling when appropriate     Carpal Tunnel syndrome:  -Seeing physician, previous carpal tunnel surgery on R hand. Pending surgery on L hand. Arthritis:  -Meloxicam 7.5 mg PO Daily     Benign Prostatic Hyperplasia:  - Ct home: Tamsulosin  0.4 g PO      Code Status: Full Code   FEN: ADULT DIET;  Regular; 1200 ml   PPX: SCDs  DISPO: MAGALIE Linares MD, PGY-2  09/10/22  11:29 AM    This patient has been staffed and discussed with [facility], MD.

## 2022-09-11 LAB
ANION GAP SERPL CALCULATED.3IONS-SCNC: 9 MMOL/L (ref 3–16)
BASOPHILS ABSOLUTE: 0 K/UL (ref 0–0.2)
BASOPHILS RELATIVE PERCENT: 0.3 %
BUN BLDV-MCNC: 18 MG/DL (ref 7–20)
CALCIUM SERPL-MCNC: 8.9 MG/DL (ref 8.3–10.6)
CHLORIDE BLD-SCNC: 101 MMOL/L (ref 99–110)
CO2: 24 MMOL/L (ref 21–32)
CREAT SERPL-MCNC: 0.8 MG/DL (ref 0.8–1.3)
EOSINOPHILS ABSOLUTE: 0 K/UL (ref 0–0.6)
EOSINOPHILS RELATIVE PERCENT: 0 %
FOLATE: 13.19 NG/ML (ref 4.78–24.2)
GFR AFRICAN AMERICAN: >60
GFR NON-AFRICAN AMERICAN: >60
GLUCOSE BLD-MCNC: 143 MG/DL (ref 70–99)
HCT VFR BLD CALC: 41.3 % (ref 40.5–52.5)
HEMOGLOBIN: 13.6 G/DL (ref 13.5–17.5)
LYMPHOCYTES ABSOLUTE: 0.4 K/UL (ref 1–5.1)
LYMPHOCYTES RELATIVE PERCENT: 7.3 %
MCH RBC QN AUTO: 32.1 PG (ref 26–34)
MCHC RBC AUTO-ENTMCNC: 32.8 G/DL (ref 31–36)
MCV RBC AUTO: 97.7 FL (ref 80–100)
MONOCYTES ABSOLUTE: 0.1 K/UL (ref 0–1.3)
MONOCYTES RELATIVE PERCENT: 2.4 %
NEUTROPHILS ABSOLUTE: 5.2 K/UL (ref 1.7–7.7)
NEUTROPHILS RELATIVE PERCENT: 90 %
PDW BLD-RTO: 13.8 % (ref 12.4–15.4)
PLATELET # BLD: 206 K/UL (ref 135–450)
PMV BLD AUTO: 7.8 FL (ref 5–10.5)
POTASSIUM REFLEX MAGNESIUM: 4.7 MMOL/L (ref 3.5–5.1)
RBC # BLD: 4.23 M/UL (ref 4.2–5.9)
SODIUM BLD-SCNC: 134 MMOL/L (ref 136–145)
VITAMIN B-12: 394 PG/ML (ref 211–911)
WBC # BLD: 5.8 K/UL (ref 4–11)

## 2022-09-11 PROCEDURE — 6370000000 HC RX 637 (ALT 250 FOR IP): Performed by: NURSE PRACTITIONER

## 2022-09-11 PROCEDURE — 6370000000 HC RX 637 (ALT 250 FOR IP)

## 2022-09-11 PROCEDURE — 6360000002 HC RX W HCPCS: Performed by: INTERNAL MEDICINE

## 2022-09-11 PROCEDURE — 6360000002 HC RX W HCPCS

## 2022-09-11 PROCEDURE — 2580000003 HC RX 258: Performed by: FAMILY MEDICINE

## 2022-09-11 PROCEDURE — 82607 VITAMIN B-12: CPT

## 2022-09-11 PROCEDURE — 36415 COLL VENOUS BLD VENIPUNCTURE: CPT

## 2022-09-11 PROCEDURE — 2060000000 HC ICU INTERMEDIATE R&B

## 2022-09-11 PROCEDURE — 6360000002 HC RX W HCPCS: Performed by: STUDENT IN AN ORGANIZED HEALTH CARE EDUCATION/TRAINING PROGRAM

## 2022-09-11 PROCEDURE — 93005 ELECTROCARDIOGRAM TRACING: CPT

## 2022-09-11 PROCEDURE — 2580000003 HC RX 258: Performed by: STUDENT IN AN ORGANIZED HEALTH CARE EDUCATION/TRAINING PROGRAM

## 2022-09-11 PROCEDURE — 99233 SBSQ HOSP IP/OBS HIGH 50: CPT | Performed by: INTERNAL MEDICINE

## 2022-09-11 PROCEDURE — 6370000000 HC RX 637 (ALT 250 FOR IP): Performed by: STUDENT IN AN ORGANIZED HEALTH CARE EDUCATION/TRAINING PROGRAM

## 2022-09-11 PROCEDURE — 82746 ASSAY OF FOLIC ACID SERUM: CPT

## 2022-09-11 PROCEDURE — 80048 BASIC METABOLIC PNL TOTAL CA: CPT

## 2022-09-11 PROCEDURE — 2580000003 HC RX 258

## 2022-09-11 PROCEDURE — 85025 COMPLETE CBC W/AUTO DIFF WBC: CPT

## 2022-09-11 RX ORDER — POLYVINYL ALCOHOL 14 MG/ML
1 SOLUTION/ DROPS OPHTHALMIC PRN
Status: DISCONTINUED | OUTPATIENT
Start: 2022-09-11 | End: 2022-09-23 | Stop reason: HOSPADM

## 2022-09-11 RX ORDER — NICOTINE 21 MG/24HR
1 PATCH, TRANSDERMAL 24 HOURS TRANSDERMAL DAILY
Status: DISCONTINUED | OUTPATIENT
Start: 2022-09-11 | End: 2022-09-23 | Stop reason: HOSPADM

## 2022-09-11 RX ORDER — HYDRALAZINE HYDROCHLORIDE 20 MG/ML
5 INJECTION INTRAMUSCULAR; INTRAVENOUS EVERY 6 HOURS PRN
Status: DISCONTINUED | OUTPATIENT
Start: 2022-09-11 | End: 2022-09-23 | Stop reason: HOSPADM

## 2022-09-11 RX ADMIN — METHOCARBAMOL 750 MG: 750 TABLET ORAL at 13:06

## 2022-09-11 RX ADMIN — HYDRALAZINE HYDROCHLORIDE 5 MG: 20 INJECTION INTRAMUSCULAR; INTRAVENOUS at 17:24

## 2022-09-11 RX ADMIN — SODIUM CHLORIDE, PRESERVATIVE FREE 10 ML: 5 INJECTION INTRAVENOUS at 08:51

## 2022-09-11 RX ADMIN — SODIUM CHLORIDE, PRESERVATIVE FREE 10 ML: 5 INJECTION INTRAVENOUS at 08:50

## 2022-09-11 RX ADMIN — SALINE NASAL SPRAY 1 SPRAY: 1.5 SOLUTION NASAL at 21:39

## 2022-09-11 RX ADMIN — METOPROLOL SUCCINATE 50 MG: 50 TABLET, FILM COATED, EXTENDED RELEASE ORAL at 08:47

## 2022-09-11 RX ADMIN — MELOXICAM 7.5 MG: 7.5 TABLET ORAL at 09:00

## 2022-09-11 RX ADMIN — HYDROMORPHONE HYDROCHLORIDE 0.5 MG: 1 INJECTION, SOLUTION INTRAMUSCULAR; INTRAVENOUS; SUBCUTANEOUS at 07:04

## 2022-09-11 RX ADMIN — METHOCARBAMOL 750 MG: 750 TABLET ORAL at 16:56

## 2022-09-11 RX ADMIN — LISINOPRIL 40 MG: 40 TABLET ORAL at 08:46

## 2022-09-11 RX ADMIN — ATORVASTATIN CALCIUM 40 MG: 40 TABLET, FILM COATED ORAL at 08:47

## 2022-09-11 RX ADMIN — METHOCARBAMOL 750 MG: 750 TABLET ORAL at 08:47

## 2022-09-11 RX ADMIN — HYDROMORPHONE HYDROCHLORIDE 0.5 MG: 1 INJECTION, SOLUTION INTRAMUSCULAR; INTRAVENOUS; SUBCUTANEOUS at 20:41

## 2022-09-11 RX ADMIN — DEXAMETHASONE 8 MG: 4 TABLET ORAL at 08:46

## 2022-09-11 RX ADMIN — POLYETHYLENE GLYCOL 3350 17 G: 17 POWDER, FOR SOLUTION ORAL at 21:44

## 2022-09-11 RX ADMIN — POLYVINYL ALCOHOL 1 DROP: 14 SOLUTION/ DROPS OPHTHALMIC at 19:47

## 2022-09-11 RX ADMIN — SODIUM CHLORIDE, PRESERVATIVE FREE 10 ML: 5 INJECTION INTRAVENOUS at 20:41

## 2022-09-11 RX ADMIN — DIAZEPAM 5 MG: 5 TABLET ORAL at 20:40

## 2022-09-11 RX ADMIN — DEXAMETHASONE 8 MG: 4 TABLET ORAL at 20:40

## 2022-09-11 RX ADMIN — HYDROMORPHONE HYDROCHLORIDE 0.5 MG: 1 INJECTION, SOLUTION INTRAMUSCULAR; INTRAVENOUS; SUBCUTANEOUS at 14:35

## 2022-09-11 RX ADMIN — METHOCARBAMOL 750 MG: 750 TABLET ORAL at 20:40

## 2022-09-11 RX ADMIN — THIAMINE HYDROCHLORIDE 100 MG: 100 INJECTION, SOLUTION INTRAMUSCULAR; INTRAVENOUS at 08:49

## 2022-09-11 ASSESSMENT — PAIN SCALES - GENERAL
PAINLEVEL_OUTOF10: 10
PAINLEVEL_OUTOF10: 0
PAINLEVEL_OUTOF10: 6
PAINLEVEL_OUTOF10: 5
PAINLEVEL_OUTOF10: 8
PAINLEVEL_OUTOF10: 8

## 2022-09-11 ASSESSMENT — PAIN DESCRIPTION - DESCRIPTORS
DESCRIPTORS: CRAMPING;ACHING;THROBBING
DESCRIPTORS: ACHING;DISCOMFORT
DESCRIPTORS: ACHING;CRUSHING

## 2022-09-11 ASSESSMENT — PAIN DESCRIPTION - LOCATION
LOCATION: KNEE;NECK
LOCATION: NECK
LOCATION: NECK;SHOULDER

## 2022-09-11 ASSESSMENT — PAIN DESCRIPTION - ORIENTATION
ORIENTATION: POSTERIOR
ORIENTATION: RIGHT;LEFT
ORIENTATION: MID

## 2022-09-11 ASSESSMENT — PAIN DESCRIPTION - ONSET: ONSET: ON-GOING

## 2022-09-11 ASSESSMENT — PAIN DESCRIPTION - PAIN TYPE: TYPE: ACUTE PAIN

## 2022-09-11 ASSESSMENT — PAIN DESCRIPTION - FREQUENCY: FREQUENCY: CONTINUOUS

## 2022-09-11 ASSESSMENT — PAIN - FUNCTIONAL ASSESSMENT: PAIN_FUNCTIONAL_ASSESSMENT: PREVENTS OR INTERFERES SOME ACTIVE ACTIVITIES AND ADLS

## 2022-09-11 NOTE — PLAN OF CARE
Problem: Discharge Planning  Goal: Discharge to home or other facility with appropriate resources  Outcome: Progressing  Flowsheets (Taken 9/11/2022 0704)  Discharge to home or other facility with appropriate resources:   Identify barriers to discharge with patient and caregiver   Arrange for needed discharge resources and transportation as appropriate   Identify discharge learning needs (meds, wound care, etc)   Arrange for interpreters to assist at discharge as needed   Refer to discharge planning if patient needs post-hospital services based on physician order or complex needs related to functional status, cognitive ability or social support system     Problem: Pain  Goal: Verbalizes/displays adequate comfort level or baseline comfort level  Outcome: Progressing     Problem: Safety - Adult  Goal: Free from fall injury  Outcome: Progressing

## 2022-09-11 NOTE — PROGRESS NOTES
tablet 50 mg, Daily  sodium chloride flush 0.9 % injection 5-40 mL, 2 times per day  sodium chloride flush 0.9 % injection 5-40 mL, PRN  0.9 % sodium chloride infusion, PRN  ondansetron (ZOFRAN-ODT) disintegrating tablet 4 mg, Q8H PRN   Or  ondansetron (ZOFRAN) injection 4 mg, Q6H PRN  polyethylene glycol (GLYCOLAX) packet 17 g, Daily PRN  acetaminophen (TYLENOL) tablet 650 mg, Q6H PRN   Or  acetaminophen (TYLENOL) suppository 650 mg, Q6H PRN  HYDROmorphone (DILAUDID) injection 0.25 mg, Q4H PRN   Or  HYDROmorphone (DILAUDID) injection 0.5 mg, Q3H PRN  [Held by provider] enoxaparin Sodium (LOVENOX) injection 30 mg, BID  methocarbamol (ROBAXIN) tablet 750 mg, 4x Daily  diazePAM (VALIUM) tablet 5 mg, Q6H PRN  sodium chloride flush 0.9 % injection 5-40 mL, 2 times per day  sodium chloride flush 0.9 % injection 5-40 mL, PRN  0.9 % sodium chloride infusion, PRN  meperidine (DEMEROL) injection 12.5 mg, Q5 Min PRN  fentaNYL (SUBLIMAZE) injection 25 mcg, Q5 Min PRN  HYDROmorphone (DILAUDID) injection 0.5 mg, Q5 Min PRN  dexamethasone (DECADRON) tablet 8 mg, 2 times per day          Physical exam:     Vitals:  BP (!) 152/80   Pulse 79   Temp 98 °F (36.7 °C) (Oral)   Resp 18   Ht 6' 2\" (1.88 m)   Wt 250 lb 7.1 oz (113.6 kg)   SpO2 97%   BMI 32.15 kg/m²   Constitutional:  OAA X3 NAD  Skin: no rash, turgor wnl  Heent:  eomi, mmm  Neck: no bruits or jvd noted  Cardiovascular:  S1, S2 without m/r/g  Respiratory: CTA B without w/r/r  Abdomen:  +bs, soft, nt, nd  Ext: no  lower extremity edema  Psychiatric: mood and affect appropriate  Musculoskeletal:  Rom, muscular strength intact    Labs:  CBC:   Recent Labs     09/09/22  1604 09/10/22  0537 09/11/22  0441   WBC 4.2 3.9* 5.8   HGB 14.9 13.7 13.6    211 206       BMP:    Recent Labs     09/09/22  1604 09/10/22  0537 09/11/22  0441   * 134* 134*   K 4.8 4.6 4.7   CL 98* 101 101   CO2 24 20* 24   BUN 18 16 18   CREATININE 0.9 0.8 0.8   GLUCOSE 83 134* 143* Ca/Mg/Phos:   Recent Labs     09/09/22  1604 09/10/22  0537 09/11/22  0441   CALCIUM 9.4 9.0 8.9       Hepatic:   Recent Labs     09/08/22  1445   AST 22   ALT 27   BILITOT 0.7   ALKPHOS 95       Troponin:   Recent Labs     09/08/22  2131   Littie Dakins <0.01       BNP: No results for input(s): BNP in the last 72 hours. Lipids: No results for input(s): CHOL, TRIG, HDL, LDLCALC, LABVLDL in the last 72 hours. ABGs: No results for input(s): PHART, PO2ART, MHO5JPJ in the last 72 hours. INR:   Recent Labs     09/10/22  0537   INR 1.02       UA:  Recent Labs     09/08/22  2330   COLORU Yellow   CLARITYU Clear   GLUCOSEU Negative   BILIRUBINUR Negative   KETUA Negative   SPECGRAV 1.015   BLOODU Negative   PHUR 6.0  6.0   PROTEINU TRACE*   UROBILINOGEN 1.0   NITRU Negative   LEUKOCYTESUR Negative   LABMICR YES   URINETYPE NotGiven        Urine Microscopic:   Recent Labs     09/08/22  2330   BACTERIA None Seen   HYALCAST 1   WBCUA 1   RBCUA 1   EPIU 1       Urine Culture: No results for input(s): LABURIN in the last 72 hours. Urine Chemistry:   Recent Labs     09/08/22  2330   LABCREA 162.2   NAUR 40                  IMAGING:  XR CERVICAL SPINE (2-3 VIEWS)   Final Result      Grade 1 anterolisthesis of C2 on C3. Multilevel moderate to severe degenerative disc disease. MRI LUMBAR SPINE WO CONTRAST   Final Result   1. Multilevel severe central canal and foraminal stenosis L2-3 through L5-S1, secondary to multifactorial degenerative changes and posterior epidural lipomatosis   2. Redundancy in edematous nerve roots at the L1-2 level secondary to chronic lumbar central canal stenosis. 2. Extruded disc herniation L3-L4 with left lateral recess and subarticular migration      Compared prior study there is been progression of spinal stenosis secondary to chronic multifactorial degenerative changes posterior epidural fat. No change in the degree of facet arthropathy and foraminal stenosis. MRI THORACIC SPINE WO CONTRAST   Final Result      1. Mild scoliosis and degenerative disc disease concordant with prior CT   2. No abnormal cord signal, central canal, compression or canal stenosis         MRI CERVICAL SPINE WO CONTRAST   Final Result      1. Cord myelopathy, cord edema with hyperintense signal noted on STIR sequence at the C2-3 and C3 level   2. Severe central canal stenosis C2-C3 which is improved with the patient's collar on compared to prior study   3. Severe canal stenosis C3-C4 with central disc herniation, also slightly improved with the patient's collar on during the study   4. Multilevel bilateral foraminal stenosis         CT CERVICAL SPINE WO CONTRAST    (Results Pending)           Assessment/Plan :      1. Hyponatremia. Likely 2/2 fluid intake . Drinks excess beer  Limit daily fluid intake to 1200 ml   Stop IV fluids   Urine sodium and urine osmolality. 2. HTN. Off IVF   Pain control     3. Acute cervical cord compression with edema:  10 mg IV Decadron given  Neurosurgery consulted.         Monitor sodium levels               Thank you for allowing us to participate in care of Luisa Georges         Electronically signed by: Delvis Finney MD, 9/11/2022, 10:45 AM      [facility] [address]  Office : [phone]  Fax :[phone]

## 2022-09-11 NOTE — PROGRESS NOTES
Progress Note    Admit Date: 9/9/2022  Day: 2  Diet: ADULT DIET; Regular; 1200 ml  Diet NPO    CC: Back pain    Interval history:  No acute events overnight. Patient seen and examined at bed side. Patient hypertensive overnight to 178/80 overnight and satting well on room air. Patient reports back pain and numbness has not improved. Patient reports his pain is being well-managed with the current pain regimen. Patient denies shortness of breath, chest pain, chills, nausea, vomiting. He denies urinary frequency, dysuria. Patient is hemodynamically stable and afebrile. HPI:   Luisa Georges a 74 y.o. M with PMHx of anterolisthesis of cervical spine, A.fib s/p ablation, HTN, HLD, CAD, BPH, Carpal Tunnel, and herniated disc p/w back pain, numbness in arms and legs, and weakness in arms and legs. Patient has had progressive cervical pain for past two months which he has been seeing someone, unable to find documentation. He is scheduled for a surgery next week. Back pain is worse when laying down. He had a herniated disc in 2000 s/p spinal surgery and reports intermittent back pain since then. Back pain has been accompanied with numbness and weakness in arms and legs. Pain in legs was described as shooting pain down to toes. Leg pain also associated with involuntary muscle spasms causing him to be off balance. Patient came in with Na of 131. Was initially 126 while in [facility] 09/08. Patient reported dizziness, fatigue, and sometimes confusion. Denied headache and nausea. Patient has fluid on knee that is drained every 3 months for last 2 years. He had a carpal tunnel release surgery on right hand. Still has hand weakness and numbness B/L. Patient was seen at [facility] ED where he received 10 mg IV Decadron, Dilaudid, Robaxin. He had no relief. CT of cervical, thoracic, and lumbar spine was performed.  Patient became anxious during imaging and only Cervical CT was clear for impression. Medications:     Scheduled Meds:   thiamine  100 mg IntraVENous Daily    sodium chloride flush  5-40 mL IntraVENous 2 times per day    [Held by provider] aspirin  325 mg Oral Daily    atorvastatin  40 mg Oral Daily    lisinopril  40 mg Oral Daily    meloxicam  7.5 mg Oral Daily    metoprolol succinate  50 mg Oral Daily    sodium chloride flush  5-40 mL IntraVENous 2 times per day    [Held by provider] enoxaparin  30 mg SubCUTAneous BID    methocarbamol  750 mg Oral 4x Daily    sodium chloride flush  5-40 mL IntraVENous 2 times per day    dexamethasone  8 mg Oral 2 times per day     Continuous Infusions:   sodium chloride      sodium chloride      sodium chloride       PRN Meds:sodium chloride flush, sodium chloride, sodium chloride flush, sodium chloride, ondansetron **OR** ondansetron, polyethylene glycol, acetaminophen **OR** acetaminophen, HYDROmorphone **OR** HYDROmorphone, diazePAM, sodium chloride flush, sodium chloride, meperidine, fentanNYL, HYDROmorphone    Objective:   Vitals:   T-max:  Patient Vitals for the past 8 hrs:   BP Temp Temp src Pulse Resp SpO2 Weight   09/11/22 0534 -- -- -- -- -- -- 250 lb 7.1 oz (113.6 kg)   09/11/22 0338 (!) 152/80 98 °F (36.7 °C) Oral 79 18 97 % --       Intake/Output Summary (Last 24 hours) at 9/11/2022 0845  Last data filed at 9/10/2022 1655  Gross per 24 hour   Intake 360 ml   Output 1150 ml   Net -790 ml       ROS: A 10 point review of systems was conducted, significant findings as noted in HPI. Physical Exam  Constitutional:       Appearance: Normal appearance. HENT:      Head: Normocephalic. Mouth/Throat:      Mouth: Mucous membranes are moist.   Eyes:      Extraocular Movements: Extraocular movements intact. Conjunctiva/sclera: Conjunctivae normal.      Pupils: Pupils are equal, round, and reactive to light. Cardiovascular:      Rate and Rhythm: Normal rate and regular rhythm. Pulses: Normal pulses.       Heart sounds: Normal heart sounds. Pulmonary:      Effort: Pulmonary effort is normal.      Breath sounds: Normal breath sounds. Abdominal:      General: Abdomen is flat. Bowel sounds are normal.      Palpations: Abdomen is soft. Musculoskeletal:         General: Deformity (Deformity on DIP of L 2nd digit on hand. Power 3/5 in arms. 4/5 in legs.) present. Normal range of motion. Cervical back: Normal range of motion. Neurological:      General: No focal deficit present. Mental Status: He is alert and oriented to person, place, and time. Mental status is at baseline. Psychiatric:         Mood and Affect: Mood normal.         Behavior: Behavior normal.       LABS:    CBC:   Recent Labs     09/09/22  1604 09/10/22  0537 09/11/22  0441   WBC 4.2 3.9* 5.8   HGB 14.9 13.7 13.6   HCT 44.7 40.9 41.3    211 206   MCV 97.6 97.2 97.7     Renal:    Recent Labs     09/09/22  1604 09/10/22  0537 09/11/22  0441   * 134* 134*   K 4.8 4.6 4.7   CL 98* 101 101   CO2 24 20* 24   BUN 18 16 18   CREATININE 0.9 0.8 0.8   GLUCOSE 83 134* 143*   CALCIUM 9.4 9.0 8.9   ANIONGAP 12 13 9     Hepatic:   Recent Labs     09/08/22  1445   AST 22   ALT 27   BILITOT 0.7   PROT 7.6   LABALBU 4.4   ALKPHOS 95     Troponin:   Recent Labs     09/08/22  2131   Deloria Chess <0.01     BNP: No results for input(s): BNP in the last 72 hours. Lipids: No results for input(s): CHOL, HDL in the last 72 hours. Invalid input(s): LDLCALCU, TRIGLYCERIDE  ABGs:  No results for input(s): PHART, RLE3YIY, PO2ART, JCJ3XJD, BEART, THGBART, B8SNUDYH, EAJ7RLC in the last 72 hours. INR:   Recent Labs     09/10/22  0537   INR 1.02     Lactate: No results for input(s): LACTATE in the last 72 hours. Cultures:  -----------------------------------------------------------------  RAD:   XR CERVICAL SPINE (2-3 VIEWS)   Final Result      Grade 1 anterolisthesis of C2 on C3. Multilevel moderate to severe degenerative disc disease.       MRI LUMBAR SPINE WO CONTRAST Final Result   1. Multilevel severe central canal and foraminal stenosis L2-3 through L5-S1, secondary to multifactorial degenerative changes and posterior epidural lipomatosis   2. Redundancy in edematous nerve roots at the L1-2 level secondary to chronic lumbar central canal stenosis. 2. Extruded disc herniation L3-L4 with left lateral recess and subarticular migration      Compared prior study there is been progression of spinal stenosis secondary to chronic multifactorial degenerative changes posterior epidural fat. No change in the degree of facet arthropathy and foraminal stenosis. MRI THORACIC SPINE WO CONTRAST   Final Result      1. Mild scoliosis and degenerative disc disease concordant with prior CT   2. No abnormal cord signal, central canal, compression or canal stenosis         MRI CERVICAL SPINE WO CONTRAST   Final Result      1. Cord myelopathy, cord edema with hyperintense signal noted on STIR sequence at the C2-3 and C3 level   2. Severe central canal stenosis C2-C3 which is improved with the patient's collar on compared to prior study   3. Severe canal stenosis C3-C4 with central disc herniation, also slightly improved with the patient's collar on during the study   4. Multilevel bilateral foraminal stenosis         CT CERVICAL SPINE WO CONTRAST    (Results Pending)       Assessment/Plan:   Renetta moore 74 y.o. M with PMHx of anterolisthesis of cervical spine, A.fib s/p ablation, HTN, HLD, CAD, BPH, Carpal Tunnel syndrome s/p unilateral surgery, and herniated disc (2000) s/p surgery p/w back pain, numbness in arms and legs, and weakness in arms and legs. Patient was admitted for further work up and management of spinal stenosis      Cervical cord compression with edema  -Patient presented to [facility] with back pain (9/8/22) where he was directly admitted to [facility]. for Neurosurgery. Patient reports back pain that radiates down arms and legs.  MRI spine showing. Severe central canal and foraminal stenosis L2-3, L5-S1, edematous nerve roots L1-2. Disc herniation L3-4. Severe canal stenosis C2-C4 with central disc herniation  -Patient compliant with cervical collar  -Neurosurgery following, planning for surgery on Thursday  -Decadron 8 mg PO every 12 hours  - Robaxin 1000 mg IV at 200mL/hr over 30 minutes every 8 hours  -Dilaudid pain panel  -Acetaminophen 650 mg PO PRN     Hyponatremia (improving)  -Na 134 today  -Hyponatremia is improving, Na 134 09/08. Patient reports dizziness, fatigue and confusion. Chronic Conditions    Alcohol consumption  -drinks 3-4 cans of beer daily  -Last drink 3 days ago  -Thiamine IV daily   -F/u B12, folate  -CIWA protocol   -Valium 5 mg prn    Essential Hypertension:   -Continue home medication: Lisinopril 40 mg PO daily    Hyperlipidemia:   -Ct home medication: atorvastatin 40 PO Daily     Paroxysmal Atrial fibrilliation status post ablation:  Continue Home Medication:  - Metoprolol succinate 50 mg PO daily  - Aspirin 325 mg PO Daily - being held by provider    Class I obesity: Complicating assessment and treatment. Placing patient at risk for multiple co-morbidities as well as early death and contributing to the patient's presentation. Education, and counseling when appropriate     Carpal Tunnel syndrome:  -Seeing physician, previous carpal tunnel surgery on R hand. Pending surgery on L hand. Arthritis:  -Meloxicam 7.5 mg PO Daily     Benign Prostatic Hyperplasia:  - Ct home: Tamsulosin  0.4 g PO      Code Status: Full Code   FEN: ADULT DIET;  Regular; 1200 ml  Diet NPO   PPX: SCDs  DISPO: MAGALIE Link, DPM, PGY-1  09/11/22  8:45 AM    This patient has been staffed and discussed with Rodney Crawford MD

## 2022-09-12 LAB
ANION GAP SERPL CALCULATED.3IONS-SCNC: 12 MMOL/L (ref 3–16)
BASOPHILS ABSOLUTE: 0 K/UL (ref 0–0.2)
BASOPHILS RELATIVE PERCENT: 0.2 %
BUN BLDV-MCNC: 19 MG/DL (ref 7–20)
CALCIUM SERPL-MCNC: 9.2 MG/DL (ref 8.3–10.6)
CHLORIDE BLD-SCNC: 99 MMOL/L (ref 99–110)
CO2: 23 MMOL/L (ref 21–32)
CREAT SERPL-MCNC: 0.7 MG/DL (ref 0.8–1.3)
EOSINOPHILS ABSOLUTE: 0 K/UL (ref 0–0.6)
EOSINOPHILS RELATIVE PERCENT: 0 %
GFR AFRICAN AMERICAN: >60
GFR NON-AFRICAN AMERICAN: >60
GLUCOSE BLD-MCNC: 131 MG/DL (ref 70–99)
HCT VFR BLD CALC: 42 % (ref 40.5–52.5)
HEMOGLOBIN: 13.8 G/DL (ref 13.5–17.5)
LYMPHOCYTES ABSOLUTE: 0.4 K/UL (ref 1–5.1)
LYMPHOCYTES RELATIVE PERCENT: 8 %
MCH RBC QN AUTO: 32 PG (ref 26–34)
MCHC RBC AUTO-ENTMCNC: 32.8 G/DL (ref 31–36)
MCV RBC AUTO: 97.5 FL (ref 80–100)
MONOCYTES ABSOLUTE: 0.2 K/UL (ref 0–1.3)
MONOCYTES RELATIVE PERCENT: 3.2 %
NEUTROPHILS ABSOLUTE: 4.9 K/UL (ref 1.7–7.7)
NEUTROPHILS RELATIVE PERCENT: 88.6 %
PDW BLD-RTO: 13.3 % (ref 12.4–15.4)
PHOSPHORUS: 3.6 MG/DL (ref 2.5–4.9)
PLATELET # BLD: 224 K/UL (ref 135–450)
PMV BLD AUTO: 7.9 FL (ref 5–10.5)
POTASSIUM REFLEX MAGNESIUM: 4.7 MMOL/L (ref 3.5–5.1)
RBC # BLD: 4.3 M/UL (ref 4.2–5.9)
SODIUM BLD-SCNC: 134 MMOL/L (ref 136–145)
WBC # BLD: 5.6 K/UL (ref 4–11)

## 2022-09-12 PROCEDURE — 6370000000 HC RX 637 (ALT 250 FOR IP): Performed by: NURSE PRACTITIONER

## 2022-09-12 PROCEDURE — 2060000000 HC ICU INTERMEDIATE R&B

## 2022-09-12 PROCEDURE — 6360000002 HC RX W HCPCS: Performed by: STUDENT IN AN ORGANIZED HEALTH CARE EDUCATION/TRAINING PROGRAM

## 2022-09-12 PROCEDURE — 2580000003 HC RX 258: Performed by: STUDENT IN AN ORGANIZED HEALTH CARE EDUCATION/TRAINING PROGRAM

## 2022-09-12 PROCEDURE — 6360000002 HC RX W HCPCS

## 2022-09-12 PROCEDURE — 85025 COMPLETE CBC W/AUTO DIFF WBC: CPT

## 2022-09-12 PROCEDURE — 6370000000 HC RX 637 (ALT 250 FOR IP)

## 2022-09-12 PROCEDURE — 6370000000 HC RX 637 (ALT 250 FOR IP): Performed by: STUDENT IN AN ORGANIZED HEALTH CARE EDUCATION/TRAINING PROGRAM

## 2022-09-12 PROCEDURE — 99233 SBSQ HOSP IP/OBS HIGH 50: CPT | Performed by: INTERNAL MEDICINE

## 2022-09-12 PROCEDURE — 2580000003 HC RX 258

## 2022-09-12 PROCEDURE — 36415 COLL VENOUS BLD VENIPUNCTURE: CPT

## 2022-09-12 PROCEDURE — 80048 BASIC METABOLIC PNL TOTAL CA: CPT

## 2022-09-12 PROCEDURE — 6360000002 HC RX W HCPCS: Performed by: INTERNAL MEDICINE

## 2022-09-12 PROCEDURE — 84100 ASSAY OF PHOSPHORUS: CPT

## 2022-09-12 RX ADMIN — HYDRALAZINE HYDROCHLORIDE 5 MG: 20 INJECTION INTRAMUSCULAR; INTRAVENOUS at 19:06

## 2022-09-12 RX ADMIN — HYDROMORPHONE HYDROCHLORIDE 0.5 MG: 1 INJECTION, SOLUTION INTRAMUSCULAR; INTRAVENOUS; SUBCUTANEOUS at 22:28

## 2022-09-12 RX ADMIN — METHOCARBAMOL 750 MG: 750 TABLET ORAL at 17:21

## 2022-09-12 RX ADMIN — METHOCARBAMOL 750 MG: 750 TABLET ORAL at 20:12

## 2022-09-12 RX ADMIN — LISINOPRIL 40 MG: 40 TABLET ORAL at 10:10

## 2022-09-12 RX ADMIN — HYDROMORPHONE HYDROCHLORIDE 0.5 MG: 1 INJECTION, SOLUTION INTRAMUSCULAR; INTRAVENOUS; SUBCUTANEOUS at 08:16

## 2022-09-12 RX ADMIN — DEXAMETHASONE 8 MG: 4 TABLET ORAL at 10:10

## 2022-09-12 RX ADMIN — MELOXICAM 7.5 MG: 7.5 TABLET ORAL at 10:14

## 2022-09-12 RX ADMIN — HYDROMORPHONE HYDROCHLORIDE 0.5 MG: 1 INJECTION, SOLUTION INTRAMUSCULAR; INTRAVENOUS; SUBCUTANEOUS at 17:29

## 2022-09-12 RX ADMIN — ATORVASTATIN CALCIUM 40 MG: 40 TABLET, FILM COATED ORAL at 10:10

## 2022-09-12 RX ADMIN — METOPROLOL SUCCINATE 50 MG: 50 TABLET, FILM COATED, EXTENDED RELEASE ORAL at 10:10

## 2022-09-12 RX ADMIN — METHOCARBAMOL 750 MG: 750 TABLET ORAL at 13:08

## 2022-09-12 RX ADMIN — HYDRALAZINE HYDROCHLORIDE 5 MG: 20 INJECTION INTRAMUSCULAR; INTRAVENOUS at 13:08

## 2022-09-12 RX ADMIN — SODIUM CHLORIDE, PRESERVATIVE FREE 10 ML: 5 INJECTION INTRAVENOUS at 13:11

## 2022-09-12 RX ADMIN — DEXAMETHASONE 8 MG: 4 TABLET ORAL at 20:12

## 2022-09-12 RX ADMIN — SALINE NASAL SPRAY 1 SPRAY: 1.5 SOLUTION NASAL at 17:22

## 2022-09-12 RX ADMIN — METHOCARBAMOL 750 MG: 750 TABLET ORAL at 10:10

## 2022-09-12 RX ADMIN — POLYVINYL ALCOHOL 1 DROP: 14 SOLUTION/ DROPS OPHTHALMIC at 17:22

## 2022-09-12 RX ADMIN — HYDROMORPHONE HYDROCHLORIDE 0.5 MG: 1 INJECTION, SOLUTION INTRAMUSCULAR; INTRAVENOUS; SUBCUTANEOUS at 03:32

## 2022-09-12 RX ADMIN — POLYETHYLENE GLYCOL 3350 17 G: 17 POWDER, FOR SOLUTION ORAL at 17:29

## 2022-09-12 RX ADMIN — THIAMINE HYDROCHLORIDE 100 MG: 100 INJECTION, SOLUTION INTRAMUSCULAR; INTRAVENOUS at 10:14

## 2022-09-12 RX ADMIN — POLYVINYL ALCOHOL 1 DROP: 14 SOLUTION/ DROPS OPHTHALMIC at 08:17

## 2022-09-12 RX ADMIN — SODIUM CHLORIDE, PRESERVATIVE FREE 10 ML: 5 INJECTION INTRAVENOUS at 20:12

## 2022-09-12 RX ADMIN — HYDRALAZINE HYDROCHLORIDE 5 MG: 20 INJECTION INTRAMUSCULAR; INTRAVENOUS at 03:32

## 2022-09-12 RX ADMIN — DIAZEPAM 5 MG: 5 TABLET ORAL at 20:12

## 2022-09-12 ASSESSMENT — PAIN DESCRIPTION - FREQUENCY
FREQUENCY: CONTINUOUS
FREQUENCY: INTERMITTENT
FREQUENCY: CONTINUOUS

## 2022-09-12 ASSESSMENT — PAIN SCALES - GENERAL
PAINLEVEL_OUTOF10: 3
PAINLEVEL_OUTOF10: 5
PAINLEVEL_OUTOF10: 7
PAINLEVEL_OUTOF10: 9
PAINLEVEL_OUTOF10: 3
PAINLEVEL_OUTOF10: 7
PAINLEVEL_OUTOF10: 7
PAINLEVEL_OUTOF10: 9
PAINLEVEL_OUTOF10: 3
PAINLEVEL_OUTOF10: 7

## 2022-09-12 ASSESSMENT — PAIN DESCRIPTION - ONSET
ONSET: ON-GOING
ONSET: SUDDEN

## 2022-09-12 ASSESSMENT — PAIN DESCRIPTION - LOCATION
LOCATION: EAR;JAW
LOCATION: NECK;SHOULDER

## 2022-09-12 ASSESSMENT — PAIN DESCRIPTION - DESCRIPTORS
DESCRIPTORS: ACHING
DESCRIPTORS: ACHING
DESCRIPTORS: ACHING;TIGHTNESS
DESCRIPTORS: ACHING;DISCOMFORT
DESCRIPTORS: ACHING;DISCOMFORT

## 2022-09-12 ASSESSMENT — PAIN DESCRIPTION - ORIENTATION
ORIENTATION: RIGHT
ORIENTATION: RIGHT;LEFT
ORIENTATION: LEFT

## 2022-09-12 ASSESSMENT — PAIN DESCRIPTION - PAIN TYPE
TYPE: ACUTE PAIN;CHRONIC PAIN
TYPE: ACUTE PAIN
TYPE: CHRONIC PAIN

## 2022-09-12 ASSESSMENT — PAIN - FUNCTIONAL ASSESSMENT
PAIN_FUNCTIONAL_ASSESSMENT: PREVENTS OR INTERFERES SOME ACTIVE ACTIVITIES AND ADLS
PAIN_FUNCTIONAL_ASSESSMENT: ACTIVITIES ARE NOT PREVENTED
PAIN_FUNCTIONAL_ASSESSMENT: ACTIVITIES ARE NOT PREVENTED
PAIN_FUNCTIONAL_ASSESSMENT: PREVENTS OR INTERFERES SOME ACTIVE ACTIVITIES AND ADLS
PAIN_FUNCTIONAL_ASSESSMENT: PREVENTS OR INTERFERES SOME ACTIVE ACTIVITIES AND ADLS

## 2022-09-12 NOTE — PROGRESS NOTES
Pt was given PRN hydralazine for /103 and PRN dilaudid for 8/10 neck and shoulder pain. His BP is now 157/106, but HR is starting to drop while pt is sleeping. On telemetry, pt's HR hit 38 bpm twice, for only about a second each time, and then increased back to 60-70s. MD notified. Will continue to monitor pt's HR and BP.

## 2022-09-12 NOTE — PLAN OF CARE
Monitor temperature, glucose, and sodium. Initiate appropriate interventions as ordered  Note: Pt is alert and oriented x4 and follows commands appropriately. Problem: Cardiovascular - Adult  Goal: Maintains optimal cardiac output and hemodynamic stability  Outcome: Progressing  Flowsheets (Taken 9/12/2022 0154)  Maintains optimal cardiac output and hemodynamic stability:   Monitor blood pressure and heart rate   Monitor urine output and notify Licensed Independent Practitioner for values outside of normal range   Assess for signs of decreased cardiac output  Note: Pt is on continuous telemetry and heart rhythm is NSR. Pt's blood pressure has been elevated during shift. MD notified. Problem: Respiratory - Adult  Goal: Achieves optimal ventilation and oxygenation  Outcome: Progressing  Flowsheets (Taken 9/12/2022 0154)  Achieves optimal ventilation and oxygenation:   Assess for changes in respiratory status   Position to facilitate oxygenation and minimize respiratory effort   Assess the need for suctioning and aspirate as needed   Respiratory therapy support as indicated   Assess for changes in mentation and behavior   Oxygen supplementation based on oxygen saturation or arterial blood gases   Encourage broncho-pulmonary hygiene including cough, deep breathe, incentive spirometry   Assess and instruct to report shortness of breath or any respiratory difficulty   Initiate smoking cessation protocol as indicated  Note: Pt remains on RA and SpO2 has been WNL.

## 2022-09-12 NOTE — PROGRESS NOTES
Pt's blood pressure is 190/104 next dose of PRN hydralazine is not available until 2320, MD notified. Will give pt his PRN pain med and continue to monitor BP.

## 2022-09-12 NOTE — PROGRESS NOTES
09/12/22 1357   Encounter Summary   Encounter Overview/Reason  Initial Encounter   Service Provided For: Patient   Referral/Consult From: Nurse   Support System Children   Last Encounter    (es 9/12)   Complexity of Encounter Moderate   Begin Time 1320   End Time  1340   Total Time Calculated 20 min   Assessment/Intervention/Outcome   Assessment Anxious; Fearful   Intervention Active listening;Discussed belief system/Mandaen practices/emma;Discussed illness injury and its impact; Explored/Affirmed feelings, thoughts, concerns;Explored Coping Skills/Resources;Prayer (assurance of)/[place]   Outcome Engaged in conversation;Expressed feelings, needs, and concerns;Receptive;Venting emotion   Plan and Referrals   Plan/Referrals Other (Comment)  (as needed)

## 2022-09-12 NOTE — CARE COORDINATION
needed, please contact unit  or ;  or  CANNOT provide pharmacy voucher for patients co-pays  5. Patients can then  the prescription on their way out of the hospital at discharge, or pharmacy can deliver to the bedside if staff is available. (payment due at time of pick-up or delivery - cash, check, or card accepted)     Able to afford home medications/ co-pay costs: Yes    ADLS  Support Systems: Family Members    PT AM-PAC:   /24  OT AM-PAC:   /24    [place]: apartment  Steps: elevator    Plans to RETURN to current housing: Yes  Barriers to RETURNING to current housing: none    Johan Lynne 80  Currently ACTIVE with [address]: No  Home Care Agency: Not Applicable          Durable Medical Equipment  DME Provider: n/a  Equipment: n/a    Home Oxygen and [address] [place] prior to admission: No  [address]: Not Applicable      Dialysis  Active with HD/PD prior to admission: No  Nephrologist:     HD Center:  Not Applicable    DISCHARGE PLAN:  Disposition: Home- No Services Needed    Transportation PLAN for discharge: family     Factors facilitating achievement of predicted outcomes: Family support    Barriers to discharge: Limited insight into deficits    Additional Case Management Notes:  Patient is from home alone, independent pta. Patient has a pending surgery later this week, may need HHC or equipment at discharge. Will continue to follow. The Plan for Transition of Care is related to the following treatment goals of Cord compression [facility]) [G95.20]    The Patient and/or patient representative [facility] and his family were provided with a choice of provider and agrees with the discharge plan Yes    Freedom of choice list was provided with basic dialogue that supports the patient's individualized plan of care/goals and shares the quality data associated with the providers.  Yes    Care Transition patient: Nathaly Powell RN  [facility]  Case Management Department  Ph: [phone]   Fax: [phone]

## 2022-09-12 NOTE — PROGRESS NOTES
Patient reported urgent need to have BM, this RN explained that he was on strict bedrest without bathroom privileges and would need to use a bedpan. Patient refused to use bedpan and proceeded to get out of bed and attempt to walk to the bathroom. This RN assisted patient for his safety and left a message with the neurosurgery answering service about the refusal to remain in bed. Will continue to monitor for changes in neuro assessment and educate on the importance of remaining on bedrest and wearing cervical collar.

## 2022-09-12 NOTE — PROGRESS NOTES
NEUROSURGERY     CHELSIE Becerra   [id number]   1955 9/12/2022    Interval History:  Hospital Day #3    Subjective: patient sitting up in bed. Collar off and lying in bed. Eating breakfast.     Objective:  BP (!) 160/94   Pulse 80   Temp 97.4 °F (36.3 °C) (Oral)   Resp 16   Ht 6' 2\" (1.88 m)   Wt 254 lb 6.6 oz (115.4 kg)   SpO2 96%   BMI 32.66 kg/m²     Labs:  Recent Labs     09/10/22  0537 09/11/22  0441 09/12/22  0453   * 134* 134*    101 99   CO2 20* 24 23   BUN 16 18 19   CREATININE 0.8 0.8 0.7*   GLUCOSE 134* 143* 131*     Recent Labs     09/10/22  0537 09/11/22  0441 09/12/22  0453   WBC 3.9* 5.8 5.6   RBC 4.20 4.23 4.30   INR 1.02  --   --        Neurologic Exam:  GCS:  4 - Opens eyes on own  5 - Alert and oriented  6 - Follows simple motor commands    Mental Status: Awake, alert, oriented x 4, speech clear and appropriate  Language: No aphasia or dysarthria noted  Sensation: Intact to all extremities to light touch  Coordination: Intact      Musculoskeletal:   Gait: Not tested   Tone:   Motor strength:    Right  Left    Right  Left    Deltoid  4 4  Hip Flex  5 5   Biceps  4 4  Knee Extensors  5 5   Triceps  4 4  Knee Flexors  5 5   Wrist Ext  4 4  Ankle Dorsiflex. 5 5   Wrist Flex  4 4  Ankle Plantarflex. 5 5   Handgrip  4 4       Thumb Ext  4 4           Assessment   73 yo Male patient with severe central stenosis at C2/3 and mobile spondylolisthesis with STIR signal change posteriorly at this level. Plan:        1. No neurosurgical intervention indicated  Plan for surgery on Thursday with Dr. David Khan strict precautions   Must wear C-collar at all times; reviewed with patient the importance of wearing the collar.          2. Neurologic exams frequency:  - Floor: Q4H until otherwise directed  For change in exam MUST contact neurosurgery team along with critical care or primary team  SCDs  and lovenox for DVT prophylaxis  GI Prophylaxis: Pepcid  BP management by medicine team      DISPO- Remain inpatient with plan for the OR later this week. ADRYAN Martell  Neurosurgery Nurse Practitioner  [phone]  Patient was discussed with Dr. Gaby Domínguez who agrees with above assessment and plan.      Electronically signed by: DOUGLAS Reid CNP, 9/12/2022 11:04 AM

## 2022-09-12 NOTE — PROGRESS NOTES
Progress Note    Admit Date: 9/9/2022  Day: 3  Diet: ADULT DIET; Regular; 1200 ml  Diet NPO    CC: Back pain    Interval history:    Patient was hypertensive overnight to 190/104. Nurse gave 2 doses of hydralazine and his pressured lowered to 157/106. Patient was satting well on room air. Patient seen and examined at bed side. Patient has been compliant wearing C-collar. Patient reports back pain and numbness has not improved. Patient reports his pain is being well-managed with the current pain regimen. Patient denies shortness of breath, chest pain, chills, nausea, vomiting. He denies urinary frequency, dysuria. Patient is hemodynamically stable and afebrile. HPI:   Guy Curtis a 74 y.o. M with PMHx of anterolisthesis of cervical spine, A.fib s/p ablation, HTN, HLD, CAD, BPH, Carpal Tunnel, and herniated disc p/w back pain, numbness in arms and legs, and weakness in arms and legs. Patient has had progressive cervical pain for past two months which he has been seeing someone, unable to find documentation. He is scheduled for a surgery next week. Back pain is worse when laying down. He had a herniated disc in 2000 s/p spinal surgery and reports intermittent back pain since then. Back pain has been accompanied with numbness and weakness in arms and legs. Pain in legs was described as shooting pain down to toes. Leg pain also associated with involuntary muscle spasms causing him to be off balance. Patient came in with Na of 131. Was initially 126 while in [facility] 09/08. Patient reported dizziness, fatigue, and sometimes confusion. Denied headache and nausea. Patient has fluid on knee that is drained every 3 months for last 2 years. He had a carpal tunnel release surgery on right hand. Still has hand weakness and numbness B/L. Patient was seen at [facility] ED where he received 10 mg IV Decadron, Dilaudid, Robaxin. He had no relief.  CT of cervical, thoracic, and lumbar spine was performed. Patient became anxious during imaging and only Cervical CT was clear for impression. Medications:     Scheduled Meds:   nicotine  1 patch TransDERmal Daily    thiamine  100 mg IntraVENous Daily    sodium chloride flush  5-40 mL IntraVENous 2 times per day    [Held by provider] aspirin  325 mg Oral Daily    atorvastatin  40 mg Oral Daily    lisinopril  40 mg Oral Daily    meloxicam  7.5 mg Oral Daily    metoprolol succinate  50 mg Oral Daily    sodium chloride flush  5-40 mL IntraVENous 2 times per day    [Held by provider] enoxaparin  30 mg SubCUTAneous BID    methocarbamol  750 mg Oral 4x Daily    dexamethasone  8 mg Oral 2 times per day     Continuous Infusions:   sodium chloride      sodium chloride       PRN Meds:hydrALAZINE, polyvinyl alcohol, sodium chloride, sodium chloride flush, sodium chloride, sodium chloride flush, sodium chloride, ondansetron **OR** ondansetron, polyethylene glycol, acetaminophen **OR** acetaminophen, HYDROmorphone **OR** HYDROmorphone, diazePAM    Objective:   Vitals:   T-max:  Patient Vitals for the past 8 hrs:   BP Temp Temp src Pulse Resp SpO2 Weight   09/12/22 1008 (!) 160/94 -- -- 80 -- -- --   09/12/22 0801 (!) 184/91 97.4 °F (36.3 °C) Oral 61 16 96 % --   09/12/22 0454 (!) 157/106 -- -- -- -- -- 254 lb 6.6 oz (115.4 kg)   09/12/22 0402 -- -- -- -- 20 -- --       Intake/Output Summary (Last 24 hours) at 9/12/2022 1148  Last data filed at 9/12/2022 0727  Gross per 24 hour   Intake 520 ml   Output 2225 ml   Net -1705 ml       ROS: A 10 point review of systems was conducted, significant findings as noted in HPI. Physical Exam  Constitutional:       Appearance: Normal appearance. HENT:      Head: Normocephalic. Mouth/Throat:      Mouth: Mucous membranes are moist.   Eyes:      Extraocular Movements: Extraocular movements intact. Conjunctiva/sclera: Conjunctivae normal.      Pupils: Pupils are equal, round, and reactive to light.    Cardiovascular: Rate and Rhythm: Normal rate and regular rhythm. Pulses: Normal pulses. Heart sounds: Normal heart sounds. Pulmonary:      Effort: Pulmonary effort is normal.      Breath sounds: Normal breath sounds. Abdominal:      General: Abdomen is flat. Bowel sounds are normal.      Palpations: Abdomen is soft. Musculoskeletal:         General: Deformity (Deformity on DIP of L 2nd digit on hand. Power 3/5 in arms. 4/5 in legs.) present. Normal range of motion. Cervical back: Normal range of motion. Neurological:      General: No focal deficit present. Mental Status: He is alert and oriented to person, place, and time. Mental status is at baseline. Psychiatric:         Mood and Affect: Mood normal.         Behavior: Behavior normal.       LABS:    CBC:   Recent Labs     09/10/22  0537 09/11/22  0441 09/12/22  0453   WBC 3.9* 5.8 5.6   HGB 13.7 13.6 13.8   HCT 40.9 41.3 42.0    206 224   MCV 97.2 97.7 97.5     Renal:    Recent Labs     09/10/22  0537 09/11/22  0441 09/12/22  0453   * 134* 134*   K 4.6 4.7 4.7    101 99   CO2 20* 24 23   BUN 16 18 19   CREATININE 0.8 0.8 0.7*   GLUCOSE 134* 143* 131*   CALCIUM 9.0 8.9 9.2   ANIONGAP 13 9 12     Hepatic:   No results for input(s): AST, ALT, BILITOT, BILIDIR, PROT, LABALBU, ALKPHOS in the last 72 hours. Troponin:   No results for input(s): TROPONINI in the last 72 hours. BNP: No results for input(s): BNP in the last 72 hours. Lipids: No results for input(s): CHOL, HDL in the last 72 hours. Invalid input(s): LDLCALCU, TRIGLYCERIDE  ABGs:  No results for input(s): PHART, IWE6YYE, PO2ART, QXQ0WRZ, BEART, THGBART, P2WKWNLH, FTA4QCH in the last 72 hours. INR:   Recent Labs     09/10/22  0537   INR 1.02     Lactate: No results for input(s): LACTATE in the last 72 hours. Cultures:  -----------------------------------------------------------------  RAD:   CT CERVICAL SPINE WO CONTRAST   Final Result   1.  Redemonstration of anterolisthesis of C2 on C3 without obvious fracture. There is severe facet arthrosis at this level   2. Redemonstration of anterolisthesis of C4-5 likely related to severe facet arthrosis at this level   3. Minimal retrolisthesis of C3 on C4 redemonstrated, likely related to severe degenerative disc disease at this level as well as associated facet arthrosis   4. Marked degenerative disc disease C3-4, C5-6, C6-7, C7-T1   5. Diffuse severe facet arthrosis            XR CERVICAL SPINE (2-3 VIEWS)   Final Result      Grade 1 anterolisthesis of C2 on C3. Multilevel moderate to severe degenerative disc disease. MRI LUMBAR SPINE WO CONTRAST   Final Result   1. Multilevel severe central canal and foraminal stenosis L2-3 through L5-S1, secondary to multifactorial degenerative changes and posterior epidural lipomatosis   2. Redundancy in edematous nerve roots at the L1-2 level secondary to chronic lumbar central canal stenosis. 2. Extruded disc herniation L3-L4 with left lateral recess and subarticular migration      Compared prior study there is been progression of spinal stenosis secondary to chronic multifactorial degenerative changes posterior epidural fat. No change in the degree of facet arthropathy and foraminal stenosis. MRI THORACIC SPINE WO CONTRAST   Final Result      1. Mild scoliosis and degenerative disc disease concordant with prior CT   2. No abnormal cord signal, central canal, compression or canal stenosis         MRI CERVICAL SPINE WO CONTRAST   Final Result      1. Cord myelopathy, cord edema with hyperintense signal noted on STIR sequence at the C2-3 and C3 level   2. Severe central canal stenosis C2-C3 which is improved with the patient's collar on compared to prior study   3. Severe canal stenosis C3-C4 with central disc herniation, also slightly improved with the patient's collar on during the study   4.  Multilevel bilateral foraminal stenosis Assessment/Plan:   Guy Curtis a 74 y.o. M with PMHx of anterolisthesis of cervical spine, A.fib s/p ablation, HTN, HLD, CAD, BPH, Carpal Tunnel syndrome s/p unilateral surgery, and herniated disc (2000) s/p surgery p/w back pain, numbness in arms and legs, and weakness in arms and legs. Patient was admitted for further work up and management of spinal stenosis      Cervical cord compression with edema  -Patient presented to [facility] with back pain (9/8/22) where he was directly admitted to [facility] for Neurosurgery. Patient reports back pain that radiates down arms and legs. MRI spine showing. Severe central canal and foraminal stenosis L2-3, L5-S1, edematous nerve roots L1-2. Disc herniation L3-4. Severe canal stenosis C2-C4 with central disc herniation  -Patient compliant with cervical collar  -Neurosurgery following, planning for surgery on Thursday  -Decadron 8 mg PO every 12 hours  - Robaxin 1000 mg IV at 200mL/hr over 30 minutes every 8 hours  -Dilaudid pain panel  -Acetaminophen 650 mg PO PRN     Hyponatremia (improving)  -Na 134 today  -Hyponatremia is improving, Na 134 09/08. Patient reports dizziness, fatigue and confusion. Chronic Conditions    Alcohol consumption  -drinks 3-4 cans of beer daily  -Last drink 3 days ago  -Stopped Thiamine IV daily  -Started Thiamine PO daily   -CIWA protocol   -Valium 5 mg prn    Essential Hypertension:   -Continue home medication: Lisinopril 40 mg PO daily    Hyperlipidemia:   -Ct home medication: atorvastatin 40 PO Daily     Paroxysmal Atrial fibrilliation status post ablation:  Continue Home Medication:  - Metoprolol succinate 50 mg PO daily  - Aspirin 325 mg PO Daily - being held by provider    Class I obesity: Complicating assessment and treatment. Placing patient at risk for multiple co-morbidities as well as early death and contributing to the patient's presentation.  Education, and counseling when appropriate     Carpal Tunnel syndrome:  -Seeing physician, previous carpal tunnel surgery on R hand. Pending surgery on L hand. Arthritis:  -Meloxicam 7.5 mg PO Daily     Benign Prostatic Hyperplasia:  - Ct home: Tamsulosin  0.4 g PO    Tobacco smoker:  - Nicoderm patch      Code Status: Full Code   FEN: ADULT DIET; Regular; 1200 ml  Diet NPO   PPX: SCDs  DISPO: [facility]    Gracia Robertson DPM, PGY-1  09/12/22  11:48 AM    This patient has been staffed and discussed with Romie Martin MD    Patient seen and examined, labs and imaging studies reviewed, agree with assessment and plan as outlined above. Continue with current care and plan. Discussed case with patients nurse, discussed case with care team, discussed plan.       Romie Martin MD [address]

## 2022-09-12 NOTE — PLAN OF CARE
Problem: Discharge Planning  Goal: Discharge to home or other facility with appropriate resources  Outcome: Progressing  Flowsheets (Taken 9/12/2022 0805)  Discharge to home or other facility with appropriate resources: Identify barriers to discharge with patient and caregiver     Problem: Pain  Goal: Verbalizes/displays adequate comfort level or baseline comfort level  9/12/2022 1516 by Chava Gilman RN  Outcome: Progressing  Flowsheets (Taken 9/12/2022 0801)  Verbalizes/displays adequate comfort level or baseline comfort level: Encourage patient to monitor pain and request assistance  9/12/2022 0154 by Pedro Pablo Cortes  Outcome: Progressing  Flowsheets (Taken 9/12/2022 0154)  Verbalizes/displays adequate comfort level or baseline comfort level:   Encourage patient to monitor pain and request assistance   Administer analgesics based on type and severity of pain and evaluate response   Assess pain using appropriate pain scale   Implement non-pharmacological measures as appropriate and evaluate response   Notify Licensed Independent Practitioner if interventions unsuccessful or patient reports new pain  Note: Pt reported neck and shoulder pain during shift. Pt was given PRN dilaudid. Will continue to monitor pt's pain level. Problem: Safety - Adult  Goal: Free from fall injury  9/12/2022 1516 by Chava Gilman RN  Outcome: Progressing  Flowsheets (Taken 9/12/2022 1515)  Free From Fall Injury: Instruct family/caregiver on patient safety  9/12/2022 0154 by Pedro Pablo Cortes  Outcome: Progressing  Flowsheets  Taken 9/12/2022 0154  Free From Fall Injury: Instruct family/caregiver on patient safety  Taken 9/11/2022 2201  Free From Fall Injury: Instruct family/caregiver on patient safety  Note: Pt will remain free from accidental injury this shift. Pt has fall risk measures (fall risk bracelet, fall risk sign, fall risk cloth, non-slip socks, dome light on) in place.  Pt bed is in low position, bed alarm on, bed wheels locked, call light within reach, bedside table within reach, chair wheels locked, chair alarm on. Problem: ABCDS Injury Assessment  Goal: Absence of physical injury  9/12/2022 1516 by Cyn Banegas RN  Outcome: Progressing  Flowsheets (Taken 9/12/2022 1515)  Absence of Physical Injury: Implement safety measures based on patient assessment  9/12/2022 0154 by Teena Alvarez  Outcome: Progressing  Flowsheets (Taken 9/11/2022 2201)  Absence of Physical Injury: Implement safety measures based on patient assessment     Problem: Neurosensory - Adult  Goal: Achieves stable or improved neurological status  9/12/2022 1516 by Cyn Banegas RN  Outcome: Progressing  Flowsheets (Taken 9/12/2022 0154 by Teena Alvarez)  Achieves stable or improved neurological status:   Assess for and report changes in neurological status   Initiate measures to prevent increased intracranial pressure   Maintain blood pressure and fluid volume within ordered parameters to optimize cerebral perfusion and minimize risk of hemorrhage   Monitor temperature, glucose, and sodium. Initiate appropriate interventions as ordered  9/12/2022 0154 by Teena Alvarez  Outcome: Progressing  Flowsheets (Taken 9/12/2022 0154)  Achieves stable or improved neurological status:   Assess for and report changes in neurological status   Initiate measures to prevent increased intracranial pressure   Maintain blood pressure and fluid volume within ordered parameters to optimize cerebral perfusion and minimize risk of hemorrhage   Monitor temperature, glucose, and sodium. Initiate appropriate interventions as ordered  Note: Pt is alert and oriented x4 and follows commands appropriately.      Problem: Cardiovascular - Adult  Goal: Maintains optimal cardiac output and hemodynamic stability  9/12/2022 1516 by Cyn Banegas RN  Outcome: Progressing  Flowsheets (Taken 9/12/2022 0154 by Teena Alvarez)  Maintains optimal cardiac output and hemodynamic stability:   Monitor blood pressure and heart rate   Monitor urine output and notify Licensed Independent Practitioner for values outside of normal range   Assess for signs of decreased cardiac output  9/12/2022 0154 by Ronald Nina  Outcome: Progressing  Flowsheets (Taken 9/12/2022 0154)  Maintains optimal cardiac output and hemodynamic stability:   Monitor blood pressure and heart rate   Monitor urine output and notify Licensed Independent Practitioner for values outside of normal range   Assess for signs of decreased cardiac output  Note: Pt is on continuous telemetry and heart rhythm is NSR. Pt's blood pressure has been elevated during shift. MD notified. Problem: Respiratory - Adult  Goal: Achieves optimal ventilation and oxygenation  9/12/2022 1516 by Genesis Verde RN  Outcome: Progressing  Flowsheets (Taken 9/12/2022 0805)  Achieves optimal ventilation and oxygenation: Assess for changes in respiratory status  9/12/2022 0154 by Ronald Nina  Outcome: Progressing  Flowsheets (Taken 9/12/2022 0154)  Achieves optimal ventilation and oxygenation:   Assess for changes in respiratory status   Position to facilitate oxygenation and minimize respiratory effort   Assess the need for suctioning and aspirate as needed   Respiratory therapy support as indicated   Assess for changes in mentation and behavior   Oxygen supplementation based on oxygen saturation or arterial blood gases   Encourage broncho-pulmonary hygiene including cough, deep breathe, incentive spirometry   Assess and instruct to report shortness of breath or any respiratory difficulty   Initiate smoking cessation protocol as indicated  Note: Pt remains on RA and SpO2 has been WNL.

## 2022-09-12 NOTE — PROGRESS NOTES
PRN  [Held by provider] aspirin tablet 325 mg, Daily  atorvastatin (LIPITOR) tablet 40 mg, Daily  lisinopril (PRINIVIL;ZESTRIL) tablet 40 mg, Daily  meloxicam (MOBIC) tablet 7.5 mg, Daily  metoprolol succinate (TOPROL XL) extended release tablet 50 mg, Daily  sodium chloride flush 0.9 % injection 5-40 mL, 2 times per day  sodium chloride flush 0.9 % injection 5-40 mL, PRN  0.9 % sodium chloride infusion, PRN  ondansetron (ZOFRAN-ODT) disintegrating tablet 4 mg, Q8H PRN   Or  ondansetron (ZOFRAN) injection 4 mg, Q6H PRN  polyethylene glycol (GLYCOLAX) packet 17 g, Daily PRN  acetaminophen (TYLENOL) tablet 650 mg, Q6H PRN   Or  acetaminophen (TYLENOL) suppository 650 mg, Q6H PRN  HYDROmorphone (DILAUDID) injection 0.25 mg, Q4H PRN   Or  HYDROmorphone (DILAUDID) injection 0.5 mg, Q3H PRN  [Held by provider] enoxaparin Sodium (LOVENOX) injection 30 mg, BID  methocarbamol (ROBAXIN) tablet 750 mg, 4x Daily  diazePAM (VALIUM) tablet 5 mg, Q6H PRN  dexamethasone (DECADRON) tablet 8 mg, 2 times per day          Physical exam:     Vitals:  BP (!) 160/94   Pulse 80   Temp 97.4 °F (36.3 °C) (Oral)   Resp 16   Ht 6' 2\" (1.88 m)   Wt 254 lb 6.6 oz (115.4 kg)   SpO2 96%   BMI 32.66 kg/m²   Constitutional:  OAA X3 NAD  Skin: no rash, turgor wnl  Heent:  eomi, mmm  Neck: no bruits or jvd noted  Cardiovascular:  S1, S2 without m/r/g  Respiratory: CTA B without w/r/r  Abdomen:  +bs, soft, nt, nd  Ext: no  lower extremity edema  Psychiatric: mood and affect appropriate  Musculoskeletal:  Rom, muscular strength intact    Labs:  CBC:   Recent Labs     09/10/22  0537 09/11/22  0441 09/12/22  0453   WBC 3.9* 5.8 5.6   HGB 13.7 13.6 13.8    206 224       BMP:    Recent Labs     09/10/22  0537 09/11/22  0441 09/12/22  0453   * 134* 134*   K 4.6 4.7 4.7    101 99   CO2 20* 24 23   BUN 16 18 19   CREATININE 0.8 0.8 0.7*   GLUCOSE 134* 143* 131*       Ca/Mg/Phos:   Recent Labs     09/10/22  0537 09/11/22  0441 09/12/22  0453   CALCIUM 9.0 8.9 9.2       Hepatic:   No results for input(s): AST, ALT, ALB, BILITOT, ALKPHOS in the last 72 hours. Troponin:   No results for input(s): TROPONINI in the last 72 hours. BNP: No results for input(s): BNP in the last 72 hours. Lipids: No results for input(s): CHOL, TRIG, HDL, LDLCALC, LABVLDL in the last 72 hours. ABGs: No results for input(s): PHART, PO2ART, AEN3FSE in the last 72 hours. INR:   Recent Labs     09/10/22  0537   INR 1.02       UA:  No results for input(s): COLORU, CLARITYU, GLUCOSEU, BILIRUBINUR, KETUA, SPECGRAV, BLOODU, PHUR, PROTEINU, UROBILINOGEN, NITRU, LEUKOCYTESUR, Pearlean Pyo in the last 72 hours. Urine Microscopic:   No results for input(s): LABCAST, BACTERIA, COMU, HYALCAST, WBCUA, RBCUA, EPIU in the last 72 hours. Urine Culture: No results for input(s): LABURIN in the last 72 hours. Urine Chemistry: No results for input(s): Lacretia All, PROTEINUR, NAUR in the last 72 hours. IMAGING:  CT CERVICAL SPINE WO CONTRAST   Final Result   1. Redemonstration of anterolisthesis of C2 on C3 without obvious fracture. There is severe facet arthrosis at this level   2. Redemonstration of anterolisthesis of C4-5 likely related to severe facet arthrosis at this level   3. Minimal retrolisthesis of C3 on C4 redemonstrated, likely related to severe degenerative disc disease at this level as well as associated facet arthrosis   4. Marked degenerative disc disease C3-4, C5-6, C6-7, C7-T1   5. Diffuse severe facet arthrosis            XR CERVICAL SPINE (2-3 VIEWS)   Final Result      Grade 1 anterolisthesis of C2 on C3. Multilevel moderate to severe degenerative disc disease. MRI LUMBAR SPINE WO CONTRAST   Final Result   1. Multilevel severe central canal and foraminal stenosis L2-3 through L5-S1, secondary to multifactorial degenerative changes and posterior epidural lipomatosis   2.  Redundancy in edematous nerve roots at the L1-2 level secondary to chronic lumbar central canal stenosis. 2. Extruded disc herniation L3-L4 with left lateral recess and subarticular migration      Compared prior study there is been progression of spinal stenosis secondary to chronic multifactorial degenerative changes posterior epidural fat. No change in the degree of facet arthropathy and foraminal stenosis. MRI THORACIC SPINE WO CONTRAST   Final Result      1. Mild scoliosis and degenerative disc disease concordant with prior CT   2. No abnormal cord signal, central canal, compression or canal stenosis         MRI CERVICAL SPINE WO CONTRAST   Final Result      1. Cord myelopathy, cord edema with hyperintense signal noted on STIR sequence at the C2-3 and C3 level   2. Severe central canal stenosis C2-C3 which is improved with the patient's collar on compared to prior study   3. Severe canal stenosis C3-C4 with central disc herniation, also slightly improved with the patient's collar on during the study   4. Multilevel bilateral foraminal stenosis                 Assessment/Plan :      1. Hyponatremia. Likely 2/2 fluid intake . Drinks excess beer  Limit daily fluid intake to 1200 ml   Stop IV fluids   Urine sodium and urine osmolality. 2. HTN. Off IVF   Pain control     3. Acute cervical cord compression with edema:  10 mg IV Decadron given  Neurosurgery consulted.         Monitor sodium levels               Thank you for allowing us to participate in care of [facility]         Electronically signed by: Neil Jones MD, 9/12/2022, 12:06 PM      [facility] [address]  Office : [phone]  Fax :[phone]

## 2022-09-13 PROBLEM — Z01.818 PRE-OP EVALUATION: Status: ACTIVE | Noted: 2022-09-13

## 2022-09-13 LAB
ANION GAP SERPL CALCULATED.3IONS-SCNC: 14 MMOL/L (ref 3–16)
BASOPHILS ABSOLUTE: 0 K/UL (ref 0–0.2)
BASOPHILS RELATIVE PERCENT: 0.1 %
BUN BLDV-MCNC: 20 MG/DL (ref 7–20)
CALCIUM SERPL-MCNC: 9.6 MG/DL (ref 8.3–10.6)
CHLORIDE BLD-SCNC: 96 MMOL/L (ref 99–110)
CO2: 25 MMOL/L (ref 21–32)
CREAT SERPL-MCNC: 0.7 MG/DL (ref 0.8–1.3)
EKG ATRIAL RATE: 58 BPM
EKG DIAGNOSIS: NORMAL
EKG P AXIS: 26 DEGREES
EKG P-R INTERVAL: 190 MS
EKG Q-T INTERVAL: 406 MS
EKG QRS DURATION: 120 MS
EKG QTC CALCULATION (BAZETT): 398 MS
EKG R AXIS: -22 DEGREES
EKG T AXIS: 84 DEGREES
EKG VENTRICULAR RATE: 58 BPM
EOSINOPHILS ABSOLUTE: 0 K/UL (ref 0–0.6)
EOSINOPHILS RELATIVE PERCENT: 0.1 %
GFR AFRICAN AMERICAN: >60
GFR NON-AFRICAN AMERICAN: >60
GLUCOSE BLD-MCNC: 140 MG/DL (ref 70–99)
HCT VFR BLD CALC: 45.2 % (ref 40.5–52.5)
HEMOGLOBIN: 15 G/DL (ref 13.5–17.5)
LYMPHOCYTES ABSOLUTE: 0.6 K/UL (ref 1–5.1)
LYMPHOCYTES RELATIVE PERCENT: 10.7 %
MCH RBC QN AUTO: 32.2 PG (ref 26–34)
MCHC RBC AUTO-ENTMCNC: 33.2 G/DL (ref 31–36)
MCV RBC AUTO: 97 FL (ref 80–100)
MONOCYTES ABSOLUTE: 0.2 K/UL (ref 0–1.3)
MONOCYTES RELATIVE PERCENT: 3.4 %
NEUTROPHILS ABSOLUTE: 5.1 K/UL (ref 1.7–7.7)
NEUTROPHILS RELATIVE PERCENT: 85.7 %
PDW BLD-RTO: 13.5 % (ref 12.4–15.4)
PLATELET # BLD: 232 K/UL (ref 135–450)
PMV BLD AUTO: 7.9 FL (ref 5–10.5)
POTASSIUM REFLEX MAGNESIUM: 5 MMOL/L (ref 3.5–5.1)
RBC # BLD: 4.66 M/UL (ref 4.2–5.9)
SODIUM BLD-SCNC: 135 MMOL/L (ref 136–145)
WBC # BLD: 5.9 K/UL (ref 4–11)

## 2022-09-13 PROCEDURE — 6370000000 HC RX 637 (ALT 250 FOR IP)

## 2022-09-13 PROCEDURE — 6360000002 HC RX W HCPCS: Performed by: STUDENT IN AN ORGANIZED HEALTH CARE EDUCATION/TRAINING PROGRAM

## 2022-09-13 PROCEDURE — 6370000000 HC RX 637 (ALT 250 FOR IP): Performed by: NURSE PRACTITIONER

## 2022-09-13 PROCEDURE — 6360000002 HC RX W HCPCS: Performed by: INTERNAL MEDICINE

## 2022-09-13 PROCEDURE — 85025 COMPLETE CBC W/AUTO DIFF WBC: CPT

## 2022-09-13 PROCEDURE — 99233 SBSQ HOSP IP/OBS HIGH 50: CPT | Performed by: INTERNAL MEDICINE

## 2022-09-13 PROCEDURE — 6370000000 HC RX 637 (ALT 250 FOR IP): Performed by: STUDENT IN AN ORGANIZED HEALTH CARE EDUCATION/TRAINING PROGRAM

## 2022-09-13 PROCEDURE — 80048 BASIC METABOLIC PNL TOTAL CA: CPT

## 2022-09-13 PROCEDURE — 2580000003 HC RX 258

## 2022-09-13 PROCEDURE — 99223 1ST HOSP IP/OBS HIGH 75: CPT | Performed by: INTERNAL MEDICINE

## 2022-09-13 PROCEDURE — 6370000000 HC RX 637 (ALT 250 FOR IP): Performed by: INTERNAL MEDICINE

## 2022-09-13 PROCEDURE — 93010 ELECTROCARDIOGRAM REPORT: CPT | Performed by: INTERNAL MEDICINE

## 2022-09-13 PROCEDURE — 2580000003 HC RX 258: Performed by: STUDENT IN AN ORGANIZED HEALTH CARE EDUCATION/TRAINING PROGRAM

## 2022-09-13 PROCEDURE — 2060000000 HC ICU INTERMEDIATE R&B

## 2022-09-13 PROCEDURE — 36415 COLL VENOUS BLD VENIPUNCTURE: CPT

## 2022-09-13 RX ORDER — GAUZE BANDAGE 2" X 2"
100 BANDAGE TOPICAL DAILY
Status: DISCONTINUED | OUTPATIENT
Start: 2022-09-13 | End: 2022-09-23 | Stop reason: HOSPADM

## 2022-09-13 RX ORDER — TAMSULOSIN HYDROCHLORIDE 0.4 MG/1
0.4 CAPSULE ORAL DAILY
Status: DISCONTINUED | OUTPATIENT
Start: 2022-09-13 | End: 2022-09-23 | Stop reason: HOSPADM

## 2022-09-13 RX ADMIN — METHOCARBAMOL 750 MG: 750 TABLET ORAL at 18:12

## 2022-09-13 RX ADMIN — SODIUM CHLORIDE, PRESERVATIVE FREE 10 ML: 5 INJECTION INTRAVENOUS at 20:23

## 2022-09-13 RX ADMIN — DEXAMETHASONE 8 MG: 4 TABLET ORAL at 08:57

## 2022-09-13 RX ADMIN — DEXAMETHASONE 8 MG: 4 TABLET ORAL at 20:14

## 2022-09-13 RX ADMIN — MAGNESIUM HYDROXIDE 30 ML: 400 SUSPENSION ORAL at 22:10

## 2022-09-13 RX ADMIN — METOPROLOL SUCCINATE 50 MG: 50 TABLET, FILM COATED, EXTENDED RELEASE ORAL at 08:58

## 2022-09-13 RX ADMIN — MELOXICAM 7.5 MG: 7.5 TABLET ORAL at 09:02

## 2022-09-13 RX ADMIN — ATORVASTATIN CALCIUM 40 MG: 40 TABLET, FILM COATED ORAL at 08:59

## 2022-09-13 RX ADMIN — TAMSULOSIN HYDROCHLORIDE 0.4 MG: 0.4 CAPSULE ORAL at 11:55

## 2022-09-13 RX ADMIN — HYDROMORPHONE HYDROCHLORIDE 0.5 MG: 1 INJECTION, SOLUTION INTRAMUSCULAR; INTRAVENOUS; SUBCUTANEOUS at 22:21

## 2022-09-13 RX ADMIN — HYDROMORPHONE HYDROCHLORIDE 0.5 MG: 1 INJECTION, SOLUTION INTRAMUSCULAR; INTRAVENOUS; SUBCUTANEOUS at 16:45

## 2022-09-13 RX ADMIN — DIAZEPAM 5 MG: 5 TABLET ORAL at 20:20

## 2022-09-13 RX ADMIN — METHOCARBAMOL 750 MG: 750 TABLET ORAL at 14:53

## 2022-09-13 RX ADMIN — POLYETHYLENE GLYCOL 3350 17 G: 17 POWDER, FOR SOLUTION ORAL at 09:09

## 2022-09-13 RX ADMIN — SODIUM CHLORIDE, PRESERVATIVE FREE 10 ML: 5 INJECTION INTRAVENOUS at 10:30

## 2022-09-13 RX ADMIN — METHOCARBAMOL 750 MG: 750 TABLET ORAL at 08:59

## 2022-09-13 RX ADMIN — LISINOPRIL 40 MG: 40 TABLET ORAL at 08:59

## 2022-09-13 RX ADMIN — Medication 100 MG: at 11:39

## 2022-09-13 RX ADMIN — HYDROMORPHONE HYDROCHLORIDE 0.5 MG: 1 INJECTION, SOLUTION INTRAMUSCULAR; INTRAVENOUS; SUBCUTANEOUS at 08:59

## 2022-09-13 RX ADMIN — HYDROMORPHONE HYDROCHLORIDE 0.5 MG: 1 INJECTION, SOLUTION INTRAMUSCULAR; INTRAVENOUS; SUBCUTANEOUS at 03:38

## 2022-09-13 ASSESSMENT — PAIN DESCRIPTION - DESCRIPTORS
DESCRIPTORS: ACHING;DISCOMFORT
DESCRIPTORS: ACHING;DISCOMFORT
DESCRIPTORS: ACHING;DULL
DESCRIPTORS: ACHING;DULL
DESCRIPTORS: ACHING;DISCOMFORT

## 2022-09-13 ASSESSMENT — PAIN SCALES - GENERAL
PAINLEVEL_OUTOF10: 0
PAINLEVEL_OUTOF10: 8
PAINLEVEL_OUTOF10: 8
PAINLEVEL_OUTOF10: 2
PAINLEVEL_OUTOF10: 8
PAINLEVEL_OUTOF10: 8
PAINLEVEL_OUTOF10: 2
PAINLEVEL_OUTOF10: 5
PAINLEVEL_OUTOF10: 8
PAINLEVEL_OUTOF10: 8

## 2022-09-13 ASSESSMENT — PAIN DESCRIPTION - LOCATION
LOCATION: BACK;NECK
LOCATION: NECK;BACK;SHOULDER
LOCATION: BACK;NECK
LOCATION: BACK;SHOULDER;ARM
LOCATION: NECK;SHOULDER

## 2022-09-13 ASSESSMENT — PAIN - FUNCTIONAL ASSESSMENT
PAIN_FUNCTIONAL_ASSESSMENT: PREVENTS OR INTERFERES SOME ACTIVE ACTIVITIES AND ADLS
PAIN_FUNCTIONAL_ASSESSMENT: ACTIVITIES ARE NOT PREVENTED
PAIN_FUNCTIONAL_ASSESSMENT: ACTIVITIES ARE NOT PREVENTED
PAIN_FUNCTIONAL_ASSESSMENT: PREVENTS OR INTERFERES SOME ACTIVE ACTIVITIES AND ADLS

## 2022-09-13 ASSESSMENT — PAIN DESCRIPTION - ORIENTATION
ORIENTATION: MID
ORIENTATION: RIGHT;LEFT
ORIENTATION: MID
ORIENTATION: MID

## 2022-09-13 ASSESSMENT — PAIN DESCRIPTION - ONSET
ONSET: ON-GOING

## 2022-09-13 ASSESSMENT — PAIN DESCRIPTION - PAIN TYPE
TYPE: ACUTE PAIN
TYPE: ACUTE PAIN;CHRONIC PAIN

## 2022-09-13 ASSESSMENT — PAIN DESCRIPTION - FREQUENCY
FREQUENCY: CONTINUOUS

## 2022-09-13 NOTE — CONSULTS
Reason for Consultation/Chief Complaint:  Back pain    History of Present Illness:  Anahi Ren is a 74 y.o. patient whom we were asked to see pre op for cord compression. Hx CAD/afib. S/p ablation of afib. S/p PTCA/stent diag/RCA  Presents with complaints of progressive cervical pain over past 2 months. Need of surgery for cord compression. Has hx CAD and is s/p intevention to diag/RCA. He had sx of sob prior to intervention 2/21. Echo 2/21 shows normal LV function. No recurrence of sx. CV has been stable. No chest pain. No pnd or orthopnea. No palp/tacy/syncope. In hosp had 12 beat nonsustained/asx wide complex tachycardia. Also remains hypertensive. Past Medical History:   has a past medical history of Arthritis, Atrial fibrillation ([address] CAD (coronary artery disease), Hyperlipidemia, and Hypertension. Surgical History:   has a past surgical history that includes back surgery (200) and Coronary angioplasty with stent (2012). Social History:   reports that he has been smoking cigarettes. He has a 40.00 pack-year smoking history. He has never used smokeless tobacco. He reports current alcohol use of about 20.0 standard drinks per week. He reports that he does not use drugs. Family History:  No evidence for sudden cardiac death or premature CAD    Home Medications:  Were reviewed and are listed in nursing record. and/or listed below  Prior to Admission medications    Medication Sig Start Date End Date Taking? Authorizing Provider   ibuprofen (IBU) 400 MG tablet Take 1 tablet by mouth every 6 hours as needed for Pain 9/7/22 9/9/22  Reinaldo Cockayne, PA   lidocaine (LIDODERM) 5 % Place 1 patch onto the skin daily 12 hours on, 12 hours off.   Patient not taking: Reported on 9/9/2022 8/22/22   DOUGLAS Ramon CNP   meloxicam (MOBIC) 7.5 MG tablet Take 1 tablet by mouth daily 8/22/22   DOUGLAS Ramon CNP   REFRESH OPTIVE 1-0.9 % GEL  4/6/22   Historical Provider, MD diclofenac sodium (VOLTAREN) 1 % GEL  1/14/22   Historical Provider, MD   INCKENYATTA ELLIPTA 62.5 MCG/INH AEPB  3/16/22   Historical Provider, MD   aspirin 325 MG tablet Take 325 mg by mouth daily    Historical Provider, MD   tamsulosin (FLOMAX) 0.4 MG capsule Take 0.2 mg by mouth daily    Historical Provider, MD   lisinopril (PRINIVIL;ZESTRIL) 20 MG tablet Take 2 tablets by mouth daily  Patient taking differently: Take 40 mg by mouth nightly 2/3/21   Ananda Lubin MD   metoprolol succinate (TOPROL XL) 50 MG extended release tablet Take 50 mg by mouth daily    Historical Provider, MD   atorvastatin (LIPITOR) 40 MG tablet Take 1 tablet by mouth daily. 2/20/14   DOUGLAS Pacheco - CNP        Allergies:  Patient has no known allergies. Review of Systems:       Constitutional: there has been no unanticipated weight loss. There's been no change in energy level, sleep pattern, or activity level. Eyes: No visual changes or diplopia. No scleral icterus. ENT: No Headaches, hearing loss or vertigo. No mouth sores or sore throat. Cardiovascular: No loss of consciousness. No hemoptysis, pleuritic pain, or phlebitis. Respiratory: No cough or wheezing, no sputum production. No hematemesis. Gastrointestinal: No abdominal pain, appetite loss, blood in stools. No change in bowel or bladder habits. Genitourinary: No dysuria, trouble voiding, or hematuria. Musculoskeletal:  No gait disturbance, weakness or joint complaints. Integumentary: No rash or pruritis. All other systems reviewed negative as done.      Physical Examination:    Vitals:    09/13/22 1023   BP: (!) 166/89   Pulse:    Resp:    Temp:    SpO2:     Weight: 258 lb 2.5 oz (117.1 kg)         General Appearance:  Alert, cooperative, no distress, appears stated age   Head:  Normocephalic, without obvious abnormality, atraumatic   Eyes:  PERRL, conjunctiva/corneas clear       Nose: Nares normal, no drainage or sinus tenderness   Throat: Lips, mucosa, and tongue normal   Neck: Supple, symmetrical, trachea midline       Lungs:   Clear to auscultation bilaterally, respirations unlabored   Chest Wall:  No tenderness or deformity   Heart:  Regular rate and rhythm, S1, S2 normal, no murmur, rub or gallop   Abdomen:   Soft, non-tender, bowel sounds active all four quadrants           Extremities: Extremities normal, atraumatic, no cyanosis or edema   Pulses: 2+ and symmetric   Skin: Skin color, texture, turgor normal, no rashes or lesions   Pysch: Normal mood and affect   Neurologic: Normal gross motor and sensory exam.         Labs  CBC:   Lab Results   Component Value Date/Time    WBC 5.9 09/13/2022 05:11 AM    RBC 4.66 09/13/2022 05:11 AM    HGB 15.0 09/13/2022 05:11 AM    HCT 45.2 09/13/2022 05:11 AM    MCV 97.0 09/13/2022 05:11 AM    RDW 13.5 09/13/2022 05:11 AM     09/13/2022 05:11 AM     CMP:    Lab Results   Component Value Date/Time     09/13/2022 05:11 AM    K 5.0 09/13/2022 05:11 AM    CL 96 09/13/2022 05:11 AM    CO2 25 09/13/2022 05:11 AM    BUN 20 09/13/2022 05:11 AM    CREATININE 0.7 09/13/2022 05:11 AM    GFRAA >60 09/13/2022 05:11 AM    GFRAA >60 02/19/2013 10:39 AM    AGRATIO 1.4 09/08/2022 02:45 PM    LABGLOM >60 09/13/2022 05:11 AM    GLUCOSE 140 09/13/2022 05:11 AM    PROT 7.6 09/08/2022 02:45 PM    PROT 7.2 02/19/2013 10:39 AM    CALCIUM 9.6 09/13/2022 05:11 AM    BILITOT 0.7 09/08/2022 02:45 PM    ALKPHOS 95 09/08/2022 02:45 PM    AST 22 09/08/2022 02:45 PM    ALT 27 09/08/2022 02:45 PM     PT/INR:  No results found for: PTINR  Lab Results   Component Value Date    TROPONINI <0.01 09/08/2022       EKG:  I have reviewed EKG with the following interpretation:  Impression:  personally reviewed,  NSR, PAC, Septal MI AU    Assessment  Patient Active Problem List   Diagnosis    CAD (coronary artery disease)    Hyperlipidemia    Tobacco use    Paroxysmal atrial fibrillation (HCC)    Tobacco abuse    Typical atrial flutter Coronary artery disease involving native coronary artery of native heart without angina pectoris    Morbid obesity due to excess calories [facility])    Essential hypertension    Bilateral carpal tunnel syndrome    Chest pain    Anterolisthesis of cervical spine    Hyponatremia    Cord compression [facility])    Pre-op evaluation         Plan:    CV appears stable. No recurrence of sx similar to prior to PCI. Had asx wide complex tachy. Titrate B blocker. Titrate for HTN also. Necessary neurosurgery for cord compression.   Since sx stable he is at increased but not prohibitive risk for needed neurosurgery for cord compression

## 2022-09-13 NOTE — PROGRESS NOTES
Office : [phone]     Fax :[phone]       Nephrology  Note      Patient's Name: Marcelo Davis  9:46 AM  9/13/2022    Reason for Consult:  hyponatremia       Requesting Physician: Reji LOJA [facility]      09/13/22    Na better  No N/V/D         I/O last 3 completed shifts: In: 1310 [P.O.:1290; I.V.:20]  Out: 36 [Urine:3725]    Past Medical History:   Diagnosis Date    Arthritis     Atrial fibrillation (HCC)     CAD (coronary artery disease)     Hyperlipidemia     Hypertension        Past Surgical History:   Procedure Laterality Date    BACK SURGERY  200    lower lumbar surgery    CORONARY ANGIOPLASTY WITH STENT PLACEMENT  2012       Family History   Problem Relation Age of Onset    Heart Disease Mother     Diabetes Mother     Heart Disease Father     Diabetes Father     Diabetes Sister     Diabetes Brother     Diabetes Maternal Grandmother     Diabetes Maternal Grandfather     Diabetes Paternal Grandmother     Diabetes Paternal Grandfather         reports that he has been smoking cigarettes. He has a 40.00 pack-year smoking history. He has never used smokeless tobacco. He reports current alcohol use of about 20.0 standard drinks per week. He reports that he does not use drugs. Allergies:  Patient has no known allergies.     Current Medications:    hydrALAZINE (APRESOLINE) injection 5 mg, Q6H PRN  nicotine (NICODERM CQ) 14 MG/24HR 1 patch, Daily  polyvinyl alcohol (LIQUIFILM TEARS) 1.4 % ophthalmic solution 1 drop, PRN  sodium chloride (OCEAN, BABY AYR) 0.65 % nasal spray 1 spray, PRN  thiamine (B-1) injection 100 mg, Daily  sodium chloride flush 0.9 % injection 5-40 mL, 2 times per day  sodium chloride flush 0.9 % injection 5-40 mL, PRN  0.9 % sodium chloride infusion, PRN  [Held by provider] aspirin tablet 325 mg, Daily  atorvastatin (LIPITOR) tablet 40 mg, Daily  lisinopril (PRINIVIL;ZESTRIL) tablet 40 mg, Daily  meloxicam (MOBIC) tablet 7.5 mg, Daily  metoprolol succinate (TOPROL XL) extended release tablet 50 mg, Daily  sodium chloride flush 0.9 % injection 5-40 mL, 2 times per day  sodium chloride flush 0.9 % injection 5-40 mL, PRN  0.9 % sodium chloride infusion, PRN  ondansetron (ZOFRAN-ODT) disintegrating tablet 4 mg, Q8H PRN   Or  ondansetron (ZOFRAN) injection 4 mg, Q6H PRN  polyethylene glycol (GLYCOLAX) packet 17 g, Daily PRN  acetaminophen (TYLENOL) tablet 650 mg, Q6H PRN   Or  acetaminophen (TYLENOL) suppository 650 mg, Q6H PRN  HYDROmorphone (DILAUDID) injection 0.25 mg, Q4H PRN   Or  HYDROmorphone (DILAUDID) injection 0.5 mg, Q3H PRN  [Held by provider] enoxaparin Sodium (LOVENOX) injection 30 mg, BID  methocarbamol (ROBAXIN) tablet 750 mg, 4x Daily  diazePAM (VALIUM) tablet 5 mg, Q6H PRN  dexamethasone (DECADRON) tablet 8 mg, 2 times per day          Physical exam:     Vitals:  BP (!) 199/96   Pulse 64   Temp 98.1 °F (36.7 °C) (Oral)   Resp 20   Ht 6' 2\" (1.88 m)   Wt 258 lb 2.5 oz (117.1 kg)   SpO2 100%   BMI 33.15 kg/m²   Constitutional:  OAA X3 NAD  Skin: no rash, turgor wnl  Heent:  eomi, mmm  Neck: no bruits or jvd noted  Cardiovascular:  S1, S2 without m/r/g  Respiratory: CTA B without w/r/r  Abdomen:  +bs, soft, nt, nd  Ext: no  lower extremity edema  Psychiatric: mood and affect appropriate  Musculoskeletal:  Rom, muscular strength intact    Labs:  CBC:   Recent Labs     09/11/22  0441 09/12/22  0453 09/13/22  0511   WBC 5.8 5.6 5.9   HGB 13.6 13.8 15.0    224 232       BMP:    Recent Labs     09/11/22  0441 09/12/22  0453 09/13/22  0511   * 134* 135*   K 4.7 4.7 5.0    99 96*   CO2 24 23 25   BUN 18 19 20   CREATININE 0.8 0.7* 0.7*   GLUCOSE 143* 131* 140*       Ca/Mg/Phos:   Recent Labs     09/11/22 0441 09/12/22  0453 09/13/22  0511   CALCIUM 8.9 9.2 9.6   PHOS  --  3.6  --        Hepatic:   No results for input(s): AST, ALT, ALB, BILITOT, ALKPHOS in the last 72 hours. Troponin:   No results for input(s): TROPONINI in the last 72 hours. BNP: No results for input(s): BNP in the last 72 hours. Lipids: No results for input(s): CHOL, TRIG, HDL, LDLCALC, LABVLDL in the last 72 hours. ABGs: No results for input(s): PHART, PO2ART, BEP9TGL in the last 72 hours. INR: No results for input(s): INR in the last 72 hours. UA:  No results for input(s): Elnita Zach, GLUCOSEU, BILIRUBINUR, KETUA, SPECGRAV, BLOODU, PHUR, PROTEINU, UROBILINOGEN, NITRU, LEUKOCYTESUR, Audra [place] in the last 72 hours. Urine Microscopic:   No results for input(s): LABCAST, BACTERIA, COMU, HYALCAST, WBCUA, RBCUA, EPIU in the last 72 hours. Urine Culture: No results for input(s): LABURIN in the last 72 hours. Urine Chemistry: No results for input(s): Valene Ramirez, PROTEINUR, NAUR in the last 72 hours. IMAGING:  CT CERVICAL SPINE WO CONTRAST   Final Result   1. Redemonstration of anterolisthesis of C2 on C3 without obvious fracture. There is severe facet arthrosis at this level   2. Redemonstration of anterolisthesis of C4-5 likely related to severe facet arthrosis at this level   3. Minimal retrolisthesis of C3 on C4 redemonstrated, likely related to severe degenerative disc disease at this level as well as associated facet arthrosis   4. Marked degenerative disc disease C3-4, C5-6, C6-7, C7-T1   5. Diffuse severe facet arthrosis            XR CERVICAL SPINE (2-3 VIEWS)   Final Result      Grade 1 anterolisthesis of C2 on C3. Multilevel moderate to severe degenerative disc disease. MRI LUMBAR SPINE WO CONTRAST   Final Result   1. Multilevel severe central canal and foraminal stenosis L2-3 through L5-S1, secondary to multifactorial degenerative changes and posterior epidural lipomatosis   2.  Redundancy in edematous nerve roots at the L1-2 level secondary to chronic lumbar central canal stenosis. 2. Extruded disc herniation L3-L4 with left lateral recess and subarticular migration      Compared prior study there is been progression of spinal stenosis secondary to chronic multifactorial degenerative changes posterior epidural fat. No change in the degree of facet arthropathy and foraminal stenosis. MRI THORACIC SPINE WO CONTRAST   Final Result      1. Mild scoliosis and degenerative disc disease concordant with prior CT   2. No abnormal cord signal, central canal, compression or canal stenosis         MRI CERVICAL SPINE WO CONTRAST   Final Result      1. Cord myelopathy, cord edema with hyperintense signal noted on STIR sequence at the C2-3 and C3 level   2. Severe central canal stenosis C2-C3 which is improved with the patient's collar on compared to prior study   3. Severe canal stenosis C3-C4 with central disc herniation, also slightly improved with the patient's collar on during the study   4. Multilevel bilateral foraminal stenosis                 Assessment/Plan :      1. Hyponatremia. Likely 2/2 fluid intake . Drinks excess beer  Limit daily fluid intake to 1200 ml   Stop IV fluids   Urine sodium and urine osmolality. 2. HTN. Off IVF   Pain control     3. Acute cervical cord compression with edema:  10 mg IV Decadron given  Neurosurgery consulted.         Monitor sodium levels               Thank you for allowing us to participate in care of Oswaldo Moy         Electronically signed by: Jp Rivas MD, 9/13/2022, 9:46 AM      [facility] [address]  Office : [phone]  Fax :[phone]

## 2022-09-13 NOTE — PROGRESS NOTES
Pt seen and examined  Doing well pain improved from yhesterday  Worsened with movement improved with rest  Denies nausea or vomiting  Tolerating po  No other issues at this time    Vitals:    09/13/22 0808 09/13/22 1023 09/13/22 1140 09/13/22 1636   BP: (!) 199/96 (!) 166/89 (!) 164/89 (!) 172/92   Pulse: 64  72 68   Resp: 20  16 18   Temp: 98.1 °F (36.7 °C)  97.4 °F (36.3 °C) 97.8 °F (36.6 °C)   TempSrc: Oral  Oral Oral   SpO2:    98%   Weight:       Height:          Gen: pleasant alert oriented  Neck: no jvd brace in place  Chest: clear bilaterally  Cvs: s2s1 no murmurs  Abd: soft nd nt bs normal active  Ext: no edema positive pulses     Sodium 135 k is 5 sugar is 140    Princella Dux a 74 y.o. M with PMHx of anterolisthesis of cervical spine, A.fib s/p ablation, HTN, HLD, CAD, BPH, Carpal Tunnel syndrome s/p unilateral surgery, and herniated disc (2000) s/p surgery p/w back pain, numbness in arms and legs, and weakness in arms and legs. Patient was admitted for further work up and management of spinal stenosis    Cervical cord compression with edema  -Patient presented to [facility] with back pain (9/8/22) where he was directly admitted to [facility]. for Neurosurgery. Patient reports back pain that radiates down arms and legs. MRI spine showing. Severe central canal and foraminal stenosis L2-3, L5-S1, edematous nerve roots L1-2. Disc herniation L3-4.  Severe canal stenosis C2-C4 with central disc herniation  -Patient compliant with cervical collar  -Neurosurgery following, planning for surgery on Thursday  -Decadron 8 mg PO every 12 hours  - Robaxin 1000 mg IV at 200mL/hr over 30 minutes every 8 hours  -Dilaudid pain panel  -Acetaminophen 650 mg PO PRN  HTN; nephrology following likely high at times because of pain  Paroxysmal afib, asa being held, currently he is in sinus he is a candidate for anticoagulant however will await surgery

## 2022-09-13 NOTE — PROGRESS NOTES
On telemetry, pt had 12 beats of V-tach. MD notified. Tele strip printed out and placed in pt's chart, resident came and looked at the tele strip. Will continue to monitor pt's HR.

## 2022-09-13 NOTE — PROGRESS NOTES
NEUROSURGERY     CHELSIE Shane Knapp   [id number]   1955 9/13/2022    Interval History:  Hospital Day #4    Subjective: patient is lying in bed. C-collar is on. Per nursing notes last night patient had 12 beat run of V-tach. Report no palpatations     Objective:  BP (!) 199/96   Pulse 64   Temp 98.1 °F (36.7 °C) (Oral)   Resp 20   Ht 6' 2\" (1.88 m)   Wt 258 lb 2.5 oz (117.1 kg)   SpO2 100%   BMI 33.15 kg/m²     Labs:  Recent Labs     09/11/22  0441 09/12/22  0453 09/13/22  0511   * 134* 135*    99 96*   CO2 24 23 25   BUN 18 19 20   CREATININE 0.8 0.7* 0.7*   GLUCOSE 143* 131* 140*       Recent Labs     09/11/22 0441 09/12/22  0453 09/13/22  0511   WBC 5.8 5.6 5.9   RBC 4.23 4.30 4.66         Neurologic Exam:  GCS:  4 - Opens eyes on own  5 - Alert and oriented  6 - Follows simple motor commands    Mental Status: Awake, alert, oriented x 4, speech clear and appropriate  Language: No aphasia or dysarthria noted  Sensation: Intact to all extremities to light touch  Coordination: Intact      Musculoskeletal:   Gait: Not tested   Tone:   Motor strength:    Right  Left    Right  Left    Deltoid  4 4  Hip Flex  5 5   Biceps  4 4  Knee Extensors  5 5   Triceps  4 4  Knee Flexors  5 5   Wrist Ext  4 4  Ankle Dorsiflex. 5 5   Wrist Flex  4 4  Ankle Plantarflex. 5 5   Handgrip  4 4       Thumb Ext  4 4           Assessment   73 yo Male patient with severe central stenosis at C2/3 and mobile spondylolisthesis with STIR signal change posteriorly at this level. Plan: 1. Plan for surgery on Thursday with Dr. Roland Rios strict precautions   Must wear C-collar at all times; reviewed with patient the  importance of wearing the collar.          2. Neurologic exams frequency:  - Floor: Q4H until otherwise directed  For change in exam MUST contact neurosurgery team along with critical care or primary team  SCDs  and lovenox for DVT prophylaxis  GI Prophylaxis: Pepcid  BP management by medicine team  Cardiology consulted for clearance for surgery. DISPO- Remain inpatient with plan for the OR later this week. DOUGLAS Martell-CNP  Neurosurgery Nurse Practitioner  [phone]  Patient was discussed with Dr. Benjamin Li who agrees with above assessment and plan.      Electronically signed by: DOUGLAS Wilkinson CNP, 9/13/2022 8:23 AM

## 2022-09-13 NOTE — PLAN OF CARE
Problem: Pain  Goal: Verbalizes/displays adequate comfort level or baseline comfort level  9/13/2022 0146 by Ginger Mcintosh  Outcome: Progressing  Flowsheets (Taken 9/13/2022 0139)  Verbalizes/displays adequate comfort level or baseline comfort level:   Encourage patient to monitor pain and request assistance   Administer analgesics based on type and severity of pain and evaluate response   Consider cultural and social influences on pain and pain management   Assess pain using appropriate pain scale   Implement non-pharmacological measures as appropriate and evaluate response   Notify Licensed Independent Practitioner if interventions unsuccessful or patient reports new pain  Note: Pt reported shoulder and back pain during shift as well as his ear and jaw aching. Pt was given PRN dilaudid, will continue to monitor pt's pain level. Problem: Safety - Adult  Goal: Free from fall injury  9/13/2022 0146 by Ginger Mcintosh  Outcome: Progressing  Flowsheets (Taken 9/12/2022 2029)  Free From Fall Injury: Instruct family/caregiver on patient safety  Note: Pt will remain free from accidental injury this shift. Pt has fall risk measures (fall risk bracelet, fall risk sign, fall risk cloth, non-slip socks, dome light on) in place. Pt bed is in low position, bed alarm on, bed wheels locked, call light within reach, bedside table within reach, chair wheels locked, chair alarm on.       Problem: ABCDS Injury Assessment  Goal: Absence of physical injury  9/13/2022 0146 by Ginger Mcintosh  Outcome: Progressing  Flowsheets (Taken 9/12/2022 2029)  Absence of Physical Injury: Implement safety measures based on patient assessment     Problem: Neurosensory - Adult  Goal: Achieves stable or improved neurological status  9/13/2022 0146 by Ginger Mcintosh  Outcome: Progressing  Flowsheets (Taken 9/13/2022 0146)  Achieves stable or improved neurological status:   Assess for and report changes in neurological status   Initiate measures to prevent increased intracranial pressure   Maintain blood pressure and fluid volume within ordered parameters to optimize cerebral perfusion and minimize risk of hemorrhage   Monitor temperature, glucose, and sodium. Initiate appropriate interventions as ordered  Note: Neuro checks are being performed q4h. Pt has neck collar in place. Problem: Cardiovascular - Adult  Goal: Maintains optimal cardiac output and hemodynamic stability  9/13/2022 0146 by Nguyen Dodge  Outcome: Progressing  Flowsheets (Taken 9/13/2022 0146)  Maintains optimal cardiac output and hemodynamic stability:   Monitor blood pressure and heart rate   Monitor urine output and notify Licensed Independent Practitioner for values outside of normal range   Assess for signs of decreased cardiac output  Note: Pt is on continuous telemetry and heart rhythm is sinus rhythm with PACs. Pt's BP continues to be elevated during shift. Pt was given PRN hydralazine. Will continue to monitor pt's HR and BP. Problem: Respiratory - Adult  Goal: Achieves optimal ventilation and oxygenation  9/13/2022 0146 by Nguyen Dodge  Outcome: Progressing  Flowsheets (Taken 9/13/2022 0146)  Achieves optimal ventilation and oxygenation:   Assess for changes in respiratory status   Position to facilitate oxygenation and minimize respiratory effort   Initiate smoking cessation protocol as indicated   Assess the need for suctioning and aspirate as needed   Respiratory therapy support as indicated   Assess for changes in mentation and behavior   Oxygen supplementation based on oxygen saturation or arterial blood gases   Encourage broncho-pulmonary hygiene including cough, deep breathe, incentive spirometry   Assess and instruct to report shortness of breath or any respiratory difficulty  Note: Pt remains on RA and SpO2 has been WNL. Problem: Skin/Tissue Integrity  Goal: Absence of new skin breakdown  Description: 1.   Monitor for areas of redness and/or skin breakdown  Outcome: Progressing  Note: Pt shows no new skin breakdown.

## 2022-09-13 NOTE — PLAN OF CARE
chair wheels locked, chair alarm on. Problem: ABCDS Injury Assessment  Goal: Absence of physical injury  9/13/2022 0146 by Jennifer Adams  Outcome: Progressing  Flowsheets (Taken 9/12/2022 2029)  Absence of Physical Injury: Implement safety measures based on patient assessment     Problem: Neurosensory - Adult  Goal: Achieves stable or improved neurological status  Recent Flowsheet Documentation  Taken 9/13/2022 0849 by Christian Nageotte, RN  Achieves stable or improved neurological status:   Assess for and report changes in neurological status   Initiate measures to prevent increased intracranial pressure   Maintain blood pressure and fluid volume within ordered parameters to optimize cerebral perfusion and minimize risk of hemorrhage   Monitor temperature, glucose, and sodium. Initiate appropriate interventions as ordered  9/13/2022 0146 by Jennifer Adams  Outcome: Progressing  Flowsheets (Taken 9/13/2022 0146)  Achieves stable or improved neurological status:   Assess for and report changes in neurological status   Initiate measures to prevent increased intracranial pressure   Maintain blood pressure and fluid volume within ordered parameters to optimize cerebral perfusion and minimize risk of hemorrhage   Monitor temperature, glucose, and sodium. Initiate appropriate interventions as ordered  Note: Neuro checks are being performed q4h. Pt has neck collar in place. Problem: Cardiovascular - Adult  Goal: Maintains optimal cardiac output and hemodynamic stability  9/13/2022 0146 by Jennifer Adams  Outcome: Progressing  Flowsheets (Taken 9/13/2022 0146)  Maintains optimal cardiac output and hemodynamic stability:   Monitor blood pressure and heart rate   Monitor urine output and notify Licensed Independent Practitioner for values outside of normal range   Assess for signs of decreased cardiac output  Note: Pt is on continuous telemetry and heart rhythm is sinus rhythm with PACs.  Pt's BP continues to be elevated during shift. Pt was given PRN hydralazine. Will continue to monitor pt's HR and BP. Problem: Respiratory - Adult  Goal: Achieves optimal ventilation and oxygenation  9/13/2022 0146 by Ronald Nina  Outcome: Progressing  Flowsheets (Taken 9/13/2022 0146)  Achieves optimal ventilation and oxygenation:   Assess for changes in respiratory status   Position to facilitate oxygenation and minimize respiratory effort   Initiate smoking cessation protocol as indicated   Assess the need for suctioning and aspirate as needed   Respiratory therapy support as indicated   Assess for changes in mentation and behavior   Oxygen supplementation based on oxygen saturation or arterial blood gases   Encourage broncho-pulmonary hygiene including cough, deep breathe, incentive spirometry   Assess and instruct to report shortness of breath or any respiratory difficulty  Note: Pt remains on RA and SpO2 has been WNL. Problem: Skin/Tissue Integrity  Goal: Absence of new skin breakdown  Description: 1. Monitor for areas of redness and/or skin breakdown  2. Assess vascular access sites hourly  3. Every 4-6 hours minimum:  Change oxygen saturation probe site  4. Every 4-6 hours:  If on nasal continuous positive airway pressure, respiratory therapy assess nares and determine need for appliance change or resting period. 9/13/2022 0146 by Ronald Nina  Outcome: Progressing  Note: Pt shows no new skin breakdown.

## 2022-09-14 LAB
ANION GAP SERPL CALCULATED.3IONS-SCNC: 11 MMOL/L (ref 3–16)
APTT: 25.5 SEC (ref 23–34.3)
BASOPHILS ABSOLUTE: 0 K/UL (ref 0–0.2)
BASOPHILS ABSOLUTE: 0 K/UL (ref 0–0.2)
BASOPHILS RELATIVE PERCENT: 0.1 %
BASOPHILS RELATIVE PERCENT: 0.1 %
BUN BLDV-MCNC: 21 MG/DL (ref 7–20)
CALCIUM SERPL-MCNC: 9.1 MG/DL (ref 8.3–10.6)
CHLORIDE BLD-SCNC: 96 MMOL/L (ref 99–110)
CO2: 27 MMOL/L (ref 21–32)
CREAT SERPL-MCNC: 0.7 MG/DL (ref 0.8–1.3)
EOSINOPHILS ABSOLUTE: 0 K/UL (ref 0–0.6)
EOSINOPHILS ABSOLUTE: 0 K/UL (ref 0–0.6)
EOSINOPHILS RELATIVE PERCENT: 0 %
EOSINOPHILS RELATIVE PERCENT: 0 %
GFR AFRICAN AMERICAN: >60
GFR NON-AFRICAN AMERICAN: >60
GLUCOSE BLD-MCNC: 143 MG/DL (ref 70–99)
HCT VFR BLD CALC: 45.1 % (ref 40.5–52.5)
HCT VFR BLD CALC: 45.9 % (ref 40.5–52.5)
HEMOGLOBIN: 15 G/DL (ref 13.5–17.5)
HEMOGLOBIN: 15.3 G/DL (ref 13.5–17.5)
INR BLD: 1.02 (ref 0.87–1.14)
LYMPHOCYTES ABSOLUTE: 0.4 K/UL (ref 1–5.1)
LYMPHOCYTES ABSOLUTE: 0.5 K/UL (ref 1–5.1)
LYMPHOCYTES RELATIVE PERCENT: 10.6 %
LYMPHOCYTES RELATIVE PERCENT: 7.5 %
MCH RBC QN AUTO: 32.6 PG (ref 26–34)
MCH RBC QN AUTO: 33 PG (ref 26–34)
MCHC RBC AUTO-ENTMCNC: 33.3 G/DL (ref 31–36)
MCHC RBC AUTO-ENTMCNC: 33.4 G/DL (ref 31–36)
MCV RBC AUTO: 97.9 FL (ref 80–100)
MCV RBC AUTO: 98.8 FL (ref 80–100)
MONOCYTES ABSOLUTE: 0.2 K/UL (ref 0–1.3)
MONOCYTES ABSOLUTE: 0.2 K/UL (ref 0–1.3)
MONOCYTES RELATIVE PERCENT: 3.3 %
MONOCYTES RELATIVE PERCENT: 4.9 %
NEUTROPHILS ABSOLUTE: 4.3 K/UL (ref 1.7–7.7)
NEUTROPHILS ABSOLUTE: 4.6 K/UL (ref 1.7–7.7)
NEUTROPHILS RELATIVE PERCENT: 84.4 %
NEUTROPHILS RELATIVE PERCENT: 89.1 %
PDW BLD-RTO: 13.2 % (ref 12.4–15.4)
PDW BLD-RTO: 13.6 % (ref 12.4–15.4)
PLATELET # BLD: 225 K/UL (ref 135–450)
PLATELET # BLD: 237 K/UL (ref 135–450)
PMV BLD AUTO: 7.8 FL (ref 5–10.5)
PMV BLD AUTO: 8.1 FL (ref 5–10.5)
POTASSIUM REFLEX MAGNESIUM: 4.6 MMOL/L (ref 3.5–5.1)
PROTHROMBIN TIME: 13.3 SEC (ref 11.7–14.5)
RBC # BLD: 4.61 M/UL (ref 4.2–5.9)
RBC # BLD: 4.65 M/UL (ref 4.2–5.9)
SODIUM BLD-SCNC: 134 MMOL/L (ref 136–145)
WBC # BLD: 5.1 K/UL (ref 4–11)
WBC # BLD: 5.2 K/UL (ref 4–11)

## 2022-09-14 PROCEDURE — 85610 PROTHROMBIN TIME: CPT

## 2022-09-14 PROCEDURE — 6370000000 HC RX 637 (ALT 250 FOR IP): Performed by: INTERNAL MEDICINE

## 2022-09-14 PROCEDURE — 6360000002 HC RX W HCPCS

## 2022-09-14 PROCEDURE — 2580000003 HC RX 258: Performed by: STUDENT IN AN ORGANIZED HEALTH CARE EDUCATION/TRAINING PROGRAM

## 2022-09-14 PROCEDURE — 6370000000 HC RX 637 (ALT 250 FOR IP): Performed by: STUDENT IN AN ORGANIZED HEALTH CARE EDUCATION/TRAINING PROGRAM

## 2022-09-14 PROCEDURE — 2580000003 HC RX 258

## 2022-09-14 PROCEDURE — 85730 THROMBOPLASTIN TIME PARTIAL: CPT

## 2022-09-14 PROCEDURE — 6370000000 HC RX 637 (ALT 250 FOR IP)

## 2022-09-14 PROCEDURE — 6360000002 HC RX W HCPCS: Performed by: INTERNAL MEDICINE

## 2022-09-14 PROCEDURE — 6360000002 HC RX W HCPCS: Performed by: STUDENT IN AN ORGANIZED HEALTH CARE EDUCATION/TRAINING PROGRAM

## 2022-09-14 PROCEDURE — 85025 COMPLETE CBC W/AUTO DIFF WBC: CPT

## 2022-09-14 PROCEDURE — 2060000000 HC ICU INTERMEDIATE R&B

## 2022-09-14 PROCEDURE — 93005 ELECTROCARDIOGRAM TRACING: CPT | Performed by: STUDENT IN AN ORGANIZED HEALTH CARE EDUCATION/TRAINING PROGRAM

## 2022-09-14 PROCEDURE — 6370000000 HC RX 637 (ALT 250 FOR IP): Performed by: NURSE PRACTITIONER

## 2022-09-14 PROCEDURE — 80048 BASIC METABOLIC PNL TOTAL CA: CPT

## 2022-09-14 PROCEDURE — 99233 SBSQ HOSP IP/OBS HIGH 50: CPT | Performed by: INTERNAL MEDICINE

## 2022-09-14 PROCEDURE — 36415 COLL VENOUS BLD VENIPUNCTURE: CPT

## 2022-09-14 RX ORDER — NIFEDIPINE 30 MG/1
30 TABLET, FILM COATED, EXTENDED RELEASE ORAL DAILY
Status: DISCONTINUED | OUTPATIENT
Start: 2022-09-14 | End: 2022-09-23 | Stop reason: HOSPADM

## 2022-09-14 RX ADMIN — NIFEDIPINE 30 MG: 30 TABLET, EXTENDED RELEASE ORAL at 11:23

## 2022-09-14 RX ADMIN — HYDRALAZINE HYDROCHLORIDE 5 MG: 20 INJECTION INTRAMUSCULAR; INTRAVENOUS at 04:01

## 2022-09-14 RX ADMIN — TAMSULOSIN HYDROCHLORIDE 0.4 MG: 0.4 CAPSULE ORAL at 08:06

## 2022-09-14 RX ADMIN — HYDROMORPHONE HYDROCHLORIDE 0.5 MG: 1 INJECTION, SOLUTION INTRAMUSCULAR; INTRAVENOUS; SUBCUTANEOUS at 16:06

## 2022-09-14 RX ADMIN — HYDROMORPHONE HYDROCHLORIDE 0.5 MG: 1 INJECTION, SOLUTION INTRAMUSCULAR; INTRAVENOUS; SUBCUTANEOUS at 20:15

## 2022-09-14 RX ADMIN — METHOCARBAMOL 750 MG: 750 TABLET ORAL at 16:06

## 2022-09-14 RX ADMIN — METHOCARBAMOL 750 MG: 750 TABLET ORAL at 20:11

## 2022-09-14 RX ADMIN — LISINOPRIL 40 MG: 40 TABLET ORAL at 08:06

## 2022-09-14 RX ADMIN — MELOXICAM 7.5 MG: 7.5 TABLET ORAL at 08:05

## 2022-09-14 RX ADMIN — ATORVASTATIN CALCIUM 40 MG: 40 TABLET, FILM COATED ORAL at 08:06

## 2022-09-14 RX ADMIN — SODIUM CHLORIDE, PRESERVATIVE FREE 10 ML: 5 INJECTION INTRAVENOUS at 20:11

## 2022-09-14 RX ADMIN — DIAZEPAM 5 MG: 5 TABLET ORAL at 11:23

## 2022-09-14 RX ADMIN — METHOCARBAMOL 750 MG: 750 TABLET ORAL at 08:06

## 2022-09-14 RX ADMIN — SODIUM CHLORIDE, PRESERVATIVE FREE 10 ML: 5 INJECTION INTRAVENOUS at 08:06

## 2022-09-14 RX ADMIN — DIAZEPAM 5 MG: 5 TABLET ORAL at 23:21

## 2022-09-14 RX ADMIN — METOPROLOL SUCCINATE 75 MG: 50 TABLET, FILM COATED, EXTENDED RELEASE ORAL at 08:06

## 2022-09-14 RX ADMIN — METHOCARBAMOL 750 MG: 750 TABLET ORAL at 11:23

## 2022-09-14 RX ADMIN — DEXAMETHASONE 8 MG: 4 TABLET ORAL at 08:06

## 2022-09-14 RX ADMIN — SODIUM CHLORIDE, PRESERVATIVE FREE 10 ML: 5 INJECTION INTRAVENOUS at 08:07

## 2022-09-14 RX ADMIN — HYDROMORPHONE HYDROCHLORIDE 0.5 MG: 1 INJECTION, SOLUTION INTRAMUSCULAR; INTRAVENOUS; SUBCUTANEOUS at 08:05

## 2022-09-14 RX ADMIN — Medication 100 MG: at 08:06

## 2022-09-14 ASSESSMENT — PAIN SCALES - GENERAL
PAINLEVEL_OUTOF10: 0
PAINLEVEL_OUTOF10: 0
PAINLEVEL_OUTOF10: 8
PAINLEVEL_OUTOF10: 4
PAINLEVEL_OUTOF10: 9
PAINLEVEL_OUTOF10: 2
PAINLEVEL_OUTOF10: 6
PAINLEVEL_OUTOF10: 9

## 2022-09-14 ASSESSMENT — PAIN SCALES - WONG BAKER
WONGBAKER_NUMERICALRESPONSE: 0

## 2022-09-14 ASSESSMENT — PAIN DESCRIPTION - ORIENTATION
ORIENTATION: MID
ORIENTATION: RIGHT;LEFT;POSTERIOR
ORIENTATION: RIGHT;LEFT;POSTERIOR
ORIENTATION: RIGHT;LEFT;MID

## 2022-09-14 ASSESSMENT — PAIN DESCRIPTION - ONSET
ONSET: ON-GOING

## 2022-09-14 ASSESSMENT — PAIN DESCRIPTION - LOCATION
LOCATION: BACK;SHOULDER
LOCATION: BACK

## 2022-09-14 ASSESSMENT — PAIN DESCRIPTION - PAIN TYPE
TYPE: ACUTE PAIN
TYPE: ACUTE PAIN
TYPE: ACUTE PAIN;CHRONIC PAIN
TYPE: ACUTE PAIN

## 2022-09-14 ASSESSMENT — PAIN DESCRIPTION - FREQUENCY
FREQUENCY: INTERMITTENT
FREQUENCY: INTERMITTENT
FREQUENCY: CONTINUOUS

## 2022-09-14 ASSESSMENT — PAIN DESCRIPTION - DESCRIPTORS
DESCRIPTORS: ACHING

## 2022-09-14 ASSESSMENT — PAIN - FUNCTIONAL ASSESSMENT
PAIN_FUNCTIONAL_ASSESSMENT: ACTIVITIES ARE NOT PREVENTED
PAIN_FUNCTIONAL_ASSESSMENT: PREVENTS OR INTERFERES SOME ACTIVE ACTIVITIES AND ADLS

## 2022-09-14 NOTE — PROGRESS NOTES
Pt seen and examined  Doing well pain improved from yhesterday  Worsened with movement improved with rest  Bp has been up during the evening and yesterday  Patient otherwise feeling ok    Vitals:    09/14/22 0745 09/14/22 0900 09/14/22 1100 09/14/22 1600   BP: (!) 186/93 (!) 173/93 (!) 165/95 138/89   Pulse: 69  69 76   Resp: 18  18 18   Temp: 97.5 °F (36.4 °C)  97.6 °F (36.4 °C) 97.8 °F (36.6 °C)   TempSrc: Oral  Oral Oral   SpO2: 100%  100% 99%   Weight:       Height:          Gen: pleasant alert oriented  Neck: no jvd brace in place  Chest: clear bilaterally  Cvs: s2s1 no murmurs  Abd: soft nd nt bs normal active  Ext: no edema positive pulses     Sodium 135 k is 5 sugar is 140    Umer [place] a 74 y.o. M with PMHx of anterolisthesis of cervical spine, A.fib s/p ablation, HTN, HLD, CAD, BPH, Carpal Tunnel syndrome s/p unilateral surgery, and herniated disc (2000) s/p surgery p/w back pain, numbness in arms and legs, and weakness in arms and legs. Patient was admitted for further work up and management of spinal stenosis    Cervical cord compression with edema  -Patient presented to [facility] with back pain (9/8/22) where he was directly admitted to [facility]. for Neurosurgery. Patient reports back pain that radiates down arms and legs. MRI spine showing. Severe central canal and foraminal stenosis L2-3, L5-S1, edematous nerve roots L1-2. Disc herniation L3-4. Severe canal stenosis C2-C4 with central disc herniation  -Patient compliant with cervical collar  -Neurosurgery following, planning for surgery on Thursday  -Decadron 8 mg PO every 12 hours  - Robaxin 1000 mg IV at 200mL/hr over 30 minutes every 8 hours  -Dilaudid pain panel  -Acetaminophen 650 mg PO PRN  HTN; nephrology following likely high at times because of pain - adjusted bp meds added nifedipine, spoke to neurosurgery ok to dc steroids likely causing his sugars and bp to go up.    Paroxysmal afib, asa being held, currently he is in sinus he is a candidate for anticoagulant however will await surgery

## 2022-09-14 NOTE — PROGRESS NOTES
21*   CREATININE 0.7* 0.7* 0.7*   GLUCOSE 131* 140* 143*       Ca/Mg/Phos:   Recent Labs     09/12/22  0453 09/13/22  0511 09/14/22  0540   CALCIUM 9.2 9.6 9.1   PHOS 3.6  --   --        Hepatic:   No results for input(s): AST, ALT, ALB, BILITOT, ALKPHOS in the last 72 hours. Troponin:   No results for input(s): TROPONINI in the last 72 hours. BNP: No results for input(s): BNP in the last 72 hours. Lipids: No results for input(s): CHOL, TRIG, HDL, LDLCALC, LABVLDL in the last 72 hours. ABGs: No results for input(s): PHART, PO2ART, OCP9ZNB in the last 72 hours. INR: No results for input(s): INR in the last 72 hours. UA:  No results for input(s): Delene Belt, GLUCOSEU, BILIRUBINUR, KETUA, SPECGRAV, BLOODU, PHUR, PROTEINU, UROBILINOGEN, NITRU, LEUKOCYTESUR, [facility] in the last 72 hours. Urine Microscopic:   No results for input(s): LABCAST, BACTERIA, COMU, HYALCAST, WBCUA, RBCUA, EPIU in the last 72 hours. Urine Culture: No results for input(s): LABURIN in the last 72 hours. Urine Chemistry: No results for input(s): Manette Finders, PROTEINUR, NAUR in the last 72 hours. IMAGING:  CT CERVICAL SPINE WO CONTRAST   Final Result   1. Redemonstration of anterolisthesis of C2 on C3 without obvious fracture. There is severe facet arthrosis at this level   2. Redemonstration of anterolisthesis of C4-5 likely related to severe facet arthrosis at this level   3. Minimal retrolisthesis of C3 on C4 redemonstrated, likely related to severe degenerative disc disease at this level as well as associated facet arthrosis   4. Marked degenerative disc disease C3-4, C5-6, C6-7, C7-T1   5. Diffuse severe facet arthrosis            XR CERVICAL SPINE (2-3 VIEWS)   Final Result      Grade 1 anterolisthesis of C2 on C3. Multilevel moderate to severe degenerative disc disease. MRI LUMBAR SPINE WO CONTRAST   Final Result   1.  Multilevel severe central canal and foraminal stenosis L2-3 through L5-S1, secondary to multifactorial degenerative changes and posterior epidural lipomatosis   2. Redundancy in edematous nerve roots at the L1-2 level secondary to chronic lumbar central canal stenosis. 2. Extruded disc herniation L3-L4 with left lateral recess and subarticular migration      Compared prior study there is been progression of spinal stenosis secondary to chronic multifactorial degenerative changes posterior epidural fat. No change in the degree of facet arthropathy and foraminal stenosis. MRI THORACIC SPINE WO CONTRAST   Final Result      1. Mild scoliosis and degenerative disc disease concordant with prior CT   2. No abnormal cord signal, central canal, compression or canal stenosis         MRI CERVICAL SPINE WO CONTRAST   Final Result      1. Cord myelopathy, cord edema with hyperintense signal noted on STIR sequence at the C2-3 and C3 level   2. Severe central canal stenosis C2-C3 which is improved with the patient's collar on compared to prior study   3. Severe canal stenosis C3-C4 with central disc herniation, also slightly improved with the patient's collar on during the study   4. Multilevel bilateral foraminal stenosis                 Assessment/Plan :      1. Hyponatremia. Likely 2/2 fluid intake . Drinks excess beer  Limit daily fluid intake to 1200 ml   Off IV fluids   Urine sodium and urine osmolality - reviewed     2. HTN. Off IVF   Pain control     3. Acute cervical cord compression with edema:  10 mg IV Decadron given  Neurosurgery consulted.   Sx tomorrow       Monitor sodium levels               Thank you for allowing us to participate in care of Marcelo Davis         Electronically signed by: Nelli Rodriguez MD, 9/14/2022, 8:57 AM      [facility] [address] [place] [address] S  Office : [phone]  Fax :[phone]

## 2022-09-14 NOTE — CARE COORDINATION
Patient is from home, independent pta, plans for surgery on Thursday. CM will continue to follow and make appropriate recommendations post op.     Perry Ruiz RN, BSN,   4th Floor Progressive Care Unit  [phone]

## 2022-09-14 NOTE — PROGRESS NOTES
NEUROSURGERY     CHELSIE Olinda [place]   [id number]   1955 9/14/2022    Interval History:  Hospital Day #5    Subjective: patient is sitting up in bed. C-collar is on. Objective:  BP (!) 173/93   Pulse 69   Temp 97.5 °F (36.4 °C) (Oral)   Resp 18   Ht 6' 2\" (1.88 m)   Wt 258 lb 12.8 oz (117.4 kg)   SpO2 100%   BMI 33.23 kg/m²     Labs:  Recent Labs     09/12/22 0453 09/13/22  0511 09/14/22  0540   * 135* 134*   CL 99 96* 96*   CO2 23 25 27   BUN 19 20 21*   CREATININE 0.7* 0.7* 0.7*   GLUCOSE 131* 140* 143*       Recent Labs     09/12/22 0453 09/13/22  0511 09/14/22  0540   WBC 5.6 5.9 5.1   RBC 4.30 4.66 4.61         Neurologic Exam:  GCS:  4 - Opens eyes on own  5 - Alert and oriented  6 - Follows simple motor commands    Mental Status: Awake, alert, oriented x 4, speech clear and appropriate  Language: No aphasia or dysarthria noted  Sensation: Intact to all extremities to light touch  Coordination: Intact      Musculoskeletal:   Gait: Not tested   Tone: normal  Motor strength:    Right  Left    Right  Left    Deltoid  4 4  Hip Flex  5 5   Biceps  4 4  Knee Extensors  5 5   Triceps  4 4  Knee Flexors  5 5   Wrist Ext  4 4  Ankle Dorsiflex. 5 5   Wrist Flex  4 4  Ankle Plantarflex. 5 5   Handgrip  4 4       Thumb Ext  4 4           Assessment   75 yo Male patient with severe central stenosis at C2/3 and mobile spondylolisthesis with STIR signal change posteriorly at this level. Plan: 1. Plan for surgery on Thursday with Dr. Minh Soliz and Dr. Jennifer Sosa strict precautions   Must wear C-collar at all times; reviewed with patient the  importance of wearing the collar.          2. Neurologic exams frequency:  - Floor: Q4H until otherwise directed  For change in exam MUST contact neurosurgery team along with critical care or primary team  SCDs  and lovenox for DVT prophylaxis  GI Prophylaxis: Pepcid  NPO at midnight; may have sips of water for meds  BP management by medicine team   12. Patient cleared for surgery by Cardiology. DISPO- Remain inpatient with plan for the OR Thursday morning  ADRYAN Vallejo  Neurosurgery Nurse Practitioner  [phone]  Patient was discussed with Dr. Rob Ya who agrees with above assessment and plan.      Electronically signed by: Billee Severin, APRN - CNP, 9/14/2022 11:19 AM

## 2022-09-14 NOTE — PLAN OF CARE
Problem: Discharge Planning  Goal: Discharge to home or other facility with appropriate resources  9/14/2022 1027 by Shannan Cloud RN  Outcome: Progressing  Flowsheets  Taken 9/14/2022 1027 by Shannan Cloud RN  Discharge to home or other facility with appropriate resources:   Identify barriers to discharge with patient and caregiver   Arrange for needed discharge resources and transportation as appropriate   Identify discharge learning needs (meds, wound care, etc)   Arrange for interpreters to assist at discharge as needed   Refer to discharge planning if patient needs post-hospital services based on physician order or complex needs related to functional status, cognitive ability or social support system  Taken 9/14/2022 0352 by Sathish Toscano RN  Discharge to home or other facility with appropriate resources: Identify barriers to discharge with patient and caregiver     Problem: Pain  Goal: Verbalizes/displays adequate comfort level or baseline comfort level  9/14/2022 1027 by Shannan Cloud RN  Outcome: Progressing  Flowsheets (Taken 9/14/2022 1027)  Verbalizes/displays adequate comfort level or baseline comfort level:   Encourage patient to monitor pain and request assistance   Assess pain using appropriate pain scale   Administer analgesics based on type and severity of pain and evaluate response   Implement non-pharmacological measures as appropriate and evaluate response   Consider cultural and social influences on pain and pain management   Notify Licensed Independent Practitioner if interventions unsuccessful or patient reports new pain     Problem: Safety - Adult  Goal: Free from fall injury  9/14/2022 1027 by Shannan Cloud RN  Outcome: Progressing  Flowsheets (Taken 9/14/2022 1027)  Free From Fall Injury: Instruct family/caregiver on patient safety     Problem: ABCDS Injury Assessment  Goal: Absence of physical injury  9/14/2022 1027 by Shannan Cloud RN  Outcome: Progressing  Flowsheets (Taken 9/14/2022 1027)  Absence of Physical Injury: Implement safety measures based on patient assessment     Problem: Neurosensory - Adult  Goal: Achieves stable or improved neurological status  9/14/2022 1027 by Terence Watts RN  Outcome: Progressing  Flowsheets  Taken 9/14/2022 1027 by Terence Watts RN  Achieves stable or improved neurological status:   Assess for and report changes in neurological status   Initiate measures to prevent increased intracranial pressure   Maintain blood pressure and fluid volume within ordered parameters to optimize cerebral perfusion and minimize risk of hemorrhage   Monitor temperature, glucose, and sodium.  Initiate appropriate interventions as ordered  Taken 9/14/2022 0352 by Hailey White RN  Achieves stable or improved neurological status: Assess for and report changes in neurological status     Problem: Cardiovascular - Adult  Goal: Maintains optimal cardiac output and hemodynamic stability  9/14/2022 1027 by Terence Watts RN  Outcome: Progressing  Flowsheets  Taken 9/14/2022 1027 by Terence Watts RN  Maintains optimal cardiac output and hemodynamic stability:   Monitor blood pressure and heart rate   Monitor urine output and notify Licensed Independent Practitioner for values outside of normal range   Assess for signs of decreased cardiac output  Taken 9/14/2022 0352 by Hailey White RN  Maintains optimal cardiac output and hemodynamic stability: Monitor blood pressure and heart rate     Problem: Respiratory - Adult  Goal: Achieves optimal ventilation and oxygenation  9/14/2022 1027 by Terence Watts RN  Outcome: Progressing  Flowsheets (Taken 9/14/2022 1027)  Achieves optimal ventilation and oxygenation:   Assess for changes in respiratory status   Assess for changes in mentation and behavior   Position to facilitate oxygenation and minimize respiratory effort   Oxygen supplementation based on oxygen saturation or arterial blood gases     Problem: Skin/Tissue Integrity  Goal: Absence of new skin breakdown  Description: 1. Monitor for areas of redness and/or skin breakdown  2. Assess vascular access sites hourly  3. Every 4-6 hours minimum:  Change oxygen saturation probe site  4. Every 4-6 hours:  If on nasal continuous positive airway pressure, respiratory therapy assess nares and determine need for appliance change or resting period.   9/14/2022 1027 by Kenyatta Burns RN  Outcome: Progressing

## 2022-09-14 NOTE — PLAN OF CARE
Problem: Discharge Planning  Goal: Discharge to home or other facility with appropriate resources  9/14/2022 0238 by Judge Marcial RN  Outcome: Progressing  Flowsheets  Taken 9/13/2022 2305  Discharge to home or other facility with appropriate resources: Identify barriers to discharge with patient and caregiver  Taken 9/13/2022 1931  Discharge to home or other facility with appropriate resources: Identify barriers to discharge with patient and caregiver     Problem: Pain  Goal: Verbalizes/displays adequate comfort level or baseline comfort level  9/14/2022 0238 by Judge Marcial RN  Outcome: Progressing  Flowsheets (Taken 9/14/2022 0238)  Verbalizes/displays adequate comfort level or baseline comfort level:   Administer analgesics based on type and severity of pain and evaluate response   Encourage patient to monitor pain and request assistance   Assess pain using appropriate pain scale  Note: PRN Valium and Dilaudid given for pain management. Problem: Safety - Adult  Goal: Free from fall injury  9/14/2022 0238 by Judge Marcial RN  Outcome: Progressing  Flowsheets (Taken 9/13/2022 1948)  Free From Fall Injury: Instruct family/caregiver on patient safety  Note: Bed in lowest position, call light within reach, bed alarm in use. Problem: ABCDS Injury Assessment  Goal: Absence of physical injury  Outcome: Progressing  Flowsheets (Taken 9/13/2022 1948)  Absence of Physical Injury: Implement safety measures based on patient assessment     Problem: Neurosensory - Adult  Goal: Achieves stable or improved neurological status  Outcome: Progressing  Flowsheets  Taken 9/14/2022 0238  Achieves stable or improved neurological status:   Assess for and report changes in neurological status   Maintain blood pressure and fluid volume within ordered parameters to optimize cerebral perfusion and minimize risk of hemorrhage   Monitor temperature, glucose, and sodium.  Initiate appropriate interventions as ordered  Taken 9/13/2022 2305  Achieves stable or improved neurological status: Assess for and report changes in neurological status  Taken 9/13/2022 1931  Achieves stable or improved neurological status: Assess for and report changes in neurological status     Problem: Cardiovascular - Adult  Goal: Maintains optimal cardiac output and hemodynamic stability  Outcome: Progressing  Flowsheets  Taken 9/14/2022 0238  Maintains optimal cardiac output and hemodynamic stability: Monitor blood pressure and heart rate  Taken 9/13/2022 1931  Maintains optimal cardiac output and hemodynamic stability: Monitor blood pressure and heart rate  Note: Patient Sinus arrhythmia on telemetry. Problem: Respiratory - Adult  Goal: Achieves optimal ventilation and oxygenation  Outcome: Progressing  Flowsheets  Taken 9/14/2022 0238  Achieves optimal ventilation and oxygenation:   Assess for changes in respiratory status   Assess for changes in mentation and behavior  Taken 9/13/2022 2305  Achieves optimal ventilation and oxygenation: Assess for changes in respiratory status  Note: Patient >95% on RA. Problem: Skin/Tissue Integrity  Goal: Absence of new skin breakdown  Description: 1. Monitor for areas of redness and/or skin breakdown  2. Assess vascular access sites hourly  3. Every 4-6 hours minimum:  Change oxygen saturation probe site  4. Every 4-6 hours:  If on nasal continuous positive airway pressure, respiratory therapy assess nares and determine need for appliance change or resting period. Outcome: Progressing  Note: No new skin issues assessed.

## 2022-09-14 NOTE — PROGRESS NOTES
Patient reported increased constipation and discomfort. No BM in multiple days. MD notified. Milk of magnesia given.

## 2022-09-15 ENCOUNTER — ANESTHESIA (OUTPATIENT)
Dept: OPERATING ROOM | Age: 67
DRG: 459 | End: 2022-09-15
Payer: MEDICARE

## 2022-09-15 ENCOUNTER — ANESTHESIA EVENT (OUTPATIENT)
Dept: OPERATING ROOM | Age: 67
DRG: 459 | End: 2022-09-15
Payer: MEDICARE

## 2022-09-15 ENCOUNTER — APPOINTMENT (OUTPATIENT)
Dept: GENERAL RADIOLOGY | Age: 67
DRG: 459 | End: 2022-09-15
Attending: INTERNAL MEDICINE
Payer: MEDICARE

## 2022-09-15 LAB
ANION GAP SERPL CALCULATED.3IONS-SCNC: 8 MMOL/L (ref 3–16)
BASOPHILS ABSOLUTE: 0 K/UL (ref 0–0.2)
BASOPHILS RELATIVE PERCENT: 0.1 %
BUN BLDV-MCNC: 23 MG/DL (ref 7–20)
CALCIUM SERPL-MCNC: 9.2 MG/DL (ref 8.3–10.6)
CHLORIDE BLD-SCNC: 95 MMOL/L (ref 99–110)
CO2: 30 MMOL/L (ref 21–32)
CREAT SERPL-MCNC: 0.8 MG/DL (ref 0.8–1.3)
EKG ATRIAL RATE: 77 BPM
EKG ATRIAL RATE: 92 BPM
EKG DIAGNOSIS: NORMAL
EKG DIAGNOSIS: NORMAL
EKG P AXIS: 25 DEGREES
EKG P AXIS: 50 DEGREES
EKG P-R INTERVAL: 204 MS
EKG P-R INTERVAL: 216 MS
EKG Q-T INTERVAL: 420 MS
EKG Q-T INTERVAL: 422 MS
EKG QRS DURATION: 122 MS
EKG QRS DURATION: 92 MS
EKG QTC CALCULATION (BAZETT): 417 MS
EKG QTC CALCULATION (BAZETT): 475 MS
EKG R AXIS: -35 DEGREES
EKG R AXIS: -52 DEGREES
EKG T AXIS: 115 DEGREES
EKG T AXIS: 87 DEGREES
EKG VENTRICULAR RATE: 59 BPM
EKG VENTRICULAR RATE: 77 BPM
EOSINOPHILS ABSOLUTE: 0 K/UL (ref 0–0.6)
EOSINOPHILS RELATIVE PERCENT: 0.3 %
GFR AFRICAN AMERICAN: >60
GFR NON-AFRICAN AMERICAN: >60
GLUCOSE BLD-MCNC: 96 MG/DL (ref 70–99)
HCT VFR BLD CALC: 46.3 % (ref 40.5–52.5)
HEMOGLOBIN: 15.2 G/DL (ref 13.5–17.5)
LYMPHOCYTES ABSOLUTE: 1.8 K/UL (ref 1–5.1)
LYMPHOCYTES RELATIVE PERCENT: 27.5 %
MCH RBC QN AUTO: 32.3 PG (ref 26–34)
MCHC RBC AUTO-ENTMCNC: 32.9 G/DL (ref 31–36)
MCV RBC AUTO: 98.1 FL (ref 80–100)
MONOCYTES ABSOLUTE: 0.4 K/UL (ref 0–1.3)
MONOCYTES RELATIVE PERCENT: 6.7 %
NEUTROPHILS ABSOLUTE: 4.3 K/UL (ref 1.7–7.7)
NEUTROPHILS RELATIVE PERCENT: 65.4 %
PDW BLD-RTO: 13.4 % (ref 12.4–15.4)
PLATELET # BLD: 235 K/UL (ref 135–450)
PMV BLD AUTO: 7.5 FL (ref 5–10.5)
POTASSIUM REFLEX MAGNESIUM: 4.6 MMOL/L (ref 3.5–5.1)
RBC # BLD: 4.72 M/UL (ref 4.2–5.9)
SODIUM BLD-SCNC: 133 MMOL/L (ref 136–145)
WBC # BLD: 6.6 K/UL (ref 4–11)

## 2022-09-15 PROCEDURE — 0RG60K1 FUSION OF THORACIC VERTEBRAL JOINT WITH NONAUTOLOGOUS TISSUE SUBSTITUTE, POSTERIOR APPROACH, POSTERIOR COLUMN, OPEN APPROACH: ICD-10-PCS | Performed by: NEUROLOGICAL SURGERY

## 2022-09-15 PROCEDURE — 72040 X-RAY EXAM NECK SPINE 2-3 VW: CPT

## 2022-09-15 PROCEDURE — 6360000002 HC RX W HCPCS: Performed by: NURSE ANESTHETIST, CERTIFIED REGISTERED

## 2022-09-15 PROCEDURE — 88305 TISSUE EXAM BY PATHOLOGIST: CPT

## 2022-09-15 PROCEDURE — 2720000010 HC SURG SUPPLY STERILE: Performed by: STUDENT IN AN ORGANIZED HEALTH CARE EDUCATION/TRAINING PROGRAM

## 2022-09-15 PROCEDURE — 93010 ELECTROCARDIOGRAM REPORT: CPT | Performed by: INTERNAL MEDICINE

## 2022-09-15 PROCEDURE — 3600000004 HC SURGERY LEVEL 4 BASE: Performed by: STUDENT IN AN ORGANIZED HEALTH CARE EDUCATION/TRAINING PROGRAM

## 2022-09-15 PROCEDURE — A4217 STERILE WATER/SALINE, 500 ML: HCPCS | Performed by: STUDENT IN AN ORGANIZED HEALTH CARE EDUCATION/TRAINING PROGRAM

## 2022-09-15 PROCEDURE — C9290 INJ, BUPIVACAINE LIPOSOME: HCPCS | Performed by: STUDENT IN AN ORGANIZED HEALTH CARE EDUCATION/TRAINING PROGRAM

## 2022-09-15 PROCEDURE — 00NW0ZZ RELEASE CERVICAL SPINAL CORD, OPEN APPROACH: ICD-10-PCS | Performed by: NEUROLOGICAL SURGERY

## 2022-09-15 PROCEDURE — 93005 ELECTROCARDIOGRAM TRACING: CPT | Performed by: INTERNAL MEDICINE

## 2022-09-15 PROCEDURE — 6370000000 HC RX 637 (ALT 250 FOR IP)

## 2022-09-15 PROCEDURE — 80048 BASIC METABOLIC PNL TOTAL CA: CPT

## 2022-09-15 PROCEDURE — C1713 ANCHOR/SCREW BN/BN,TIS/BN: HCPCS | Performed by: STUDENT IN AN ORGANIZED HEALTH CARE EDUCATION/TRAINING PROGRAM

## 2022-09-15 PROCEDURE — 2580000003 HC RX 258: Performed by: NEUROLOGICAL SURGERY

## 2022-09-15 PROCEDURE — C9359 IMPLNT,BON VOID FILLER-PUTTY: HCPCS | Performed by: STUDENT IN AN ORGANIZED HEALTH CARE EDUCATION/TRAINING PROGRAM

## 2022-09-15 PROCEDURE — 2580000003 HC RX 258: Performed by: NURSE ANESTHETIST, CERTIFIED REGISTERED

## 2022-09-15 PROCEDURE — 2500000003 HC RX 250 WO HCPCS: Performed by: STUDENT IN AN ORGANIZED HEALTH CARE EDUCATION/TRAINING PROGRAM

## 2022-09-15 PROCEDURE — 7100000001 HC PACU RECOVERY - ADDTL 15 MIN: Performed by: STUDENT IN AN ORGANIZED HEALTH CARE EDUCATION/TRAINING PROGRAM

## 2022-09-15 PROCEDURE — 7100000000 HC PACU RECOVERY - FIRST 15 MIN: Performed by: STUDENT IN AN ORGANIZED HEALTH CARE EDUCATION/TRAINING PROGRAM

## 2022-09-15 PROCEDURE — 3600000014 HC SURGERY LEVEL 4 ADDTL 15MIN: Performed by: STUDENT IN AN ORGANIZED HEALTH CARE EDUCATION/TRAINING PROGRAM

## 2022-09-15 PROCEDURE — 2709999900 HC NON-CHARGEABLE SUPPLY: Performed by: STUDENT IN AN ORGANIZED HEALTH CARE EDUCATION/TRAINING PROGRAM

## 2022-09-15 PROCEDURE — 6360000002 HC RX W HCPCS: Performed by: FAMILY MEDICINE

## 2022-09-15 PROCEDURE — 6360000002 HC RX W HCPCS: Performed by: STUDENT IN AN ORGANIZED HEALTH CARE EDUCATION/TRAINING PROGRAM

## 2022-09-15 PROCEDURE — 3700000001 HC ADD 15 MINUTES (ANESTHESIA): Performed by: STUDENT IN AN ORGANIZED HEALTH CARE EDUCATION/TRAINING PROGRAM

## 2022-09-15 PROCEDURE — 2780000010 HC IMPLANT OTHER: Performed by: STUDENT IN AN ORGANIZED HEALTH CARE EDUCATION/TRAINING PROGRAM

## 2022-09-15 PROCEDURE — 0RG20K1 FUSION OF 2 OR MORE CERVICAL VERTEBRAL JOINTS WITH NONAUTOLOGOUS TISSUE SUBSTITUTE, POSTERIOR APPROACH, POSTERIOR COLUMN, OPEN APPROACH: ICD-10-PCS | Performed by: NEUROLOGICAL SURGERY

## 2022-09-15 PROCEDURE — 3700000000 HC ANESTHESIA ATTENDED CARE: Performed by: STUDENT IN AN ORGANIZED HEALTH CARE EDUCATION/TRAINING PROGRAM

## 2022-09-15 PROCEDURE — 6370000000 HC RX 637 (ALT 250 FOR IP): Performed by: NEUROLOGICAL SURGERY

## 2022-09-15 PROCEDURE — 1200000000 HC SEMI PRIVATE

## 2022-09-15 PROCEDURE — 2500000003 HC RX 250 WO HCPCS: Performed by: NURSE ANESTHETIST, CERTIFIED REGISTERED

## 2022-09-15 PROCEDURE — 0RG40K1 FUSION OF CERVICOTHORACIC VERTEBRAL JOINT WITH NONAUTOLOGOUS TISSUE SUBSTITUTE, POSTERIOR APPROACH, POSTERIOR COLUMN, OPEN APPROACH: ICD-10-PCS | Performed by: NEUROLOGICAL SURGERY

## 2022-09-15 PROCEDURE — 3209999900 FLUORO FOR SURGICAL PROCEDURES

## 2022-09-15 PROCEDURE — 85025 COMPLETE CBC W/AUTO DIFF WBC: CPT

## 2022-09-15 PROCEDURE — 8E0WXBF COMPUTER ASSISTED PROCEDURE OF TRUNK REGION, WITH FLUOROSCOPY: ICD-10-PCS | Performed by: NEUROLOGICAL SURGERY

## 2022-09-15 PROCEDURE — 6360000002 HC RX W HCPCS: Performed by: ANESTHESIOLOGY

## 2022-09-15 PROCEDURE — 6360000002 HC RX W HCPCS: Performed by: INTERNAL MEDICINE

## 2022-09-15 PROCEDURE — 2580000003 HC RX 258: Performed by: STUDENT IN AN ORGANIZED HEALTH CARE EDUCATION/TRAINING PROGRAM

## 2022-09-15 PROCEDURE — 6360000002 HC RX W HCPCS: Performed by: NEUROLOGICAL SURGERY

## 2022-09-15 PROCEDURE — 36415 COLL VENOUS BLD VENIPUNCTURE: CPT

## 2022-09-15 PROCEDURE — 99232 SBSQ HOSP IP/OBS MODERATE 35: CPT | Performed by: INTERNAL MEDICINE

## 2022-09-15 DEVICE — IMPLANTABLE DEVICE
Type: IMPLANTABLE DEVICE | Site: POSTERIOR CERVICAL | Status: FUNCTIONAL
Brand: PUROS®

## 2022-09-15 DEVICE — IMPLANTABLE DEVICE
Type: IMPLANTABLE DEVICE | Site: POSTERIOR CERVICAL | Status: FUNCTIONAL
Brand: VIRAGE®

## 2022-09-15 DEVICE — IMPLANTABLE DEVICE: Type: IMPLANTABLE DEVICE | Site: POSTERIOR CERVICAL | Status: FUNCTIONAL

## 2022-09-15 DEVICE — GRAFT BONE PRIMAGEN 10CC: Type: IMPLANTABLE DEVICE | Site: POSTERIOR CERVICAL | Status: FUNCTIONAL

## 2022-09-15 DEVICE — CAP SPNL OCT CLSR FIX SYS VIRAGE: Type: IMPLANTABLE DEVICE | Site: POSTERIOR CERVICAL | Status: FUNCTIONAL

## 2022-09-15 RX ORDER — SENNA AND DOCUSATE SODIUM 50; 8.6 MG/1; MG/1
2 TABLET, FILM COATED ORAL 2 TIMES DAILY
Status: DISCONTINUED | OUTPATIENT
Start: 2022-09-16 | End: 2022-09-23 | Stop reason: HOSPADM

## 2022-09-15 RX ORDER — DIAZEPAM 2 MG/1
5 TABLET ORAL EVERY 6 HOURS PRN
Status: DISCONTINUED | OUTPATIENT
Start: 2022-09-15 | End: 2022-09-23 | Stop reason: HOSPADM

## 2022-09-15 RX ORDER — FAMOTIDINE 10 MG/ML
INJECTION, SOLUTION INTRAVENOUS PRN
Status: DISCONTINUED | OUTPATIENT
Start: 2022-09-15 | End: 2022-09-15 | Stop reason: SDUPTHER

## 2022-09-15 RX ORDER — LIDOCAINE HYDROCHLORIDE AND EPINEPHRINE 10; 10 MG/ML; UG/ML
INJECTION, SOLUTION INFILTRATION; PERINEURAL PRN
Status: DISCONTINUED | OUTPATIENT
Start: 2022-09-15 | End: 2022-09-15 | Stop reason: HOSPADM

## 2022-09-15 RX ORDER — SODIUM CHLORIDE 9 MG/ML
INJECTION, SOLUTION INTRAVENOUS CONTINUOUS PRN
Status: DISCONTINUED | OUTPATIENT
Start: 2022-09-15 | End: 2022-09-15 | Stop reason: SDUPTHER

## 2022-09-15 RX ORDER — SUCCINYLCHOLINE/SOD CL,ISO/PF 200MG/10ML
SYRINGE (ML) INTRAVENOUS PRN
Status: DISCONTINUED | OUTPATIENT
Start: 2022-09-15 | End: 2022-09-15 | Stop reason: SDUPTHER

## 2022-09-15 RX ORDER — PROPOFOL 10 MG/ML
INJECTION, EMULSION INTRAVENOUS PRN
Status: DISCONTINUED | OUTPATIENT
Start: 2022-09-15 | End: 2022-09-15 | Stop reason: SDUPTHER

## 2022-09-15 RX ORDER — POLYETHYLENE GLYCOL 3350 17 G/17G
17 POWDER, FOR SOLUTION ORAL DAILY
Status: DISCONTINUED | OUTPATIENT
Start: 2022-09-15 | End: 2022-09-23 | Stop reason: HOSPADM

## 2022-09-15 RX ORDER — SODIUM CHLORIDE 9 MG/ML
INJECTION, SOLUTION INTRAVENOUS PRN
Status: DISCONTINUED | OUTPATIENT
Start: 2022-09-15 | End: 2022-09-23 | Stop reason: HOSPADM

## 2022-09-15 RX ORDER — HEPARIN SODIUM 5000 [USP'U]/.5ML
5000 INJECTION, SOLUTION INTRAVENOUS; SUBCUTANEOUS EVERY 8 HOURS
Status: DISCONTINUED | OUTPATIENT
Start: 2022-09-16 | End: 2022-09-16

## 2022-09-15 RX ORDER — SODIUM CHLORIDE 9 MG/ML
25 INJECTION, SOLUTION INTRAVENOUS PRN
Status: DISCONTINUED | OUTPATIENT
Start: 2022-09-15 | End: 2022-09-15 | Stop reason: HOSPADM

## 2022-09-15 RX ORDER — NALOXONE HYDROCHLORIDE 0.4 MG/ML
0.2 INJECTION, SOLUTION INTRAMUSCULAR; INTRAVENOUS; SUBCUTANEOUS PRN
Status: DISCONTINUED | OUTPATIENT
Start: 2022-09-15 | End: 2022-09-23 | Stop reason: HOSPADM

## 2022-09-15 RX ORDER — OXYCODONE HYDROCHLORIDE 5 MG/1
5 TABLET ORAL EVERY 4 HOURS PRN
Status: DISCONTINUED | OUTPATIENT
Start: 2022-09-15 | End: 2022-09-23 | Stop reason: HOSPADM

## 2022-09-15 RX ORDER — BISACODYL 10 MG
10 SUPPOSITORY, RECTAL RECTAL DAILY PRN
Status: DISCONTINUED | OUTPATIENT
Start: 2022-09-15 | End: 2022-09-23 | Stop reason: HOSPADM

## 2022-09-15 RX ORDER — FENTANYL CITRATE 50 UG/ML
INJECTION, SOLUTION INTRAMUSCULAR; INTRAVENOUS PRN
Status: DISCONTINUED | OUTPATIENT
Start: 2022-09-15 | End: 2022-09-15 | Stop reason: SDUPTHER

## 2022-09-15 RX ORDER — CEFAZOLIN SODIUM 1 G/3ML
INJECTION, POWDER, FOR SOLUTION INTRAMUSCULAR; INTRAVENOUS PRN
Status: DISCONTINUED | OUTPATIENT
Start: 2022-09-15 | End: 2022-09-15 | Stop reason: SDUPTHER

## 2022-09-15 RX ORDER — MAGNESIUM HYDROXIDE 1200 MG/15ML
LIQUID ORAL CONTINUOUS PRN
Status: DISCONTINUED | OUTPATIENT
Start: 2022-09-15 | End: 2022-09-15 | Stop reason: HOSPADM

## 2022-09-15 RX ORDER — ACETAMINOPHEN 500 MG
1000 TABLET ORAL EVERY 6 HOURS
Status: DISCONTINUED | OUTPATIENT
Start: 2022-09-16 | End: 2022-09-23 | Stop reason: HOSPADM

## 2022-09-15 RX ORDER — MEPERIDINE HYDROCHLORIDE 25 MG/ML
12.5 INJECTION INTRAMUSCULAR; INTRAVENOUS; SUBCUTANEOUS EVERY 5 MIN PRN
Status: DISCONTINUED | OUTPATIENT
Start: 2022-09-15 | End: 2022-09-15 | Stop reason: HOSPADM

## 2022-09-15 RX ORDER — MAGNESIUM HYDROXIDE/ALUMINUM HYDROXICE/SIMETHICONE 120; 1200; 1200 MG/30ML; MG/30ML; MG/30ML
15 SUSPENSION ORAL EVERY 6 HOURS PRN
Status: DISCONTINUED | OUTPATIENT
Start: 2022-09-15 | End: 2022-09-23 | Stop reason: HOSPADM

## 2022-09-15 RX ORDER — DEXAMETHASONE SODIUM PHOSPHATE 4 MG/ML
INJECTION, SOLUTION INTRA-ARTICULAR; INTRALESIONAL; INTRAMUSCULAR; INTRAVENOUS; SOFT TISSUE PRN
Status: DISCONTINUED | OUTPATIENT
Start: 2022-09-15 | End: 2022-09-15 | Stop reason: SDUPTHER

## 2022-09-15 RX ORDER — LORAZEPAM 2 MG/ML
1 CONCENTRATE ORAL ONCE
Status: DISCONTINUED | OUTPATIENT
Start: 2022-09-15 | End: 2022-09-15 | Stop reason: SDUPTHER

## 2022-09-15 RX ORDER — SENNA AND DOCUSATE SODIUM 50; 8.6 MG/1; MG/1
1 TABLET, FILM COATED ORAL 2 TIMES DAILY
Status: DISCONTINUED | OUTPATIENT
Start: 2022-09-15 | End: 2022-09-15

## 2022-09-15 RX ORDER — LORAZEPAM 2 MG/ML
1 INJECTION INTRAMUSCULAR ONCE
Status: COMPLETED | OUTPATIENT
Start: 2022-09-15 | End: 2022-09-15

## 2022-09-15 RX ORDER — PROCHLORPERAZINE EDISYLATE 5 MG/ML
5 INJECTION INTRAMUSCULAR; INTRAVENOUS
Status: DISCONTINUED | OUTPATIENT
Start: 2022-09-15 | End: 2022-09-15 | Stop reason: HOSPADM

## 2022-09-15 RX ORDER — SODIUM CHLORIDE 0.9 % (FLUSH) 0.9 %
5-40 SYRINGE (ML) INJECTION PRN
Status: DISCONTINUED | OUTPATIENT
Start: 2022-09-15 | End: 2022-09-15 | Stop reason: HOSPADM

## 2022-09-15 RX ORDER — HYDRALAZINE HYDROCHLORIDE 20 MG/ML
10 INJECTION INTRAMUSCULAR; INTRAVENOUS
Status: DISCONTINUED | OUTPATIENT
Start: 2022-09-15 | End: 2022-09-15 | Stop reason: HOSPADM

## 2022-09-15 RX ORDER — METHOCARBAMOL 750 MG/1
750 TABLET, FILM COATED ORAL EVERY 6 HOURS PRN
Status: DISCONTINUED | OUTPATIENT
Start: 2022-09-16 | End: 2022-09-15

## 2022-09-15 RX ORDER — ONDANSETRON 2 MG/ML
4 INJECTION INTRAMUSCULAR; INTRAVENOUS EVERY 6 HOURS PRN
Status: DISCONTINUED | OUTPATIENT
Start: 2022-09-15 | End: 2022-09-23 | Stop reason: HOSPADM

## 2022-09-15 RX ORDER — GLYCOPYRROLATE 0.2 MG/ML
INJECTION INTRAMUSCULAR; INTRAVENOUS PRN
Status: DISCONTINUED | OUTPATIENT
Start: 2022-09-15 | End: 2022-09-15 | Stop reason: SDUPTHER

## 2022-09-15 RX ORDER — DIAZEPAM 5 MG/ML
2.5 INJECTION, SOLUTION INTRAMUSCULAR; INTRAVENOUS
Status: DISCONTINUED | OUTPATIENT
Start: 2022-09-15 | End: 2022-09-15 | Stop reason: SDUPTHER

## 2022-09-15 RX ORDER — VASOPRESSIN 20 U/ML
INJECTION PARENTERAL PRN
Status: DISCONTINUED | OUTPATIENT
Start: 2022-09-15 | End: 2022-09-15 | Stop reason: SDUPTHER

## 2022-09-15 RX ORDER — LIDOCAINE HYDROCHLORIDE 20 MG/ML
INJECTION, SOLUTION INTRAVENOUS PRN
Status: DISCONTINUED | OUTPATIENT
Start: 2022-09-15 | End: 2022-09-15 | Stop reason: SDUPTHER

## 2022-09-15 RX ORDER — MIDAZOLAM HYDROCHLORIDE 1 MG/ML
INJECTION INTRAMUSCULAR; INTRAVENOUS PRN
Status: DISCONTINUED | OUTPATIENT
Start: 2022-09-15 | End: 2022-09-15 | Stop reason: SDUPTHER

## 2022-09-15 RX ORDER — OXYCODONE HYDROCHLORIDE 5 MG/1
10 TABLET ORAL EVERY 4 HOURS PRN
Status: DISCONTINUED | OUTPATIENT
Start: 2022-09-15 | End: 2022-09-23 | Stop reason: HOSPADM

## 2022-09-15 RX ORDER — ROCURONIUM BROMIDE 10 MG/ML
INJECTION, SOLUTION INTRAVENOUS PRN
Status: DISCONTINUED | OUTPATIENT
Start: 2022-09-15 | End: 2022-09-15 | Stop reason: SDUPTHER

## 2022-09-15 RX ORDER — PROMETHAZINE HYDROCHLORIDE 12.5 MG/1
12.5 TABLET ORAL EVERY 6 HOURS PRN
Status: DISCONTINUED | OUTPATIENT
Start: 2022-09-15 | End: 2022-09-23 | Stop reason: HOSPADM

## 2022-09-15 RX ORDER — ACETAMINOPHEN 325 MG/1
650 TABLET ORAL EVERY 4 HOURS PRN
Status: DISCONTINUED | OUTPATIENT
Start: 2022-09-15 | End: 2022-09-15

## 2022-09-15 RX ORDER — SODIUM CHLORIDE 0.9 % (FLUSH) 0.9 %
5-40 SYRINGE (ML) INJECTION EVERY 12 HOURS SCHEDULED
Status: DISCONTINUED | OUTPATIENT
Start: 2022-09-15 | End: 2022-09-15 | Stop reason: HOSPADM

## 2022-09-15 RX ORDER — METHOCARBAMOL 500 MG/1
1000 TABLET, FILM COATED ORAL 4 TIMES DAILY
Status: DISCONTINUED | OUTPATIENT
Start: 2022-09-16 | End: 2022-09-23 | Stop reason: HOSPADM

## 2022-09-15 RX ORDER — SODIUM CHLORIDE 0.9 % (FLUSH) 0.9 %
5-40 SYRINGE (ML) INJECTION EVERY 12 HOURS SCHEDULED
Status: DISCONTINUED | OUTPATIENT
Start: 2022-09-15 | End: 2022-09-23 | Stop reason: HOSPADM

## 2022-09-15 RX ORDER — SODIUM CHLORIDE 9 MG/ML
INJECTION, SOLUTION INTRAVENOUS CONTINUOUS
Status: DISCONTINUED | OUTPATIENT
Start: 2022-09-15 | End: 2022-09-16

## 2022-09-15 RX ORDER — SODIUM CHLORIDE 0.9 % (FLUSH) 0.9 %
5-40 SYRINGE (ML) INJECTION PRN
Status: DISCONTINUED | OUTPATIENT
Start: 2022-09-15 | End: 2022-09-23 | Stop reason: HOSPADM

## 2022-09-15 RX ORDER — HYDROMORPHONE HCL 110MG/55ML
PATIENT CONTROLLED ANALGESIA SYRINGE INTRAVENOUS PRN
Status: DISCONTINUED | OUTPATIENT
Start: 2022-09-15 | End: 2022-09-15 | Stop reason: SDUPTHER

## 2022-09-15 RX ORDER — PROPOFOL 10 MG/ML
INJECTION, EMULSION INTRAVENOUS CONTINUOUS PRN
Status: DISCONTINUED | OUTPATIENT
Start: 2022-09-15 | End: 2022-09-15 | Stop reason: SDUPTHER

## 2022-09-15 RX ADMIN — HYDROMORPHONE HYDROCHLORIDE 0.5 MG: 1 INJECTION, SOLUTION INTRAMUSCULAR; INTRAVENOUS; SUBCUTANEOUS at 16:54

## 2022-09-15 RX ADMIN — HYDROMORPHONE HYDROCHLORIDE 0.5 MG: 2 INJECTION, SOLUTION INTRAMUSCULAR; INTRAVENOUS; SUBCUTANEOUS at 11:38

## 2022-09-15 RX ADMIN — HYDRALAZINE HYDROCHLORIDE 5 MG: 20 INJECTION INTRAMUSCULAR; INTRAVENOUS at 03:53

## 2022-09-15 RX ADMIN — VASOPRESSIN 1 UNITS: 20 INJECTION INTRAVENOUS at 13:49

## 2022-09-15 RX ADMIN — VASOPRESSIN 1 UNITS: 20 INJECTION INTRAVENOUS at 14:35

## 2022-09-15 RX ADMIN — HYDROMORPHONE HYDROCHLORIDE 0.5 MG: 2 INJECTION, SOLUTION INTRAMUSCULAR; INTRAVENOUS; SUBCUTANEOUS at 15:08

## 2022-09-15 RX ADMIN — HYDROMORPHONE HYDROCHLORIDE 0.5 MG: 1 INJECTION, SOLUTION INTRAMUSCULAR; INTRAVENOUS; SUBCUTANEOUS at 07:02

## 2022-09-15 RX ADMIN — METHOCARBAMOL 1000 MG: 100 INJECTION, SOLUTION INTRAMUSCULAR; INTRAVENOUS at 16:50

## 2022-09-15 RX ADMIN — SODIUM CHLORIDE: 9 INJECTION, SOLUTION INTRAVENOUS at 13:43

## 2022-09-15 RX ADMIN — VASOPRESSIN 1 UNITS: 20 INJECTION INTRAVENOUS at 12:30

## 2022-09-15 RX ADMIN — ROCURONIUM BROMIDE 50 MG: 10 INJECTION INTRAVENOUS at 12:19

## 2022-09-15 RX ADMIN — GLYCOPYRROLATE 0.1 MG: 0.2 INJECTION, SOLUTION INTRAMUSCULAR; INTRAVENOUS at 11:30

## 2022-09-15 RX ADMIN — HYDROMORPHONE HYDROCHLORIDE 1 MG: 1 INJECTION, SOLUTION INTRAMUSCULAR; INTRAVENOUS; SUBCUTANEOUS at 22:41

## 2022-09-15 RX ADMIN — PROPOFOL 120 MCG/KG/MIN: 10 INJECTION, EMULSION INTRAVENOUS at 10:58

## 2022-09-15 RX ADMIN — DEXAMETHASONE SODIUM PHOSPHATE 10 MG: 4 INJECTION, SOLUTION INTRAMUSCULAR; INTRAVENOUS at 11:57

## 2022-09-15 RX ADMIN — VASOPRESSIN 2 UNITS: 20 INJECTION INTRAVENOUS at 11:26

## 2022-09-15 RX ADMIN — FAMOTIDINE 20 MG: 10 INJECTION, SOLUTION INTRAVENOUS at 14:52

## 2022-09-15 RX ADMIN — PROPOFOL 50 MG: 10 INJECTION, EMULSION INTRAVENOUS at 11:40

## 2022-09-15 RX ADMIN — CEFAZOLIN SODIUM 2000 MG: 1 POWDER, FOR SOLUTION INTRAMUSCULAR; INTRAVENOUS at 12:03

## 2022-09-15 RX ADMIN — REMIFENTANIL HYDROCHLORIDE 0.2 MCG/KG/MIN: 1 INJECTION, POWDER, LYOPHILIZED, FOR SOLUTION INTRAVENOUS at 10:58

## 2022-09-15 RX ADMIN — LORAZEPAM 1 MG: 2 INJECTION INTRAMUSCULAR; INTRAVENOUS at 17:25

## 2022-09-15 RX ADMIN — VASOPRESSIN 2 UNITS: 20 INJECTION INTRAVENOUS at 10:58

## 2022-09-15 RX ADMIN — HYDRALAZINE HYDROCHLORIDE 5 MG: 20 INJECTION INTRAMUSCULAR; INTRAVENOUS at 22:11

## 2022-09-15 RX ADMIN — HYDROMORPHONE HYDROCHLORIDE 0.5 MG: 1 INJECTION, SOLUTION INTRAMUSCULAR; INTRAVENOUS; SUBCUTANEOUS at 16:45

## 2022-09-15 RX ADMIN — PROPOFOL 150 MG: 10 INJECTION, EMULSION INTRAVENOUS at 10:54

## 2022-09-15 RX ADMIN — HYDROMORPHONE HYDROCHLORIDE 0.5 MG: 1 INJECTION, SOLUTION INTRAMUSCULAR; INTRAVENOUS; SUBCUTANEOUS at 03:28

## 2022-09-15 RX ADMIN — FENTANYL CITRATE 50 MCG: 50 INJECTION, SOLUTION INTRAMUSCULAR; INTRAVENOUS at 10:54

## 2022-09-15 RX ADMIN — MIDAZOLAM HYDROCHLORIDE 2 MG: 2 INJECTION, SOLUTION INTRAMUSCULAR; INTRAVENOUS at 10:31

## 2022-09-15 RX ADMIN — SODIUM CHLORIDE: 9 INJECTION, SOLUTION INTRAVENOUS at 10:31

## 2022-09-15 RX ADMIN — OXYCODONE 10 MG: 5 TABLET ORAL at 21:17

## 2022-09-15 RX ADMIN — HYDROMORPHONE HYDROCHLORIDE 1 MG: 1 INJECTION, SOLUTION INTRAMUSCULAR; INTRAVENOUS; SUBCUTANEOUS at 19:49

## 2022-09-15 RX ADMIN — VASOPRESSIN 1 UNITS: 20 INJECTION INTRAVENOUS at 14:19

## 2022-09-15 RX ADMIN — PHENYLEPHRINE HYDROCHLORIDE 10 MCG/MIN: 10 INJECTION INTRAVENOUS at 12:29

## 2022-09-15 RX ADMIN — HYDROMORPHONE HYDROCHLORIDE 1 MG: 2 INJECTION, SOLUTION INTRAMUSCULAR; INTRAVENOUS; SUBCUTANEOUS at 14:06

## 2022-09-15 RX ADMIN — Medication 160 MG: at 10:55

## 2022-09-15 RX ADMIN — VASOPRESSIN 1 UNITS: 20 INJECTION INTRAVENOUS at 13:31

## 2022-09-15 RX ADMIN — FENTANYL CITRATE 50 MCG: 50 INJECTION, SOLUTION INTRAMUSCULAR; INTRAVENOUS at 11:40

## 2022-09-15 RX ADMIN — DOCUSATE SODIUM 50MG AND SENNOSIDES 8.6MG 1 TABLET: 8.6; 5 TABLET, FILM COATED ORAL at 21:17

## 2022-09-15 RX ADMIN — CEFAZOLIN 2000 MG: 2 INJECTION, POWDER, FOR SOLUTION INTRAMUSCULAR; INTRAVENOUS at 21:22

## 2022-09-15 RX ADMIN — SODIUM CHLORIDE: 9 INJECTION, SOLUTION INTRAVENOUS at 10:41

## 2022-09-15 RX ADMIN — LIDOCAINE HYDROCHLORIDE 100 MG: 20 INJECTION, SOLUTION INTRAVENOUS at 10:54

## 2022-09-15 ASSESSMENT — PAIN DESCRIPTION - FREQUENCY
FREQUENCY: CONTINUOUS

## 2022-09-15 ASSESSMENT — PAIN DESCRIPTION - LOCATION
LOCATION: BACK
LOCATION: SHOULDER;BACK
LOCATION: BACK;INCISION;NECK
LOCATION: BACK;NECK
LOCATION: BACK
LOCATION: BACK;INCISION

## 2022-09-15 ASSESSMENT — PAIN DESCRIPTION - PAIN TYPE
TYPE: SURGICAL PAIN
TYPE: ACUTE PAIN;CHRONIC PAIN
TYPE: ACUTE PAIN
TYPE: ACUTE PAIN;SURGICAL PAIN
TYPE: SURGICAL PAIN
TYPE: SURGICAL PAIN

## 2022-09-15 ASSESSMENT — PAIN SCALES - GENERAL
PAINLEVEL_OUTOF10: 10
PAINLEVEL_OUTOF10: 9
PAINLEVEL_OUTOF10: 10
PAINLEVEL_OUTOF10: 10
PAINLEVEL_OUTOF10: 4
PAINLEVEL_OUTOF10: 3
PAINLEVEL_OUTOF10: 7
PAINLEVEL_OUTOF10: 10

## 2022-09-15 ASSESSMENT — PAIN DESCRIPTION - ONSET
ONSET: ON-GOING

## 2022-09-15 ASSESSMENT — PAIN DESCRIPTION - ORIENTATION
ORIENTATION: RIGHT;LEFT;POSTERIOR
ORIENTATION: POSTERIOR
ORIENTATION: MID
ORIENTATION: POSTERIOR
ORIENTATION: MID
ORIENTATION: UPPER

## 2022-09-15 ASSESSMENT — PAIN DESCRIPTION - DESCRIPTORS
DESCRIPTORS: ACHING
DESCRIPTORS: SORE
DESCRIPTORS: ACHING

## 2022-09-15 ASSESSMENT — PAIN SCALES - WONG BAKER
WONGBAKER_NUMERICALRESPONSE: 0

## 2022-09-15 ASSESSMENT — ENCOUNTER SYMPTOMS: DYSPNEA ACTIVITY LEVEL: NO INTERVAL CHANGE

## 2022-09-15 NOTE — ANESTHESIA POSTPROCEDURE EVALUATION
Department of Anesthesiology  Postprocedure Note    Patient: Aiden Medel  MRN: [id number]  YOB: 1955  Date of evaluation: 9/15/2022      Procedure Summary     Date: 09/15/22 Room / Location: [address] [place] [address] [facility] / [facility]    Anesthesia Start: 1031 Anesthesia Stop: 1633    Procedure: C2-T2 POSTERIOR DECOMPRESSION FUSION AND FIXATION (Spine Cervical) Diagnosis:       Cervical stenosis of spine      (Cervical stenosis of spine [M48.02])    Surgeons: Kourtney Rojas MD Responsible Provider: Juan José Nickerson DO    Anesthesia Type: general ASA Status: 3 - Emergent          Anesthesia Type: No value filed.     Charis Phase I: Charis Score: 10    Charis Phase II:        Anesthesia Post Evaluation    Patient location during evaluation: PACU  Level of consciousness: awake  Complications: no  Multimodal analgesia pain management approach

## 2022-09-15 NOTE — PROGRESS NOTES
Pt transferred to room 5517. Able to move all extremities. Pt knows he is in the hospital, but sleepy. VSS. Pt is on oxygen, 3L. Pt has two hemovacs, both are compressed. J collar in place. Incision is clean, dry, intact. Call light within reach, bed alarm on.

## 2022-09-15 NOTE — PROGRESS NOTES
Office : [phone]     Fax :[phone]       Nephrology  Note      Patient's Name: Jose Barbosa  1:34 PM  9/15/2022    Reason for Consult:  hyponatremia       Requesting Physician: Rajni LOJA [facility]      09/15/22    Na stable   No N/V/D         I/O last 3 completed shifts: In: 1260 [P.O.:1250; I.V.:10]  Out: 1950 [Urine:1950]    Past Medical History:   Diagnosis Date    Arthritis     Atrial fibrillation (HCC)     CAD (coronary artery disease)     Hyperlipidemia     Hypertension        Past Surgical History:   Procedure Laterality Date    BACK SURGERY  200    lower lumbar surgery    CORONARY ANGIOPLASTY WITH STENT PLACEMENT  2012       Family History   Problem Relation Age of Onset    Heart Disease Mother     Diabetes Mother     Heart Disease Father     Diabetes Father     Diabetes Sister     Diabetes Brother     Diabetes Maternal Grandmother     Diabetes Maternal Grandfather     Diabetes Paternal Grandmother     Diabetes Paternal Grandfather         reports that he has been smoking cigarettes. He has a 40.00 pack-year smoking history. He has never used smokeless tobacco. He reports current alcohol use of about 20.0 standard drinks per week. He reports that he does not use drugs. Allergies:  Patient has no known allergies.     Current Medications:    naloxegol (MOVANTIK) tablet 12.5 mg, QAM AC  NIFEdipine (ADALAT CC) extended release tablet 30 mg, Daily  thiamine mononitrate tablet 100 mg, Daily  metoprolol succinate (TOPROL XL) extended release tablet 75 mg, Daily  tamsulosin (FLOMAX) capsule 0.4 mg, Daily  magnesium hydroxide (MILK OF MAGNESIA) 400 MG/5ML suspension 30 mL, Daily PRN  hydrALAZINE (APRESOLINE) injection 5 mg, Q6H PRN  nicotine (NICODERM CQ) 14 MG/24HR 1 patch, Daily  polyvinyl alcohol (LIQUIFILM TEARS) 1.4 % ophthalmic solution 1 drop, PRN  sodium chloride (OCEAN, BABY AYR) 0.65 % nasal spray 1 spray, PRN  sodium chloride flush 0.9 % injection 5-40 mL, 2 times per day  sodium chloride flush 0.9 % injection 5-40 mL, PRN  0.9 % sodium chloride infusion, PRN  [Held by provider] aspirin tablet 325 mg, Daily  atorvastatin (LIPITOR) tablet 40 mg, Daily  lisinopril (PRINIVIL;ZESTRIL) tablet 40 mg, Daily  meloxicam (MOBIC) tablet 7.5 mg, Daily  sodium chloride flush 0.9 % injection 5-40 mL, 2 times per day  sodium chloride flush 0.9 % injection 5-40 mL, PRN  0.9 % sodium chloride infusion, PRN  ondansetron (ZOFRAN-ODT) disintegrating tablet 4 mg, Q8H PRN   Or  ondansetron (ZOFRAN) injection 4 mg, Q6H PRN  polyethylene glycol (GLYCOLAX) packet 17 g, Daily PRN  acetaminophen (TYLENOL) tablet 650 mg, Q6H PRN   Or  acetaminophen (TYLENOL) suppository 650 mg, Q6H PRN  HYDROmorphone (DILAUDID) injection 0.25 mg, Q4H PRN   Or  HYDROmorphone (DILAUDID) injection 0.5 mg, Q3H PRN  [Held by provider] enoxaparin Sodium (LOVENOX) injection 30 mg, BID  methocarbamol (ROBAXIN) tablet 750 mg, 4x Daily  diazePAM (VALIUM) tablet 5 mg, Q6H PRN    midazolam (VERSED) injection, PRN  propofol injection, PRN  lidocaine (cardiac) (XYLOCAINE) injection, PRN  succinylcholine (ANECTINE) injection, PRN  remifentanil (ULTIVA) 2,000 mcg in sodium chloride 0.9 % 100 mL infusion, Continuous PRN  propofol injection, Continuous PRN  fentaNYL (SUBLIMAZE) injection, PRN  vasopressin (VASOSTRICT) injection, PRN  phenylephrine (CHARLENE-SYNEPHRINE) 100 mg in sodium chloride 0.9 % 100 mL infusion, Continuous PRN  dexamethasone (DECADRON) injection, PRN  ceFAZolin (ANCEF) injection, PRN  rocuronium (ZEMURON) injection, PRN  0.9 % sodium chloride infusion, Continuous PRN  0.9 % sodium chloride infusion, Continuous PRN  HYDROmorphone (DILAUDID) injection, PRN          Physical exam:     Vitals:  BP (!) 157/89   Pulse 55   Temp 97.4 °F (36.3 °C) (Oral)   Resp 16   Ht 6' 2\" (1.88 m)   Wt 258 lb 12.8 oz (117.4 kg)   SpO2 98%   BMI 33.23 kg/m²   Constitutional:  OAA X3 NAD  Skin: no rash, turgor wnl  Heent:  eomi, mmm  Neck: no bruits or jvd noted  Cardiovascular:  S1, S2 without m/r/g  Respiratory: CTA B without w/r/r  Abdomen:  +bs, soft, nt, nd  Ext: no  lower extremity edema  Psychiatric: mood and affect appropriate  Musculoskeletal:  Rom, muscular strength intact    Labs:  CBC:   Recent Labs     09/14/22  0540 09/14/22  1540 09/15/22  0508   WBC 5.1 5.2 6.6   HGB 15.0 15.3 15.2    225 235       BMP:    Recent Labs     09/13/22  0511 09/14/22  0540 09/15/22  0508   * 134* 133*   K 5.0 4.6 4.6   CL 96* 96* 95*   CO2 25 27 30   BUN 20 21* 23*   CREATININE 0.7* 0.7* 0.8   GLUCOSE 140* 143* 96       Ca/Mg/Phos:   Recent Labs     09/13/22  0511 09/14/22  0540 09/15/22  0508   CALCIUM 9.6 9.1 9.2       Hepatic:   No results for input(s): AST, ALT, ALB, BILITOT, ALKPHOS in the last 72 hours. Troponin:   No results for input(s): TROPONINI in the last 72 hours. BNP: No results for input(s): BNP in the last 72 hours. Lipids: No results for input(s): CHOL, TRIG, HDL, LDLCALC, LABVLDL in the last 72 hours. ABGs: No results for input(s): PHART, PO2ART, [id number] in the last 72 hours. INR:   Recent Labs     09/14/22  1540   INR 1.02       UA:  No results for input(s): COLORU, CLARITYU, GLUCOSEU, BILIRUBINUR, KETUA, SPECGRAV, BLOODU, PHUR, PROTEINU, UROBILINOGEN, NITRU, LEUKOCYTESUR, Cassandria Stein in the last 72 hours. Urine Microscopic:   No results for input(s): LABCAST, BACTERIA, COMU, HYALCAST, WBCUA, RBCUA, EPIU in the last 72 hours. Urine Culture: No results for input(s): LABURIN in the last 72 hours. Urine Chemistry: No results for input(s): [place] Meter, PROTEINUR, NAUR in the last 72 hours. IMAGING:  CT CERVICAL SPINE WO CONTRAST   Final Result   1.  Redemonstration of Assessment/Plan :      1. Hyponatremia. Likely 2/2 fluid intake . Drinks excess beer  Limit daily fluid intake to 1200 ml   Off IV fluids   Urine sodium and urine osmolality - reviewed     2. HTN. Off IVF   Pain control     3. Acute cervical cord compression with edema:  10 mg IV Decadron given  Neurosurgery consulted.   Sx tomorrow       Monitor sodium levels               Thank you for allowing us to participate in care of Chelsea Smart         Electronically signed by: Garry Rene MD, 9/15/2022, 1:34 PM      [facility] [address]  Office : [phone]  Fax :[phone]

## 2022-09-15 NOTE — FLOWSHEET NOTE
Arterial line removed per order for transfer to  per Puma Tobin RN. Pressure x 10 minutes, DSD applied, no bleeding, no hematoma noted.

## 2022-09-15 NOTE — PROGRESS NOTES
4 Eyes Admission Assessment     I agree as the admission nurse that 2 RN's have performed a thorough Head to Toe Skin Assessment on the patient. ALL assessment sites listed below have been assessed on admission. Areas assessed by both nurses:   [x]   Head, Face, and Ears   [x]   Shoulders, Back, and Chest  [x]   Arms, Elbows, and Hands   [x]   Coccyx, Sacrum, and Ischium  [x]   Legs, Feet, and Heels        Does the Patient have Skin Breakdown?    No   abrasion left elbow and right 5th finger        Adeel Prevention initiated:  Yes   Wound Care Orders initiated:  Yes      [id number]  nurse consulted for Pressure Injury (Stage 3,4, Unstageable, DTI, NWPT, and Complex wounds) or Adeel score 18 or lower:  No      Nurse 1 eSignature: Electronically signed by Cass Logan RN on 9/15/22 at 7:17 PM EDT    **SHARE this note so that the co-signing nurse is able to place an eSignature**    Nurse 2 eSignature: Electronically signed by Horace Navarro RN on 9/15/22 at 10:31 PM EDT

## 2022-09-15 NOTE — ADDENDUM NOTE
Addendum  created 09/15/22 6911 by German Alexis MD    Alternative orders not taken and original order placed, Order list changed, Pharmacy for encounter modified

## 2022-09-15 NOTE — PROGRESS NOTES
Angry wants to return to room. Change of shift unable to transfer until 1930. Wants collar off. Wants to know why MD has not called back. Posterior Auricular Interpolation Flap Text: A decision was made to reconstruct the defect utilizing an interpolation axial flap and a staged reconstruction.  A telfa template was made of the defect.  This telfa template was then used to outline the posterior auricular interpolation flap.  The donor area for the pedicle flap was then injected with anesthesia.  The flap was excised through the skin and subcutaneous tissue down to the layer of the underlying musculature.  The pedicle flap was carefully excised within this deep plane to maintain its blood supply.  The edges of the donor site were undermined.   The donor site was closed in a primary fashion.  The pedicle was then rotated into position and sutured.  Once the tube was sutured into place, adequate blood supply was confirmed with blanching and refill.  The pedicle was then wrapped with xeroform gauze and dressed appropriately with a telfa and gauze bandage to ensure continued blood supply and protect the attached pedicle.

## 2022-09-15 NOTE — PROGRESS NOTES
Hospitalist Progress Note      PCP: Gladis Hair    Date of Admission: 9/9/2022    Hospital Course: 74 y.o. male who presented to [facility] with past medical history of arthritis, CAD, hypertension, hyperlipidemia, atrial fibrillation, BPH presented to the ED for difficulty functioning and ADL or even walking. Patient reported that the back pain started around 1 week ago progressively worsening however today has gone worse and that he is having upper extremity weakness and pain that is radicular going from the low back to bilateral legs. Patient denied having any incontinence urinary or rectal, no perineal numbness. Patient however is reporting that weakness is now causing him to not been able to even walk or carry out ADLs. Patient reports that the back pain is very severe specially in the low back and the low neck. Exacerbated with extending, alleviated with rest.  No associate with fever chills nausea vomiting chest pain abdominal pain or dysuria. Patient denied having any trauma or falls at home. Patient reports that he follows up with spine and did not have any surgery scheduled. Patient was given prednisone and Robaxin Lidoderm patches on 08/22 and was taking them except for the patch due to being expensive with minimal improvement. Patient lives alone and functions independently prior to this. In the ED, patient was noted acute on chronic back pain, spine was consulted recommended MRI and admission to [facility] with PT OT. Patient then underwent MRI and was not able to proceed due to severe back pain especially when laying flat. MRI was able to be obtained showing a C2-C3 cord compression with edema. Subjective:   Patient was seen and examined at bedside in the PACU unit postoperatively. Per nurse report he was in severe pain, received IV Dilaudid, Robaxin, and Ativan. He was resting and sedated upon my evaluation. Moving all extremities.     Medications: Reviewed    Infusion Medications    sodium chloride      sodium chloride      sodium chloride      sodium chloride       Scheduled Medications    sodium chloride flush  5-40 mL IntraVENous 2 times per day    methocarbamol IVPB  1,000 mg IntraVENous Q8H    naloxegol  12.5 mg Oral QAM AC    NIFEdipine  30 mg Oral Daily    thiamine mononitrate  100 mg Oral Daily    metoprolol succinate  75 mg Oral Daily    tamsulosin  0.4 mg Oral Daily    nicotine  1 patch TransDERmal Daily    sodium chloride flush  5-40 mL IntraVENous 2 times per day    [Held by provider] aspirin  325 mg Oral Daily    atorvastatin  40 mg Oral Daily    lisinopril  40 mg Oral Daily    meloxicam  7.5 mg Oral Daily    sodium chloride flush  5-40 mL IntraVENous 2 times per day    [Held by provider] enoxaparin  30 mg SubCUTAneous BID     PRN Meds: sodium chloride flush, sodium chloride, meperidine, prochlorperazine, hydrALAZINE, HYDROmorphone, HYDROmorphone, methocarbamol IVPB **FOLLOWED BY** [START ON 9/16/2022] methocarbamol, magnesium hydroxide, hydrALAZINE, polyvinyl alcohol, sodium chloride, sodium chloride flush, sodium chloride, sodium chloride flush, sodium chloride, ondansetron **OR** ondansetron, polyethylene glycol, acetaminophen **OR** acetaminophen, HYDROmorphone **OR** HYDROmorphone      Intake/Output Summary (Last 24 hours) at 9/15/2022 1750  Last data filed at 9/15/2022 1733  Gross per 24 hour   Intake 1870 ml   Output 2250 ml   Net -380 ml       Physical Exam Performed:    /71   Pulse 85   Temp 98.1 °F (36.7 °C) (Temporal)   Resp 15   Ht 6' 2\" (1.88 m)   Wt 258 lb 12.8 oz (117.4 kg)   SpO2 94%   BMI 33.23 kg/m²     General appearance: Sedated, postoperatively. HEENT: Conjunctivae/corneas clear. Neck: Neck collar. Respiratory:  Normal respiratory effort. Clear to auscultation, bilaterally without Rales/Wheezes/Rhonchi. Cardiovascular: Regular rate and rhythm with normal S1/S2 without murmurs, rubs or gallops.   Abdomen: Soft, non-tender, non-distended with normal bowel sounds. Musculoskeletal: No clubbing, cyanosis or edema bilaterally. Full range of motion without deformity. Skin: Skin color, texture, turgor normal.  No rashes or lesions. Neurologic: Sedated. Capillary Refill: Brisk,3 seconds, normal     Labs:   Recent Labs     09/14/22  0540 09/14/22  1540 09/15/22  0508   WBC 5.1 5.2 6.6   HGB 15.0 15.3 15.2   HCT 45.1 45.9 46.3    225 235     Recent Labs     09/13/22  0511 09/14/22  0540 09/15/22  0508   * 134* 133*   K 5.0 4.6 4.6   CL 96* 96* 95*   CO2 25 27 30   BUN 20 21* 23*   CREATININE 0.7* 0.7* 0.8   CALCIUM 9.6 9.1 9.2     No results for input(s): AST, ALT, BILIDIR, BILITOT, ALKPHOS in the last 72 hours. Recent Labs     09/14/22  1540   INR 1.02     No results for input(s): Yaritza Risk in the last 72 hours. Urinalysis:      Lab Results   Component Value Date/Time    NITRU Negative 09/08/2022 11:30 PM    WBCUA 1 09/08/2022 11:30 PM    BACTERIA None Seen 09/08/2022 11:30 PM    RBCUA 1 09/08/2022 11:30 PM    BLOODU Negative 09/08/2022 11:30 PM    SPECGRAV 1.015 09/08/2022 11:30 PM    GLUCOSEU Negative 09/08/2022 11:30 PM       Radiology:  CT CERVICAL SPINE WO CONTRAST   Final Result   1. Redemonstration of anterolisthesis of C2 on C3 without obvious fracture. There is severe facet arthrosis at this level   2. Redemonstration of anterolisthesis of C4-5 likely related to severe facet arthrosis at this level   3. Minimal retrolisthesis of C3 on C4 redemonstrated, likely related to severe degenerative disc disease at this level as well as associated facet arthrosis   4. Marked degenerative disc disease C3-4, C5-6, C6-7, C7-T1   5. Diffuse severe facet arthrosis            XR CERVICAL SPINE (2-3 VIEWS)   Final Result      Grade 1 anterolisthesis of C2 on C3. Multilevel moderate to severe degenerative disc disease. MRI LUMBAR SPINE WO CONTRAST   Final Result   1.  Multilevel severe central canal and foraminal stenosis L2-3 through L5-S1, secondary to multifactorial degenerative changes and posterior epidural lipomatosis   2. Redundancy in edematous nerve roots at the L1-2 level secondary to chronic lumbar central canal stenosis. 2. Extruded disc herniation L3-L4 with left lateral recess and subarticular migration      Compared prior study there is been progression of spinal stenosis secondary to chronic multifactorial degenerative changes posterior epidural fat. No change in the degree of facet arthropathy and foraminal stenosis. MRI THORACIC SPINE WO CONTRAST   Final Result      1. Mild scoliosis and degenerative disc disease concordant with prior CT   2. No abnormal cord signal, central canal, compression or canal stenosis         MRI CERVICAL SPINE WO CONTRAST   Final Result      1. Cord myelopathy, cord edema with hyperintense signal noted on STIR sequence at the C2-3 and C3 level   2. Severe central canal stenosis C2-C3 which is improved with the patient's collar on compared to prior study   3. Severe canal stenosis C3-C4 with central disc herniation, also slightly improved with the patient's collar on during the study   4. Multilevel bilateral foraminal stenosis         XR CERVICAL SPINE (2-3 VIEWS)    (Results Pending)   FLUORO FOR SURGICAL PROCEDURES    (Results Pending)           Assessment/Plan:    Active Hospital Problems    Diagnosis     Pre-op evaluation [Z01.818]      Priority: Medium    Cord compression [facility]) [G95.20]      Priority: Medium    CAD in native artery [I25.10]      Renetta moore 74 y.o. M with PMHx of anterolisthesis of cervical spine, A.fib s/p ablation, HTN, HLD, CAD, BPH, Carpal Tunnel syndrome s/p unilateral surgery, and herniated disc (2000) s/p surgery p/w back pain, numbness in arms and legs, and weakness in arms and legs.  Patient was admitted for further work up and management of spinal stenosis     # Cervical cord compression with edema  -Patient presented to [facility] with back pain (9/8/22) where he was directly admitted to [facility]. for Neurosurgery. Patient reports back pain that radiates down arms and legs. MRI spine showing. Severe central canal and foraminal stenosis L2-3, L5-S1, edematous nerve roots L1-2. Disc herniation L3-4. Severe canal stenosis C2-C4 with central disc herniation  -Patient compliant with cervical collar  -Neurosurgery following, s/p C2-T2 POSTERIOR DECOMPRESSION FUSION AND FIXATION on 9/15/2022. -Was on Decadron 8 mg PO every 12 hours  - Robaxin 1000 mg IV at 200mL/hr over 30 minutes every 8 hours  -Dilaudid pain panel  -Acetaminophen 650 mg PO PRN    # HTN; nephrology following likely high at times because of pain - adjusted bp meds added nifedipine. # Paroxysmal afib, asa being held, currently he is in sinus rhythm he is a candidate for anticoagulation however will await surgery     DVT Prophylaxis: SCDs  Diet: No diet orders on file  Code Status: Full Code    PT/OT Eval Status: To be determined    High risk due to spine surgery, IV Dilaudid and IV benzodiazepine.     Obi Valdes MD

## 2022-09-15 NOTE — PLAN OF CARE
[place] neurosurgery note    Patient fully consented at bedside last evening. Anesthesia consent was not signed prior to pain medication this am.  Patient has unstable cervical spine and wants to proceed with surgery which is medically necessary in my opinion. Patient is fully awake, alert and oriented x4. He expresses that he consents to anesthesia and blood products if needed.      Chi White MD

## 2022-09-15 NOTE — PLAN OF CARE
Problem: Discharge Planning  Goal: Discharge to home or other facility with appropriate resources  9/15/2022 0514 by Penny Castillo RN  Outcome: Progressing     Problem: Pain  Goal: Verbalizes/displays adequate comfort level or baseline comfort level  9/15/2022 0514 by Penny Castillo RN  Outcome: Progressing  Flowsheets (Taken 9/15/2022 0512)  Verbalizes/displays adequate comfort level or baseline comfort level:   Encourage patient to monitor pain and request assistance   Assess pain using appropriate pain scale   Administer analgesics based on type and severity of pain and evaluate response  Note: PRN Dilaudid given for pain management. Problem: Safety - Adult  Goal: Free from fall injury  9/15/2022 0514 by Penny Castillo RN  Outcome: Progressing  Flowsheets (Taken 9/14/2022 1956)  Free From Fall Injury: Instruct family/caregiver on patient safety  Note: Bed in lowest position, call light within reach, bed alarm on. Problem: ABCDS Injury Assessment  Goal: Absence of physical injury  9/15/2022 0514 by Penny Castillo RN  Outcome: Progressing     Problem: Neurosensory - Adult  Goal: Achieves stable or improved neurological status  9/15/2022 0514 by Penny Castillo RN  Outcome: Progressing  Flowsheets (Taken 9/14/2022 2317)  Achieves stable or improved neurological status: Assess for and report changes in neurological status     Problem: Respiratory - Adult  Goal: Achieves optimal ventilation and oxygenation  9/15/2022 0514 by Penny Castillo RN  Outcome: Progressing  Flowsheets (Taken 9/15/2022 0514)  Achieves optimal ventilation and oxygenation:   Assess for changes in mentation and behavior   Assess for changes in respiratory status     Problem: Skin/Tissue Integrity  Goal: Absence of new skin breakdown  Description: 1. Monitor for areas of redness and/or skin breakdown  2. Assess vascular access sites hourly  3. Every 4-6 hours minimum:  Change oxygen saturation probe site  4.   Every 4-6 hours: If on nasal continuous positive airway pressure, respiratory therapy assess nares and determine need for appliance change or resting period. 9/15/2022 0514 by Danna Clark, RN  Outcome: Progressing  Note: No new skin issues assessed.

## 2022-09-15 NOTE — ANESTHESIA PRE PROCEDURE
Department of Anesthesiology  Preprocedure Note       Name:  Anahi Ren   Age:  74 y.o.  :  1955                                          MRN:  [id number]         Date:  9/15/2022      Surgeon: Kevin Harvey):  MD Juliocesar Bingham MD    Procedure: Procedure(s):  C2-T2 POSTERIOR DECOMPRESSION FUSION AND FIXATION    Medications prior to admission:   Prior to Admission medications    Medication Sig Start Date End Date Taking? Authorizing Provider   ibuprofen (IBU) 400 MG tablet Take 1 tablet by mouth every 6 hours as needed for Pain 22  Reinaldo Cockayne, PA   lidocaine (LIDODERM) 5 % Place 1 patch onto the skin daily 12 hours on, 12 hours off. Patient not taking: Reported on 2022   DOUGLAS Ramon CNP   meloxicam (MOBIC) 7.5 MG tablet Take 1 tablet by mouth daily 22   DOUGLAS Ramon CNP   REFRESH OPTIVE 1-0.9 % GEL  22   Historical Provider, MD   diclofenac sodium (VOLTAREN) 1 % GEL  22   Historical Provider, MD   INCRUSE ELLIPTA 62.5 MCG/INH AEPB  3/16/22   Historical Provider, MD   aspirin 325 MG tablet Take 325 mg by mouth daily    Historical Provider, MD   tamsulosin (FLOMAX) 0.4 MG capsule Take 0.2 mg by mouth daily    Historical Provider, MD   lisinopril (PRINIVIL;ZESTRIL) 20 MG tablet Take 2 tablets by mouth daily  Patient taking differently: Take 40 mg by mouth nightly 2/3/21   Sharalyn Spurling, MD   metoprolol succinate (TOPROL XL) 50 MG extended release tablet Take 50 mg by mouth daily    Historical Provider, MD   atorvastatin (LIPITOR) 40 MG tablet Take 1 tablet by mouth daily.  14   DOUGLAS Diamond CNP       Current medications:    Current Facility-Administered Medications   Medication Dose Route Frequency Provider Last Rate Last Admin    naloxegol (MOVANTIK) tablet 12.5 mg  12.5 mg Oral QAM AC Krista Andersen MD        NIFEdipine (ADALAT CC) extended release tablet 30 mg  30 mg Oral Daily Krista Andersen MD 30 mg at 09/14/22 1123    thiamine mononitrate tablet 100 mg  100 mg Oral Daily Chago Mormol, DPM   100 mg at 09/14/22 5480    metoprolol succinate (TOPROL XL) extended release tablet 75 mg  75 mg Oral Daily Keven Guzmán MD   75 mg at 09/14/22 0806    tamsulosin (FLOMAX) capsule 0.4 mg  0.4 mg Oral Daily Maria Victoria Anders MD   0.4 mg at 09/14/22 0806    magnesium hydroxide (MILK OF MAGNESIA) 400 MG/5ML suspension 30 mL  30 mL Oral Daily PRN Behzad Adan MD   30 mL at 09/13/22 2210    hydrALAZINE (APRESOLINE) injection 5 mg  5 mg IntraVENous Q6H PRN Maria Victoria Anders MD   5 mg at 09/15/22 0353    nicotine (NICODERM CQ) 14 MG/24HR 1 patch  1 patch TransDERmal Daily Asael Sidhu MD   1 patch at 09/13/22 1811    polyvinyl alcohol (LIQUIFILM TEARS) 1.4 % ophthalmic solution 1 drop  1 drop Both Eyes PRN Asael Sidhu MD   1 drop at 09/12/22 1722    sodium chloride (OCEAN, BABY AYR) 0.65 % nasal spray 1 spray  1 spray Each Nostril PRN Evangelina Webster MD   1 spray at 09/12/22 1722    sodium chloride flush 0.9 % injection 5-40 mL  5-40 mL IntraVENous 2 times per day Mackenzie Trent MD   10 mL at 09/14/22 2011    sodium chloride flush 0.9 % injection 5-40 mL  5-40 mL IntraVENous PRN Saniya Hooks MD        0.9 % sodium chloride infusion   IntraVENous PRN Mackenzie Trent MD       [place] [Held by provider] aspirin tablet 325 mg  325 mg Oral Daily Gemma Can DO        atorvastatin (LIPITOR) tablet 40 mg  40 mg Oral Daily Gemma Can DO   40 mg at 09/14/22 0806    lisinopril (PRINIVIL;ZESTRIL) tablet 40 mg  40 mg Oral Daily Gemma Can DO   40 mg at 09/14/22 0806    meloxicam (MOBIC) tablet 7.5 mg  7.5 mg Oral Daily Gemma Can DO   7.5 mg at 09/14/22 0805    sodium chloride flush 0.9 % injection 5-40 mL  5-40 mL IntraVENous 2 times per day Gemma Can,    10 mL at 09/14/22 0807    sodium chloride flush 0.9 % injection 5-40 mL  5-40 mL IntraVENous PRN Washington Rickers, DO        0.9 % sodium chloride infusion   IntraVENous PRN Washington Rickers, DO        ondansetron (ZOFRAN-ODT) disintegrating tablet 4 mg  4 mg Oral Q8H PRN Washington Rickers, DO        Or    ondansetron [facility]) injection 4 mg  4 mg IntraVENous Q6H PRN Washington Rickers, DO        polyethylene glycol (GLYCOLAX) packet 17 g  17 g Oral Daily PRN Washington Rickers, DO   17 g at 09/13/22 0909    acetaminophen (TYLENOL) tablet 650 mg  650 mg Oral Q6H PRN Washington Rickers, DO        Or    acetaminophen (TYLENOL) suppository 650 mg  650 mg Rectal Q6H PRN Washington Rickers, DO        HYDROmorphone (DILAUDID) injection 0.25 mg  0.25 mg IntraVENous Q4H PRN Washington Rickers, DO        Or    HYDROmorphone (DILAUDID) injection 0.5 mg  0.5 mg IntraVENous Q3H PRN Washington Rickers, DO   0.5 mg at 09/15/22 0702    [Held by provider] enoxaparin Sodium (LOVENOX) injection 30 mg  30 mg SubCUTAneous BID Sary Kate DO        methocarbamol (ROBAXIN) tablet 750 mg  750 mg Oral 4x Daily Niurka Ramirez, APRN - CNP   750 mg at 09/14/22 2011    diazePAM (VALIUM) tablet 5 mg  5 mg Oral Q6H PRN Leila Orantes, APRN - CNP   5 mg at 09/14/22 2321       Allergies:  No Known Allergies    Problem List:    Patient Active Problem List   Diagnosis Code    CAD in native artery I25.10    Hyperlipidemia E78.5    Tobacco use Z72.0    Paroxysmal atrial fibrillation (HCC) I48.0    Tobacco abuse Z72.0    Typical atrial flutter I48.3    Coronary artery disease involving native coronary artery of native heart without angina pectoris I25.10    Morbid obesity due to excess calories (HCC) E66.01    Essential hypertension I10    Bilateral carpal tunnel syndrome G56.03    Chest pain R07.9    Anterolisthesis of cervical spine M43.12    Hyponatremia E87.1    Cord compression (HCC) G95.20    Pre-op evaluation Z01.818       Past Medical History:        Diagnosis Date    Arthritis     Atrial fibrillation ([facility] [address]     CAD (coronary artery disease)     Hyperlipidemia     Hypertension        Past Surgical History:        Procedure Laterality Date    BACK SURGERY  200    lower lumbar surgery    CORONARY ANGIOPLASTY WITH STENT PLACEMENT  2012       Social History:    Social History     Tobacco Use    Smoking status: Every Day     Packs/day: 1.00     Years: 40.00     Pack years: 40.00     Types: Cigarettes    Smokeless tobacco: Never   Substance Use Topics    Alcohol use: Yes     Alcohol/week: 20.0 standard drinks     Types: 20 Cans of beer per week     Comment: occasionally                                Ready to quit: Not Answered  Counseling given: Not Answered      Vital Signs (Current):   Vitals:    09/14/22 2317 09/15/22 0200 09/15/22 0338 09/15/22 0718   BP: (!) 147/86  (!) 184/105 (!) 157/89   Pulse: 78 72 65 55   Resp: 18  18 16   Temp: 98.4 °F (36.9 °C)  98.3 °F (36.8 °C) 97.4 °F (36.3 °C)   TempSrc: Oral  Oral Oral   SpO2: 99%  99% 98%   Weight:       Height:                                                  BP Readings from Last 3 Encounters:   09/15/22 (!) 157/89   09/09/22 (!) 170/94   09/07/22 (!) 158/94       NPO Status:                                                                                 BMI:   Wt Readings from Last 3 Encounters:   09/14/22 258 lb 12.8 oz (117.4 kg)   09/08/22 250 lb (113.4 kg)   09/08/22 250 lb (113.4 kg)     Body mass index is 33.23 kg/m².     CBC:   Lab Results   Component Value Date/Time    WBC 6.6 09/15/2022 05:08 AM    RBC 4.72 09/15/2022 05:08 AM    HGB 15.2 09/15/2022 05:08 AM    HCT 46.3 09/15/2022 05:08 AM    MCV 98.1 09/15/2022 05:08 AM    RDW 13.4 09/15/2022 05:08 AM     09/15/2022 05:08 AM       CMP:   Lab Results   Component Value Date/Time     09/15/2022 05:08 AM    K 4.6 09/15/2022 05:08 AM    CL 95 09/15/2022 05:08 AM    CO2 30 09/15/2022 05:08 AM    BUN 23 09/15/2022 05:08 AM    CREATININE 0.8 09/15/2022 05:08 AM    GFRAA >60 09/15/2022 05:08 AM    GFRAA >60 02/19/2013 10:39 AM    AGRATIO 1.4 09/08/2022 02:45 PM    LABGLOM >60 09/15/2022 05:08 AM    GLUCOSE 96 09/15/2022 05:08 AM    PROT 7.6 09/08/2022 02:45 PM    PROT 7.2 02/19/2013 10:39 AM    CALCIUM 9.2 09/15/2022 05:08 AM    BILITOT 0.7 09/08/2022 02:45 PM    ALKPHOS 95 09/08/2022 02:45 PM    AST 22 09/08/2022 02:45 PM    ALT 27 09/08/2022 02:45 PM       POC Tests: No results for input(s): POCGLU, POCNA, POCK, POCCL, POCBUN, POCHEMO, POCHCT in the last 72 hours. Coags:   Lab Results   Component Value Date/Time    PROTIME 13.3 09/14/2022 03:40 PM    INR 1.02 09/14/2022 03:40 PM    APTT 25.5 09/14/2022 03:40 PM       HCG (If Applicable): No results found for: PREGTESTUR, PREGSERUM, HCG, HCGQUANT     ABGs: No results found for: PHART, PO2ART, NIL1YKE, LXH5XBP, BEART, X8DBVGJK     Type & Screen (If Applicable):  No results found for: LABABO, LABRH    Drug/Infectious Status (If Applicable):  No results found for: HIV, HEPCAB    COVID-19 Screening (If Applicable): No results found for: COVID19        Anesthesia Evaluation  Patient summary reviewed and Nursing notes reviewed no history of anesthetic complications:   Airway: Mallampati: III  TM distance: >3 FB   Neck ROM: limited  Comment: C-collar in place  Mouth opening: > = 3 FB   Dental: normal exam         Pulmonary:normal exam                               Cardiovascular:  Exercise tolerance: poor (<4 METS),   (+) hypertension:, past MI: > 6 months, CAD:, CABG/stent: no interval change, dysrhythmias (rate controlled): atrial fibrillation, HERNANDEZ: no interval change,     (-)  angina and orthopnea      Rhythm: irregular  Rate: abnormal                    Neuro/Psych:   (+) neuromuscular disease (bilateral upper and lower extremity myelopathy secondary to severe cervical canal stenosis):,             GI/Hepatic/Renal:             Endo/Other:                     Abdominal:   (+) obese,     Abdomen: soft. Vascular:           Other Findings:           Anesthesia Plan      general     ASA 3 - emergent     (Patient has severe cervical stenosis w/ associated myelopathy. Plan is to maintain hemodynamics during induction and throughout case as well as maintain inline stabilization during intubation.)  Induction: intravenous. MIPS: Postoperative opioids intended and Prophylactic antiemetics administered. Anesthetic plan and risks discussed with patient. Use of blood products discussed with patient whom consented to blood products. Plan discussed with CRNA and attending.                     Dawit Askew DO   9/15/2022

## 2022-09-15 NOTE — BRIEF OP NOTE
Brief Postoperative Note      Patient: Guy Curtis  YOB: 1955  MRN: [id number]    Date of Procedure: 9/15/2022    Pre-Op Diagnosis: Cervical stenosis of spine [M48.02]    Post-Op Diagnosis: Same       Procedure(s):  C2-T2 POSTERIOR DECOMPRESSION FUSION AND FIXATION    Surgeon(s):  MD Juliana Solitario MD    Assistant:  * No surgical staff found *    Anesthesia: General    Estimated Blood Loss (mL): 870     Complications: None    Specimens:   ID Type Source Tests Collected by Time Destination   A : A) LEFT POSTERIOR CERVICAL (C2-C3) TISSUE FACET Tissue Tissue SURGICAL PATHOLOGY Juliana Carrasquillo MD 9/15/2022 1322        Implants:  Implant Name Type Inv. Item Serial No.  Lot No. LRB No. Used Action   GRAFT BONE PRIMAGEN 10CC - [id number]  GRAFT BONE PRIMAGEN 10CC  [employer] [id number] N/A 1 Implanted   PUTTY W/ CHIP 5.0CC DBM W/ RPM - [id number]  PUTTY W/ CHIP 5.0CC DBM W/ RPM  [employer] [id number] N/A 1 Implanted   PUTTY W/ CHIP 10. [place] DBM W/ RPM - [id number]  PUTTY W/ CHIP 10. [facility] SPINE-WD [id number] N/A 1 Implanted   PUTTY W/ CHIP 10. [employer] DBM W/ RPM - [id number]  PUTTY W/ CHIP 10. [facility] SPINE-WD [id number] N/A 1 Implanted         Drains: * No LDAs found *    Findings: C2-T2 with C2-3 decompression. Gross instability in rostral cervical spine.      Electronically signed by Alessio Mahmood MD on 9/15/2022 at 3:48 PM

## 2022-09-15 NOTE — PROGRESS NOTES
PACU Transfer Note    Vitals:    09/15/22 1815   BP: (!) 153/88   Pulse: 82   Resp: 11   Temp: 98.1 °F (36.7 °C)   SpO2: 99%   BP is within 20% of baseline value. In: 1726 [I.V.:1726]  Out: 1250 [Urine:1175; Drains:75]    Pain assessment:  present - adequately treated  Pain Level: 4 (dozing at intervals, much more calm)    Report given to Receiving unit RN, [place] here in the pacu at bedside. Transferred to ready room in bed per pacu transport. Call placed to patient's daughter, Monalisa Handy for update and to make aware of new room assignment.     9/15/2022 6:48 PM

## 2022-09-15 NOTE — FLOWSHEET NOTE
Patient received from the OR to pACU #8 post C2-T2 POSTERIOR DECOMPRESSION FUSION AND FIXATION of Isis Sams and Ignacio. Placed on PACU monitoring equipment. Report given per Dr. Radha Michel and OR RN. Per report, patient's BP was very labile intra op, treated off and on for hypotension. Had local with Exparel. On arrival, patient is writhing in pain, difficult to obtain VS, will not cooperate with full neuro exam, VELOZ to command. Endorses numbness and pain to bilateral hands. This was baseline.

## 2022-09-15 NOTE — PROGRESS NOTES
Patient transported to neuro by surgery RN.  Electronically signed by Kate Agosto RN on 9/15/2022 at 10:11 AM Consent: Pt advised on the following including but not limited to risks of crusting, scabbing, blistering, scarring, darker or lighter pigmentary change, recurrence, incomplete removal and infection. Detail Level: Simple Add 52 Modifier (Optional): no Pared With?: 15 blade Medical Necessity Information: It is in your best interest to select a reason for this procedure from the list below. All of these items fulfill various CMS LCD requirements except the new and changing color options. Render Post-Care Instructions In Note?: yes Medical Necessity Clause: This procedure was medically necessary because the lesions that were treated were: Post-Care Instructions: I reviewed with the patient in detail post-care instructions. Patient is to wear sunprotection, and avoid picking at any of the treated lesions. Pt may apply Vaseline to crusted or scabbing areas. Duration Of Freeze Thaw-Cycle (Seconds): 5-10

## 2022-09-15 NOTE — PROGRESS NOTES
Notified by [address] dropped to 38 on Tele, then back up to 50's. Appears to have had a few beats in a 2nd degree HB. Patient sleeping and asymptomatic when into room. /89. HR 55. SPO2 98% on RA. EKG ordered. Perfect serve sent to Dr. Elana Jay to make aware. Cardiology nurse [address] [place] [address] notified and will come review strips and reach out to Dr. Glenn Wolf. Primary RN Azeb Hernandez made aware.

## 2022-09-15 NOTE — ANESTHESIA PROCEDURE NOTES
Arterial Line:    An arterial line was placed using ultrasound guidance and surface landmarks, in the OR for the following indication(s): continuous blood pressure monitoring. A 20 gauge (size), 1 and 3/4 inch (length), Arrow (type) catheter was placed, Seldinger technique used, into the left brachial artery, secured by Tegaderm. Anesthesia type: Local    Events:  patient tolerated procedure well with no complications and EBL < 5mL. 9/15/2022 10:40 AM9/15/2022 10:47 AM  Anesthesiologist: Doug Messer MD  Performed: Anesthesiologist   Preanesthetic Checklist  Completed: patient identified, IV checked, site marked, risks and benefits discussed, surgical/procedural consents, equipment checked, pre-op evaluation, timeout performed, anesthesia consent given, oxygen available, monitors applied/VS acknowledged and blood product R/B/A discussed and consented

## 2022-09-15 NOTE — DISCHARGE INSTRUCTIONS
Your Surgeon or Anesthesiologist gave you Exparel     Exparel (bupivicaine liposome injectable suspension) is a medication injected into the surgical incision  just before the end of the procedure by your surgeon to help control your pain after surgery. It can also be given as a nerve block by the anesthesiologist. This local anesthetic provides pain relief by numbing the tissue around the surgical site. Exparel releases pain medication over time lasting up to 5 days or 120 hours. Exparel may cause a temporary loss of sensation or the ability to move in the area where the medication was injected. Side effects of Exparel that may occur include nausea, vomiting or constipation. Call immediately if you experience numbness or tingling of the mouth or lips, lightheadedness or severe anxiety. Notify your physician if you experience these or any other possible side effects of the medication. Note: Other forms of bupivacaine should not be administered within 96 hours (4 days) following administration of Exparel. Please keep ID band in place for 96 hours (4 days).

## 2022-09-16 ENCOUNTER — APPOINTMENT (OUTPATIENT)
Dept: GENERAL RADIOLOGY | Age: 67
DRG: 459 | End: 2022-09-16
Attending: INTERNAL MEDICINE
Payer: MEDICARE

## 2022-09-16 LAB
A/G RATIO: 1.8 (ref 1.1–2.2)
ALBUMIN SERPL-MCNC: 3.8 G/DL (ref 3.4–5)
ALP BLD-CCNC: 110 U/L (ref 40–129)
ALT SERPL-CCNC: 56 U/L (ref 10–40)
ANION GAP SERPL CALCULATED.3IONS-SCNC: 11 MMOL/L (ref 3–16)
ANION GAP SERPL CALCULATED.3IONS-SCNC: 9 MMOL/L (ref 3–16)
AST SERPL-CCNC: 35 U/L (ref 15–37)
BASOPHILS ABSOLUTE: 0 K/UL (ref 0–0.2)
BASOPHILS RELATIVE PERCENT: 0.1 %
BILIRUB SERPL-MCNC: 0.3 MG/DL (ref 0–1)
BUN BLDV-MCNC: 11 MG/DL (ref 7–20)
BUN BLDV-MCNC: 20 MG/DL (ref 7–20)
CALCIUM SERPL-MCNC: 3.5 MG/DL (ref 8.3–10.6)
CALCIUM SERPL-MCNC: 8.7 MG/DL (ref 8.3–10.6)
CHLORIDE BLD-SCNC: 123 MMOL/L (ref 99–110)
CHLORIDE BLD-SCNC: 97 MMOL/L (ref 99–110)
CO2: 13 MMOL/L (ref 21–32)
CO2: 25 MMOL/L (ref 21–32)
CREAT SERPL-MCNC: 0.8 MG/DL (ref 0.8–1.3)
CREAT SERPL-MCNC: <0.5 MG/DL (ref 0.8–1.3)
EOSINOPHILS ABSOLUTE: 0 K/UL (ref 0–0.6)
EOSINOPHILS RELATIVE PERCENT: 0.8 %
GFR AFRICAN AMERICAN: >60
GFR AFRICAN AMERICAN: >60
GFR NON-AFRICAN AMERICAN: >60
GFR NON-AFRICAN AMERICAN: >60
GLUCOSE BLD-MCNC: 141 MG/DL (ref 70–99)
GLUCOSE BLD-MCNC: 70 MG/DL (ref 70–99)
HCT VFR BLD CALC: 26.4 % (ref 40.5–52.5)
HCT VFR BLD CALC: 42.6 % (ref 40.5–52.5)
HEMOGLOBIN: 13.8 G/DL (ref 13.5–17.5)
HEMOGLOBIN: 8.6 G/DL (ref 13.5–17.5)
LYMPHOCYTES ABSOLUTE: 0.6 K/UL (ref 1–5.1)
LYMPHOCYTES RELATIVE PERCENT: 9.6 %
MAGNESIUM: 1.5 MG/DL (ref 1.8–2.4)
MCH RBC QN AUTO: 32.2 PG (ref 26–34)
MCH RBC QN AUTO: 33.2 PG (ref 26–34)
MCHC RBC AUTO-ENTMCNC: 32.5 G/DL (ref 31–36)
MCHC RBC AUTO-ENTMCNC: 32.6 G/DL (ref 31–36)
MCV RBC AUTO: 101.7 FL (ref 80–100)
MCV RBC AUTO: 99.2 FL (ref 80–100)
MONOCYTES ABSOLUTE: 0.7 K/UL (ref 0–1.3)
MONOCYTES RELATIVE PERCENT: 10.5 %
NEUTROPHILS ABSOLUTE: 4.9 K/UL (ref 1.7–7.7)
NEUTROPHILS RELATIVE PERCENT: 79 %
PDW BLD-RTO: 13.7 % (ref 12.4–15.4)
PDW BLD-RTO: 13.7 % (ref 12.4–15.4)
PLATELET # BLD: 112 K/UL (ref 135–450)
PLATELET # BLD: 195 K/UL (ref 135–450)
PMV BLD AUTO: 7.2 FL (ref 5–10.5)
PMV BLD AUTO: 7.5 FL (ref 5–10.5)
POTASSIUM SERPL-SCNC: 2.1 MMOL/L (ref 3.5–5.1)
POTASSIUM SERPL-SCNC: 4 MMOL/L (ref 3.5–5.1)
RBC # BLD: 2.6 M/UL (ref 4.2–5.9)
RBC # BLD: 4.29 M/UL (ref 4.2–5.9)
SODIUM BLD-SCNC: 133 MMOL/L (ref 136–145)
SODIUM BLD-SCNC: 145 MMOL/L (ref 136–145)
TOTAL PROTEIN: 5.9 G/DL (ref 6.4–8.2)
WBC # BLD: 5.1 K/UL (ref 4–11)
WBC # BLD: 6.2 K/UL (ref 4–11)

## 2022-09-16 PROCEDURE — 6360000002 HC RX W HCPCS: Performed by: NURSE PRACTITIONER

## 2022-09-16 PROCEDURE — 6370000000 HC RX 637 (ALT 250 FOR IP): Performed by: NEUROLOGICAL SURGERY

## 2022-09-16 PROCEDURE — 36415 COLL VENOUS BLD VENIPUNCTURE: CPT

## 2022-09-16 PROCEDURE — 6370000000 HC RX 637 (ALT 250 FOR IP)

## 2022-09-16 PROCEDURE — 6370000000 HC RX 637 (ALT 250 FOR IP): Performed by: NURSE PRACTITIONER

## 2022-09-16 PROCEDURE — 6370000000 HC RX 637 (ALT 250 FOR IP): Performed by: STUDENT IN AN ORGANIZED HEALTH CARE EDUCATION/TRAINING PROGRAM

## 2022-09-16 PROCEDURE — 97162 PT EVAL MOD COMPLEX 30 MIN: CPT

## 2022-09-16 PROCEDURE — 6370000000 HC RX 637 (ALT 250 FOR IP): Performed by: INTERNAL MEDICINE

## 2022-09-16 PROCEDURE — 97166 OT EVAL MOD COMPLEX 45 MIN: CPT

## 2022-09-16 PROCEDURE — 85027 COMPLETE CBC AUTOMATED: CPT

## 2022-09-16 PROCEDURE — 83735 ASSAY OF MAGNESIUM: CPT

## 2022-09-16 PROCEDURE — 97116 GAIT TRAINING THERAPY: CPT

## 2022-09-16 PROCEDURE — 99232 SBSQ HOSP IP/OBS MODERATE 35: CPT | Performed by: INTERNAL MEDICINE

## 2022-09-16 PROCEDURE — 85025 COMPLETE CBC W/AUTO DIFF WBC: CPT

## 2022-09-16 PROCEDURE — 1200000000 HC SEMI PRIVATE

## 2022-09-16 PROCEDURE — 80053 COMPREHEN METABOLIC PANEL: CPT

## 2022-09-16 PROCEDURE — 72040 X-RAY EXAM NECK SPINE 2-3 VW: CPT

## 2022-09-16 PROCEDURE — 2580000003 HC RX 258: Performed by: NURSE PRACTITIONER

## 2022-09-16 PROCEDURE — 2580000003 HC RX 258: Performed by: NEUROLOGICAL SURGERY

## 2022-09-16 PROCEDURE — 99233 SBSQ HOSP IP/OBS HIGH 50: CPT | Performed by: INTERNAL MEDICINE

## 2022-09-16 PROCEDURE — 97530 THERAPEUTIC ACTIVITIES: CPT

## 2022-09-16 PROCEDURE — 6360000002 HC RX W HCPCS: Performed by: NEUROLOGICAL SURGERY

## 2022-09-16 RX ORDER — HEPARIN SODIUM 5000 [USP'U]/ML
5000 INJECTION, SOLUTION INTRAVENOUS; SUBCUTANEOUS EVERY 8 HOURS SCHEDULED
Status: DISCONTINUED | OUTPATIENT
Start: 2022-09-16 | End: 2022-09-23 | Stop reason: HOSPADM

## 2022-09-16 RX ADMIN — SENNOSIDES AND DOCUSATE SODIUM 2 TABLET: 50; 8.6 TABLET ORAL at 20:28

## 2022-09-16 RX ADMIN — SENNOSIDES AND DOCUSATE SODIUM 2 TABLET: 50; 8.6 TABLET ORAL at 08:14

## 2022-09-16 RX ADMIN — DIAZEPAM 5 MG: 5 TABLET ORAL at 12:35

## 2022-09-16 RX ADMIN — METHOCARBAMOL 1000 MG: 100 INJECTION, SOLUTION INTRAMUSCULAR; INTRAVENOUS at 08:13

## 2022-09-16 RX ADMIN — TAMSULOSIN HYDROCHLORIDE 0.4 MG: 0.4 CAPSULE ORAL at 08:14

## 2022-09-16 RX ADMIN — DIAZEPAM 5 MG: 5 TABLET ORAL at 20:29

## 2022-09-16 RX ADMIN — Medication 100 MG: at 08:14

## 2022-09-16 RX ADMIN — OXYCODONE 10 MG: 5 TABLET ORAL at 05:04

## 2022-09-16 RX ADMIN — HYDROMORPHONE HYDROCHLORIDE 1 MG: 1 INJECTION, SOLUTION INTRAMUSCULAR; INTRAVENOUS; SUBCUTANEOUS at 08:17

## 2022-09-16 RX ADMIN — OXYCODONE 10 MG: 5 TABLET ORAL at 01:32

## 2022-09-16 RX ADMIN — ACETAMINOPHEN 1000 MG: 500 TABLET ORAL at 23:15

## 2022-09-16 RX ADMIN — SODIUM CHLORIDE, PRESERVATIVE FREE 10 ML: 5 INJECTION INTRAVENOUS at 20:29

## 2022-09-16 RX ADMIN — METHOCARBAMOL TABLETS 1000 MG: 500 TABLET, COATED ORAL at 16:17

## 2022-09-16 RX ADMIN — POLYETHYLENE GLYCOL (3350) 17 G: 17 POWDER, FOR SOLUTION ORAL at 08:15

## 2022-09-16 RX ADMIN — METOPROLOL SUCCINATE 75 MG: 50 TABLET, FILM COATED, EXTENDED RELEASE ORAL at 08:14

## 2022-09-16 RX ADMIN — NIFEDIPINE 30 MG: 30 TABLET, EXTENDED RELEASE ORAL at 08:14

## 2022-09-16 RX ADMIN — NALOXEGOL OXALATE 12.5 MG: 12.5 TABLET, FILM COATED ORAL at 05:51

## 2022-09-16 RX ADMIN — HEPARIN SODIUM 5000 UNITS: 5000 INJECTION INTRAVENOUS; SUBCUTANEOUS at 20:30

## 2022-09-16 RX ADMIN — MELOXICAM 7.5 MG: 7.5 TABLET ORAL at 08:14

## 2022-09-16 RX ADMIN — HYDROMORPHONE HYDROCHLORIDE 1 MG: 1 INJECTION, SOLUTION INTRAMUSCULAR; INTRAVENOUS; SUBCUTANEOUS at 13:04

## 2022-09-16 RX ADMIN — OXYCODONE 10 MG: 5 TABLET ORAL at 18:28

## 2022-09-16 RX ADMIN — METHOCARBAMOL TABLETS 1000 MG: 500 TABLET, COATED ORAL at 20:29

## 2022-09-16 RX ADMIN — ACETAMINOPHEN 1000 MG: 500 TABLET ORAL at 11:31

## 2022-09-16 RX ADMIN — HYDROMORPHONE HYDROCHLORIDE 1 MG: 1 INJECTION, SOLUTION INTRAMUSCULAR; INTRAVENOUS; SUBCUTANEOUS at 03:17

## 2022-09-16 RX ADMIN — CEFAZOLIN 2000 MG: 2 INJECTION, POWDER, FOR SOLUTION INTRAMUSCULAR; INTRAVENOUS at 03:28

## 2022-09-16 RX ADMIN — DIAZEPAM 5 MG: 5 TABLET ORAL at 01:32

## 2022-09-16 RX ADMIN — ATORVASTATIN CALCIUM 40 MG: 40 TABLET, FILM COATED ORAL at 08:15

## 2022-09-16 RX ADMIN — HYDROMORPHONE HYDROCHLORIDE 1 MG: 1 INJECTION, SOLUTION INTRAMUSCULAR; INTRAVENOUS; SUBCUTANEOUS at 16:17

## 2022-09-16 RX ADMIN — METHOCARBAMOL 1000 MG: 100 INJECTION, SOLUTION INTRAMUSCULAR; INTRAVENOUS at 00:31

## 2022-09-16 RX ADMIN — ACETAMINOPHEN 1000 MG: 500 TABLET ORAL at 18:29

## 2022-09-16 RX ADMIN — OXYCODONE 10 MG: 5 TABLET ORAL at 11:32

## 2022-09-16 RX ADMIN — CEFAZOLIN 2000 MG: 2 INJECTION, POWDER, FOR SOLUTION INTRAMUSCULAR; INTRAVENOUS at 20:37

## 2022-09-16 RX ADMIN — CEFAZOLIN 2000 MG: 2 INJECTION, POWDER, FOR SOLUTION INTRAMUSCULAR; INTRAVENOUS at 11:36

## 2022-09-16 RX ADMIN — SODIUM CHLORIDE: 9 INJECTION, SOLUTION INTRAVENOUS at 20:36

## 2022-09-16 RX ADMIN — LISINOPRIL 40 MG: 40 TABLET ORAL at 08:14

## 2022-09-16 ASSESSMENT — PAIN DESCRIPTION - ORIENTATION
ORIENTATION: MID
ORIENTATION: MID
ORIENTATION: UPPER
ORIENTATION: INNER
ORIENTATION: MID
ORIENTATION: MID
ORIENTATION: UPPER
ORIENTATION: MID

## 2022-09-16 ASSESSMENT — PAIN DESCRIPTION - PAIN TYPE
TYPE: SURGICAL PAIN

## 2022-09-16 ASSESSMENT — PAIN - FUNCTIONAL ASSESSMENT
PAIN_FUNCTIONAL_ASSESSMENT: PREVENTS OR INTERFERES SOME ACTIVE ACTIVITIES AND ADLS
PAIN_FUNCTIONAL_ASSESSMENT: ACTIVITIES ARE NOT PREVENTED
PAIN_FUNCTIONAL_ASSESSMENT: PREVENTS OR INTERFERES SOME ACTIVE ACTIVITIES AND ADLS

## 2022-09-16 ASSESSMENT — PAIN SCALES - GENERAL
PAINLEVEL_OUTOF10: 8
PAINLEVEL_OUTOF10: 9
PAINLEVEL_OUTOF10: 10
PAINLEVEL_OUTOF10: 7
PAINLEVEL_OUTOF10: 8
PAINLEVEL_OUTOF10: 8
PAINLEVEL_OUTOF10: 5

## 2022-09-16 ASSESSMENT — PAIN DESCRIPTION - FREQUENCY
FREQUENCY: CONTINUOUS

## 2022-09-16 ASSESSMENT — PAIN DESCRIPTION - DESCRIPTORS
DESCRIPTORS: SORE
DESCRIPTORS: ACHING
DESCRIPTORS: SORE
DESCRIPTORS: SORE
DESCRIPTORS: ACHING
DESCRIPTORS: SORE

## 2022-09-16 ASSESSMENT — PAIN DESCRIPTION - LOCATION
LOCATION: BACK
LOCATION: BACK
LOCATION: NECK
LOCATION: BACK
LOCATION: BACK
LOCATION: NECK
LOCATION: BACK
LOCATION: NECK

## 2022-09-16 ASSESSMENT — PAIN DESCRIPTION - ONSET
ONSET: ON-GOING

## 2022-09-16 NOTE — PLAN OF CARE
Problem: Pain  Goal: Verbalizes/displays adequate comfort level or baseline comfort level  9/16/2022 0940 by Getachew Dudley RN  Outcome: Progressing  9/16/2022 0434 by Petrona Becerril RN  Outcome: Progressing  Note: Pt taking PO and IV pain medication. Pt asleep when reassessed. Problem: Safety - Adult  Goal: Free from fall injury  9/16/2022 0940 by Getachew Dudley RN  Outcome: Progressing  9/16/2022 0434 by Petrona Becerril RN  Outcome: Progressing  Note: Pt will remain free of falls for the duration of the shift. Pt is A/O x4  and uses the call light appropriately for needs. Bed alarm on, wheels locked, bed in lowest position, side rails up 2/4, nonskid socks on, call light and bedside table in reach. Will continue to monitor. Problem: ABCDS Injury Assessment  Goal: Absence of physical injury  Outcome: Progressing     Problem: Neurosensory - Adult  Goal: Achieves stable or improved neurological status  Outcome: Progressing     Problem: Cardiovascular - Adult  Goal: Maintains optimal cardiac output and hemodynamic stability  Outcome: Progressing     Problem: Respiratory - Adult  Goal: Achieves optimal ventilation and oxygenation  Outcome: Progressing     Problem: Skin/Tissue Integrity  Goal: Absence of new skin breakdown  Description: 1. Monitor for areas of redness and/or skin breakdown  2. Assess vascular access sites hourly  3. Every 4-6 hours minimum:  Change oxygen saturation probe site  4. Every 4-6 hours:  If on nasal continuous positive airway pressure, respiratory therapy assess nares and determine need for appliance change or resting period.   Outcome: Progressing

## 2022-09-16 NOTE — PROGRESS NOTES
Physical Therapy  Facility/Department: [place] [address]  Physical Therapy Initial Assessment and Treatment    Name: Renetta Carr  : 1955  MRN: [id number]  Date of Service: 2022    Discharge Recommendations:    Renetta Carr scored a 14/24 on the AM-PAC short mobility form. Current research shows that an AM-PAC score of 17 or less is typically not associated with a discharge to the patient's home setting. Based on the patient's AM-PAC score and their current functional mobility deficits, it is recommended that the patient have 5-7 sessions per week of Physical Therapy at d/c to increase the patient's independence. At this time, this patient demonstrates complex nursing, medical, and rehabilitative needs, and would benefit from intensive rehabilitation services upon discharge from the Inpatient setting. This patient demonstrates the ability to participate in and benefit from an intensive therapy program with a coordinated interdisciplinary team approach to foster frequent, structured, and documented communication among disciplines, who will work together to establish, prioritize, and achieve treatment goals. Please see assessment section for further patient specific details. If patient discharges prior to next session this note will serve as a discharge summary. Please see below for the latest assessment towards goals. PT Equipment Recommendations  Equipment Needed:  (defer)      Patient Diagnosis(es): The encounter diagnosis was Cervical stenosis of spine. Past Medical History:  has a past medical history of Arthritis, Atrial fibrillation ([address] CAD (coronary artery disease), Hyperlipidemia, and Hypertension. Past Surgical History:  has a past surgical history that includes back surgery (); Coronary angioplasty with stent (); and cervical fusion (N/A, 9/15/2022).     Assessment   Body Structures, Functions, Activity Limitations Requiring Skilled Therapeutic Intervention: Decreased functional mobility ; Decreased endurance;Decreased strength;Decreased balance  Assessment: Pt with decreased independent mobiltiy from baseline s/p C2-T2 POSTERIOR DECOMPRESSION FUSION AND FIXATION. Pt reports normally independent with mobility without AD. Currenlty needing min assist x 1-2 for transfers/gt. Decreased gt/activity tolerance. Limited by fatigue and c/o leg weakness. Safety concerns for pt returning home. Pt at risk for falls. Pt lives alone and has steps to enter apartment. Rec cont skilled PT to maximize mobility and independence  Treatment Diagnosis: impaired functional mobility s/ppC2-T2 POSTERIOR DECOMPRESSION FUSION AND FIXATION  Decision Making: Medium Complexity  Requires PT Follow-Up: Yes     Plan   Plan  Plan: 5-7 times per week  Current Treatment Recommendations: Strengthening, Balance training, Functional mobility training, Gait training, Safety education & training, Patient/Caregiver education & training, Endurance training  Safety Devices  Type of Devices: Call light within reach, Chair alarm in place, Patient at risk for falls, Left in chair, Nurse notified     Restrictions  Position Activity Restriction  Other position/activity restrictions: up with assistance;  [place] J Collar -Cervical collar to be worn at all times, Cervical collar does NOT need to be worn when eating, bathing or showering, Cervical collar pads to be cleaned with soap & water, air dried, and changed daily     Subjective   General  Chart Reviewed: Yes  Additional Pertinent Hx: Pt is a 74 y.o. male adm 9/9 with cord compression. Pt presented to the [facility] ED complaining of having difficulty functioning or performing any ADL's or even walking. This is due to back pain and pain radiating into the arms and legs. He says the sx have progressed for weeks. MRI showed showing a C2-C3 cord compression with edema. Transferred to [facility] for further management.     MRI Cspine:Cord myelopathy, cord edema with hyperintense signal noted on STIR sequence at the C2-3 and C3 level,  Severe central canal stenosis C2-C3 which is improved with the patient's collar on compared to prior study, Severe canal stenosis C3-C4 with central disc herniation, also slightly improved with the patient's collar on during the study. MRI Lspine:Multilevel severe central canal and foraminal stenosis L2-3 through L5-S1, secondary to multifactorial degenerative changes and posterior epidural lipomatosis, Redundancy in edematous nerve roots at the L1-2 level secondary to chronic lumbar central canal stenosis. Xray C spine:Grade 1 anterolisthesis of C2 on C3. Pt s/pC2-T2 POSTERIOR DECOMPRESSION FUSION AND FIXATION on 9/15. PMH:anterolisthesis of cervical spine, A.fib s/p ablation, HTN, HLD, CAD, BPH, Carpal Tunnel, and herniated disc  Referring Practitioner: Keith Brenner MD  Referral Date : 09/15/22  Subjective  Subjective: Pt found supine. Agreeable to PT. \"My legs feel weak. \"         Social/Functional History  Social/Functional History  Lives With: Spouse  Type of Home: Apartment (3rd floor)  Home Layout: One level  Home Access: Elevator, Stairs to enter with rails (elevator to thrid floor but then has a long way of walking + stairs to his wing of the complex/apartment)  Entrance Stairs - Number of Steps: 5-7 + 3-4 steps+ 2-3 (long hallway with steps)  Bathroom Shower/Tub: Tub/Shower unit  [employer]: Handicap height  Bathroom Equipment: Shower chair, Grab bars in shower, Grab bars around toilet  Home Equipment:  (none)  Has the patient had two or more falls in the past year or any fall with injury in the past year?: No (has had falls in the past May 2021)  ADL Assistance: Independent  Homemaking Assistance: Independent  Ambulation Assistance: Independent  Transfer Assistance: Independent  Active : Yes  Additional Comments: Pt questionable historian, lethargic confused at times.   Reports daughter may be able to stay wuth him at dc  Vision/Hearing  Vision  Vision: Impaired (blurred vision in R eye over a year)  Hearing  Hearing: Within functional limits    Cognition   Orientation  Overall Orientation Status: Within Functional Limits  Cognition  Overall Cognitive Status: WFL  Cognition Comment: lethargic this date     Objective                 AROM RLE (degrees)  RLE AROM: WFL  AROM LLE (degrees)  LLE AROM : WFL  Strength RLE  Strength RLE: WFL  Strength LLE  Strength LLE: WFL          Bed mobility  Supine to Sit: Minimal assistance (HOB elevated wi th rail)  Transfers  Sit to Stand: Minimal Assistance (from bed, CGA from chair)  Stand to sit: Minimal Assistance (cues for safety and hand placement)  Bed to Chair: Dependent/Total (min assist x 2, stand pivot with HHA)  Ambulation  Device: No Device (HHA)  Other Apparatus:  ([place] J collar on)  Assistance: Dependent/Total (min assist x 2)  Quality of Gait: unsteady, decreased bilat step length/height  Distance: 3-4 steps  Comments: Pt amb 12' with rolling walker min assist x 1.   Decreased bilat step length/height, assist to maneuver walker             Treatment included: bed mobility, transfers, gt, pt education      OutComes Score                                                  AM-PAC Score  AM-PAC Inpatient Mobility Raw Score : 14 (09/16/22 1129)  -PAC Inpatient T-Scale Score : 38.1 (09/16/22 1129)  Mobility Inpatient CMS 0-100% Score: 61.29 (09/16/22 1129)  Mobility Inpatient CMS G-Code Modifier : CL (09/16/22 1129)          Tinn[place] Score       Goals  Short Term Goals  Time Frame for Short term goals: By discharge  Short term goal 1: Sup to sit supervision  Short term goal 2: Pt will transfer sit to stand supervision  Short term goal 3: Pt will amb >50' with LRAD supervision       Education  Patient Education  Education Given To: Patient  Education Provided: Role of Therapy;Plan of Care;Precautions;IADL Safety  Education Method: Verbal  Education Outcome: Verbalized understanding;Continued education needed      Therapy Time   Individual Concurrent Group Co-treatment   Time In 1029         Time Out 1059         Minutes 30             Timed Code Treatment Minutes:15       Total Treatment Minutes:  27      Diane Nunez, PT

## 2022-09-16 NOTE — PROGRESS NOTES
102-360-52 MG/5ML suspension 15 mL, Q6H PRN  polyethylene glycol (GLYCOLAX) packet 17 g, Daily  bisacodyl (DULCOLAX) suppository 10 mg, Daily PRN  promethazine (PHENERGAN) tablet 12.5 mg, Q6H PRN   Or  ondansetron (ZOFRAN) injection 4 mg, Q6H PRN  oxyCODONE (ROXICODONE) immediate release tablet 5 mg, Q4H PRN   Or  oxyCODONE (ROXICODONE) immediate release tablet 10 mg, Q4H PRN  HYDROmorphone (DILAUDID) injection 0.5 mg, Q3H PRN   Or  HYDROmorphone (DILAUDID) injection 1 mg, Q3H PRN  heparin (porcine) PF injection 5,000 Units, Q8H  ceFAZolin (ANCEF) 2,000 mg in sodium chloride 0.9 % 50 mL IVPB (mini-bag), Q8H  methocarbamol (ROBAXIN) tablet 1,000 mg, 4x Daily  sennosides-docusate sodium (SENOKOT-S) 8.6-50 MG tablet 2 tablet, BID  acetaminophen (TYLENOL) tablet 1,000 mg, Q6H  diazePAM (VALIUM) tablet 5 mg, Q6H PRN  naloxegol (MOVANTIK) tablet 12.5 mg, QAM AC  NIFEdipine (ADALAT CC) extended release tablet 30 mg, Daily  thiamine mononitrate tablet 100 mg, Daily  metoprolol succinate (TOPROL XL) extended release tablet 75 mg, Daily  tamsulosin (FLOMAX) capsule 0.4 mg, Daily  magnesium hydroxide (MILK OF MAGNESIA) 400 MG/5ML suspension 30 mL, Daily PRN  hydrALAZINE (APRESOLINE) injection 5 mg, Q6H PRN  nicotine (NICODERM CQ) 14 MG/24HR 1 patch, Daily  polyvinyl alcohol (LIQUIFILM TEARS) 1.4 % ophthalmic solution 1 drop, PRN  sodium chloride (OCEAN, BABY AYR) 0.65 % nasal spray 1 spray, PRN  atorvastatin (LIPITOR) tablet 40 mg, Daily  lisinopril (PRINIVIL;ZESTRIL) tablet 40 mg, Daily  meloxicam (MOBIC) tablet 7.5 mg, Daily  ondansetron (ZOFRAN-ODT) disintegrating tablet 4 mg, Q8H PRN   Or  ondansetron (ZOFRAN) injection 4 mg, Q6H PRN  polyethylene glycol (GLYCOLAX) packet 17 g, Daily PRN          Physical exam:     Vitals:  BP (!) 145/77   Pulse 85   Temp 98.3 °F (36.8 °C) (Oral)   Resp 16   Ht 6' 2\" (1.88 m)   Wt 258 lb 12.8 oz (117.4 kg)   SpO2 98%   BMI 33.23 kg/m²   Constitutional:  OAA X3 NAD  Skin: no rash, turgor wnl  Heent:  eomi, mmm  Neck: no bruits or jvd noted  Cardiovascular:  S1, S2 without m/r/g  Respiratory: CTA B without w/r/r  Abdomen:  +bs, soft, nt, nd  Ext: no  lower extremity edema  Psychiatric: mood and affect appropriate  Musculoskeletal:  Rom, muscular strength intact    Labs:  CBC:   Recent Labs     09/14/22  1540 09/15/22  0508 09/16/22  0646   WBC 5.2 6.6 5.1   HGB 15.3 15.2 8.6*    235 112*       BMP:    Recent Labs     09/14/22  0540 09/15/22  0508 09/16/22  0646   * 133* 145   K 4.6 4.6 2.1*   CL 96* 95* 123*   CO2 27 30 13*   BUN 21* 23* 11   CREATININE 0.7* 0.8 <0.5*   GLUCOSE 143* 96 70       Ca/Mg/Phos:   Recent Labs     09/14/22  0540 09/15/22  0508 09/16/22  0646   CALCIUM 9.1 9.2 3.5*       Hepatic:   No results for input(s): AST, ALT, ALB, BILITOT, ALKPHOS in the last 72 hours. Troponin:   No results for input(s): TROPONINI in the last 72 hours. BNP: No results for input(s): BNP in the last 72 hours. Lipids: No results for input(s): CHOL, TRIG, HDL, LDLCALC, LABVLDL in the last 72 hours. ABGs: No results for input(s): PHART, PO2ART, [id number] in the last 72 hours. INR:   Recent Labs     09/14/22  1540   INR 1.02       UA:  No results for input(s): COLORU, CLARITYU, GLUCOSEU, BILIRUBINUR, KETUA, SPECGRAV, BLOODU, PHUR, PROTEINU, UROBILINOGEN, NITRU, LEUKOCYTESUR, [place] in the last 72 hours. Urine Microscopic:   No results for input(s): LABCAST, BACTERIA, COMU, HYALCAST, WBCUA, RBCUA, EPIU in the last 72 hours. Urine Culture: No results for input(s): LABURIN in the last 72 hours. Urine Chemistry: No results for input(s): [place] Jillian, PROTEINUR, NAUR in the last 72 hours. IMAGING:  XR CERVICAL SPINE (2-3 VIEWS)   Final Result   1. Intraoperative imaging using CT and fluoroscopy during posterior cervical decompression and fusion. FLUORO FOR SURGICAL PROCEDURES   Final Result   1.  Intraoperative imaging using CT and fluoroscopy during posterior cervical decompression and fusion. CT CERVICAL SPINE WO CONTRAST   Final Result   1. Redemonstration of anterolisthesis of C2 on C3 without obvious fracture. There is severe facet arthrosis at this level   2. Redemonstration of anterolisthesis of C4-5 likely related to severe facet arthrosis at this level   3. Minimal retrolisthesis of C3 on C4 redemonstrated, likely related to severe degenerative disc disease at this level as well as associated facet arthrosis   4. Marked degenerative disc disease C3-4, C5-6, C6-7, C7-T1   5. Diffuse severe facet arthrosis            XR CERVICAL SPINE (2-3 VIEWS)   Final Result      Grade 1 anterolisthesis of C2 on C3. Multilevel moderate to severe degenerative disc disease. MRI LUMBAR SPINE WO CONTRAST   Final Result   1. Multilevel severe central canal and foraminal stenosis L2-3 through L5-S1, secondary to multifactorial degenerative changes and posterior epidural lipomatosis   2. Redundancy in edematous nerve roots at the L1-2 level secondary to chronic lumbar central canal stenosis. 2. Extruded disc herniation L3-L4 with left lateral recess and subarticular migration      Compared prior study there is been progression of spinal stenosis secondary to chronic multifactorial degenerative changes posterior epidural fat. No change in the degree of facet arthropathy and foraminal stenosis. MRI THORACIC SPINE WO CONTRAST   Final Result      1. Mild scoliosis and degenerative disc disease concordant with prior CT   2. No abnormal cord signal, central canal, compression or canal stenosis         MRI CERVICAL SPINE WO CONTRAST   Final Result      1. Cord myelopathy, cord edema with hyperintense signal noted on STIR sequence at the C2-3 and C3 level   2. Severe central canal stenosis C2-C3 which is improved with the patient's collar on compared to prior study   3.  Severe canal stenosis C3-C4 with central disc herniation, also slightly improved with the patient's collar on during the study   4. Multilevel bilateral foraminal stenosis         XR CERVICAL SPINE (2-3 VIEWS)    (Results Pending)           Assessment/Plan :      1. Hyponatremia. Likely 2/2 fluid intake . Drinks excess beer  Limit daily fluid intake to 1200 ml   Off IV fluids   Urine sodium and urine osmolality - reviewed     2. HTN. Off IVF   Pain control     3. Acute cervical cord compression with edema:  Neurosurgery consulted.   Sx done       Monitor sodium levels               Thank you for allowing us to participate in care of Jose Barbosa         Electronically signed by: Cora Andino MD, 9/16/2022, 12:43 PM      [facility]  [address]  Office : [phone]  Fax :[phone]

## 2022-09-16 NOTE — PROGRESS NOTES
NEUROSURGERY POST-OP PROGRESS NOTE    Patient Name: Umer Corey YOB: 1955   Sex: Male Age: 74 yrs     Medical Record Number: [id number] Acct Number: [de-identified]   Room Number: 0094/0544-52 Hospital Day: Hospital Day: 8     Interval History:   Post-operative Day# 1 s/p Procedure(s) (LRB):  C2-T2 POSTERIOR DECOMPRESSION FUSION AND FIXATION (N/A)    Subjective: patient is sitting up in bed. C-Collar on. Objective:    VITAL SIGNS   BP (!) 145/77   Pulse 85   Temp 98.3 °F (36.8 °C) (Oral)   Resp 16   Ht 6' 2\" (1.88 m)   Wt 258 lb 12.8 oz (117.4 kg)   SpO2 98%   BMI 33.23 kg/m²    Height Height: 6' 2\" (188 cm)   Weight Weight: 258 lb 12.8 oz (117.4 kg)        Allergies No Known Allergies   NPO Status ADULT DIET;  Regular   Isolation No active isolations     LABS   Basic Metabolic Profile Recent Labs     09/14/22  0540 09/15/22  0508 09/16/22  0646   * 133* 145   CL 96* 95* 123*   CO2 27 30 13*   BUN 21* 23* 11   CREATININE 0.7* 0.8 <0.5*   GLUCOSE 143* 96 70      Complete Blood Count Recent Labs     09/15/22  0508 09/16/22  0646 09/16/22  1154   WBC 6.6 5.1 6.2   RBC 4.72 2.60* 4.29      Coagulation Studies Recent Labs     09/14/22  1540   INR 1.02        MEDICATIONS   Inpatient Medications     sodium chloride flush, 5-40 mL, IntraVENous, 2 times per day    polyethylene glycol, 17 g, Oral, Daily    heparin (porcine) PF, 5,000 Units, SubCUTAneous, Q8H    ceFAZolin, 2,000 mg, IntraVENous, Q8H    [COMPLETED] methocarbamol IVPB, 1,000 mg, IntraVENous, Q8H **FOLLOWED BY** methocarbamol, 1,000 mg, Oral, 4x Daily    sennosides-docusate sodium, 2 tablet, Oral, BID    acetaminophen, 1,000 mg, Oral, Q6H    naloxegol, 12.5 mg, Oral, QAM AC    NIFEdipine, 30 mg, Oral, Daily    thiamine mononitrate, 100 mg, Oral, Daily    metoprolol succinate, 75 mg, Oral, Daily    tamsulosin, 0.4 mg, Oral, Daily    nicotine, 1 patch, TransDERmal, Daily    atorvastatin, 40 mg, Oral, Daily    lisinopril, 40 mg, Oral, Daily    meloxicam, 7.5 mg, Oral, Daily   Infusions    sodium chloride        Antibiotics   Recent Abx Admin                     ceFAZolin (ANCEF) 2,000 mg in sodium chloride 0.9 % 50 mL IVPB (mini-bag) (mg) 2,000 mg New Bag 09/16/22 1136     2,000 mg New Bag  0328     2,000 mg New Bag 09/15/22 2122                     Neurologic Exam:  Mental status: awake and alert and oriented x4      Musculoskeletal:   Gait: Not tested   Tone: normal  Sensory: intact to all extremities  Motor strength:    Right  Left    Right  Left    Deltoid  4 4  Hip Flex  4 4   Biceps  4 4  Knee Extensors  4 5   Triceps  4 4  Knee Flexors  4 4   Wrist Ext  4 4  Ankle Dorsiflex. 4 4   Wrist Flex  4 4  Ankle Plantarflex. 4 4     Incision: dressing intact, small amount of drainage present  Drain: upper back output over last 24 hr 575 mL    Respiratory:  Unlabored respiratory pattern    Abdomen:   Soft, ND   Last BM: prior to surgery    Cardiovascular:  Warm, well perfused    Assessment   Patient is a 73 yo male s/p Procedure(s) (LRB):  C2-T2 POSTERIOR DECOMPRESSION FUSION AND FIXATION (N/A) p    Plan:  Neurologic exam frequency: Q4hr  Mobility: PT/OT, up to chair  Diet: Adult  Antibiotics: finished post op dose  Drain: in upper back, 575mL out in 24 hr  DVT Prophylaxis: SCDs and heparin  GI Prophylaxis: pepcid  Bowel Regimen: senokot and glycolax  Pain control:  tylenol oxycodone and dilaudid  Muscle spasm: robaxin  Incisional Care: dressing intact. Dispo Planning: remain in patient for pain control and work with PT for improved mobility    Patient was discussed with Dr. Veronica Sommer who agrees with above assessment and plan.      Electronically signed by: DOUGLAS Dykes - CNP, 9/16/2022 1:29 PM   Neurosurgery Nurse Practitioner  [phone] __________________________________________________________________    I saw and examined Eduar Domingo on 09/16/22. I agree with the assessment and plan as detailed above.      Saad Steinberg MD, PhD  [address], [address], [address] [phone] (c), [phone] (o)

## 2022-09-16 NOTE — PLAN OF CARE
Problem: Pain  Goal: Verbalizes/displays adequate comfort level or baseline comfort level  Outcome: Progressing  Note: Pt taking PO and IV pain medication. Pt asleep when reassessed. Problem: Safety - Adult  Goal: Free from fall injury  Outcome: Progressing  Note: Pt will remain free of falls for the duration of the shift. Pt is A/O x4  and uses the call light appropriately for needs. Bed alarm on, wheels locked, bed in lowest position, side rails up 2/4, nonskid socks on, call light and bedside table in reach. Will continue to monitor.

## 2022-09-16 NOTE — PROGRESS NOTES
No acute events overnight. Pt is more alert and less restless. Pt continues to c/o pain 7-10/10. PO and IV pain medication given. Incision to the neck has breakthrough drainage but is otherwise CDI. MIRANDA drains intact. Bed alarm on, wheels locked, bed in lowest position, side rails up 2/4, nonskid socks on, call light and bedside table in reach. Will continue to monitor.

## 2022-09-16 NOTE — PROGRESS NOTES
[address] Daily Progress Note      Admit Date:  9/9/2022    Subjective:  Mr. Angelika Enriquez was seen and examined early this am. F/U pre op/cord compression/cad/PCI. Sitting up eating breakfast.  Back discomfort. Denies cp/sob.      Objective:   BP (!) 144/72   Pulse 72   Temp 97.6 °F (36.4 °C) (Oral)   Resp 18   Ht 6' 2\" (1.88 m)   Wt 258 lb 12.8 oz (117.4 kg)   SpO2 98%   BMI 33.23 kg/m²     Intake/Output Summary (Last 24 hours) at 9/16/2022 1523  Last data filed at 9/16/2022 1206  Gross per 24 hour   Intake 886.83 ml   Output 1600 ml   Net -713.17 ml       TELEMETRY: Sinus     Physical Exam:  General:  Awake, alert, NAD  Skin:  Warm and dry  Neck:  JVP not elevated  Chest:  Clear to auscultation, respiration normal  Cardiovascular:  RRR S1S2  Abdomen:  Soft nontender  Extremities:  no edema    Medications:    sodium chloride flush  5-40 mL IntraVENous 2 times per day    polyethylene glycol  17 g Oral Daily    heparin (porcine) PF  5,000 Units SubCUTAneous Q8H    ceFAZolin  2,000 mg IntraVENous Q8H    methocarbamol  1,000 mg Oral 4x Daily    sennosides-docusate sodium  2 tablet Oral BID    acetaminophen  1,000 mg Oral Q6H    naloxegol  12.5 mg Oral QAM AC    NIFEdipine  30 mg Oral Daily    thiamine mononitrate  100 mg Oral Daily    metoprolol succinate  75 mg Oral Daily    tamsulosin  0.4 mg Oral Daily    nicotine  1 patch TransDERmal Daily    atorvastatin  40 mg Oral Daily    lisinopril  40 mg Oral Daily    meloxicam  7.5 mg Oral Daily      sodium chloride       sodium chloride flush, sodium chloride, naloxone, aluminum & magnesium hydroxide-simethicone, bisacodyl, promethazine **OR** ondansetron, oxyCODONE **OR** oxyCODONE, HYDROmorphone **OR** HYDROmorphone, diazePAM, magnesium hydroxide, hydrALAZINE, polyvinyl alcohol, sodium chloride, ondansetron **OR** ondansetron, polyethylene glycol    Lab Data:  CBC:   Recent Labs     09/15/22  0508 09/16/22  0646 09/16/22  1154   WBC 6.6 5.1 6.2   HGB 15.2 8.6* 13.8   HCT 46.3 26.4* 42.6   MCV 98.1 101.7* 99.2    112* 195     BMP:   Recent Labs     09/15/22  0508 09/16/22  0646 09/16/22  1154   * 145 133*   K 4.6 2.1* 4.0   CL 95* 123* 97*   CO2 30 13* 25   BUN 23* 11 20   CREATININE 0.8 <0.5* 0.8     LIVER PROFILE:   Recent Labs     09/16/22  1154   AST 35   ALT 56*   BILITOT 0.3   ALKPHOS 110     PT/INR:   Recent Labs     09/14/22  1540   PROTIME 13.3   INR 1.02     APTT:   Recent Labs     09/14/22  1540   APTT 25.5     BNP:  No results for input(s): BNP in the last 72 hours. IMAGING:     Assessment:  Patient Active Problem List    Diagnosis Date Noted    Pre-op evaluation 09/13/2022    Hyponatremia 09/09/2022    Cord compression ([facility] 09/09/2022    Anterolisthesis of cervical spine 09/08/2022    Chest pain 01/31/2021    Bilateral carpal tunnel syndrome     Coronary artery disease involving native coronary artery of native heart without angina pectoris     Morbid obesity due to excess calories ([facility]     Essential hypertension     Tobacco use 05/04/2016    Paroxysmal atrial fibrillation ([facility] 05/04/2016    Tobacco abuse 05/04/2016    Typical atrial flutter 05/04/2016    CAD in native artery 05/25/2012    Hyperlipidemia 05/25/2012       Plan:  Sitting up eating breakfast.  Denies cp/sob. Tolerated surgery well. No changes. Nothing to add. Will follow from a distance.        Core Measures:  Discharge instructions:   LVEF documented:   ACEI for LV dysfunction:   Smoking Cessation:    Nehemiah Robbins MD, MD 9/16/2022 3:23 PM

## 2022-09-16 NOTE — PROGRESS NOTES
Assessment complete. BP elevated. Medicated with PRN Hydralazine. Cervical collar on. Pt c/o pain 10/10 and yellig in the hallway \"I need Dilaudid! \" When pt assessed he is sleeping. Tele placed on per order. Dexter catheter in place. 2 hemovac drains. Pt reports full sensation of extremities. Bed alarm on, wheels locked, bed in lowest position, side rails up 2/4, nonskid socks on, call light and bedside table in reach. Will continue to monitor.

## 2022-09-16 NOTE — PROGRESS NOTES
Hospitalist Progress Note      PCP: Tracie Romano    Date of Admission: 9/9/2022    Hospital Course: 74 y.o. male who presented to [facility] with past medical history of arthritis, CAD, hypertension, hyperlipidemia, atrial fibrillation, BPH presented to the ED for difficulty functioning and ADL or even walking. Patient reported that the back pain started around 1 week ago progressively worsening however today has gone worse and that he is having upper extremity weakness and pain that is radicular going from the low back to bilateral legs. Patient denied having any incontinence urinary or rectal, no perineal numbness. Patient however is reporting that weakness is now causing him to not been able to even walk or carry out ADLs. Patient reports that the back pain is very severe specially in the low back and the low neck. Exacerbated with extending, alleviated with rest.  No associate with fever chills nausea vomiting chest pain abdominal pain or dysuria. Patient denied having any trauma or falls at home. Patient reports that he follows up with spine and did not have any surgery scheduled. Patient was given prednisone and Robaxin Lidoderm patches on 08/22 and was taking them except for the patch due to being expensive with minimal improvement. Patient lives alone and functions independently prior to this. In the ED, patient was noted acute on chronic back pain, spine was consulted recommended MRI and admission to [facility] with PT OT. Patient then underwent MRI and was not able to proceed due to severe back pain especially when laying flat. MRI was able to be obtained showing a C2-C3 cord compression with edema. Subjective:   Patient was seen and examined at bedside today, alert and oriented. Pleasant gentleman. Alert and oriented, little sleepy under effect of pain medication. No acute events reported or observed overnight. Continue to report neck pain.   However upper extremities weakness or numbness has improved steadily since surgery. Medications:  Reviewed    Infusion Medications    sodium chloride       Scheduled Medications    sodium chloride flush  5-40 mL IntraVENous 2 times per day    polyethylene glycol  17 g Oral Daily    heparin (porcine) PF  5,000 Units SubCUTAneous Q8H    ceFAZolin  2,000 mg IntraVENous Q8H    methocarbamol  1,000 mg Oral 4x Daily    sennosides-docusate sodium  2 tablet Oral BID    acetaminophen  1,000 mg Oral Q6H    naloxegol  12.5 mg Oral QAM AC    NIFEdipine  30 mg Oral Daily    thiamine mononitrate  100 mg Oral Daily    metoprolol succinate  75 mg Oral Daily    tamsulosin  0.4 mg Oral Daily    nicotine  1 patch TransDERmal Daily    atorvastatin  40 mg Oral Daily    lisinopril  40 mg Oral Daily    meloxicam  7.5 mg Oral Daily     PRN Meds: sodium chloride flush, sodium chloride, naloxone, aluminum & magnesium hydroxide-simethicone, bisacodyl, promethazine **OR** ondansetron, oxyCODONE **OR** oxyCODONE, HYDROmorphone **OR** HYDROmorphone, diazePAM, magnesium hydroxide, hydrALAZINE, polyvinyl alcohol, sodium chloride, ondansetron **OR** ondansetron, polyethylene glycol      Intake/Output Summary (Last 24 hours) at 9/16/2022 1813  Last data filed at 9/16/2022 1624  Gross per 24 hour   Intake 886.83 ml   Output 1750 ml   Net -863.17 ml       Physical Exam Performed:    BP (!) 144/72   Pulse 72   Temp 97.6 °F (36.4 °C) (Oral)   Resp 18   Ht 6' 2\" (1.88 m)   Wt 258 lb 12.8 oz (117.4 kg)   SpO2 98%   BMI 33.23 kg/m²     General appearance: Sedated, postoperatively. HEENT: Conjunctivae/corneas clear. Neck: Neck collar. Respiratory:  Normal respiratory effort. Clear to auscultation, bilaterally without Rales/Wheezes/Rhonchi. Cardiovascular: Regular rate and rhythm with normal S1/S2 without murmurs, rubs or gallops. Abdomen: Soft, non-tender, non-distended with normal bowel sounds.   Musculoskeletal: No clubbing, cyanosis or edema bilaterally. Full range of motion without deformity. Skin: Skin color, texture, turgor normal.  No rashes or lesions. Neurologic: Alert and oriented, good hand  bilaterally. Capillary Refill: Brisk,3 seconds, normal     Labs:   Recent Labs     09/15/22  0508 09/16/22  0646 09/16/22  1154   WBC 6.6 5.1 6.2   HGB 15.2 8.6* 13.8   HCT 46.3 26.4* 42.6    112* 195     Recent Labs     09/15/22  0508 09/16/22  0646 09/16/22  1154   * 145 133*   K 4.6 2.1* 4.0   CL 95* 123* 97*   CO2 30 13* 25   BUN 23* 11 20   CREATININE 0.8 <0.5* 0.8   CALCIUM 9.2 3.5* 8.7     Recent Labs     09/16/22  1154   AST 35   ALT 56*   BILITOT 0.3   ALKPHOS 110     Recent Labs     09/14/22  1540   INR 1.02     No results for input(s): Adelina Comber in the last 72 hours. Urinalysis:      Lab Results   Component Value Date/Time    NITRU Negative 09/08/2022 11:30 PM    WBCUA 1 09/08/2022 11:30 PM    BACTERIA None Seen 09/08/2022 11:30 PM    RBCUA 1 09/08/2022 11:30 PM    BLOODU Negative 09/08/2022 11:30 PM    SPECGRAV 1.015 09/08/2022 11:30 PM    GLUCOSEU Negative 09/08/2022 11:30 PM       Radiology:  XR CERVICAL SPINE (2-3 VIEWS)   Final Result      Fusion in the cervical spine. XR CERVICAL SPINE (2-3 VIEWS)   Final Result   1. Intraoperative imaging using CT and fluoroscopy during posterior cervical decompression and fusion. FLUORO FOR SURGICAL PROCEDURES   Final Result   1. Intraoperative imaging using CT and fluoroscopy during posterior cervical decompression and fusion. CT CERVICAL SPINE WO CONTRAST   Final Result   1. Redemonstration of anterolisthesis of C2 on C3 without obvious fracture. There is severe facet arthrosis at this level   2. Redemonstration of anterolisthesis of C4-5 likely related to severe facet arthrosis at this level   3. Minimal retrolisthesis of C3 on C4 redemonstrated, likely related to severe degenerative disc disease at this level as well as associated facet arthrosis   4. Marked degenerative disc disease C3-4, C5-6, C6-7, C7-T1   5. Diffuse severe facet arthrosis            XR CERVICAL SPINE (2-3 VIEWS)   Final Result      Grade 1 anterolisthesis of C2 on C3. Multilevel moderate to severe degenerative disc disease. MRI LUMBAR SPINE WO CONTRAST   Final Result   1. Multilevel severe central canal and foraminal stenosis L2-3 through L5-S1, secondary to multifactorial degenerative changes and posterior epidural lipomatosis   2. Redundancy in edematous nerve roots at the L1-2 level secondary to chronic lumbar central canal stenosis. 2. Extruded disc herniation L3-L4 with left lateral recess and subarticular migration      Compared prior study there is been progression of spinal stenosis secondary to chronic multifactorial degenerative changes posterior epidural fat. No change in the degree of facet arthropathy and foraminal stenosis. MRI THORACIC SPINE WO CONTRAST   Final Result      1. Mild scoliosis and degenerative disc disease concordant with prior CT   2. No abnormal cord signal, central canal, compression or canal stenosis         MRI CERVICAL SPINE WO CONTRAST   Final Result      1. Cord myelopathy, cord edema with hyperintense signal noted on STIR sequence at the C2-3 and C3 level   2. Severe central canal stenosis C2-C3 which is improved with the patient's collar on compared to prior study   3. Severe canal stenosis C3-C4 with central disc herniation, also slightly improved with the patient's collar on during the study   4.  Multilevel bilateral foraminal stenosis                 Assessment/Plan:    Active Hospital Problems    Diagnosis     Pre-op evaluation [Z01.818]      Priority: Medium    Cord compression [facility]) [G95.20]      Priority: Medium    CAD in native artery [I25.10]      Colon Rise a 74 y.o. M with PMHx of anterolisthesis of cervical spine, A.fib s/p ablation, HTN, HLD, CAD, BPH, Carpal Tunnel syndrome s/p unilateral surgery, and herniated disc (2000) s/p surgery p/w back pain, numbness in arms and legs, and weakness in arms and legs. Patient was admitted for further work up and management of spinal stenosis     # Cervical cord compression with edema  -Patient presented to [facility] with back pain (9/8/22) where he was directly admitted to [facility] for Neurosurgery. Patient reports back pain that radiates down arms and legs. MRI spine showing. Severe central canal and foraminal stenosis L2-3, L5-S1, edematous nerve roots L1-2. Disc herniation L3-4. Severe canal stenosis C2-C4 with central disc herniation  -Patient compliant with cervical collar  -Neurosurgery following, s/p C2-T2 POSTERIOR DECOMPRESSION FUSION AND FIXATION on 9/15/2022. -Was on Decadron 8 mg PO every 12 hours  - Robaxin 1000 mg IV at 200mL/hr over 30 minutes every 8 hours  -Dilaudid pain panel  -Acetaminophen 650 mg PO PRN    # HTN; nephrology following likely high at times because of pain - adjusted bp meds added nifedipine. # Paroxysmal afib, asa being held, currently he is in sinus rhythm he is a candidate for anticoagulation however will await surgery     DVT Prophylaxis: SCDs  Diet: ADULT DIET; Regular  Code Status: Full Code    PT/OT Eval Status: Ongoing    High risk due to spine surgery, IV Dilaudid.     Veronika Carranza MD

## 2022-09-16 NOTE — PROGRESS NOTES
VSS. Patient is A&O x 4. Continuously complaining of surgical pain, PRN medication given per order and ice packs applied. At times seemed agitated and is impulsive. Bed in lowest position; bed alarm on; non-skid socks on; call light within reach; seizure precautions in place. Patient verbalizes understanding of precautions in place. No new issues or concerns at this time.

## 2022-09-16 NOTE — PROGRESS NOTES
Occupational Therapy  Facility/Department: [facility] 5T ORTHO/NEURO  Occupational Therapy Initial Assessment & Tx    Name: Diane Contreras  : 1955  MRN: [id number]  Date of Service: 2022    Discharge Recommendations: Diane Contreras scored a 15/24 on the AM-PAC ADL Inpatient form. Current research shows that an AM-PAC score of 17 or less is typically not associated with a discharge to the patient's home setting. Based on the patient's AM-PAC score and their current ADL deficits, it is recommended that the patient have 5-7 sessions per week of Occupational Therapy at d/c to increase the patient's independence. At this time, this patient demonstrates complex nursing, medical, and rehabilitative needs, and would benefit from intensive rehabilitation services upon discharge from the Inpatient setting. This patient demonstrates the ability to participate in and benefit from an intensive therapy program with a coordinated interdisciplinary team approach to foster frequent, structured, and documented communication among disciplines, who will work together to establish, prioritize, and achieve treatment goals. Please see assessment section for further patient specific details. If patient discharges prior to next session this note will serve as a discharge summary. Please see below for the latest assessment towards goals. OT Equipment Recommendations  Equipment Needed: No       Patient Diagnosis(es): The encounter diagnosis was Cervical stenosis of spine. Past Medical History:  has a past medical history of Arthritis, Atrial fibrillation ([address] CAD (coronary artery disease), Hyperlipidemia, and Hypertension. Past Surgical History:  has a past surgical history that includes back surgery (); Coronary angioplasty with stent (); and cervical fusion (N/A, 9/15/2022).     Treatment Diagnosis: impaired mobility, transfers, ADL      Assessment   Performance deficits / Impairments: Decreased functional mobility ; Decreased ADL status; Decreased endurance  Assessment: From home alone, pt IND at baseline. S/p C2-T2 POSTERIOR DECOMPRESSION FUSION AND FIXATION. Pt currently requires Parth with short mobility and transfers using RW. Pt limited by pain and fatigue this date. Assist with ADLs. Pt would benefit from cont skilled OT while in acute care and cont intensive inpt at dc to rtn to fx baseline. Treatment Diagnosis: impaired mobility, transfers, ADL  Decision Making: Medium Complexity  REQUIRES OT FOLLOW-UP: Yes  Activity Tolerance  Activity Tolerance: Patient Tolerated treatment well;Patient limited by fatigue;Patient limited by pain        Plan   Plan  Times per Week: 5-7     Restrictions  Position Activity Restriction  Other position/activity restrictions: up with assistance;  [place] J Collar -Cervical collar to be worn at all times, Cervical collar does NOT need to be worn when eating, bathing or showering, Cervical collar pads to be cleaned with soap & water, air dried, and changed daily    Subjective   General  Chart Reviewed: Yes  Additional Pertinent Hx: 74 y.o. M with PMHx of anterolisthesis of cervical spine, A.fib s/p ablation, HTN, HLD, CAD, BPH, Carpal Tunnel, and herniated disc p/w back pain, numbness in arms and legs, and weakness in arms and legs. S/p C2-T2 POSTERIOR DECOMPRESSION FUSION AND FIXATION  Referring Practitioner: Tim Alejandre MD  Diagnosis: cord compression  Subjective  Subjective:  In bed agreeable to session with encouragement, reporting pain 5/10     Social/Functional History  Social/Functional History  Lives With: Spouse  Type of Home: Apartment (3rd floor)  Home Layout: One level  Home Access: Elevator, Stairs to enter with rails (elevator to thrid floor but then has a long way of walking + stairs to his wing of the complex/apartment)  Entrance Stairs - Number of Steps: 5-7 + 3-4 steps+ 2-3 (long hallway with steps)  Bathroom Shower/Tub: Tub/Shower unit  Bathroom Toilet: Handicap height  Bathroom Equipment: Shower chair, Grab bars in shower, Grab bars around toilet  Home Equipment:  (none)  Has the patient had two or more falls in the past year or any fall with injury in the past year?: No (has had falls in the past May 2021)  ADL Assistance: [address]: Independent  Ambulation Assistance: Independent  Transfer Assistance: Independent  Active : Yes  Additional Comments: Pt questionable historian, lethargic confused at times                    Safety Devices  Type of Devices: All fall risk precautions in place;Call light within reach; Chair alarm in place; Patient at risk for falls; Left in chair;Nurse notified;Gait belt  Bed Mobility Training  Bed Mobility Training: Yes  Supine to Sit: Minimum assistance  Sit to Supine:  (left in chair end of session)  Balance  Sitting: Intact  Standing: With support  Transfer Training  Transfer Training: Yes  Sit to Stand: Minimum assistance  Stand to Sit: Minimum assistance  Bed to Chair:  (HHA Parth x2)  Toilet Transfer:  (simulated with chair, Parth)  Gait  Overall Level of Assistance:  (Parth x2 HHA. Parth x1 with RW)  Assistive Device: Walker, rolling     AROM: Generally decreased, functional  Strength: Generally decreased, functional  ADL  Grooming: Setup;Stand by assistance (wipe face seated in recliner chair)  LE Dressing: Maximum assistance  Toileting:  (SBA with urinal seated in recliner chair this date)              Vision  Vision: Impaired (blurred vision in R eye over a year)  Hearing  Hearing: Within functional limits  Cognition  Overall Cognitive Status: WFL  Cognition Comment: lethargic this date  Orientation  Overall Orientation Status: Within Functional Limits                  Education Given To: Patient  Education Provided: Role of Therapy;Plan of Care;ADL Adaptive Strategies;Transfer Training; Fall Prevention Strategies;Precautions  Education Method: Demonstration;Verbal  Barriers to Learning: None  Education Outcome: Continued education needed                          AM-PAC Score        AM-PAC Inpatient Daily Activity Raw Score: 15 (09/16/22 1122)  AM-PAC Inpatient ADL T-Scale Score : 34.69 (09/16/22 1122)  ADL Inpatient CMS 0-100% Score: 56.46 (09/16/22 1122)  ADL Inpatient CMS G-Code Modifier : CK (09/16/22 1122)        Goals  Short Term Goals  Time Frame for Short term goals: dc  Short Term Goal 1: supine to sit SPVN  Short Term Goal 2: sit<>stand SBA  Short Term Goal 3: Fx mobility SBA  Short Term Goal 4: UB/LB dressing SBA + AE as needed  Short Term Goal 5: toileting with SBA       Therapy Time   Individual Concurrent Group Co-treatment   Time In 1032         Time Out 1057         Minutes 25             Timed Code Treatment Minutes:   10    Total Treatment Minutes:  1659 Lj Avelar, OT

## 2022-09-16 NOTE — CARE COORDINATION
ADDENDUM: SW met w/Pt at bedside. SW discussed DCP options. Pt would prefer to go home but is agreeable to [facility] if he needs it. JOSIE spoke to Marchia Aase, she will submit pre-cert    Electronically signed by ELO Guerrero, SERA on 9/16/2022 at 3:30 PM          Pt  is from home alone. Awaiting PT/OT recs for DCP. JOSIE spoke to Moriah Leon she is reviewing/following to see if Pt will be a candidate for [facility]. SW following.   Electronically signed by ELO Guerrero, SERA on 9/16/2022 at 10:50 AM  [phone]

## 2022-09-17 LAB
A/G RATIO: 1.6 (ref 1.1–2.2)
ALBUMIN SERPL-MCNC: 3.4 G/DL (ref 3.4–5)
ALP BLD-CCNC: 103 U/L (ref 40–129)
ALT SERPL-CCNC: 34 U/L (ref 10–40)
ANION GAP SERPL CALCULATED.3IONS-SCNC: 8 MMOL/L (ref 3–16)
AST SERPL-CCNC: 22 U/L (ref 15–37)
BASOPHILS ABSOLUTE: 0 K/UL (ref 0–0.2)
BASOPHILS RELATIVE PERCENT: 0.2 %
BILIRUB SERPL-MCNC: 0.3 MG/DL (ref 0–1)
BUN BLDV-MCNC: 20 MG/DL (ref 7–20)
CALCIUM SERPL-MCNC: 8.5 MG/DL (ref 8.3–10.6)
CHLORIDE BLD-SCNC: 100 MMOL/L (ref 99–110)
CO2: 25 MMOL/L (ref 21–32)
CREAT SERPL-MCNC: 0.8 MG/DL (ref 0.8–1.3)
EOSINOPHILS ABSOLUTE: 0.1 K/UL (ref 0–0.6)
EOSINOPHILS RELATIVE PERCENT: 2.5 %
GFR AFRICAN AMERICAN: >60
GFR NON-AFRICAN AMERICAN: >60
GLUCOSE BLD-MCNC: 89 MG/DL (ref 70–99)
HCT VFR BLD CALC: 38.9 % (ref 40.5–52.5)
HEMOGLOBIN: 13.1 G/DL (ref 13.5–17.5)
LYMPHOCYTES ABSOLUTE: 0.8 K/UL (ref 1–5.1)
LYMPHOCYTES RELATIVE PERCENT: 15.1 %
MAGNESIUM: 1.6 MG/DL (ref 1.8–2.4)
MCH RBC QN AUTO: 32.7 PG (ref 26–34)
MCHC RBC AUTO-ENTMCNC: 33.7 G/DL (ref 31–36)
MCV RBC AUTO: 97 FL (ref 80–100)
MONOCYTES ABSOLUTE: 0.8 K/UL (ref 0–1.3)
MONOCYTES RELATIVE PERCENT: 15 %
NEUTROPHILS ABSOLUTE: 3.7 K/UL (ref 1.7–7.7)
NEUTROPHILS RELATIVE PERCENT: 67.2 %
PDW BLD-RTO: 13.2 % (ref 12.4–15.4)
PLATELET # BLD: 185 K/UL (ref 135–450)
PMV BLD AUTO: 7.4 FL (ref 5–10.5)
POTASSIUM SERPL-SCNC: 4.2 MMOL/L (ref 3.5–5.1)
RBC # BLD: 4.01 M/UL (ref 4.2–5.9)
SODIUM BLD-SCNC: 133 MMOL/L (ref 136–145)
TOTAL PROTEIN: 5.5 G/DL (ref 6.4–8.2)
WBC # BLD: 5.4 K/UL (ref 4–11)

## 2022-09-17 PROCEDURE — 36415 COLL VENOUS BLD VENIPUNCTURE: CPT

## 2022-09-17 PROCEDURE — 2580000003 HC RX 258: Performed by: NEUROLOGICAL SURGERY

## 2022-09-17 PROCEDURE — 99233 SBSQ HOSP IP/OBS HIGH 50: CPT | Performed by: INTERNAL MEDICINE

## 2022-09-17 PROCEDURE — 85025 COMPLETE CBC W/AUTO DIFF WBC: CPT

## 2022-09-17 PROCEDURE — 6360000002 HC RX W HCPCS: Performed by: NEUROLOGICAL SURGERY

## 2022-09-17 PROCEDURE — 6370000000 HC RX 637 (ALT 250 FOR IP): Performed by: INTERNAL MEDICINE

## 2022-09-17 PROCEDURE — 6370000000 HC RX 637 (ALT 250 FOR IP): Performed by: NURSE PRACTITIONER

## 2022-09-17 PROCEDURE — 80053 COMPREHEN METABOLIC PANEL: CPT

## 2022-09-17 PROCEDURE — 6370000000 HC RX 637 (ALT 250 FOR IP): Performed by: NEUROLOGICAL SURGERY

## 2022-09-17 PROCEDURE — 1200000000 HC SEMI PRIVATE

## 2022-09-17 PROCEDURE — 83735 ASSAY OF MAGNESIUM: CPT

## 2022-09-17 PROCEDURE — 6370000000 HC RX 637 (ALT 250 FOR IP)

## 2022-09-17 PROCEDURE — 6370000000 HC RX 637 (ALT 250 FOR IP): Performed by: STUDENT IN AN ORGANIZED HEALTH CARE EDUCATION/TRAINING PROGRAM

## 2022-09-17 RX ADMIN — HYDROMORPHONE HYDROCHLORIDE 1 MG: 1 INJECTION, SOLUTION INTRAMUSCULAR; INTRAVENOUS; SUBCUTANEOUS at 18:37

## 2022-09-17 RX ADMIN — HEPARIN SODIUM 5000 UNITS: 5000 INJECTION INTRAVENOUS; SUBCUTANEOUS at 04:31

## 2022-09-17 RX ADMIN — SODIUM CHLORIDE: 9 INJECTION, SOLUTION INTRAVENOUS at 04:33

## 2022-09-17 RX ADMIN — MELOXICAM 7.5 MG: 7.5 TABLET ORAL at 08:52

## 2022-09-17 RX ADMIN — NALOXEGOL OXALATE 12.5 MG: 12.5 TABLET, FILM COATED ORAL at 06:57

## 2022-09-17 RX ADMIN — NIFEDIPINE 30 MG: 30 TABLET, EXTENDED RELEASE ORAL at 08:51

## 2022-09-17 RX ADMIN — HYDROMORPHONE HYDROCHLORIDE 1 MG: 1 INJECTION, SOLUTION INTRAMUSCULAR; INTRAVENOUS; SUBCUTANEOUS at 08:50

## 2022-09-17 RX ADMIN — OXYCODONE 5 MG: 5 TABLET ORAL at 00:09

## 2022-09-17 RX ADMIN — Medication 100 MG: at 08:51

## 2022-09-17 RX ADMIN — ACETAMINOPHEN 1000 MG: 500 TABLET ORAL at 12:03

## 2022-09-17 RX ADMIN — SODIUM CHLORIDE, PRESERVATIVE FREE 10 ML: 5 INJECTION INTRAVENOUS at 21:23

## 2022-09-17 RX ADMIN — ACETAMINOPHEN 1000 MG: 500 TABLET ORAL at 18:36

## 2022-09-17 RX ADMIN — HYDROMORPHONE HYDROCHLORIDE 1 MG: 1 INJECTION, SOLUTION INTRAMUSCULAR; INTRAVENOUS; SUBCUTANEOUS at 02:49

## 2022-09-17 RX ADMIN — ATORVASTATIN CALCIUM 40 MG: 40 TABLET, FILM COATED ORAL at 08:51

## 2022-09-17 RX ADMIN — SENNOSIDES AND DOCUSATE SODIUM 2 TABLET: 50; 8.6 TABLET ORAL at 21:19

## 2022-09-17 RX ADMIN — OXYCODONE 10 MG: 5 TABLET ORAL at 14:06

## 2022-09-17 RX ADMIN — HYDROMORPHONE HYDROCHLORIDE 1 MG: 1 INJECTION, SOLUTION INTRAMUSCULAR; INTRAVENOUS; SUBCUTANEOUS at 12:00

## 2022-09-17 RX ADMIN — ACETAMINOPHEN 1000 MG: 500 TABLET ORAL at 04:31

## 2022-09-17 RX ADMIN — OXYCODONE 10 MG: 5 TABLET ORAL at 23:43

## 2022-09-17 RX ADMIN — TAMSULOSIN HYDROCHLORIDE 0.4 MG: 0.4 CAPSULE ORAL at 08:51

## 2022-09-17 RX ADMIN — METHOCARBAMOL TABLETS 1000 MG: 500 TABLET, COATED ORAL at 12:03

## 2022-09-17 RX ADMIN — METOPROLOL SUCCINATE 75 MG: 50 TABLET, FILM COATED, EXTENDED RELEASE ORAL at 08:51

## 2022-09-17 RX ADMIN — SODIUM CHLORIDE, PRESERVATIVE FREE 10 ML: 5 INJECTION INTRAVENOUS at 09:05

## 2022-09-17 RX ADMIN — CEFAZOLIN 2000 MG: 2 INJECTION, POWDER, FOR SOLUTION INTRAMUSCULAR; INTRAVENOUS at 04:34

## 2022-09-17 RX ADMIN — ACETAMINOPHEN 1000 MG: 500 TABLET ORAL at 23:43

## 2022-09-17 RX ADMIN — HEPARIN SODIUM 5000 UNITS: 5000 INJECTION INTRAVENOUS; SUBCUTANEOUS at 21:19

## 2022-09-17 RX ADMIN — HEPARIN SODIUM 5000 UNITS: 5000 INJECTION INTRAVENOUS; SUBCUTANEOUS at 14:07

## 2022-09-17 RX ADMIN — METHOCARBAMOL TABLETS 1000 MG: 500 TABLET, COATED ORAL at 21:19

## 2022-09-17 RX ADMIN — SENNOSIDES AND DOCUSATE SODIUM 2 TABLET: 50; 8.6 TABLET ORAL at 08:51

## 2022-09-17 RX ADMIN — HYDROMORPHONE HYDROCHLORIDE 1 MG: 1 INJECTION, SOLUTION INTRAMUSCULAR; INTRAVENOUS; SUBCUTANEOUS at 15:26

## 2022-09-17 RX ADMIN — OXYCODONE 10 MG: 5 TABLET ORAL at 19:32

## 2022-09-17 RX ADMIN — OXYCODONE 10 MG: 5 TABLET ORAL at 07:10

## 2022-09-17 RX ADMIN — LISINOPRIL 40 MG: 40 TABLET ORAL at 08:51

## 2022-09-17 RX ADMIN — POLYETHYLENE GLYCOL (3350) 17 G: 17 POWDER, FOR SOLUTION ORAL at 08:52

## 2022-09-17 RX ADMIN — METHOCARBAMOL TABLETS 1000 MG: 500 TABLET, COATED ORAL at 18:36

## 2022-09-17 RX ADMIN — CEFAZOLIN 2000 MG: 2 INJECTION, POWDER, FOR SOLUTION INTRAMUSCULAR; INTRAVENOUS at 12:08

## 2022-09-17 RX ADMIN — METHOCARBAMOL TABLETS 1000 MG: 500 TABLET, COATED ORAL at 08:51

## 2022-09-17 ASSESSMENT — PAIN SCALES - GENERAL
PAINLEVEL_OUTOF10: 6
PAINLEVEL_OUTOF10: 8
PAINLEVEL_OUTOF10: 5
PAINLEVEL_OUTOF10: 7
PAINLEVEL_OUTOF10: 10
PAINLEVEL_OUTOF10: 10
PAINLEVEL_OUTOF10: 5
PAINLEVEL_OUTOF10: 8
PAINLEVEL_OUTOF10: 7

## 2022-09-17 ASSESSMENT — PAIN DESCRIPTION - FREQUENCY
FREQUENCY: CONTINUOUS

## 2022-09-17 ASSESSMENT — PAIN DESCRIPTION - ONSET
ONSET: ON-GOING

## 2022-09-17 ASSESSMENT — PAIN DESCRIPTION - LOCATION
LOCATION: BACK

## 2022-09-17 ASSESSMENT — PAIN DESCRIPTION - PAIN TYPE
TYPE: SURGICAL PAIN

## 2022-09-17 ASSESSMENT — PAIN DESCRIPTION - ORIENTATION
ORIENTATION: MID

## 2022-09-17 ASSESSMENT — PAIN DESCRIPTION - DESCRIPTORS
DESCRIPTORS: ACHING
DESCRIPTORS: DISCOMFORT
DESCRIPTORS: SORE
DESCRIPTORS: ACHING
DESCRIPTORS: SORE

## 2022-09-17 ASSESSMENT — PAIN - FUNCTIONAL ASSESSMENT: PAIN_FUNCTIONAL_ASSESSMENT: PREVENTS OR INTERFERES SOME ACTIVE ACTIVITIES AND ADLS

## 2022-09-17 NOTE — PROGRESS NOTES
Neurosurgery Progress Note    Patient seen and examined on 09/17/22. No acute events overnight. Neurologically stable on exam. Dressing clear and intact. Notes improved hand function. Complains of incisional pain. Drain output 375 cc. X-rays reviewed with hardware in proper position.      A/P: 73 yo man POD 2 s/p C2-T2 D/F/F    -Neuro stable  -Pain control with PO meds, IV for breakthrough  -Muscle relaxants prn  -PT/OT  -Mobilize, OOB  -Monitor drain output  -Social work consult  -Dispo: Likely will require placement early next week      Madeline Baig MD, PhD  [address], [address], [address] [phone] (c), [phone] (o)

## 2022-09-17 NOTE — PROGRESS NOTES
Pt. Oriented x4. VSS on RA. Pt. Had complaints of pain all night, PRN pain medication given per MAR. Pt. Voiding well via urinal. Incision CDI. Pt. Educated on the importance of wearing cervical collar at all times. No acute changes overnight. All fall precautions in place and call light is within reach.

## 2022-09-17 NOTE — PROGRESS NOTES
Hospitalist Progress Note      PCP: Radha Rodrigues    Date of Admission: 9/9/2022    Hospital Course: 74 y.o. male who presented to [facility] with past medical history of arthritis, CAD, hypertension, hyperlipidemia, atrial fibrillation, BPH presented to the ED for difficulty functioning and ADL or even walking. Patient reported that the back pain started around 1 week ago progressively worsening however today has gone worse and that he is having upper extremity weakness and pain that is radicular going from the low back to bilateral legs. Patient denied having any incontinence urinary or rectal, no perineal numbness. Patient however is reporting that weakness is now causing him to not been able to even walk or carry out ADLs. Patient reports that the back pain is very severe specially in the low back and the low neck. Exacerbated with extending, alleviated with rest.  No associate with fever chills nausea vomiting chest pain abdominal pain or dysuria. Patient denied having any trauma or falls at home. Patient reports that he follows up with spine and did not have any surgery scheduled. Patient was given prednisone and Robaxin Lidoderm patches on 08/22 and was taking them except for the patch due to being expensive with minimal improvement. Patient lives alone and functions independently prior to this. In the ED, patient was noted acute on chronic back pain, spine was consulted recommended MRI and admission to [facility] with PT OT. Patient then underwent MRI and was not able to proceed due to severe back pain especially when laying flat. MRI was able to be obtained showing a C2-C3 cord compression with edema. Subjective:   Patient was seen and examined at bedside today. Pleasant gentleman. Alert and oriented. No acute events reported or observed overnight. Continue to report neck pain. Most severe when he wakes up in the morning.  upper extremities weakness or numbness has continued to improve steadily since surgery. Medications:  Reviewed    Infusion Medications    sodium chloride 25 mL/hr at 09/17/22 0433     Scheduled Medications    heparin (porcine)  5,000 Units SubCUTAneous 3 times per day    sodium chloride flush  5-40 mL IntraVENous 2 times per day    polyethylene glycol  17 g Oral Daily    methocarbamol  1,000 mg Oral 4x Daily    sennosides-docusate sodium  2 tablet Oral BID    acetaminophen  1,000 mg Oral Q6H    naloxegol  12.5 mg Oral QAM AC    NIFEdipine  30 mg Oral Daily    thiamine mononitrate  100 mg Oral Daily    metoprolol succinate  75 mg Oral Daily    tamsulosin  0.4 mg Oral Daily    nicotine  1 patch TransDERmal Daily    atorvastatin  40 mg Oral Daily    lisinopril  40 mg Oral Daily    meloxicam  7.5 mg Oral Daily     PRN Meds: sodium chloride flush, sodium chloride, naloxone, aluminum & magnesium hydroxide-simethicone, bisacodyl, promethazine **OR** ondansetron, oxyCODONE **OR** oxyCODONE, HYDROmorphone **OR** HYDROmorphone, diazePAM, magnesium hydroxide, hydrALAZINE, polyvinyl alcohol, sodium chloride, ondansetron **OR** ondansetron, polyethylene glycol      Intake/Output Summary (Last 24 hours) at 9/17/2022 1432  Last data filed at 9/17/2022 0710  Gross per 24 hour   Intake --   Output 1990 ml   Net -1990 ml         Physical Exam Performed:    /69   Pulse 77   Temp 98 °F (36.7 °C) (Oral)   Resp 16   Ht 6' 2\" (1.88 m)   Wt 258 lb 12.8 oz (117.4 kg)   SpO2 93%   BMI 33.23 kg/m²     General appearance: Sedated, postoperatively. HEENT: Conjunctivae/corneas clear. Neck: Neck collar. Respiratory:  Normal respiratory effort. Clear to auscultation, bilaterally without Rales/Wheezes/Rhonchi. Cardiovascular: Regular rate and rhythm with normal S1/S2 without murmurs, rubs or gallops. Abdomen: Soft, non-tender, non-distended with normal bowel sounds. Musculoskeletal: No clubbing, cyanosis or edema bilaterally.   Full range of motion Marked degenerative disc disease C3-4, C5-6, C6-7, C7-T1   5. Diffuse severe facet arthrosis            XR CERVICAL SPINE (2-3 VIEWS)   Final Result      Grade 1 anterolisthesis of C2 on C3. Multilevel moderate to severe degenerative disc disease. MRI LUMBAR SPINE WO CONTRAST   Final Result   1. Multilevel severe central canal and foraminal stenosis L2-3 through L5-S1, secondary to multifactorial degenerative changes and posterior epidural lipomatosis   2. Redundancy in edematous nerve roots at the L1-2 level secondary to chronic lumbar central canal stenosis. 2. Extruded disc herniation L3-L4 with left lateral recess and subarticular migration      Compared prior study there is been progression of spinal stenosis secondary to chronic multifactorial degenerative changes posterior epidural fat. No change in the degree of facet arthropathy and foraminal stenosis. MRI THORACIC SPINE WO CONTRAST   Final Result      1. Mild scoliosis and degenerative disc disease concordant with prior CT   2. No abnormal cord signal, central canal, compression or canal stenosis         MRI CERVICAL SPINE WO CONTRAST   Final Result      1. Cord myelopathy, cord edema with hyperintense signal noted on STIR sequence at the C2-3 and C3 level   2. Severe central canal stenosis C2-C3 which is improved with the patient's collar on compared to prior study   3. Severe canal stenosis C3-C4 with central disc herniation, also slightly improved with the patient's collar on during the study   4.  Multilevel bilateral foraminal stenosis                 Assessment/Plan:    Active Hospital Problems    Diagnosis     Pre-op evaluation [Z01.818]      Priority: Medium    Cord compression [facility]) [G95.20]      Priority: Medium    CAD in native artery [I25.10]      Trey moore 74 y.o. M with PMHx of anterolisthesis of cervical spine, A.fib s/p ablation, HTN, HLD, CAD, BPH, Carpal Tunnel syndrome s/p unilateral surgery, and herniated disc (2000) s/p surgery p/w back pain, numbness in arms and legs, and weakness in arms and legs. Patient was admitted for further work up and management of spinal stenosis     # Cervical cord compression with edema  -Patient presented to [facility] with back pain (9/8/22) where he was directly admitted to [facility]. for Neurosurgery. Patient reports back pain that radiates down arms and legs. MRI spine showing. Severe central canal and foraminal stenosis L2-3, L5-S1, edematous nerve roots L1-2. Disc herniation L3-4. Severe canal stenosis C2-C4 with central disc herniation  -Patient compliant with cervical collar  -Neurosurgery following, s/p C2-T2 POSTERIOR DECOMPRESSION FUSION AND FIXATION on 9/15/2022. -Was on Decadron 8 mg PO every 12 hours  - Robaxin 1000 mg IV at 200mL/hr over 30 minutes every 8 hours  -Dilaudid pain panel  -Acetaminophen 650 mg PO PRN    # HTN; nephrology following likely high at times because of pain - also was elevated due to IV fluid post operatively. - Improved after discontinuing IV fluid on 9/16/22.  - adjusted bp meds as needed. added nifedipine this admission. # Paroxysmal afib, asa being held, currently he is in sinus rhythm he is a candidate for anticoagulation however will await surgery     DVT Prophylaxis: SCDs  Diet: ADULT DIET; Regular  Code Status: Full Code    PT/OT Eval Status: Ongoing    High risk due to recent spine surgery, and IV Dilaudid.     Dispo -inpatient, will likely need rehab placement by next week    Peg Davila MD

## 2022-09-17 NOTE — OP NOTE
Operative Report    PATIENT NAME: Clent Kocher  YOB: 1955  MEDICAL RECORD# [id number]  SURGERY DATE: 9/15/2022  Co-SURGEON(s): MD Jeff Fierro MD/PhD      DICTATED BY: Buddy Lester MD    PREOPERATIVE DIAGNOSIS:  1. Severe cervical stenosis and spinal cord injury with cord signal change  2. Mobile spondylolisthesis at C2-3 with ligamentous injury     POSTOPERATIVE DIAGNOSIS:  1. Same     PROCEDURES PERFORMED:  1.  C2-3 partial laminectomy and decompression  2. Placement of Virage C2 pedicle screws, lateral mass screws bilateral C3-6 and pedicle screws T1 and T2  3. Posterolateral fusion C2-T2 using allograft bone, Puros DBM and PrimaGen  4.  O-arm neuronavigation  5. Neuro-monitoring for SSEP, MEP and EMG     ANESTHESIA:  General    IMPLANTS:  Tomas Virage Cervical Fixation System  C3-C6 lateral mass screws, 14 mm (#2), 16mm (#6)  Pedicle screws, (#4)  T1: 30 mm  T2: 30 mm  C2 Pedicle screws  Left 26mm, Right 28mm  Allograft bone chips mixed with DBM and Primagen  3.5mm titanium rods x2     INDICATION FOR SURGERY: The patient is a 74 y.o. who presented with complaints of burning and numbness in his hands as well as difficulty walking. MRI revealed severe cervical stenosis most prominent at the C2-3 disc space with associated myelomalacia within the spinal cord. Xrays showed a mobile spondylolisthesis at C2-3 which reduced in a hard cervical collar. Given his symptoms and instability on xray, surgery was recommended and the patient agreed. The risks of surgery were discussed with the patient and his family to include (but not limited to): bleeding, infection, injury to the spinal cord, vertebral artery injury, failure to relieve his symptoms, need for further surgery, failure of implants, stroke, coma and death. The patient expressed understanding of these risks and wished to proceed.      OPERATIVE FINDINGS: The superior lamina of C3 and the inferior part of the C2 lamina were removed and the patient was fixated from C2-T2. PROCEDURE IN DETAIL: The patient was brought to the operating room with his c collar in place. General anesthesia was induced and the patient was intubated with his c collar in place at all times. Baseline SSEP and MEPs were obtained. Initially, we could not get any motors in the lowers, so his anesthetic was lowered and then we were able to get some motors in the feet. The head was placed in a [place] crown-of-thorns and he was then flipped prone onto the modified derrick table for posterior cervical fixation. His head was secured into the fixation system and brought into neutral position. C arm confirmed he was pretty well aligned now at C2-3. His collar was removed. Post flip SSEPs and motors were stable. The midline posterior cervical incision was planned and the incision shaved. Intravenous antibiotics were given by anesthesia, a Dexter catheter and radial arterial line were placed. A time out was completed and the site was prepped and draped in the usual sterile fashion. The planned incision was infiltrated with 1% lidocaine with Epi and incised full thickness with a #15 blade. Bovie electrocautery was utilized to separate the posterior cervical muscles in the midline. This dissection was carried down to expose the posterior elements of C2-T2. Self retaining cerebellar retractors were placed. The stereotactic array was secured to the spinous process of C2, the wound was covered with sterile drapes and the intraoperative CT was brought into the field for image acquisition. This was loaded into the stereotactic system and used to navigate the pedicle screws. The navigated ScreachTV was used to locate the entrance to each of the C2 pedicle screws and a  hole was made. The navigated small diameter handheld automatic drill was then used to drill the C2 pedicle screw on the right.   This was then checked with a ball probe and a 28mm screw was placed. This process was repeated on the left. However, custodial through drilling the appropriate trajectory, the drill stopped working correctly. The remainder of the trajectory was drilled with the hand drill at appropriate angulation and then a navigated 26mm pedicle screw was placed. Next we turned our attention to the thoracic pedicle screws. The navigated drill was used to identify the left T1 pedicle, the drill used to create a  hole and the navigated tap was used to cannulate the pedicle under real-time guidance. A navigated pedicle screw was then placed into the pedicle with excellent fixation. This was repeated on the left at T2 and the right at T1 and T2. Lateral mass screws were then placed at C3-6 bilaterally. The navigated midas drill was used to create  holes, the navigated handheld drill was used to tap the trajectory and the screws placed, excellent purchase was achieved at each level. A rongeur was then used to remove the inferior spinous process of C2 and superior spinous process of C3. The Midas was then used to drill out the inferior lamina of C2 and the superior lamina of C3. The yellow ligament was then removed. Of note, there was significant motion of C2 noted with any manipulation and the C2/3 joint on the left had failed. The lateral recesses were further decompressed with the 2 mm punch. Meticulous hemostasis was obtained utilizing bipolar electrocautery and bone wax. The wound was then irrigated copiously to inspect for any residual bleeding. The wound was again copiously irrigated with antibiotic irrigation. The drill was used to de-corticate the posterior elements from C2-T2 and allograft bone material placed to establish a posterolateral fusion mass. 2 hemovac drains were placed and tunneled out in standard fashion and secured using 2-0 vicryl stitches. All monitoring remained stable throughout the case.       The posterior cervical muscles were re-approximated and the fascia closed with 0 Vicryl sutures. The deep dermal layer was brought together with 2-0 Vicryl and the skin approximated with staples. The wound was cleaned and dressed with Telfa and  tegaderm. The patient was awakened from anesthesia and extubated. His neurologic exam was stable post-operatively and he was transferred to the PACU. All needle, instrument, and sponge counts were correct. I attest that I was present throughout the entire operation in accordance with CMS guidelines. ESTIMATED BLOOD LOSS: 100 cubic centimeters. FLUIDS: Per Anesthesia. SPECIMENS: No specimens     COMPLICATIONS: None apparent. DRAINS PLACED: 2 Medium hemovac drains     DISPOSITION: The patient was brought to the PACU, following commands, and moving all 4 extremities.

## 2022-09-17 NOTE — PROGRESS NOTES
VSS. Patient is alert and oriented x4. Complained of pain throughout shift and PRN pain medication given as ordered. Patient educated of pain regimen schedule that is written on board in room and verbalizes understanding. Seizure and standard safety precautions in place. No new issues or complaints at this time.

## 2022-09-17 NOTE — PLAN OF CARE
Problem: Pain  Goal: Verbalizes/displays adequate comfort level or baseline comfort level  9/17/2022 1011 by Weston Ram RN  Outcome: Progressing  9/16/2022 2128 by Brisa Whittington RN  Outcome: Progressing  Note: Pt. Managing pain per [facility]       Problem: Safety - Adult  Goal: Free from fall injury  9/17/2022 1011 by Weston Ram RN  Outcome: Progressing  9/16/2022 2128 by Brisa Whittington RN  Outcome: Progressing  Note: Pt. Free from falls this shift. All fall precautions in place and call light is within reach. Problem: ABCDS Injury Assessment  Goal: Absence of physical injury  Outcome: Progressing     Problem: Neurosensory - Adult  Goal: Achieves stable or improved neurological status  Outcome: Progressing     Problem: Cardiovascular - Adult  Goal: Maintains optimal cardiac output and hemodynamic stability  Outcome: Progressing     Problem: Respiratory - Adult  Goal: Achieves optimal ventilation and oxygenation  Outcome: Progressing     Problem: Skin/Tissue Integrity  Goal: Absence of new skin breakdown  Description: 1. Monitor for areas of redness and/or skin breakdown  2. Assess vascular access sites hourly  3. Every 4-6 hours minimum:  Change oxygen saturation probe site  4. Every 4-6 hours:  If on nasal continuous positive airway pressure, respiratory therapy assess nares and determine need for appliance change or resting period.   Outcome: Progressing

## 2022-09-17 NOTE — PROGRESS NOTES
Pt. Was sedated all night with low blood pressure. Unable to do IV pain medication due to light sedation and hypotension. Pt. Verbally aggressive towards me due to withholding IV pain medication after educating medication administration safety.

## 2022-09-17 NOTE — PROGRESS NOTES
Office : [phone]     Fax :[phone]       Nephrology  Note      Patient's Name: Ana Rosa Baker  1:10 PM  9/17/2022    Reason for Consult:  hyponatremia       Requesting Physician: Denita Wong      09/17/22    Feels same   na stable       I/O last 3 completed shifts: In: 660.8 [P.O.:315; IV Piggyback:345.8]  Out: 3090 [Urine:2215; Drains:875]    Past Medical History:   Diagnosis Date    Arthritis     Atrial fibrillation (HCC)     CAD (coronary artery disease)     Hyperlipidemia     Hypertension        Past Surgical History:   Procedure Laterality Date    BACK SURGERY  200    lower lumbar surgery    CERVICAL FUSION N/A 9/15/2022    C2-T2 POSTERIOR DECOMPRESSION FUSION AND FIXATION performed by Courtney Fernandez MD at [place] [address]  2012       Family History   Problem Relation Age of Onset    Heart Disease Mother     Diabetes Mother     Heart Disease Father     Diabetes Father     Diabetes Sister     Diabetes Brother     Diabetes Maternal Grandmother     Diabetes Maternal Grandfather     Diabetes Paternal Grandmother     Diabetes Paternal Grandfather         reports that he has been smoking cigarettes. He has a 40.00 pack-year smoking history. He has never used smokeless tobacco. He reports current alcohol use of about 20.0 standard drinks per week. He reports that he does not use drugs. Allergies:  Patient has no known allergies.     Current Medications:    heparin (porcine) injection 5,000 Units, 3 times per day  sodium chloride flush 0.9 % injection 5-40 mL, 2 times per day  sodium chloride flush 0.9 % injection 5-40 mL, PRN  0.9 % sodium chloride infusion, PRN  naloxone (NARCAN) injection 0.2 mg, PRN  aluminum & magnesium hydroxide-simethicone (MAALOX) 200-200-20 MG/5ML suspension 15 mL, Q6H PRN  polyethylene glycol (GLYCOLAX) packet 17 g, Daily  bisacodyl (DULCOLAX) suppository 10 mg, Daily PRN  promethazine (PHENERGAN) tablet 12.5 mg, Q6H PRN   Or  ondansetron (ZOFRAN) injection 4 mg, Q6H PRN  oxyCODONE (ROXICODONE) immediate release tablet 5 mg, Q4H PRN   Or  oxyCODONE (ROXICODONE) immediate release tablet 10 mg, Q4H PRN  HYDROmorphone (DILAUDID) injection 0.5 mg, Q3H PRN   Or  HYDROmorphone (DILAUDID) injection 1 mg, Q3H PRN  methocarbamol (ROBAXIN) tablet 1,000 mg, 4x Daily  sennosides-docusate sodium (SENOKOT-S) 8.6-50 MG tablet 2 tablet, BID  acetaminophen (TYLENOL) tablet 1,000 mg, Q6H  diazePAM (VALIUM) tablet 5 mg, Q6H PRN  naloxegol (MOVANTIK) tablet 12.5 mg, QAM AC  NIFEdipine (ADALAT CC) extended release tablet 30 mg, Daily  thiamine mononitrate tablet 100 mg, Daily  metoprolol succinate (TOPROL XL) extended release tablet 75 mg, Daily  tamsulosin (FLOMAX) capsule 0.4 mg, Daily  magnesium hydroxide (MILK OF MAGNESIA) 400 MG/5ML suspension 30 mL, Daily PRN  hydrALAZINE (APRESOLINE) injection 5 mg, Q6H PRN  nicotine (NICODERM CQ) 14 MG/24HR 1 patch, Daily  polyvinyl alcohol (LIQUIFILM TEARS) 1.4 % ophthalmic solution 1 drop, PRN  sodium chloride (OCEAN, BABY AYR) 0.65 % nasal spray 1 spray, PRN  atorvastatin (LIPITOR) tablet 40 mg, Daily  lisinopril (PRINIVIL;ZESTRIL) tablet 40 mg, Daily  meloxicam (MOBIC) tablet 7.5 mg, Daily  ondansetron (ZOFRAN-ODT) disintegrating tablet 4 mg, Q8H PRN   Or  ondansetron (ZOFRAN) injection 4 mg, Q6H PRN  polyethylene glycol (GLYCOLAX) packet 17 g, Daily PRN          Physical exam:     Vitals:  /69   Pulse 77   Temp 97.8 °F (36.6 °C) (Oral)   Resp 16   Ht 6' 2\" (1.88 m)   Wt 258 lb 12.8 oz (117.4 kg)   SpO2 93%   BMI 33.23 kg/m²   Constitutional:  OAA X3 NAD  Skin: no rash, turgor wnl  Heent:  eomi, mmm  Neck: no bruits or jvd noted  Cardiovascular:  S1, S2 without m/r/g  Respiratory: CTA B without w/r/r  Abdomen:  +bs, soft, nt, nd  Ext: no  lower extremity edema  Psychiatric: mood and affect appropriate  Musculoskeletal:  Rom, muscular strength intact    Labs:  CBC:   Recent Labs     09/16/22  0646 09/16/22  1154 09/17/22  0618   WBC 5.1 6.2 5.4   HGB 8.6* 13.8 13.1*   * 195 185       BMP:    Recent Labs     09/16/22  0646 09/16/22  1154 09/17/22  0618    133* 133*   K 2.1* 4.0 4.2   * 97* 100   CO2 13* 25 25   BUN 11 20 20   CREATININE <0.5* 0.8 0.8   GLUCOSE 70 141* 89       Ca/Mg/Phos:   Recent Labs     09/16/22  0646 09/16/22  1154 09/17/22  0618   CALCIUM 3.5* 8.7 8.5   MG  --  1.50* 1.60*       Hepatic:   Recent Labs     09/16/22  1154 09/17/22 0618   AST 35 22   ALT 56* 34   BILITOT 0.3 0.3   ALKPHOS 110 103       Troponin:   No results for input(s): TROPONINI in the last 72 hours. BNP: No results for input(s): BNP in the last 72 hours. Lipids: No results for input(s): CHOL, TRIG, HDL, LDLCALC, LABVLDL in the last 72 hours. ABGs: No results for input(s): PHART, PO2ART, DNB2HBM in the last 72 hours. INR:   Recent Labs     09/14/22  1540   INR 1.02       UA:  No results for input(s): COLORU, CLARITYU, GLUCOSEU, BILIRUBINUR, KETUA, SPECGRAV, BLOODU, PHUR, PROTEINU, UROBILINOGEN, NITRU, LEUKOCYTESUR, Rachana Solum in the last 72 hours. Urine Microscopic:   No results for input(s): LABCAST, BACTERIA, COMU, HYALCAST, WBCUA, RBCUA, EPIU in the last 72 hours. Urine Culture: No results for input(s): LABURIN in the last 72 hours. Urine Chemistry: No results for input(s): Latonia Sesar, PROTEINUR, NAUR in the last 72 hours. IMAGING:  XR CERVICAL SPINE (2-3 VIEWS)   Final Result      Fusion in the cervical spine. XR CERVICAL SPINE (2-3 VIEWS)   Final Result   1. Intraoperative imaging using CT and fluoroscopy during posterior cervical decompression and fusion. FLUORO FOR SURGICAL PROCEDURES   Final Result   1. stenosis C3-C4 with central disc herniation, also slightly improved with the patient's collar on during the study   4. Multilevel bilateral foraminal stenosis                 Assessment/Plan :      1. Hyponatremia. Likely 2/2 fluid intake . Drinks excess beer  Limit daily fluid intake to 1200 ml   Off IV fluids   Urine sodium and urine osmolality - reviewed     2. HTN. Off IVF   Pain control     3. Acute cervical cord compression with edema:  Neurosurgery consulted.   Sx done       Monitor sodium levels               Thank you for allowing us to participate in care of Bubba Ken         Electronically signed by: Bruce Thornton MD, 9/17/2022, 1:10 PM      [facility] [address]  Office : [phone]  Fax :[phone]

## 2022-09-17 NOTE — PROGRESS NOTES
Pt continually taking off the cervical collar. This Rn educated the reason of the collar. Pt stated \"I had to turn my head, it's uncomfortable. \" Rn educated the reason for the collar is to prevent him turning his head. The collar was put back on.

## 2022-09-18 LAB
A/G RATIO: 1.9 (ref 1.1–2.2)
ALBUMIN SERPL-MCNC: 3.6 G/DL (ref 3.4–5)
ALP BLD-CCNC: 116 U/L (ref 40–129)
ALT SERPL-CCNC: 23 U/L (ref 10–40)
ANION GAP SERPL CALCULATED.3IONS-SCNC: 6 MMOL/L (ref 3–16)
AST SERPL-CCNC: 21 U/L (ref 15–37)
BASOPHILS ABSOLUTE: 0 K/UL (ref 0–0.2)
BASOPHILS RELATIVE PERCENT: 0.4 %
BILIRUB SERPL-MCNC: 0.4 MG/DL (ref 0–1)
BUN BLDV-MCNC: 19 MG/DL (ref 7–20)
CALCIUM SERPL-MCNC: 8.8 MG/DL (ref 8.3–10.6)
CHLORIDE BLD-SCNC: 101 MMOL/L (ref 99–110)
CO2: 29 MMOL/L (ref 21–32)
CREAT SERPL-MCNC: 0.8 MG/DL (ref 0.8–1.3)
EOSINOPHILS ABSOLUTE: 0.2 K/UL (ref 0–0.6)
EOSINOPHILS RELATIVE PERCENT: 3 %
GFR AFRICAN AMERICAN: >60
GFR NON-AFRICAN AMERICAN: >60
GLUCOSE BLD-MCNC: 123 MG/DL (ref 70–99)
HCT VFR BLD CALC: 39.4 % (ref 40.5–52.5)
HEMOGLOBIN: 13 G/DL (ref 13.5–17.5)
LYMPHOCYTES ABSOLUTE: 1.1 K/UL (ref 1–5.1)
LYMPHOCYTES RELATIVE PERCENT: 22.3 %
MAGNESIUM: 1.6 MG/DL (ref 1.8–2.4)
MCH RBC QN AUTO: 32.6 PG (ref 26–34)
MCHC RBC AUTO-ENTMCNC: 32.9 G/DL (ref 31–36)
MCV RBC AUTO: 98.9 FL (ref 80–100)
MONOCYTES ABSOLUTE: 0.6 K/UL (ref 0–1.3)
MONOCYTES RELATIVE PERCENT: 11.1 %
NEUTROPHILS ABSOLUTE: 3.2 K/UL (ref 1.7–7.7)
NEUTROPHILS RELATIVE PERCENT: 63.2 %
PDW BLD-RTO: 13.5 % (ref 12.4–15.4)
PLATELET # BLD: 204 K/UL (ref 135–450)
PMV BLD AUTO: 7.2 FL (ref 5–10.5)
POTASSIUM SERPL-SCNC: 4.2 MMOL/L (ref 3.5–5.1)
RBC # BLD: 3.98 M/UL (ref 4.2–5.9)
SODIUM BLD-SCNC: 136 MMOL/L (ref 136–145)
TOTAL PROTEIN: 5.5 G/DL (ref 6.4–8.2)
WBC # BLD: 5 K/UL (ref 4–11)

## 2022-09-18 PROCEDURE — 99232 SBSQ HOSP IP/OBS MODERATE 35: CPT | Performed by: INTERNAL MEDICINE

## 2022-09-18 PROCEDURE — 6370000000 HC RX 637 (ALT 250 FOR IP): Performed by: NEUROLOGICAL SURGERY

## 2022-09-18 PROCEDURE — 97535 SELF CARE MNGMENT TRAINING: CPT

## 2022-09-18 PROCEDURE — 6360000002 HC RX W HCPCS: Performed by: INTERNAL MEDICINE

## 2022-09-18 PROCEDURE — 6370000000 HC RX 637 (ALT 250 FOR IP): Performed by: INTERNAL MEDICINE

## 2022-09-18 PROCEDURE — 6370000000 HC RX 637 (ALT 250 FOR IP): Performed by: NURSE PRACTITIONER

## 2022-09-18 PROCEDURE — 2580000003 HC RX 258: Performed by: NEUROLOGICAL SURGERY

## 2022-09-18 PROCEDURE — 1200000000 HC SEMI PRIVATE

## 2022-09-18 PROCEDURE — 85025 COMPLETE CBC W/AUTO DIFF WBC: CPT

## 2022-09-18 PROCEDURE — 36415 COLL VENOUS BLD VENIPUNCTURE: CPT

## 2022-09-18 PROCEDURE — 6360000002 HC RX W HCPCS: Performed by: NEUROLOGICAL SURGERY

## 2022-09-18 PROCEDURE — 80053 COMPREHEN METABOLIC PANEL: CPT

## 2022-09-18 PROCEDURE — 83735 ASSAY OF MAGNESIUM: CPT

## 2022-09-18 PROCEDURE — 6370000000 HC RX 637 (ALT 250 FOR IP): Performed by: STUDENT IN AN ORGANIZED HEALTH CARE EDUCATION/TRAINING PROGRAM

## 2022-09-18 PROCEDURE — 6370000000 HC RX 637 (ALT 250 FOR IP)

## 2022-09-18 RX ORDER — MAGNESIUM SULFATE IN WATER 40 MG/ML
4000 INJECTION, SOLUTION INTRAVENOUS ONCE
Status: COMPLETED | OUTPATIENT
Start: 2022-09-18 | End: 2022-09-18

## 2022-09-18 RX ADMIN — NIFEDIPINE 30 MG: 30 TABLET, EXTENDED RELEASE ORAL at 08:39

## 2022-09-18 RX ADMIN — ACETAMINOPHEN 1000 MG: 500 TABLET ORAL at 18:43

## 2022-09-18 RX ADMIN — OXYCODONE 10 MG: 5 TABLET ORAL at 03:30

## 2022-09-18 RX ADMIN — SODIUM CHLORIDE, PRESERVATIVE FREE 10 ML: 5 INJECTION INTRAVENOUS at 08:40

## 2022-09-18 RX ADMIN — SENNOSIDES AND DOCUSATE SODIUM 2 TABLET: 50; 8.6 TABLET ORAL at 08:40

## 2022-09-18 RX ADMIN — METHOCARBAMOL TABLETS 1000 MG: 500 TABLET, COATED ORAL at 17:40

## 2022-09-18 RX ADMIN — OXYCODONE 10 MG: 5 TABLET ORAL at 11:36

## 2022-09-18 RX ADMIN — METOPROLOL SUCCINATE 75 MG: 50 TABLET, FILM COATED, EXTENDED RELEASE ORAL at 08:40

## 2022-09-18 RX ADMIN — METHOCARBAMOL TABLETS 1000 MG: 500 TABLET, COATED ORAL at 21:01

## 2022-09-18 RX ADMIN — POLYETHYLENE GLYCOL (3350) 17 G: 17 POWDER, FOR SOLUTION ORAL at 08:41

## 2022-09-18 RX ADMIN — ACETAMINOPHEN 1000 MG: 500 TABLET ORAL at 06:00

## 2022-09-18 RX ADMIN — SODIUM CHLORIDE, PRESERVATIVE FREE 10 ML: 5 INJECTION INTRAVENOUS at 21:01

## 2022-09-18 RX ADMIN — HEPARIN SODIUM 5000 UNITS: 5000 INJECTION INTRAVENOUS; SUBCUTANEOUS at 06:00

## 2022-09-18 RX ADMIN — DIAZEPAM 5 MG: 5 TABLET ORAL at 17:39

## 2022-09-18 RX ADMIN — METHOCARBAMOL TABLETS 1000 MG: 500 TABLET, COATED ORAL at 13:23

## 2022-09-18 RX ADMIN — MAGNESIUM SULFATE HEPTAHYDRATE 4000 MG: 40 INJECTION, SOLUTION INTRAVENOUS at 15:12

## 2022-09-18 RX ADMIN — OXYCODONE 10 MG: 5 TABLET ORAL at 19:37

## 2022-09-18 RX ADMIN — HEPARIN SODIUM 5000 UNITS: 5000 INJECTION INTRAVENOUS; SUBCUTANEOUS at 13:23

## 2022-09-18 RX ADMIN — OXYCODONE 10 MG: 5 TABLET ORAL at 15:37

## 2022-09-18 RX ADMIN — METHOCARBAMOL TABLETS 1000 MG: 500 TABLET, COATED ORAL at 08:39

## 2022-09-18 RX ADMIN — TAMSULOSIN HYDROCHLORIDE 0.4 MG: 0.4 CAPSULE ORAL at 08:40

## 2022-09-18 RX ADMIN — ATORVASTATIN CALCIUM 40 MG: 40 TABLET, FILM COATED ORAL at 08:39

## 2022-09-18 RX ADMIN — SENNOSIDES AND DOCUSATE SODIUM 2 TABLET: 50; 8.6 TABLET ORAL at 21:00

## 2022-09-18 RX ADMIN — HEPARIN SODIUM 5000 UNITS: 5000 INJECTION INTRAVENOUS; SUBCUTANEOUS at 21:00

## 2022-09-18 RX ADMIN — NALOXEGOL OXALATE 12.5 MG: 12.5 TABLET, FILM COATED ORAL at 06:00

## 2022-09-18 RX ADMIN — OXYCODONE 10 MG: 5 TABLET ORAL at 23:35

## 2022-09-18 RX ADMIN — OXYCODONE 10 MG: 5 TABLET ORAL at 07:26

## 2022-09-18 RX ADMIN — DIAZEPAM 5 MG: 5 TABLET ORAL at 11:44

## 2022-09-18 RX ADMIN — ACETAMINOPHEN 1000 MG: 500 TABLET ORAL at 13:23

## 2022-09-18 RX ADMIN — MELOXICAM 7.5 MG: 7.5 TABLET ORAL at 08:39

## 2022-09-18 RX ADMIN — DIAZEPAM 5 MG: 5 TABLET ORAL at 23:35

## 2022-09-18 RX ADMIN — Medication 100 MG: at 08:40

## 2022-09-18 RX ADMIN — LISINOPRIL 40 MG: 40 TABLET ORAL at 08:40

## 2022-09-18 ASSESSMENT — PAIN SCALES - GENERAL
PAINLEVEL_OUTOF10: 7
PAINLEVEL_OUTOF10: 7
PAINLEVEL_OUTOF10: 4
PAINLEVEL_OUTOF10: 8
PAINLEVEL_OUTOF10: 7
PAINLEVEL_OUTOF10: 8
PAINLEVEL_OUTOF10: 4
PAINLEVEL_OUTOF10: 7
PAINLEVEL_OUTOF10: 7
PAINLEVEL_OUTOF10: 8
PAINLEVEL_OUTOF10: 8

## 2022-09-18 ASSESSMENT — PAIN DESCRIPTION - DESCRIPTORS
DESCRIPTORS: ACHING

## 2022-09-18 ASSESSMENT — PAIN DESCRIPTION - LOCATION
LOCATION: NECK
LOCATION: BACK
LOCATION: NECK
LOCATION: BACK
LOCATION: NECK
LOCATION: NECK
LOCATION: BACK

## 2022-09-18 ASSESSMENT — PAIN SCALES - WONG BAKER
WONGBAKER_NUMERICALRESPONSE: 0

## 2022-09-18 NOTE — PLAN OF CARE
Problem: Discharge Planning  Goal: Discharge to home or other facility with appropriate resources  9/18/2022 0751 by Darlin Nagy RN  Outcome: Progressing  9/17/2022 2205 by Rod Lowe RN  Outcome: Progressing     Problem: Pain  Goal: Verbalizes/displays adequate comfort level or baseline comfort level  9/18/2022 0751 by Darlin Nagy RN  Outcome: Progressing  9/17/2022 2205 by Rod Lowe RN  Outcome: Progressing     Problem: Safety - Adult  Goal: Free from fall injury  9/18/2022 0751 by Darlin Nagy RN  Outcome: Progressing  9/17/2022 2205 by Rod Lowe RN  Outcome: Progressing     Problem: ABCDS Injury Assessment  Goal: Absence of physical injury  9/18/2022 0751 by Darlin Nagy RN  Outcome: Progressing  9/17/2022 2205 by Rod Lowe RN  Outcome: Progressing     Problem: Neurosensory - Adult  Goal: Achieves stable or improved neurological status  9/18/2022 0751 by Darlin Nagy RN  Outcome: Progressing  9/17/2022 2205 by Rod Lowe RN  Outcome: Progressing     Problem: Cardiovascular - Adult  Goal: Maintains optimal cardiac output and hemodynamic stability  9/18/2022 0751 by Darlin Nagy RN  Outcome: Progressing  9/17/2022 2205 by Rod Lowe RN  Outcome: Progressing     Problem: Respiratory - Adult  Goal: Achieves optimal ventilation and oxygenation  9/18/2022 0751 by Darlin Nagy RN  Outcome: Progressing  9/17/2022 2205 by Rod Lowe RN  Outcome: Progressing     Problem: Skin/Tissue Integrity  Goal: Absence of new skin breakdown  Description: 1. Monitor for areas of redness and/or skin breakdown  2. Assess vascular access sites hourly  3. Every 4-6 hours minimum:  Change oxygen saturation probe site  4. Every 4-6 hours:  If on nasal continuous positive airway pressure, respiratory therapy assess nares and determine need for appliance change or resting period.   9/18/2022 0751 by Darlin Nagy RN  Outcome: Progressing  9/17/2022 2205 by Trino Mccormick RN  Outcome: Progressing

## 2022-09-18 NOTE — PROGRESS NOTES
Neurosurgery Progress Note    Patient seen and examined on 09/18/22. No acute events overnight. Neurologically stable on exam. Dressing clear and intact. Continues to complain of incisional pain. Drain output 255 cc.      A/P: 73 yo man POD 3 s/p C2-T2 D/F/F    -Neuro stable  -Pain control with PO meds, IV for breakthrough  -Muscle relaxants prn  -PT/OT  -Mobilize, OOB  -Monitor drain output  -Social work consult  -Dispo: Likely will require placement early next week      Saad Steinberg MD, PhD  [address], [address] [place] [address], [address] [phone] (c), [phone] (o)

## 2022-09-18 NOTE — PROGRESS NOTES
Strategies;Transfer Training; Fall Prevention Strategies;Precautions; Energy Conservation  Education Method: Demonstration;Verbal  Barriers to Learning: None  Education Outcome: Continued education needed         AM-PAC Score        AM-PAC Inpatient Daily Activity Raw Score: 16 (09/18/22 1438)  AM-PAC Inpatient ADL T-Scale Score : 35.96 (09/18/22 1438)  ADL Inpatient CMS 0-100% Score: 53.32 (09/18/22 1438)  ADL Inpatient CMS G-Code Modifier : CK (09/18/22 1438)    Tinneti Score       Goals  Short Term Goals  Time Frame for Short term goals: dc  Short Term Goal 1: supine to sit SPVN - ongoing  Short Term Goal 2: sit<>stand SBA - ongoing  Short Term Goal 3: Fx mobility SBA - ongoing  Short Term Goal 4: UB/LB dressing SBA + AE as needed - partially met (LB) 9/18  Short Term Goal 5: toileting with SBA - ongoing       Therapy Time   Individual Concurrent Group Co-treatment   Time In 1015         Time Out 1100         Minutes 45         Timed Code Treatment Minutes: 975 [facility],

## 2022-09-18 NOTE — PROGRESS NOTES
Hospitalist Progress Note      PCP: Tong Markham    Date of Admission: 9/9/2022    Hospital Course: 74 y.o. male who presented to [facility] with past medical history of arthritis, CAD, hypertension, hyperlipidemia, atrial fibrillation, BPH presented to the ED for difficulty functioning and ADL or even walking. MRI showed C2-C3 cord compression with edema. Neurosurgery consulted patient is s/p C2-T2 D/F/F    Subjective: Patient continues to complain of significant neck/back pain. Denies any nausea, vomiting, lightheadedness. Denies any fevers, chills.         Medications:  Reviewed    Infusion Medications    sodium chloride 25 mL/hr at 09/17/22 0433     Scheduled Medications    heparin (porcine)  5,000 Units SubCUTAneous 3 times per day    sodium chloride flush  5-40 mL IntraVENous 2 times per day    polyethylene glycol  17 g Oral Daily    methocarbamol  1,000 mg Oral 4x Daily    sennosides-docusate sodium  2 tablet Oral BID    acetaminophen  1,000 mg Oral Q6H    naloxegol  12.5 mg Oral QAM AC    NIFEdipine  30 mg Oral Daily    thiamine mononitrate  100 mg Oral Daily    metoprolol succinate  75 mg Oral Daily    tamsulosin  0.4 mg Oral Daily    nicotine  1 patch TransDERmal Daily    atorvastatin  40 mg Oral Daily    lisinopril  40 mg Oral Daily    meloxicam  7.5 mg Oral Daily     PRN Meds: sodium chloride flush, sodium chloride, naloxone, aluminum & magnesium hydroxide-simethicone, bisacodyl, promethazine **OR** ondansetron, oxyCODONE **OR** oxyCODONE, diazePAM, magnesium hydroxide, hydrALAZINE, polyvinyl alcohol, sodium chloride, ondansetron **OR** ondansetron, polyethylene glycol      Intake/Output Summary (Last 24 hours) at 9/18/2022 1408  Last data filed at 9/18/2022 1139  Gross per 24 hour   Intake 236 ml   Output 1755 ml   Net -1519 ml         Physical Exam Performed:    BP (!) 155/85   Pulse 76   Temp 98.1 °F (36.7 °C) (Oral)   Resp 16   Ht 6' 2\" (1.88 m)   Wt 258 lb 12.8 oz (117.4 kg)   SpO2 98%   BMI 33.23 kg/m²     General appearance: Appears in mild distress due to pain  HEENT: NC/AT, EOMI, PERRLA  Neck: Cervical collar in place  Respiratory:  Normal respiratory effort. Clear to auscultation, bilaterally without Rales/Wheezes/Rhonchi. Cardiovascular: Regular rate and rhythm with normal S1/S2 without murmurs, rubs or gallops. Abdomen: Soft, non-tender, non-distended with normal bowel sounds. Musculoskeletal: No clubbing, cyanosis or edema bilaterally. Full range of motion without deformity. Skin: Skin color, texture, turgor normal.  No rashes or lesions. Neurologic: Alert and awake x3, grossly nonfocal exam  Capillary Refill: Brisk,3 seconds, normal     Labs:   Recent Labs     09/16/22  1154 09/17/22  0618 09/18/22  0821   WBC 6.2 5.4 5.0   HGB 13.8 13.1* 13.0*   HCT 42.6 38.9* 39.4*    185 204       Recent Labs     09/16/22  1154 09/17/22  0618 09/18/22  0821   * 133* 136   K 4.0 4.2 4.2   CL 97* 100 101   CO2 25 25 29   BUN 20 20 19   CREATININE 0.8 0.8 0.8   CALCIUM 8.7 8.5 8.8       Recent Labs     09/16/22  1154 09/17/22  0618 09/18/22  0821   AST 35 22 21   ALT 56* 34 23   BILITOT 0.3 0.3 0.4   ALKPHOS 110 103 116       No results for input(s): INR in the last 72 hours. No results for input(s): Jake Turcios in the last 72 hours. Urinalysis:      Lab Results   Component Value Date/Time    NITRU Negative 09/08/2022 11:30 PM    WBCUA 1 09/08/2022 11:30 PM    BACTERIA None Seen 09/08/2022 11:30 PM    RBCUA 1 09/08/2022 11:30 PM    BLOODU Negative 09/08/2022 11:30 PM    SPECGRAV 1.015 09/08/2022 11:30 PM    GLUCOSEU Negative 09/08/2022 11:30 PM       Radiology:  XR CERVICAL SPINE (2-3 VIEWS)   Final Result      Fusion in the cervical spine. XR CERVICAL SPINE (2-3 VIEWS)   Final Result   1. Intraoperative imaging using CT and fluoroscopy during posterior cervical decompression and fusion. FLUORO FOR SURGICAL PROCEDURES   Final Result   1. Intraoperative imaging using CT and fluoroscopy during posterior cervical decompression and fusion. CT CERVICAL SPINE WO CONTRAST   Final Result   1. Redemonstration of anterolisthesis of C2 on C3 without obvious fracture. There is severe facet arthrosis at this level   2. Redemonstration of anterolisthesis of C4-5 likely related to severe facet arthrosis at this level   3. Minimal retrolisthesis of C3 on C4 redemonstrated, likely related to severe degenerative disc disease at this level as well as associated facet arthrosis   4. Marked degenerative disc disease C3-4, C5-6, C6-7, C7-T1   5. Diffuse severe facet arthrosis            XR CERVICAL SPINE (2-3 VIEWS)   Final Result      Grade 1 anterolisthesis of C2 on C3. Multilevel moderate to severe degenerative disc disease. MRI LUMBAR SPINE WO CONTRAST   Final Result   1. Multilevel severe central canal and foraminal stenosis L2-3 through L5-S1, secondary to multifactorial degenerative changes and posterior epidural lipomatosis   2. Redundancy in edematous nerve roots at the L1-2 level secondary to chronic lumbar central canal stenosis. 2. Extruded disc herniation L3-L4 with left lateral recess and subarticular migration      Compared prior study there is been progression of spinal stenosis secondary to chronic multifactorial degenerative changes posterior epidural fat. No change in the degree of facet arthropathy and foraminal stenosis. MRI THORACIC SPINE WO CONTRAST   Final Result      1. Mild scoliosis and degenerative disc disease concordant with prior CT   2. No abnormal cord signal, central canal, compression or canal stenosis         MRI CERVICAL SPINE WO CONTRAST   Final Result      1. Cord myelopathy, cord edema with hyperintense signal noted on STIR sequence at the C2-3 and C3 level   2. Severe central canal stenosis C2-C3 which is improved with the patient's collar on compared to prior study   3.  Severe canal stenosis C3-C4 with central disc herniation, also slightly improved with the patient's collar on during the study   4. Multilevel bilateral foraminal stenosis                 Assessment/Plan:    Active Hospital Problems    Diagnosis     Pre-op evaluation [Z01.818]      Priority: Medium    Cord compression [facility]) [G95.20]      Priority: Medium    CAD in native artery [I25.10]      Cervical cord compression with edema s/p C2-T2 D/F/F  -Neurosurgery following, s/p C2-T2 posterior decompression fusion fixation on 9/15/2022.  - Continue pain control, muscle relaxants  - Monitor drain output  -PT/OT, will likely need placement    Hypomagnesemia  - Replace and recheck    Hypertension  -Continue metoprolol, nifedipine, lisinopril    Paroxysmal atrial fibrillation  -Continue metoprolol, aspirin on hold    Tobacco abuse  - Nicotine patch    BPH  - Continue Flomax    DVT Prophylaxis: Subcu heparin  Diet: ADULT DIET; Regular  Code Status: Full Code    PT/OT Eval Status: Ongoing    Dispo -await neurosurgery clearance for discharge.   Will likely need SNF placement    Javier Hendrickson MD

## 2022-09-18 NOTE — PLAN OF CARE
Problem: Discharge Planning  Goal: Discharge to home or other facility with appropriate resources  Outcome: Progressing     Problem: Pain  Goal: Verbalizes/displays adequate comfort level or baseline comfort level  9/17/2022 2205 by Ashwin Martinez RN  Outcome: Progressing  9/17/2022 1011 by Alejandra Lama RN  Outcome: Progressing     Problem: Safety - Adult  Goal: Free from fall injury  9/17/2022 2205 by Ashwin Martinez RN  Outcome: Progressing  9/17/2022 1011 by Alejandra Lama RN  Outcome: Progressing     Problem: ABCDS Injury Assessment  Goal: Absence of physical injury  9/17/2022 2205 by Ashwin Martinez RN  Outcome: Progressing  9/17/2022 1011 by Alejandra Lama RN  Outcome: Progressing     Problem: Neurosensory - Adult  Goal: Achieves stable or improved neurological status  9/17/2022 2205 by Ashwin Martinez RN  Outcome: Progressing  9/17/2022 1011 by Alejandra Lama RN  Outcome: Progressing     Problem: Cardiovascular - Adult  Goal: Maintains optimal cardiac output and hemodynamic stability  9/17/2022 2205 by Ashwin Martinez RN  Outcome: Progressing  9/17/2022 1011 by Alejandra Lama RN  Outcome: Progressing     Problem: Respiratory - Adult  Goal: Achieves optimal ventilation and oxygenation  9/17/2022 2205 by sAhwin Martinez RN  Outcome: Progressing  9/17/2022 1011 by Alejandra Lama RN  Outcome: Progressing     Problem: Skin/Tissue Integrity  Goal: Absence of new skin breakdown  Description: 1. Monitor for areas of redness and/or skin breakdown  2. Assess vascular access sites hourly  3. Every 4-6 hours minimum:  Change oxygen saturation probe site  4. Every 4-6 hours:  If on nasal continuous positive airway pressure, respiratory therapy assess nares and determine need for appliance change or resting period.   9/17/2022 2205 by Ashwin Martinez RN  Outcome: Progressing  9/17/2022 1011 by Alejandra Lama RN  Outcome: Progressing  Patient educated on importance of wearing c-collar at all times, verbalizes understanding at this time. Patient educated on pain management orders in place along with no pharmaceutical measures. All fall precautions in place, patient lying in bed with call bell in hand.

## 2022-09-18 NOTE — PROGRESS NOTES
Office : [phone]     Fax :[phone]       Nephrology  Note      Patient's Name: Liliana Zuniga  4:04 PM  9/18/2022    Reason for Consult:  hyponatremia       Requesting Physician: Laura Dimas      09/18/22    Feels same   na stable       I/O last 3 completed shifts: In: 149.5 [IV Piggyback:149.5]  Out: 7876 [Urine:2300; Drains:430]    Past Medical History:   Diagnosis Date    Arthritis     Atrial fibrillation ([address]     CAD (coronary artery disease)     Hyperlipidemia     Hypertension        Past Surgical History:   Procedure Laterality Date    BACK SURGERY  200    lower lumbar surgery    CERVICAL FUSION N/A 9/15/2022    C2-T2 POSTERIOR DECOMPRESSION FUSION AND FIXATION performed by Marylene Enter, MD at [place] [address]  2012       Family History   Problem Relation Age of Onset    Heart Disease Mother     Diabetes Mother     Heart Disease Father     Diabetes Father     Diabetes Sister     Diabetes Brother     Diabetes Maternal Grandmother     Diabetes Maternal Grandfather     Diabetes Paternal Grandmother     Diabetes Paternal Grandfather         reports that he has been smoking cigarettes. He has a 40.00 pack-year smoking history. He has never used smokeless tobacco. He reports current alcohol use of about 20.0 standard drinks per week. He reports that he does not use drugs. Allergies:  Patient has no known allergies.     Current Medications:    magnesium sulfate 4000 mg in 100 mL IVPB premix, Once  heparin (porcine) injection 5,000 Units, 3 times per day  sodium chloride flush 0.9 % injection 5-40 mL, 2 times per day  sodium chloride flush 0.9 % injection 5-40 mL, PRN  0.9 % sodium chloride infusion, PRN  naloxone [facility]) injection 0.2 mg, PRN  aluminum & magnesium hydroxide-simethicone (MAALOX) 200-200-20 MG/5ML suspension 15 mL, Q6H PRN  polyethylene glycol (GLYCOLAX) packet 17 g, Daily  bisacodyl (DULCOLAX) suppository 10 mg, Daily PRN  promethazine (PHENERGAN) tablet 12.5 mg, Q6H PRN   Or  ondansetron (ZOFRAN) injection 4 mg, Q6H PRN  oxyCODONE (ROXICODONE) immediate release tablet 5 mg, Q4H PRN   Or  oxyCODONE (ROXICODONE) immediate release tablet 10 mg, Q4H PRN  methocarbamol (ROBAXIN) tablet 1,000 mg, 4x Daily  sennosides-docusate sodium (SENOKOT-S) 8.6-50 MG tablet 2 tablet, BID  acetaminophen (TYLENOL) tablet 1,000 mg, Q6H  diazePAM (VALIUM) tablet 5 mg, Q6H PRN  naloxegol (MOVANTIK) tablet 12.5 mg, QAM AC  NIFEdipine (ADALAT CC) extended release tablet 30 mg, Daily  thiamine mononitrate tablet 100 mg, Daily  metoprolol succinate (TOPROL XL) extended release tablet 75 mg, Daily  tamsulosin (FLOMAX) capsule 0.4 mg, Daily  magnesium hydroxide (MILK OF MAGNESIA) 400 MG/5ML suspension 30 mL, Daily PRN  hydrALAZINE (APRESOLINE) injection 5 mg, Q6H PRN  nicotine (NICODERM CQ) 14 MG/24HR 1 patch, Daily  polyvinyl alcohol (LIQUIFILM TEARS) 1.4 % ophthalmic solution 1 drop, PRN  sodium chloride (OCEAN, BABY AYR) 0.65 % nasal spray 1 spray, PRN  atorvastatin (LIPITOR) tablet 40 mg, Daily  lisinopril (PRINIVIL;ZESTRIL) tablet 40 mg, Daily  meloxicam (MOBIC) tablet 7.5 mg, Daily  ondansetron (ZOFRAN-ODT) disintegrating tablet 4 mg, Q8H PRN   Or  ondansetron (ZOFRAN) injection 4 mg, Q6H PRN  polyethylene glycol (GLYCOLAX) packet 17 g, Daily PRN          Physical exam:     Vitals:  /88   Pulse 67   Temp 97.9 °F (36.6 °C) (Oral)   Resp 16   Ht 6' 2\" (1.88 m)   Wt 258 lb 12.8 oz (117.4 kg)   SpO2 100%   BMI 33.23 kg/m²   Constitutional:  OAA X3 NAD  Skin: no rash, turgor wnl  Heent:  eomi, mmm  Neck: no bruits or jvd noted  Cardiovascular:  S1, S2 without m/r/g  Respiratory: CTA B without w/r/r  Abdomen:  +bs, soft, nt, nd  Ext: no  lower extremity edema  Psychiatric: mood and affect appropriate  Musculoskeletal:  Rom, muscular strength intact    Labs:  CBC:   Recent Labs     09/16/22  1154 09/17/22  0618 09/18/22  0821   WBC 6.2 5.4 5.0   HGB 13.8 13.1* 13.0*    185 204       BMP:    Recent Labs     09/16/22  1154 09/17/22  0618 09/18/22  0821   * 133* 136   K 4.0 4.2 4.2   CL 97* 100 101   CO2 25 25 29   BUN 20 20 19   CREATININE 0.8 0.8 0.8   GLUCOSE 141* 89 123*       Ca/Mg/Phos:   Recent Labs     09/16/22  1154 09/17/22  0618 09/18/22  0821   CALCIUM 8.7 8.5 8.8   MG 1.50* 1.60* 1.60*       Hepatic:   Recent Labs     09/16/22  1154 09/17/22  0618 09/18/22  0821   AST 35 22 21   ALT 56* 34 23   BILITOT 0.3 0.3 0.4   ALKPHOS 110 103 116       Troponin:   No results for input(s): TROPONINI in the last 72 hours. BNP: No results for input(s): BNP in the last 72 hours. Lipids: No results for input(s): CHOL, TRIG, HDL, LDLCALC, LABVLDL in the last 72 hours. ABGs: No results for input(s): PHART, PO2ART, UYZ7DSB in the last 72 hours. INR: No results for input(s): INR in the last 72 hours. UA:  No results for input(s): Rowley Nova, GLUCOSEU, BILIRUBINUR, KETUA, SPECGRAV, BLOODU, PHUR, PROTEINU, UROBILINOGEN, NITRU, LEUKOCYTESUR, Abigail Roch in the last 72 hours. Urine Microscopic:   No results for input(s): LABCAST, BACTERIA, COMU, HYALCAST, WBCUA, RBCUA, EPIU in the last 72 hours. Urine Culture: No results for input(s): LABURIN in the last 72 hours. Urine Chemistry: No results for input(s): Amorteresa Mckeon, PROTEINUR, NAUR in the last 72 hours. IMAGING:  XR CERVICAL SPINE (2-3 VIEWS)   Final Result      Fusion in the cervical spine. XR CERVICAL SPINE (2-3 VIEWS)   Final Result   1. Intraoperative imaging using CT and fluoroscopy during posterior cervical decompression and fusion. FLUORO FOR SURGICAL PROCEDURES   Final Result   1.  Intraoperative imaging using CT and fluoroscopy during posterior cervical decompression and fusion. CT CERVICAL SPINE WO CONTRAST   Final Result   1. Redemonstration of anterolisthesis of C2 on C3 without obvious fracture. There is severe facet arthrosis at this level   2. Redemonstration of anterolisthesis of C4-5 likely related to severe facet arthrosis at this level   3. Minimal retrolisthesis of C3 on C4 redemonstrated, likely related to severe degenerative disc disease at this level as well as associated facet arthrosis   4. Marked degenerative disc disease C3-4, C5-6, C6-7, C7-T1   5. Diffuse severe facet arthrosis            XR CERVICAL SPINE (2-3 VIEWS)   Final Result      Grade 1 anterolisthesis of C2 on C3. Multilevel moderate to severe degenerative disc disease. MRI LUMBAR SPINE WO CONTRAST   Final Result   1. Multilevel severe central canal and foraminal stenosis L2-3 through L5-S1, secondary to multifactorial degenerative changes and posterior epidural lipomatosis   2. Redundancy in edematous nerve roots at the L1-2 level secondary to chronic lumbar central canal stenosis. 2. Extruded disc herniation L3-L4 with left lateral recess and subarticular migration      Compared prior study there is been progression of spinal stenosis secondary to chronic multifactorial degenerative changes posterior epidural fat. No change in the degree of facet arthropathy and foraminal stenosis. MRI THORACIC SPINE WO CONTRAST   Final Result      1. Mild scoliosis and degenerative disc disease concordant with prior CT   2. No abnormal cord signal, central canal, compression or canal stenosis         MRI CERVICAL SPINE WO CONTRAST   Final Result      1. Cord myelopathy, cord edema with hyperintense signal noted on STIR sequence at the C2-3 and C3 level   2. Severe central canal stenosis C2-C3 which is improved with the patient's collar on compared to prior study   3.  Severe canal stenosis C3-C4 with central disc herniation, also slightly improved with the patient's collar on during the study   4. Multilevel bilateral foraminal stenosis                 Assessment/Plan :      1. Hyponatremia. Likely 2/2 fluid intake . Drinks excess beer  Limit daily fluid intake to 1200 ml   Off IV fluids   Urine sodium and urine osmolality - reviewed     2. HTN. Off IVF   Pain control     3. Acute cervical cord compression with edema:  Neurosurgery consulted.   Sx done       Monitor sodium levels               Thank you for allowing us to participate in care of Luis E Mortensen         Electronically signed by: Jerry Pacheco MD, 9/18/2022, 4:04 PM      [facility] [address]  Office : [phone]  Fax :[phone]

## 2022-09-19 PROCEDURE — 2580000003 HC RX 258: Performed by: NEUROLOGICAL SURGERY

## 2022-09-19 PROCEDURE — 6370000000 HC RX 637 (ALT 250 FOR IP)

## 2022-09-19 PROCEDURE — 6360000002 HC RX W HCPCS: Performed by: INTERNAL MEDICINE

## 2022-09-19 PROCEDURE — 6370000000 HC RX 637 (ALT 250 FOR IP): Performed by: INTERNAL MEDICINE

## 2022-09-19 PROCEDURE — 6370000000 HC RX 637 (ALT 250 FOR IP): Performed by: NURSE PRACTITIONER

## 2022-09-19 PROCEDURE — 6370000000 HC RX 637 (ALT 250 FOR IP): Performed by: NEUROLOGICAL SURGERY

## 2022-09-19 PROCEDURE — 1200000000 HC SEMI PRIVATE

## 2022-09-19 PROCEDURE — 97530 THERAPEUTIC ACTIVITIES: CPT

## 2022-09-19 PROCEDURE — 97116 GAIT TRAINING THERAPY: CPT

## 2022-09-19 PROCEDURE — 99232 SBSQ HOSP IP/OBS MODERATE 35: CPT | Performed by: INTERNAL MEDICINE

## 2022-09-19 PROCEDURE — 6360000002 HC RX W HCPCS: Performed by: NEUROLOGICAL SURGERY

## 2022-09-19 PROCEDURE — 6370000000 HC RX 637 (ALT 250 FOR IP): Performed by: STUDENT IN AN ORGANIZED HEALTH CARE EDUCATION/TRAINING PROGRAM

## 2022-09-19 RX ADMIN — NIFEDIPINE 30 MG: 30 TABLET, EXTENDED RELEASE ORAL at 08:49

## 2022-09-19 RX ADMIN — TAMSULOSIN HYDROCHLORIDE 0.4 MG: 0.4 CAPSULE ORAL at 08:49

## 2022-09-19 RX ADMIN — POLYETHYLENE GLYCOL (3350) 17 G: 17 POWDER, FOR SOLUTION ORAL at 08:48

## 2022-09-19 RX ADMIN — Medication 100 MG: at 08:49

## 2022-09-19 RX ADMIN — HEPARIN SODIUM 5000 UNITS: 5000 INJECTION INTRAVENOUS; SUBCUTANEOUS at 06:15

## 2022-09-19 RX ADMIN — HYDROMORPHONE HYDROCHLORIDE 1 MG: 1 INJECTION, SOLUTION INTRAMUSCULAR; INTRAVENOUS; SUBCUTANEOUS at 22:36

## 2022-09-19 RX ADMIN — METHOCARBAMOL TABLETS 1000 MG: 500 TABLET, COATED ORAL at 08:48

## 2022-09-19 RX ADMIN — DIAZEPAM 5 MG: 5 TABLET ORAL at 06:15

## 2022-09-19 RX ADMIN — OXYCODONE 10 MG: 5 TABLET ORAL at 03:59

## 2022-09-19 RX ADMIN — LISINOPRIL 40 MG: 40 TABLET ORAL at 08:49

## 2022-09-19 RX ADMIN — ATORVASTATIN CALCIUM 40 MG: 40 TABLET, FILM COATED ORAL at 08:49

## 2022-09-19 RX ADMIN — SODIUM CHLORIDE, PRESERVATIVE FREE 10 ML: 5 INJECTION INTRAVENOUS at 20:35

## 2022-09-19 RX ADMIN — HYDROMORPHONE HYDROCHLORIDE 1 MG: 1 INJECTION, SOLUTION INTRAMUSCULAR; INTRAVENOUS; SUBCUTANEOUS at 10:00

## 2022-09-19 RX ADMIN — MAGNESIUM HYDROXIDE 30 ML: 400 SUSPENSION ORAL at 09:57

## 2022-09-19 RX ADMIN — METHOCARBAMOL TABLETS 1000 MG: 500 TABLET, COATED ORAL at 13:44

## 2022-09-19 RX ADMIN — METHOCARBAMOL TABLETS 1000 MG: 500 TABLET, COATED ORAL at 20:35

## 2022-09-19 RX ADMIN — SODIUM CHLORIDE, PRESERVATIVE FREE 10 ML: 5 INJECTION INTRAVENOUS at 08:50

## 2022-09-19 RX ADMIN — HYDROMORPHONE HYDROCHLORIDE 1 MG: 1 INJECTION, SOLUTION INTRAMUSCULAR; INTRAVENOUS; SUBCUTANEOUS at 18:02

## 2022-09-19 RX ADMIN — ACETAMINOPHEN 1000 MG: 500 TABLET ORAL at 00:03

## 2022-09-19 RX ADMIN — MELOXICAM 7.5 MG: 7.5 TABLET ORAL at 08:49

## 2022-09-19 RX ADMIN — NALOXEGOL OXALATE 12.5 MG: 12.5 TABLET, FILM COATED ORAL at 06:15

## 2022-09-19 RX ADMIN — ACETAMINOPHEN 1000 MG: 500 TABLET ORAL at 06:15

## 2022-09-19 RX ADMIN — DIAZEPAM 5 MG: 5 TABLET ORAL at 20:34

## 2022-09-19 RX ADMIN — HEPARIN SODIUM 5000 UNITS: 5000 INJECTION INTRAVENOUS; SUBCUTANEOUS at 13:44

## 2022-09-19 RX ADMIN — HEPARIN SODIUM 5000 UNITS: 5000 INJECTION INTRAVENOUS; SUBCUTANEOUS at 22:36

## 2022-09-19 RX ADMIN — SENNOSIDES AND DOCUSATE SODIUM 2 TABLET: 50; 8.6 TABLET ORAL at 20:35

## 2022-09-19 RX ADMIN — METOPROLOL SUCCINATE 75 MG: 50 TABLET, FILM COATED, EXTENDED RELEASE ORAL at 08:49

## 2022-09-19 RX ADMIN — OXYCODONE 10 MG: 5 TABLET ORAL at 08:49

## 2022-09-19 RX ADMIN — SENNOSIDES AND DOCUSATE SODIUM 2 TABLET: 50; 8.6 TABLET ORAL at 08:49

## 2022-09-19 ASSESSMENT — PAIN DESCRIPTION - LOCATION
LOCATION: NECK;SHOULDER
LOCATION: BACK;SHOULDER
LOCATION: BACK;SHOULDER;NECK

## 2022-09-19 ASSESSMENT — PAIN DESCRIPTION - DESCRIPTORS
DESCRIPTORS: ACHING
DESCRIPTORS: STABBING
DESCRIPTORS: STABBING

## 2022-09-19 ASSESSMENT — PAIN - FUNCTIONAL ASSESSMENT
PAIN_FUNCTIONAL_ASSESSMENT: ACTIVITIES ARE NOT PREVENTED

## 2022-09-19 ASSESSMENT — PAIN DESCRIPTION - ORIENTATION
ORIENTATION: RIGHT;LEFT

## 2022-09-19 ASSESSMENT — PAIN SCALES - GENERAL
PAINLEVEL_OUTOF10: 7
PAINLEVEL_OUTOF10: 10
PAINLEVEL_OUTOF10: 2
PAINLEVEL_OUTOF10: 7
PAINLEVEL_OUTOF10: 10
PAINLEVEL_OUTOF10: 2

## 2022-09-19 NOTE — PROGRESS NOTES
Office : [phone]     Fax :[phone]       Nephrology  Note      Patient's Name: Trey Stratton  10:09 AM  9/19/2022    Reason for Consult:  hyponatremia       Requesting Physician: Padmini Mcgovern      09/19/22    Feels same   na stable       I/O last 3 completed shifts: In: 1 [P.O.:716]  Out: 2696 [Urine:3425; Drains:331]    Past Medical History:   Diagnosis Date    Arthritis     Atrial fibrillation ([address]     CAD (coronary artery disease)     Hyperlipidemia     Hypertension        Past Surgical History:   Procedure Laterality Date    BACK SURGERY  200    lower lumbar surgery    CERVICAL FUSION N/A 9/15/2022    C2-T2 POSTERIOR DECOMPRESSION FUSION AND FIXATION performed by Teagan Pisano MD at [place] [address]  2012       Family History   Problem Relation Age of Onset    Heart Disease Mother     Diabetes Mother     Heart Disease Father     Diabetes Father     Diabetes Sister     Diabetes Brother     Diabetes Maternal Grandmother     Diabetes Maternal Grandfather     Diabetes Paternal Grandmother     Diabetes Paternal Grandfather         reports that he has been smoking cigarettes. He has a 40.00 pack-year smoking history. He has never used smokeless tobacco. He reports current alcohol use of about 20.0 standard drinks per week. He reports that he does not use drugs. Allergies:  Patient has no known allergies.     Current Medications:    magnesium hydroxide (MILK OF MAGNESIA) 400 MG/5ML suspension 30 mL, Daily  HYDROmorphone (DILAUDID) injection 1 mg, Q4H PRN  heparin (porcine) injection 5,000 Units, 3 times per day  sodium chloride flush 0.9 % injection 5-40 mL, 2 times per day  sodium chloride flush 0.9 % injection 5-40 mL, PRN  0.9 % sodium chloride infusion, PRN  naloxone (NARCAN) injection 0.2 mg, PRN  aluminum & magnesium hydroxide-simethicone (MAALOX) 200-200-20 MG/5ML suspension 15 mL, Q6H PRN  polyethylene glycol (GLYCOLAX) packet 17 g, Daily  bisacodyl (DULCOLAX) suppository 10 mg, Daily PRN  promethazine (PHENERGAN) tablet 12.5 mg, Q6H PRN   Or  ondansetron (ZOFRAN) injection 4 mg, Q6H PRN  oxyCODONE (ROXICODONE) immediate release tablet 5 mg, Q4H PRN   Or  oxyCODONE (ROXICODONE) immediate release tablet 10 mg, Q4H PRN  methocarbamol (ROBAXIN) tablet 1,000 mg, 4x Daily  sennosides-docusate sodium (SENOKOT-S) 8.6-50 MG tablet 2 tablet, BID  acetaminophen (TYLENOL) tablet 1,000 mg, Q6H  diazePAM (VALIUM) tablet 5 mg, Q6H PRN  naloxegol (MOVANTIK) tablet 12.5 mg, QAM AC  NIFEdipine (ADALAT CC) extended release tablet 30 mg, Daily  thiamine mononitrate tablet 100 mg, Daily  metoprolol succinate (TOPROL XL) extended release tablet 75 mg, Daily  tamsulosin (FLOMAX) capsule 0.4 mg, Daily  magnesium hydroxide (MILK OF MAGNESIA) 400 MG/5ML suspension 30 mL, Daily PRN  hydrALAZINE (APRESOLINE) injection 5 mg, Q6H PRN  nicotine (NICODERM CQ) 14 MG/24HR 1 patch, Daily  polyvinyl alcohol (LIQUIFILM TEARS) 1.4 % ophthalmic solution 1 drop, PRN  sodium chloride (OCEAN, BABY AYR) 0.65 % nasal spray 1 spray, PRN  atorvastatin (LIPITOR) tablet 40 mg, Daily  lisinopril (PRINIVIL;ZESTRIL) tablet 40 mg, Daily  meloxicam (MOBIC) tablet 7.5 mg, Daily  ondansetron (ZOFRAN-ODT) disintegrating tablet 4 mg, Q8H PRN   Or  ondansetron (ZOFRAN) injection 4 mg, Q6H PRN  polyethylene glycol (GLYCOLAX) packet 17 g, Daily PRN          Physical exam:     Vitals:  BP (!) 167/92   Pulse 74   Temp 97.8 °F (36.6 °C) (Oral)   Resp 16   Ht 6' 2\" (1.88 m)   Wt 258 lb 12.8 oz (117.4 kg)   SpO2 95%   BMI 33.23 kg/m²   Constitutional:  OAA X3 NAD  Skin: no rash, turgor wnl  Heent:  eomi, mmm  Neck: no bruits or jvd noted  Cardiovascular:  S1, S2 without PROCEDURES   Final Result   1. Intraoperative imaging using CT and fluoroscopy during posterior cervical decompression and fusion. CT CERVICAL SPINE WO CONTRAST   Final Result   1. Redemonstration of anterolisthesis of C2 on C3 without obvious fracture. There is severe facet arthrosis at this level   2. Redemonstration of anterolisthesis of C4-5 likely related to severe facet arthrosis at this level   3. Minimal retrolisthesis of C3 on C4 redemonstrated, likely related to severe degenerative disc disease at this level as well as associated facet arthrosis   4. Marked degenerative disc disease C3-4, C5-6, C6-7, C7-T1   5. Diffuse severe facet arthrosis            XR CERVICAL SPINE (2-3 VIEWS)   Final Result      Grade 1 anterolisthesis of C2 on C3. Multilevel moderate to severe degenerative disc disease. MRI LUMBAR SPINE WO CONTRAST   Final Result   1. Multilevel severe central canal and foraminal stenosis L2-3 through L5-S1, secondary to multifactorial degenerative changes and posterior epidural lipomatosis   2. Redundancy in edematous nerve roots at the L1-2 level secondary to chronic lumbar central canal stenosis. 2. Extruded disc herniation L3-L4 with left lateral recess and subarticular migration      Compared prior study there is been progression of spinal stenosis secondary to chronic multifactorial degenerative changes posterior epidural fat. No change in the degree of facet arthropathy and foraminal stenosis. MRI THORACIC SPINE WO CONTRAST   Final Result      1. Mild scoliosis and degenerative disc disease concordant with prior CT   2. No abnormal cord signal, central canal, compression or canal stenosis         MRI CERVICAL SPINE WO CONTRAST   Final Result      1. Cord myelopathy, cord edema with hyperintense signal noted on STIR sequence at the C2-3 and C3 level   2.  Severe central canal stenosis C2-C3 which is improved with the patient's collar on compared to prior study   3. Severe canal stenosis C3-C4 with central disc herniation, also slightly improved with the patient's collar on during the study   4. Multilevel bilateral foraminal stenosis                 Assessment/Plan :      1. Hyponatremia. Likely 2/2 fluid intake . Drinks excess beer  Limit daily fluid intake to 1200 ml   Off IV fluids   Urine sodium and urine osmolality - reviewed     2. HTN. Off IVF   Pain control     3. Acute cervical cord compression with edema:  Neurosurgery consulted.   Sx done       Monitor sodium levels               Thank you for allowing us to participate in care of Connie Mccartney         Electronically signed by: Cher Mata MD, 9/19/2022, 10:09 AM      [facility] [id number]  Office : [phone]  Fax :[phone]

## 2022-09-19 NOTE — CARE COORDINATION
JOSIE spoke to Tarsha Barr at [facility], the insurance is requesting updated PT evals for pre-cert. Pt  is from home alone. Pt would prefer to go home but is agreeable to [facility] if he needs it. JOSIE following.   Electronically signed by ELO Bernstein, SERA on 9/19/2022 at 11:16 AM  [phone]

## 2022-09-19 NOTE — PROGRESS NOTES
Hospitalist Progress Note      PCP: Naz Simms    Date of Admission: 9/9/2022    Hospital Course: 74 y.o. male who presented to [facility] with past medical history of arthritis, CAD, hypertension, hyperlipidemia, atrial fibrillation, BPH presented to the ED for difficulty functioning and ADL or even walking. MRI showed C2-C3 cord compression with edema. Neurosurgery consulted patient is s/p C2-T2 D/F/F    Subjective: Patient was complaining of significant neck and back pain this morning. He denies any fevers, chills or any other new complaints.       Medications:  Reviewed    Infusion Medications    sodium chloride 25 mL/hr at 09/17/22 0433     Scheduled Medications    magnesium hydroxide  30 mL Oral Daily    heparin (porcine)  5,000 Units SubCUTAneous 3 times per day    sodium chloride flush  5-40 mL IntraVENous 2 times per day    polyethylene glycol  17 g Oral Daily    methocarbamol  1,000 mg Oral 4x Daily    sennosides-docusate sodium  2 tablet Oral BID    acetaminophen  1,000 mg Oral Q6H    naloxegol  12.5 mg Oral QAM AC    NIFEdipine  30 mg Oral Daily    thiamine mononitrate  100 mg Oral Daily    metoprolol succinate  75 mg Oral Daily    tamsulosin  0.4 mg Oral Daily    nicotine  1 patch TransDERmal Daily    atorvastatin  40 mg Oral Daily    lisinopril  40 mg Oral Daily    meloxicam  7.5 mg Oral Daily     PRN Meds: HYDROmorphone, sodium chloride flush, sodium chloride, naloxone, aluminum & magnesium hydroxide-simethicone, bisacodyl, promethazine **OR** ondansetron, oxyCODONE **OR** oxyCODONE, diazePAM, magnesium hydroxide, hydrALAZINE, polyvinyl alcohol, sodium chloride, ondansetron **OR** ondansetron, polyethylene glycol      Intake/Output Summary (Last 24 hours) at 9/19/2022 1115  Last data filed at 9/19/2022 0855  Gross per 24 hour   Intake 720 ml   Output 2576 ml   Net -1856 ml         Physical Exam Performed:    BP (!) 184/94   Pulse 66   Temp 98.6 °F (37 °C) (Oral)   Resp 16 Ht 6' 2\" (1.88 m)   Wt 258 lb 12.8 oz (117.4 kg)   SpO2 99%   BMI 33.23 kg/m²     General appearance: Appears in mild distress due to pain  HEENT: NC/AT, EOMI, PERRLA  Neck: Cervical collar in place  Respiratory:  Normal respiratory effort. Clear to auscultation, bilaterally without Rales/Wheezes/Rhonchi. Cardiovascular: Regular rate and rhythm with normal S1/S2 without murmurs, rubs or gallops. Abdomen: Soft, non-tender, non-distended with normal bowel sounds. Musculoskeletal: No clubbing, cyanosis or edema bilaterally. Full range of motion without deformity. Skin: Skin color, texture, turgor normal.  No rashes or lesions. Neurologic: Alert and awake x3, grossly nonfocal exam  Capillary Refill: Brisk,3 seconds, normal     Labs:   Recent Labs     09/16/22  1154 09/17/22  0618 09/18/22  0821   WBC 6.2 5.4 5.0   HGB 13.8 13.1* 13.0*   HCT 42.6 38.9* 39.4*    185 204       Recent Labs     09/16/22  1154 09/17/22  0618 09/18/22  0821   * 133* 136   K 4.0 4.2 4.2   CL 97* 100 101   CO2 25 25 29   BUN 20 20 19   CREATININE 0.8 0.8 0.8   CALCIUM 8.7 8.5 8.8       Recent Labs     09/16/22  1154 09/17/22  0618 09/18/22  0821   AST 35 22 21   ALT 56* 34 23   BILITOT 0.3 0.3 0.4   ALKPHOS 110 103 116       No results for input(s): INR in the last 72 hours. No results for input(s): Adonica Hoose in the last 72 hours. Urinalysis:      Lab Results   Component Value Date/Time    NITRU Negative 09/08/2022 11:30 PM    WBCUA 1 09/08/2022 11:30 PM    BACTERIA None Seen 09/08/2022 11:30 PM    RBCUA 1 09/08/2022 11:30 PM    BLOODU Negative 09/08/2022 11:30 PM    SPECGRAV 1.015 09/08/2022 11:30 PM    GLUCOSEU Negative 09/08/2022 11:30 PM       Radiology:  XR CERVICAL SPINE (2-3 VIEWS)   Final Result      Fusion in the cervical spine. XR CERVICAL SPINE (2-3 VIEWS)   Final Result   1. Intraoperative imaging using CT and fluoroscopy during posterior cervical decompression and fusion.       FLUORO FOR SURGICAL PROCEDURES   Final Result   1. Intraoperative imaging using CT and fluoroscopy during posterior cervical decompression and fusion. CT CERVICAL SPINE WO CONTRAST   Final Result   1. Redemonstration of anterolisthesis of C2 on C3 without obvious fracture. There is severe facet arthrosis at this level   2. Redemonstration of anterolisthesis of C4-5 likely related to severe facet arthrosis at this level   3. Minimal retrolisthesis of C3 on C4 redemonstrated, likely related to severe degenerative disc disease at this level as well as associated facet arthrosis   4. Marked degenerative disc disease C3-4, C5-6, C6-7, C7-T1   5. Diffuse severe facet arthrosis            XR CERVICAL SPINE (2-3 VIEWS)   Final Result      Grade 1 anterolisthesis of C2 on C3. Multilevel moderate to severe degenerative disc disease. MRI LUMBAR SPINE WO CONTRAST   Final Result   1. Multilevel severe central canal and foraminal stenosis L2-3 through L5-S1, secondary to multifactorial degenerative changes and posterior epidural lipomatosis   2. Redundancy in edematous nerve roots at the L1-2 level secondary to chronic lumbar central canal stenosis. 2. Extruded disc herniation L3-L4 with left lateral recess and subarticular migration      Compared prior study there is been progression of spinal stenosis secondary to chronic multifactorial degenerative changes posterior epidural fat. No change in the degree of facet arthropathy and foraminal stenosis. MRI THORACIC SPINE WO CONTRAST   Final Result      1. Mild scoliosis and degenerative disc disease concordant with prior CT   2. No abnormal cord signal, central canal, compression or canal stenosis         MRI CERVICAL SPINE WO CONTRAST   Final Result      1. Cord myelopathy, cord edema with hyperintense signal noted on STIR sequence at the C2-3 and C3 level   2.  Severe central canal stenosis C2-C3 which is improved with the patient's collar on compared

## 2022-09-19 NOTE — PLAN OF CARE
Problem: Discharge Planning  Goal: Discharge to home or other facility with appropriate resources  Outcome: Progressing     Problem: Pain  Goal: Verbalizes/displays adequate comfort level or baseline comfort level  Outcome: Progressing     Problem: Safety - Adult  Goal: Free from fall injury  Outcome: Progressing     Problem: ABCDS Injury Assessment  Goal: Absence of physical injury  Outcome: Progressing     Problem: Neurosensory - Adult  Goal: Achieves stable or improved neurological status  Outcome: Progressing     Problem: Cardiovascular - Adult  Goal: Maintains optimal cardiac output and hemodynamic stability  Outcome: Progressing     Problem: Respiratory - Adult  Goal: Achieves optimal ventilation and oxygenation  Outcome: Progressing     Problem: Skin/Tissue Integrity  Goal: Absence of new skin breakdown  Description: 1. Monitor for areas of redness and/or skin breakdown  2. Assess vascular access sites hourly  3. Every 4-6 hours minimum:  Change oxygen saturation probe site  4. Every 4-6 hours:  If on nasal continuous positive airway pressure, respiratory therapy assess nares and determine need for appliance change or resting period. Outcome: Progressing   Patient education provided on pain interventions available at this time, patient verbalizes understanding. All fall precautions in place at this time, call bell in hand with patient resting comfortably in bed.

## 2022-09-19 NOTE — PROGRESS NOTES
NEUROSURGERY POST-OP PROGRESS NOTE    Patient Name: Guy Curtis YOB: 1955   Sex: Male Age: 74 yrs     Medical Record Number: [id number] Acct Number: [de-identified]   Room Number: 7789/4285-56 Hospital Day: Hospital Day: 11     Interval History:  Post-operative Day# 4 s/p Procedure(s) (LRB):  C2-T2 POSTERIOR DECOMPRESSION FUSION AND FIXATION (N/A)    Subjective: C-collar on, sitting up in bed. Objective:    VITAL SIGNS   BP (!) 184/94   Pulse 66   Temp 98.6 °F (37 °C) (Oral)   Resp 16   Ht 6' 2\" (1.88 m)   Wt 258 lb 12.8 oz (117.4 kg)   SpO2 99%   BMI 33.23 kg/m²    Height Height: 6' 2\" (188 cm)   Weight Weight: 258 lb 12.8 oz (117.4 kg)        Allergies No Known Allergies   NPO Status ADULT DIET; Regular   Isolation No active isolations     LABS   Basic Metabolic Profile Recent Labs     09/17/22  0618 09/18/22  0821   * 136    101   CO2 25 29   BUN 20 19   CREATININE 0.8 0.8   GLUCOSE 89 123*   MG 1.60* 1.60*      Complete Blood Count Recent Labs     09/17/22  0618 09/18/22  0821   WBC 5.4 5.0   RBC 4.01* 3.98*      Coagulation Studies No results for input(s): PTT, INR in the last 72 hours.     Invalid input(s): PLATELETS, PROA, PT, PTTA     MEDICATIONS   Inpatient Medications     magnesium hydroxide, 30 mL, Oral, Daily    heparin (porcine), 5,000 Units, SubCUTAneous, 3 times per day    sodium chloride flush, 5-40 mL, IntraVENous, 2 times per day    polyethylene glycol, 17 g, Oral, Daily    [COMPLETED] methocarbamol IVPB, 1,000 mg, IntraVENous, Q8H **FOLLOWED BY** methocarbamol, 1,000 mg, Oral, 4x Daily    sennosides-docusate sodium, 2 tablet, Oral, BID    acetaminophen, 1,000 mg, Oral, Q6H    naloxegol, 12.5 mg, Oral, QAM AC    NIFEdipine, 30 mg, Oral, Daily    thiamine mononitrate, 100 mg, Oral, Daily    metoprolol succinate, 75 mg, Oral, Daily    tamsulosin, 0.4 mg, Oral, Daily    nicotine, 1 patch, TransDERmal, Daily    atorvastatin, 40 mg, Oral, Daily    lisinopril, 40 mg, Oral, Daily    meloxicam, 7.5 mg, Oral, Daily   Infusions    sodium chloride 25 mL/hr at 09/17/22 0433      Antibiotics   Recent Abx Admin        No antibiotic orders with administrations found. Neurologic Exam:  Mental status: awake and alert and oriented x4    Musculoskeletal:   Gait: Not tested   Tone: normal  Sensory: intact to all extremities  Motor strength:    Right  Left    Right  Left    Deltoid  4 4  Hip Flex  4 4   Biceps  4 4  Knee Extensors  4 4   Triceps  4 4  Knee Flexors  4 4   Wrist Ext  4 4  Ankle Dorsiflex. 4 4   Wrist Flex  4 4  Ankle Plantarflex. 4 4   Handgrip  4 4  Ext Celso Longus  4 4   Thumb Ext  4 4         Incision: intact, clean and dry  Drain: in upper back, 76/125 = 201mL for last 24 hr.     Respiratory:  Unlabored respiratory pattern    Abdomen:   Soft, ND   Last BM: 9/14/22    Cardiovascular:  Warm, well perfused    Assessment   Patient is a 75 yo male s/p Procedure(s) (LRB):  C2-T2 POSTERIOR DECOMPRESSION FUSION AND FIXATION (N/A)   Plan:  Neurologic exam frequency: Q4hr  C-collar when up  Mobility: PT/OT, up to chair - needs new PT evaluation for precert for encompass placement  Diet: Adult  Drain: in upper back, 201mL out for last 24 hr.   DVT Prophylaxis: SCDs and heparin  GI Prophylaxis: pepcid  Bowel Regimen: senokot and glycolax  Pain control:  tylenol oxycodone and dilaudid  Muscle spasm: robaxin  Incisional Care: dressing intact. Dispo Planning: remain in patient for pain control and work with PT for improved mobility, plan is for patient to go to [facility], needs new PT evaluation for completion of pre-certification    Patient was seen and evaluated with Dr. Luis Cao who agrees with above assessment and plan.      Electronically signed by: DOUGLAS Catherine CNP, 9/19/2022 1:03 PM Neurosurgery Nurse Practitioner  [phone]

## 2022-09-19 NOTE — PLAN OF CARE
No further episodes reported today   Problem: Discharge Planning  Goal: Discharge to home or other facility with appropriate resources  9/19/2022 0013 by Yasmine De Santiago RN  Outcome: Progressing     Problem: Pain  Goal: Verbalizes/displays adequate comfort level or baseline comfort level  9/19/2022 0923 by Tonja Redmond RN  Outcome: Progressing  9/19/2022 0013 by Yasmine De Santiago RN  Outcome: Progressing     Problem: Safety - Adult  Goal: Free from fall injury  9/19/2022 0923 by Tonja Redmond RN  Outcome: Progressing  9/19/2022 0013 by Yasmine De Santiago RN  Outcome: Progressing     Problem: ABCDS Injury Assessment  Goal: Absence of physical injury  9/19/2022 0923 by Tonja Redmond RN  Outcome: Progressing  9/19/2022 0013 by Yasmine De Santiago RN  Outcome: Progressing     Problem: Neurosensory - Adult  Goal: Achieves stable or improved neurological status  9/19/2022 0923 by Tonja Redmond RN  Outcome: Progressing  9/19/2022 0013 by Yasmine De Santiago RN  Outcome: Progressing     Problem: Cardiovascular - Adult  Goal: Maintains optimal cardiac output and hemodynamic stability  9/19/2022 0923 by Tonja Redmond RN  Outcome: Progressing  9/19/2022 0013 by Yasmine De Santiago RN  Outcome: Progressing     Problem: Respiratory - Adult  Goal: Achieves optimal ventilation and oxygenation  9/19/2022 0923 by Tonja Redmond RN  Outcome: Progressing  9/19/2022 0013 by Yasmine De Santiago RN  Outcome: Progressing     Problem: Skin/Tissue Integrity  Goal: Absence of new skin breakdown  Description: 1. Monitor for areas of redness and/or skin breakdown  2. Assess vascular access sites hourly  3. Every 4-6 hours minimum:  Change oxygen saturation probe site  4. Every 4-6 hours:  If on nasal continuous positive airway pressure, respiratory therapy assess nares and determine need for appliance change or resting period.   9/19/2022 0923 by Tonja Redmond RN  Outcome: Progressing  9/19/2022 0013 by Yasmine De Santiago RN  Outcome: Progressing

## 2022-09-19 NOTE — PROGRESS NOTES
Physical Therapy  Facility/Department: [facility] 5T ORTHO/NEURO  Daily Treatment Note  NAME: Oswaldo Moy  : 1955  MRN: [id number]    Date of Service: 2022    Discharge Recommendations:  Oswaldo Moy scored a 17/24 on the AM-PAC short mobility form. Current research shows that an AM-PAC score of 17 or less is typically not associated with a discharge to the patient's home setting. Based on the patient's AM-PAC score and their current functional mobility deficits, it is recommended that the patient have 5-7 sessions per week of Physical Therapy at d/c to increase the patient's independence. At this time, this patient demonstrates complex nursing, medical, and rehabilitative needs, and would benefit from intensive rehabilitation services upon discharge from the Inpatient setting. This patient demonstrates the ability to participate in and benefit from an intensive therapy program with a coordinated interdisciplinary team approach to foster frequent, structured, and documented communication among disciplines, who will work together to establish, prioritize, and achieve treatment goals. Please see assessment section for further patient specific details. If patient discharges prior to next session this note will serve as a discharge summary. Please see below for the latest assessment towards goals. Patient would benefit from continued therapy after discharge   PT Equipment Recommendations  Other: defer to next level of care    Patient Diagnosis(es): The encounter diagnosis was Cervical stenosis of spine. Assessment   Assessment: Pt continues to present below baseline mobility- requiring CGA for transfers and ambulation with RW and is independent without device at baseline. Pt fatigues quickly with short distance ambulation and has 10-15 steps to enter apartment at home.  Pt would benefit from intensive IP PT at discharge in order to progress towards prior level of function and discharge- all ongoing  Short term goal 1: Sup to sit supervision  Short term goal 2: Pt will transfer sit to stand supervision  Short term goal 3: Pt will amb >50' with [facility] supervision    Education  Patient Education  Education Given To: Patient  Education Provided: Role of Therapy;Plan of Care;Precautions;IADL Safety  Education Provided Comments: DC recommendations  Education Method: Verbal  Education Outcome: Verbalized understanding;Continued education needed    Therapy Time   Individual Concurrent Group Co-treatment   Time In 1242         Time Out 1335         Minutes 53         Timed Code Treatment Minutes: Ahsan Lazo 1122, [facility]

## 2022-09-20 LAB
ANION GAP SERPL CALCULATED.3IONS-SCNC: 9 MMOL/L (ref 3–16)
BUN BLDV-MCNC: 14 MG/DL (ref 7–20)
CALCIUM SERPL-MCNC: 8.9 MG/DL (ref 8.3–10.6)
CHLORIDE BLD-SCNC: 96 MMOL/L (ref 99–110)
CO2: 28 MMOL/L (ref 21–32)
CREAT SERPL-MCNC: 0.6 MG/DL (ref 0.8–1.3)
GFR AFRICAN AMERICAN: >60
GFR NON-AFRICAN AMERICAN: >60
GLUCOSE BLD-MCNC: 124 MG/DL (ref 70–99)
POTASSIUM REFLEX MAGNESIUM: 4.1 MMOL/L (ref 3.5–5.1)
SODIUM BLD-SCNC: 133 MMOL/L (ref 136–145)

## 2022-09-20 PROCEDURE — 6370000000 HC RX 637 (ALT 250 FOR IP): Performed by: INTERNAL MEDICINE

## 2022-09-20 PROCEDURE — 36415 COLL VENOUS BLD VENIPUNCTURE: CPT

## 2022-09-20 PROCEDURE — 6360000002 HC RX W HCPCS: Performed by: NEUROLOGICAL SURGERY

## 2022-09-20 PROCEDURE — 6360000002 HC RX W HCPCS: Performed by: INTERNAL MEDICINE

## 2022-09-20 PROCEDURE — 80048 BASIC METABOLIC PNL TOTAL CA: CPT

## 2022-09-20 PROCEDURE — 97110 THERAPEUTIC EXERCISES: CPT

## 2022-09-20 PROCEDURE — 6370000000 HC RX 637 (ALT 250 FOR IP): Performed by: STUDENT IN AN ORGANIZED HEALTH CARE EDUCATION/TRAINING PROGRAM

## 2022-09-20 PROCEDURE — 6370000000 HC RX 637 (ALT 250 FOR IP): Performed by: NEUROLOGICAL SURGERY

## 2022-09-20 PROCEDURE — 99232 SBSQ HOSP IP/OBS MODERATE 35: CPT | Performed by: INTERNAL MEDICINE

## 2022-09-20 PROCEDURE — 2580000003 HC RX 258: Performed by: NEUROLOGICAL SURGERY

## 2022-09-20 PROCEDURE — 6370000000 HC RX 637 (ALT 250 FOR IP)

## 2022-09-20 PROCEDURE — 97530 THERAPEUTIC ACTIVITIES: CPT

## 2022-09-20 PROCEDURE — 97535 SELF CARE MNGMENT TRAINING: CPT

## 2022-09-20 PROCEDURE — 6370000000 HC RX 637 (ALT 250 FOR IP): Performed by: NURSE PRACTITIONER

## 2022-09-20 PROCEDURE — 97116 GAIT TRAINING THERAPY: CPT

## 2022-09-20 PROCEDURE — 1200000000 HC SEMI PRIVATE

## 2022-09-20 RX ADMIN — METHOCARBAMOL TABLETS 1000 MG: 500 TABLET, COATED ORAL at 20:43

## 2022-09-20 RX ADMIN — HEPARIN SODIUM 5000 UNITS: 5000 INJECTION INTRAVENOUS; SUBCUTANEOUS at 06:17

## 2022-09-20 RX ADMIN — SODIUM CHLORIDE, PRESERVATIVE FREE 10 ML: 5 INJECTION INTRAVENOUS at 20:44

## 2022-09-20 RX ADMIN — MAGNESIUM HYDROXIDE 30 ML: 400 SUSPENSION ORAL at 09:42

## 2022-09-20 RX ADMIN — NALOXEGOL OXALATE 12.5 MG: 12.5 TABLET, FILM COATED ORAL at 06:17

## 2022-09-20 RX ADMIN — OXYCODONE 10 MG: 5 TABLET ORAL at 04:31

## 2022-09-20 RX ADMIN — METHOCARBAMOL TABLETS 1000 MG: 500 TABLET, COATED ORAL at 09:41

## 2022-09-20 RX ADMIN — SENNOSIDES AND DOCUSATE SODIUM 2 TABLET: 50; 8.6 TABLET ORAL at 20:43

## 2022-09-20 RX ADMIN — ACETAMINOPHEN 1000 MG: 500 TABLET ORAL at 17:28

## 2022-09-20 RX ADMIN — DIAZEPAM 5 MG: 5 TABLET ORAL at 04:35

## 2022-09-20 RX ADMIN — DIAZEPAM 5 MG: 5 TABLET ORAL at 20:43

## 2022-09-20 RX ADMIN — LISINOPRIL 40 MG: 40 TABLET ORAL at 09:41

## 2022-09-20 RX ADMIN — HEPARIN SODIUM 5000 UNITS: 5000 INJECTION INTRAVENOUS; SUBCUTANEOUS at 21:59

## 2022-09-20 RX ADMIN — OXYCODONE 10 MG: 5 TABLET ORAL at 22:07

## 2022-09-20 RX ADMIN — METHOCARBAMOL TABLETS 1000 MG: 500 TABLET, COATED ORAL at 17:29

## 2022-09-20 RX ADMIN — METHOCARBAMOL TABLETS 1000 MG: 500 TABLET, COATED ORAL at 13:03

## 2022-09-20 RX ADMIN — DIAZEPAM 5 MG: 5 TABLET ORAL at 13:03

## 2022-09-20 RX ADMIN — HYDRALAZINE HYDROCHLORIDE 5 MG: 20 INJECTION INTRAMUSCULAR; INTRAVENOUS at 03:58

## 2022-09-20 RX ADMIN — MELOXICAM 7.5 MG: 7.5 TABLET ORAL at 09:41

## 2022-09-20 RX ADMIN — SODIUM CHLORIDE, PRESERVATIVE FREE 10 ML: 5 INJECTION INTRAVENOUS at 09:42

## 2022-09-20 RX ADMIN — SENNOSIDES AND DOCUSATE SODIUM 2 TABLET: 50; 8.6 TABLET ORAL at 09:41

## 2022-09-20 RX ADMIN — POLYETHYLENE GLYCOL (3350) 17 G: 17 POWDER, FOR SOLUTION ORAL at 09:49

## 2022-09-20 RX ADMIN — OXYCODONE 10 MG: 5 TABLET ORAL at 17:28

## 2022-09-20 RX ADMIN — OXYCODONE 10 MG: 5 TABLET ORAL at 13:03

## 2022-09-20 RX ADMIN — NIFEDIPINE 30 MG: 30 TABLET, EXTENDED RELEASE ORAL at 09:41

## 2022-09-20 RX ADMIN — HEPARIN SODIUM 5000 UNITS: 5000 INJECTION INTRAVENOUS; SUBCUTANEOUS at 13:03

## 2022-09-20 RX ADMIN — METOPROLOL SUCCINATE 75 MG: 50 TABLET, FILM COATED, EXTENDED RELEASE ORAL at 09:41

## 2022-09-20 RX ADMIN — ACETAMINOPHEN 1000 MG: 500 TABLET ORAL at 06:17

## 2022-09-20 RX ADMIN — Medication 100 MG: at 09:41

## 2022-09-20 RX ADMIN — ATORVASTATIN CALCIUM 40 MG: 40 TABLET, FILM COATED ORAL at 09:41

## 2022-09-20 RX ADMIN — TAMSULOSIN HYDROCHLORIDE 0.4 MG: 0.4 CAPSULE ORAL at 09:42

## 2022-09-20 ASSESSMENT — PAIN SCALES - GENERAL
PAINLEVEL_OUTOF10: 10
PAINLEVEL_OUTOF10: 9
PAINLEVEL_OUTOF10: 7
PAINLEVEL_OUTOF10: 8

## 2022-09-20 NOTE — PROGRESS NOTES
Pt A&O x4, VSS. Surgical incisions CDI. Hemovacs putting out bloody drainage. Pain being managed with medications per MAR. Voiding adequately via urinal. Tolerating diet and fluids with no issues. Fall precautions in place. Pt in bed with call light in reach.

## 2022-09-20 NOTE — PROGRESS NOTES
NEUROSURGERY POST-OP PROGRESS NOTE    Patient Name: Umer Corey YOB: 1955   Sex: Male Age: 74 yrs     Medical Record Number: [id number] Acct Number: [de-identified]   Room Number: 3665/0911-96 Hospital Day: Hospital Day: 12     Interval History:  Post-operative Day# 4 s/p Procedure(s) (LRB):  C2-T2 POSTERIOR DECOMPRESSION FUSION AND FIXATION (N/A)    Subjective: C-collar on, sitting up in bed. Objective:    VITAL SIGNS   BP (!) 159/89 Comment: nurse notified  Pulse 80   Temp 97.5 °F (36.4 °C) (Oral)   Resp 16   Ht 6' 2\" (1.88 m)   Wt 258 lb 12.8 oz (117.4 kg)   SpO2 99%   BMI 33.23 kg/m²    Height Height: 6' 2\" (188 cm)   Weight Weight: 258 lb 12.8 oz (117.4 kg)        Allergies No Known Allergies   NPO Status ADULT DIET; Regular   Isolation No active isolations     LABS   Basic Metabolic Profile Recent Labs     09/18/22  0821 09/20/22  0035    133*    96*   CO2 29 28   BUN 19 14   CREATININE 0.8 0.6*   GLUCOSE 123* 124*   MG 1.60*  --         Complete Blood Count Recent Labs     09/18/22 0821   WBC 5.0   RBC 3.98*        Coagulation Studies No results for input(s): PTT, INR in the last 72 hours.     Invalid input(s): PLATELETS, PROA, PT, PTTA     MEDICATIONS   Inpatient Medications     magnesium hydroxide, 30 mL, Oral, Daily    heparin (porcine), 5,000 Units, SubCUTAneous, 3 times per day    sodium chloride flush, 5-40 mL, IntraVENous, 2 times per day    polyethylene glycol, 17 g, Oral, Daily    [COMPLETED] methocarbamol IVPB, 1,000 mg, IntraVENous, Q8H **FOLLOWED BY** methocarbamol, 1,000 mg, Oral, 4x Daily    sennosides-docusate sodium, 2 tablet, Oral, BID    acetaminophen, 1,000 mg, Oral, Q6H    naloxegol, 12.5 mg, Oral, QAM AC    NIFEdipine, 30 mg, Oral, Daily    thiamine mononitrate, 100 mg, Oral, Daily metoprolol succinate, 75 mg, Oral, Daily    tamsulosin, 0.4 mg, Oral, Daily    nicotine, 1 patch, TransDERmal, Daily    atorvastatin, 40 mg, Oral, Daily    lisinopril, 40 mg, Oral, Daily    meloxicam, 7.5 mg, Oral, Daily   Infusions    sodium chloride 25 mL/hr at 09/17/22 0433      Antibiotics   Recent Abx Admin        No antibiotic orders with administrations found. Neurologic Exam:  Mental status: awake and alert and oriented x4    Musculoskeletal:   Gait: Not tested   Tone: normal  Sensory: intact to all extremities  Motor strength:    Right  Left    Right  Left    Deltoid  4 4  Hip Flex  4 4   Biceps  4 4  Knee Extensors  4 4   Triceps  4 4  Knee Flexors  4 4   Wrist Ext  4 4  Ankle Dorsiflex. 4 4   Wrist Flex  4 4  Ankle Plantarflex. 4 4   Handgrip  4 4  Ext Celso Longus  4 4   Thumb Ext  4 4         Incision: intact, clean and dry  Drain: in upper back, 77/75 = 152 mL for last 24 hr.     Respiratory:  Unlabored respiratory pattern    Abdomen:   Soft, ND   Last BM: last evening x1     Cardiovascular:  Warm, well perfused    Assessment   Patient is a 73 yo male s/p Procedure(s) (LRB):  C2-T2 POSTERIOR DECOMPRESSION FUSION AND FIXATION (N/A)   Plan:  Neurologic exam frequency: Q4hr  C-collar when up  Mobility: PT/OT, up to chair -   Diet: Adult  Drain: in upper back, 152mL out for last 24 hr.   DVT Prophylaxis: SCDs and heparin  GI Prophylaxis: pepcid  Bowel Regimen: senokot and glycolax  Pain control:  tylenol and oxycodone   Muscle spasm: robaxin and valium  Incisional Care: dressing intact. Dispo Planning: remain in patient for pain control and work with PT for improved mobility, plan is for patient to go to [facility], waiting for precert. Patient was discussed with Dr. Cynthia Christie who agrees with above assessment and plan.      Electronically signed by: DOUGLAS Crawley CNP, 9/20/2022 1:28 PM   Neurosurgery Nurse Practitioner  [phone]

## 2022-09-20 NOTE — PROGRESS NOTES
Office : [phone]     Fax :[phone]       Nephrology  Note      Patient's Name: Trey Stratton  10:30 AM  9/20/2022    Reason for Consult:  hyponatremia       Requesting Physician: Padmini Mcgovern      09/20/22    Feels same   na stable       I/O last 3 completed shifts: In: 18 [P.O.:480]  Out: 1420 [Urine:3300; Drains:277]    Past Medical History:   Diagnosis Date    Arthritis     Atrial fibrillation (HCC)     CAD (coronary artery disease)     Hyperlipidemia     Hypertension        Past Surgical History:   Procedure Laterality Date    BACK SURGERY  200    lower lumbar surgery    CERVICAL FUSION N/A 9/15/2022    C2-T2 POSTERIOR DECOMPRESSION FUSION AND FIXATION performed by Teagan Pisano MD at [place] [address]  2012       Family History   Problem Relation Age of Onset    Heart Disease Mother     Diabetes Mother     Heart Disease Father     Diabetes Father     Diabetes Sister     Diabetes Brother     Diabetes Maternal Grandmother     Diabetes Maternal Grandfather     Diabetes Paternal Grandmother     Diabetes Paternal Grandfather         reports that he has been smoking cigarettes. He has a 40.00 pack-year smoking history. He has never used smokeless tobacco. He reports current alcohol use of about 20.0 standard drinks per week. He reports that he does not use drugs. Allergies:  Patient has no known allergies.     Current Medications:    magnesium hydroxide (MILK OF MAGNESIA) 400 MG/5ML suspension 30 mL, Daily  heparin (porcine) injection 5,000 Units, 3 times per day  sodium chloride flush 0.9 % injection 5-40 mL, 2 times per day  sodium chloride flush 0.9 % injection 5-40 mL, PRN  0.9 % sodium chloride infusion, PRN  naloxone (NARCAN) injection 0.2 mg, PRN  aluminum & magnesium hydroxide-simethicone (MAALOX) 200-200-20 MG/5ML suspension 15 mL, Q6H PRN  polyethylene glycol (GLYCOLAX) packet 17 g, Daily  bisacodyl (DULCOLAX) suppository 10 mg, Daily PRN  promethazine (PHENERGAN) tablet 12.5 mg, Q6H PRN   Or  ondansetron (ZOFRAN) injection 4 mg, Q6H PRN  oxyCODONE (ROXICODONE) immediate release tablet 5 mg, Q4H PRN   Or  oxyCODONE (ROXICODONE) immediate release tablet 10 mg, Q4H PRN  methocarbamol (ROBAXIN) tablet 1,000 mg, 4x Daily  sennosides-docusate sodium (SENOKOT-S) 8.6-50 MG tablet 2 tablet, BID  acetaminophen (TYLENOL) tablet 1,000 mg, Q6H  diazePAM (VALIUM) tablet 5 mg, Q6H PRN  naloxegol (MOVANTIK) tablet 12.5 mg, QAM AC  NIFEdipine (ADALAT CC) extended release tablet 30 mg, Daily  thiamine mononitrate tablet 100 mg, Daily  metoprolol succinate (TOPROL XL) extended release tablet 75 mg, Daily  tamsulosin (FLOMAX) capsule 0.4 mg, Daily  magnesium hydroxide (MILK OF MAGNESIA) 400 MG/5ML suspension 30 mL, Daily PRN  hydrALAZINE (APRESOLINE) injection 5 mg, Q6H PRN  nicotine (NICODERM CQ) 14 MG/24HR 1 patch, Daily  polyvinyl alcohol (LIQUIFILM TEARS) 1.4 % ophthalmic solution 1 drop, PRN  sodium chloride (OCEAN, BABY AYR) 0.65 % nasal spray 1 spray, PRN  atorvastatin (LIPITOR) tablet 40 mg, Daily  lisinopril (PRINIVIL;ZESTRIL) tablet 40 mg, Daily  meloxicam (MOBIC) tablet 7.5 mg, Daily  ondansetron (ZOFRAN-ODT) disintegrating tablet 4 mg, Q8H PRN   Or  ondansetron (ZOFRAN) injection 4 mg, Q6H PRN  polyethylene glycol (GLYCOLAX) packet 17 g, Daily PRN          Physical exam:     Vitals:  BP (!) 154/97   Pulse 84   Temp 97.8 °F (36.6 °C) (Oral)   Resp 16   Ht 6' 2\" (1.88 m)   Wt 258 lb 12.8 oz (117.4 kg)   SpO2 98%   BMI 33.23 kg/m²   Constitutional:  OAA X3 NAD  Skin: no rash, turgor wnl  Heent:  eomi, mmm  Neck: no bruits or jvd noted  Cardiovascular:  S1, S2 without m/r/g  Respiratory: CTA B without w/r/r  Abdomen:  +bs, soft, nt, nd  Ext: no  lower extremity edema  Psychiatric: mood and affect appropriate  Musculoskeletal:  Rom, muscular strength intact    Labs:  CBC:   Recent Labs     09/18/22 0821   WBC 5.0   HGB 13.0*          BMP:    Recent Labs     09/18/22 0821 09/20/22  0035    133*   K 4.2 4.1    96*   CO2 29 28   BUN 19 14   CREATININE 0.8 0.6*   GLUCOSE 123* 124*       Ca/Mg/Phos:   Recent Labs     09/18/22 0821 09/20/22  0035   CALCIUM 8.8 8.9   MG 1.60*  --        Hepatic:   Recent Labs     09/18/22 0821   AST 21   ALT 23   BILITOT 0.4   ALKPHOS 116       Troponin:   No results for input(s): TROPONINI in the last 72 hours. BNP: No results for input(s): BNP in the last 72 hours. Lipids: No results for input(s): CHOL, TRIG, HDL, LDLCALC, LABVLDL in the last 72 hours. ABGs: No results for input(s): PHART, PO2ART, LBS5ZGA in the last 72 hours. INR: No results for input(s): INR in the last 72 hours. UA:  No results for input(s): Epifanio Alu, GLUCOSEU, BILIRUBINUR, KETUA, SPECGRAV, BLOODU, PHUR, PROTEINU, UROBILINOGEN, NITRU, LEUKOCYTESUR, Ceasar Nurse in the last 72 hours. Urine Microscopic:   No results for input(s): LABCAST, BACTERIA, COMU, HYALCAST, WBCUA, RBCUA, EPIU in the last 72 hours. Urine Culture: No results for input(s): LABURIN in the last 72 hours. Urine Chemistry: No results for input(s): Marvin Alley, PROTEINUR, NAUR in the last 72 hours. IMAGING:  XR CERVICAL SPINE (2-3 VIEWS)   Final Result      Fusion in the cervical spine. XR CERVICAL SPINE (2-3 VIEWS)   Final Result   1. Intraoperative imaging using CT and fluoroscopy during posterior cervical decompression and fusion. FLUORO FOR SURGICAL PROCEDURES   Final Result   1. Intraoperative imaging using CT and fluoroscopy during posterior cervical decompression and fusion. CT CERVICAL SPINE WO CONTRAST   Final Result   1.  Redemonstration of anterolisthesis of C2 on C3 without obvious fracture. There is severe facet arthrosis at this level   2. Redemonstration of anterolisthesis of C4-5 likely related to severe facet arthrosis at this level   3. Minimal retrolisthesis of C3 on C4 redemonstrated, likely related to severe degenerative disc disease at this level as well as associated facet arthrosis   4. Marked degenerative disc disease C3-4, C5-6, C6-7, C7-T1   5. Diffuse severe facet arthrosis            XR CERVICAL SPINE (2-3 VIEWS)   Final Result      Grade 1 anterolisthesis of C2 on C3. Multilevel moderate to severe degenerative disc disease. MRI LUMBAR SPINE WO CONTRAST   Final Result   1. Multilevel severe central canal and foraminal stenosis L2-3 through L5-S1, secondary to multifactorial degenerative changes and posterior epidural lipomatosis   2. Redundancy in edematous nerve roots at the L1-2 level secondary to chronic lumbar central canal stenosis. 2. Extruded disc herniation L3-L4 with left lateral recess and subarticular migration      Compared prior study there is been progression of spinal stenosis secondary to chronic multifactorial degenerative changes posterior epidural fat. No change in the degree of facet arthropathy and foraminal stenosis. MRI THORACIC SPINE WO CONTRAST   Final Result      1. Mild scoliosis and degenerative disc disease concordant with prior CT   2. No abnormal cord signal, central canal, compression or canal stenosis         MRI CERVICAL SPINE WO CONTRAST   Final Result      1. Cord myelopathy, cord edema with hyperintense signal noted on STIR sequence at the C2-3 and C3 level   2. Severe central canal stenosis C2-C3 which is improved with the patient's collar on compared to prior study   3. Severe canal stenosis C3-C4 with central disc herniation, also slightly improved with the patient's collar on during the study   4.  Multilevel bilateral foraminal stenosis                 Assessment/Plan :      1. Hyponatremia. Likely 2/2 fluid intake . Drinks excess beer  Limit daily fluid intake to 1200 ml   Off IV fluids   Urine sodium and urine osmolality - reviewed     2. HTN. Off IVF   Pain control     3. Acute cervical cord compression with edema:  Neurosurgery consulted.   Sx done       Monitor sodium levels               Thank you for allowing us to participate in care of Ashlee Hoang         Electronically signed by: Tena Dick MD, 9/20/2022, 10:30 AM      [facility] [address]  Office : [phone]  Fax :[phone]

## 2022-09-20 NOTE — PROGRESS NOTES
Physical Therapy  Facility/Department: [facility] 5T ORTHO/NEURO  Daily Treatment Note  NAME: Yonis Henderson  : 1955  MRN: [id number]    Date of Service: 2022    Discharge Recommendations:  Yonis Patient scored a 17/24 on the AM-PAC short mobility form. Current research shows that an AM-PAC score of 17 or less is typically not associated with a discharge to the patient's home setting. Based on the patient's AM-PAC score and their current functional mobility deficits, it is recommended that the patient have 5-7 sessions per week of Physical Therapy at d/c to increase the patient's independence. At this time, this patient demonstrates complex nursing, medical, and rehabilitative needs, and would benefit from intensive rehabilitation services upon discharge from the Inpatient setting. This patient demonstrates the ability to participate in and benefit from an intensive therapy program with a coordinated interdisciplinary team approach to foster frequent, structured, and documented communication among disciplines, who will work together to establish, prioritize, and achieve treatment goals. Please see assessment section for further patient specific details. If patient discharges prior to next session this note will serve as a discharge summary. Please see below for the latest assessment towards goals. Patient would benefit from continued therapy after discharge   PT Equipment Recommendations  Other: defer to next level of care    Patient Diagnosis(es): The encounter diagnosis was Cervical stenosis of spine. Assessment   Assessment: Pt continues to present below baseline mobility- requiring CGA for transfers and ambulation with RW and is independent without device at baseline. Pt currently fatigues quickly with mobility.  Concerns for safety at home in which pt chadwick not have 24/7 supervision/assist available and has 10-15 steps to enter apartment Pt would benefit from intensive IP PT at discharge in order to progress towards prior level of function and independence. Activity Tolerance: Patient limited by pain; Patient limited by fatigue  Other: defer to next level of care     Plan    Plan  Plan: 5-7 times per week  Current Treatment Recommendations: Strengthening;Balance training;Functional mobility training;Gait training; Safety education & training;Patient/Caregiver education & training; Endurance training     Restrictions  Position Activity Restriction  Other position/activity restrictions: up with assistance;  [place] J Collar -Cervical collar to be worn at all times, Cervical collar does NOT need to be worn when eating, bathing or showering, Cervical collar pads to be cleaned with soap & water, air dried, and changed daily     Subjective    Subjective  Subjective: Pt supine in bed upon approach and agreeable to PT  Pain: Denies Pain  Orientation  Overall Orientation Status: Within Functional Limits  Cognition  Overall Cognitive Status: WFL     Objective   Vitals     Bed Mobility Training  Bed Mobility Training: Yes  Supine to Sit: Contact-guard assistance  Balance  Sitting: Intact  Standing: With support (static: SBA with UE support on RW)  Transfer Training  Transfer Training: Yes  Interventions: Verbal cues (VC for safe positioning prior to stand>sit)  Sit to Stand: Contact-guard assistance (at recliner with RW)  Stand to Sit: Contact-guard assistance (at recliner with RW)  Gait Training  Gait Training: Yes  Gait  Overall Level of Assistance: Contact-guard assistance  Interventions: Verbal cues (VC for upright posture and safe positioning of RW)  Speed/Pearl: Slow  Step Length: Right shortened;Left shortened  Distance (ft): 100 Feet (fatigues quickly with multiple standing rest breaks taken throughout)  Assistive Device: Walker, rolling     PT Exercises  Exercise Treatment: Pt completes x10 B LE standing marches, heel raises, and minisqauts with B UE support on RW and CGA. VC for technique.      Safety Devices  Type of Devices: Call light within reach; Chair alarm in place; Patient at risk for falls; Left in chair;Nurse notified;Gait belt; All fall risk precautions in place       Goals  Short Term Goals  Time Frame for Short term goals: By discharge- all ongoing  Short term goal 1: Sup to sit supervision  Short term goal 2: Pt will transfer sit to stand supervision  Short term goal 3: Pt will amb >50' with LRAD supervision    Education  Patient Education  Education Given To: Patient  Education Provided: Role of Therapy;Plan of Care;Precautions;IADL Safety  Education Provided Comments: DC recommendations  Education Method: Verbal  Education Outcome: Verbalized understanding;Continued education needed    Therapy Time   Individual Concurrent Group Co-treatment   Time In 1535         Time Out 1600         Minutes 25         Timed Code Treatment Minutes: [address]

## 2022-09-20 NOTE — PROGRESS NOTES
Occupational Therapy  Facility/Department: [facility]  Occupational Therapy Treatment    Name: Dain Zambrano  : 1955  MRN: [id number]  Date of Service: 2022    Discharge Recommendations: Dain Zambrano scored a 16/24 on the AM-PAC ADL Inpatient form. Current research shows that an AM-PAC score of 17 or less is typically not associated with a discharge to the patient's home setting. Based on the patient's AM-PAC score and their current ADL deficits, it is recommended that the patient have 5-7 sessions per week of Occupational Therapy at d/c to increase the patient's independence. At this time, this patient demonstrates complex nursing, medical, and rehabilitative needs, and would benefit from intensive rehabilitation services upon discharge from the Inpatient setting. This patient demonstrates the ability to participate in and benefit from an intensive therapy program with a coordinated interdisciplinary team approach to foster frequent, structured, and documented communication among disciplines, who will work together to establish, prioritize, and achieve treatment goals. Please see assessment section for further patient specific details. If patient discharges prior to next session this note will serve as a discharge summary. Please see below for the latest assessment towards goals. Patient Diagnosis(es): The encounter diagnosis was Cervical stenosis of spine. Past Medical History:  has a past medical history of Arthritis, Atrial fibrillation ([address] CAD (coronary artery disease), Hyperlipidemia, and Hypertension. Past Surgical History:  has a past surgical history that includes back surgery (); Coronary angioplasty with stent (); and cervical fusion (N/A, 9/15/2022). Treatment Diagnosis: impaired mobility, transfers, ADL      Assessment   Performance deficits / Impairments: Decreased functional mobility ; Decreased ADL status; Decreased endurance  Assessment: Pt completing mobility with CGA, slow and effortful with RW. Mobility in room and hallway. Assist with UB/LB bathing and dressing. Pt fatigues quickly, rest breaks. Pt would benefit from cont skilled OT while in acute care and cont intensive inpt at [facility]. Pt is from home and IND at baseline with steps to enter home. Cont POC. REQUIRES OT FOLLOW-UP: Yes  Activity Tolerance  Activity Tolerance: Patient Tolerated treatment well        Plan   Plan  Times per Week: 5-7  Times per Day: Daily     Restrictions  Position Activity Restriction  Other position/activity restrictions: up with assistance;  [place] J Collar -Cervical collar to be worn at all times, Cervical collar does NOT need to be worn when eating, bathing or showering, Cervical collar pads to be cleaned with soap & water, air dried, and changed daily    Subjective   General  Chart Reviewed: Yes  Additional Pertinent Hx: 74 y.o. M with PMHx of anterolisthesis of cervical spine, A.fib s/p ablation, HTN, HLD, CAD, BPH, Carpal Tunnel, and herniated disc p/w back pain, numbness in arms and legs, and weakness in arms and legs. S/p C2-T2 POSTERIOR DECOMPRESSION FUSION AND FIXATION  Referring Practitioner: Aleks Strauss MD  Diagnosis: cord compression  Subjective  Subjective:  In bed agreeable to session with encouragement, \"I would like to walk, can I get some new pants\"     Social/Functional History  Social/Functional History  Lives With: Spouse  Type of Home: Apartment (3rd floor)  Home Layout: One level  Home Access: Elevator, Stairs to enter with rails (elevator to thrid floor but then has a long way of walking + stairs to his wing of the complex/apartment)  Entrance Stairs - Number of Steps: 5-7 + 3-4 steps+ 2-3 (long hallway with steps)  Bathroom Shower/Tub: Tub/Shower unit  [place] Toilet: Handicap height  Bathroom Equipment: Shower chair, Grab bars in shower, Grab bars around toilet  Home Equipment:  (none)  Has the patient had two or more falls in the past year or any fall with injury in the past year?: No (has had falls in the past May 2021)  ADL Assistance: Independent  Homemaking Assistance: Independent  Ambulation Assistance: Independent  Transfer Assistance: Independent  Active : Yes  Additional Comments: Pt questionable historian, lethargic confused at times. Reports daughter may be able to stay wuth him at [place]  Type of Devices: Call light within reach; Chair alarm in place; Patient at risk for falls; Left in chair;Nurse notified;Gait belt; All fall risk precautions in place  Bed Mobility Training  Bed Mobility Training: Yes  Supine to Sit: Contact-guard assistance  Balance  Sitting: Intact  Standing: With support  Transfer Training  Transfer Training: Yes  Sit to Stand: Contact-guard assistance  Stand to Sit: Contact-guard assistance  Gait  Overall Level of Assistance: Contact-guard assistance (with RW, slow and effortful)  Assistive Device: Walker, rolling        ADL  UE Bathing: Stand by assistance  LE Bathing: Minimal assistance  UE Dressing: Minimal assistance  LE Dressing: Minimal assistance (donned socks and brief with SBA figure 4, assist with pants)                 Cognition  Overall Cognitive Status: WFL  Orientation  Overall Orientation Status: Within Functional Limits                  Education Given To: Patient  Education Provided: ADL Adaptive Strategies;Transfer Training; Fall Prevention Strategies;Precautions; Energy Conservation  Education Method: Demonstration;Verbal  Barriers to Learning: None  Education Outcome: Continued education needed                          AM-PAC Score        AM-[facility] Inpatient Daily Activity Raw Score: 16 (09/20/22 1506)  AM-PAC Inpatient ADL T-Scale Score : 35.96 (09/20/22 1506)  ADL Inpatient CMS 0-100% Score: 53.32 (09/20/22 1506)  ADL Inpatient CMS G-Code Modifier : CK (09/20/22 1506)           Goals  Short Term Goals  Time Frame for Short term goals: dc  Short Term Goal 1: supine to sit SPVN - ongoing  Short Term Goal 2: sit<>stand SBA - ongoing  Short Term Goal 3: Fx mobility SBA - ongoing  Short Term Goal 4: UB/LB dressing SBA + AE as needed - partially met (LB) 9/18  Short Term Goal 5: toileting with SBA - ongoing       Therapy Time   Individual Concurrent Group Co-treatment   Time In 1415         Time Out 1453         Minutes 38             Timed Code Treatment Minutes:  38    Total Treatment Minutes:  [address], OT

## 2022-09-20 NOTE — PROGRESS NOTES
Pt is A&O, VSS, pain is being managed with pain medicine per MAR. Incision site CDI, cervical collar on all times. Close suction drainage is in place draining bloody drainage. All fall precaution is in place. Call light is within the reach.

## 2022-09-20 NOTE — PLAN OF CARE
Problem: Discharge Planning  Goal: Discharge to home or other facility with appropriate resources  9/20/2022 0802 by Linda Wallis RN  Outcome: Progressing     Problem: Pain  Goal: Verbalizes/displays adequate comfort level or baseline comfort level  9/20/2022 0802 by Linda Wallis RN  Outcome: Progressing     Problem: Safety - Adult  Goal: Free from fall injury  9/20/2022 0802 by Linda Wallis RN  Outcome: Progressing   Fall risk precaution in place. Bed is locked and in lowest position. 2/4 side rails are up. Call light with in reach. Fall risk bracelet in place, non slip socks on.frequent check on patient. free from falls at this time. will continue to monitor.       Problem: ABCDS Injury Assessment  Goal: Absence of physical injury  9/20/2022 0802 by Linda Wallis RN  Outcome: Progressing     Problem: Neurosensory - Adult  Goal: Achieves stable or improved neurological status  9/20/2022 0802 by Linda Wallis RN  Outcome: Progressing     Problem: Cardiovascular - Adult  Goal: Maintains optimal cardiac output and hemodynamic stability  Outcome: Progressing     Problem: Respiratory - Adult  Goal: Achieves optimal ventilation and oxygenation  9/20/2022 0802 by Linda Wallis RN  Outcome: Progressing

## 2022-09-20 NOTE — PROGRESS NOTES
Hospitalist Progress Note      PCP: [place]    Date of Admission: 9/9/2022    Hospital Course: 74 y.o. male who presented to [facility] with past medical history of arthritis, CAD, hypertension, hyperlipidemia, atrial fibrillation, BPH presented to the ED for difficulty functioning and ADL or even walking. MRI showed C2-C3 cord compression with edema. Neurosurgery consulted patient is s/p C2-T2 D/F/F    Subjective: No acute events reported overnight. Patient states that the pain is currently controlled on the present regimen. Denies any nausea, vomiting.     Medications:  Reviewed    Infusion Medications    sodium chloride 25 mL/hr at 09/17/22 0433     Scheduled Medications    magnesium hydroxide  30 mL Oral Daily    heparin (porcine)  5,000 Units SubCUTAneous 3 times per day    sodium chloride flush  5-40 mL IntraVENous 2 times per day    polyethylene glycol  17 g Oral Daily    methocarbamol  1,000 mg Oral 4x Daily    sennosides-docusate sodium  2 tablet Oral BID    acetaminophen  1,000 mg Oral Q6H    naloxegol  12.5 mg Oral QAM AC    NIFEdipine  30 mg Oral Daily    thiamine mononitrate  100 mg Oral Daily    metoprolol succinate  75 mg Oral Daily    tamsulosin  0.4 mg Oral Daily    nicotine  1 patch TransDERmal Daily    atorvastatin  40 mg Oral Daily    lisinopril  40 mg Oral Daily    meloxicam  7.5 mg Oral Daily     PRN Meds: sodium chloride flush, sodium chloride, naloxone, aluminum & magnesium hydroxide-simethicone, bisacodyl, promethazine **OR** ondansetron, oxyCODONE **OR** oxyCODONE, diazePAM, magnesium hydroxide, hydrALAZINE, polyvinyl alcohol, sodium chloride, ondansetron **OR** ondansetron, polyethylene glycol      Intake/Output Summary (Last 24 hours) at 9/20/2022 0824  Last data filed at 9/20/2022 5406  Gross per 24 hour   Intake 480 ml   Output 2002 ml   Net -1522 ml         Physical Exam Performed:    BP (!) 154/97   Pulse 84   Temp 97.8 °F (36.6 °C) (Oral)   Resp 16 Ht 6' 2\" (1.88 m)   Wt 258 lb 12.8 oz (117.4 kg)   SpO2 98%   BMI 33.23 kg/m²     General appearance: Appears in mild distress due to pain  HEENT: NC/AT, EOMI, PERRLA  Neck: Cervical collar in place  Respiratory:  Normal respiratory effort. Clear to auscultation, bilaterally without Rales/Wheezes/Rhonchi. Cardiovascular: Regular rate and rhythm with normal S1/S2 without murmurs, rubs or gallops. Abdomen: Soft, non-tender, non-distended with normal bowel sounds. Musculoskeletal: No clubbing, cyanosis or edema bilaterally. Full range of motion without deformity. Skin: Skin color, texture, turgor normal.  No rashes or lesions. Neurologic: Alert and awake x3, grossly nonfocal exam  Capillary Refill: Brisk,3 seconds, normal     Labs:   Recent Labs     09/18/22 0821   WBC 5.0   HGB 13.0*   HCT 39.4*          Recent Labs     09/18/22 0821 09/20/22  0035    133*   K 4.2 4.1    96*   CO2 29 28   BUN 19 14   CREATININE 0.8 0.6*   CALCIUM 8.8 8.9       Recent Labs     09/18/22 0821   AST 21   ALT 23   BILITOT 0.4   ALKPHOS 116       No results for input(s): INR in the last 72 hours. No results for input(s): Casie Shanks in the last 72 hours. Urinalysis:      Lab Results   Component Value Date/Time    NITRU Negative 09/08/2022 11:30 PM    WBCUA 1 09/08/2022 11:30 PM    BACTERIA None Seen 09/08/2022 11:30 PM    RBCUA 1 09/08/2022 11:30 PM    BLOODU Negative 09/08/2022 11:30 PM    SPECGRAV 1.015 09/08/2022 11:30 PM    GLUCOSEU Negative 09/08/2022 11:30 PM       Radiology:  XR CERVICAL SPINE (2-3 VIEWS)   Final Result      Fusion in the cervical spine. XR CERVICAL SPINE (2-3 VIEWS)   Final Result   1. Intraoperative imaging using CT and fluoroscopy during posterior cervical decompression and fusion. FLUORO FOR SURGICAL PROCEDURES   Final Result   1. Intraoperative imaging using CT and fluoroscopy during posterior cervical decompression and fusion.       CT CERVICAL SPINE WO CONTRAST   Final Result   1. Redemonstration of anterolisthesis of C2 on C3 without obvious fracture. There is severe facet arthrosis at this level   2. Redemonstration of anterolisthesis of C4-5 likely related to severe facet arthrosis at this level   3. Minimal retrolisthesis of C3 on C4 redemonstrated, likely related to severe degenerative disc disease at this level as well as associated facet arthrosis   4. Marked degenerative disc disease C3-4, C5-6, C6-7, C7-T1   5. Diffuse severe facet arthrosis            XR CERVICAL SPINE (2-3 VIEWS)   Final Result      Grade 1 anterolisthesis of C2 on C3. Multilevel moderate to severe degenerative disc disease. MRI LUMBAR SPINE WO CONTRAST   Final Result   1. Multilevel severe central canal and foraminal stenosis L2-3 through L5-S1, secondary to multifactorial degenerative changes and posterior epidural lipomatosis   2. Redundancy in edematous nerve roots at the L1-2 level secondary to chronic lumbar central canal stenosis. 2. Extruded disc herniation L3-L4 with left lateral recess and subarticular migration      Compared prior study there is been progression of spinal stenosis secondary to chronic multifactorial degenerative changes posterior epidural fat. No change in the degree of facet arthropathy and foraminal stenosis. MRI THORACIC SPINE WO CONTRAST   Final Result      1. Mild scoliosis and degenerative disc disease concordant with prior CT   2. No abnormal cord signal, central canal, compression or canal stenosis         MRI CERVICAL SPINE WO CONTRAST   Final Result      1. Cord myelopathy, cord edema with hyperintense signal noted on STIR sequence at the C2-3 and C3 level   2. Severe central canal stenosis C2-C3 which is improved with the patient's collar on compared to prior study   3. Severe canal stenosis C3-C4 with central disc herniation, also slightly improved with the patient's collar on during the study   4.  Multilevel bilateral foraminal stenosis                 Assessment/Plan:    Active Hospital Problems    Diagnosis     Pre-op evaluation [Z01.818]      Priority: Medium    Cord compression [facility]) [G95.20]      Priority: Medium    CAD in native artery [I25.10]      Cervical cord compression with edema s/p C2-T2 D/F/F  -Neurosurgery following, s/p C2-T2 posterior decompression fusion fixation on 9/15/2022.  - Continue pain control, muscle relaxants  -Drain still in place, neurosurgery monitoring output  -PT/OT, will likely need placement    Hypomagnesemia  - Replace and recheck    Hypertension  -Continue metoprolol, nifedipine, lisinopril    Paroxysmal atrial fibrillation  -Continue metoprolol, aspirin on hold    Tobacco abuse  - Nicotine patch    BPH  - Continue Flomax    DVT Prophylaxis: Subcu heparin  Diet: ADULT DIET; Regular  Code Status: Full Code    PT/OT Eval Status: Ongoing    Dispo -await neurosurgery clearance for discharge. Will likely need SNF placement.   Anticipate 1-2 more days in the hospital    VIKTORIA [facility], MD

## 2022-09-20 NOTE — PROGRESS NOTES
Occupational Therapy  Refusal   Attempt to see for OT treatment this PM. Pt currently refusing therapy services despite max encouragement. \"Ma'am I am in pain, can you come back later, I just cant do this, I did this yesterday and I am in pain right now. \" Explained that nurse recently provided pt with pain meds and this was an appropriate time for therapy 2/2 pain control. Education on the importance of participating in therapy daily. Pt cont to refuse becoming frustrated \"Ma'am I told you I am in pain, you have the worst timing, I was just going to lay down and rest.\" Pt cont to refuse therapy services despite education and encouragement.     Andie Conte, HOANG, OTR/L

## 2022-09-20 NOTE — PLAN OF CARE
Problem: Discharge Planning  Goal: Discharge to home or other facility with appropriate resources  Outcome: Progressing     Problem: Pain  Goal: Verbalizes/displays adequate comfort level or baseline comfort level  Outcome: Progressing     Problem: Safety - Adult  Goal: Free from fall injury  Outcome: Progressing     Problem: ABCDS Injury Assessment  Goal: Absence of physical injury  Outcome: Progressing     Problem: Neurosensory - Adult  Goal: Achieves stable or improved neurological status  Outcome: Progressing     Problem: Respiratory - Adult  Goal: Achieves optimal ventilation and oxygenation  Outcome: Progressing     Problem: Skin/Tissue Integrity  Goal: Absence of new skin breakdown  Description: 1. Monitor for areas of redness and/or skin breakdown  2. Assess vascular access sites hourly  3. Every 4-6 hours minimum:  Change oxygen saturation probe site  4. Every 4-6 hours:  If on nasal continuous positive airway pressure, respiratory therapy assess nares and determine need for appliance change or resting period.   Outcome: Progressing

## 2022-09-21 PROCEDURE — 97535 SELF CARE MNGMENT TRAINING: CPT

## 2022-09-21 PROCEDURE — 6370000000 HC RX 637 (ALT 250 FOR IP): Performed by: STUDENT IN AN ORGANIZED HEALTH CARE EDUCATION/TRAINING PROGRAM

## 2022-09-21 PROCEDURE — 6370000000 HC RX 637 (ALT 250 FOR IP)

## 2022-09-21 PROCEDURE — 6370000000 HC RX 637 (ALT 250 FOR IP): Performed by: NEUROLOGICAL SURGERY

## 2022-09-21 PROCEDURE — 6370000000 HC RX 637 (ALT 250 FOR IP): Performed by: INTERNAL MEDICINE

## 2022-09-21 PROCEDURE — 1200000000 HC SEMI PRIVATE

## 2022-09-21 PROCEDURE — 97530 THERAPEUTIC ACTIVITIES: CPT

## 2022-09-21 PROCEDURE — 6360000002 HC RX W HCPCS: Performed by: NEUROLOGICAL SURGERY

## 2022-09-21 PROCEDURE — 2580000003 HC RX 258: Performed by: NEUROLOGICAL SURGERY

## 2022-09-21 PROCEDURE — 6370000000 HC RX 637 (ALT 250 FOR IP): Performed by: NURSE PRACTITIONER

## 2022-09-21 PROCEDURE — 99232 SBSQ HOSP IP/OBS MODERATE 35: CPT | Performed by: INTERNAL MEDICINE

## 2022-09-21 RX ORDER — METHOCARBAMOL 500 MG/1
1000 TABLET, FILM COATED ORAL 4 TIMES DAILY
Qty: 40 TABLET | Refills: 0 | Status: SHIPPED | OUTPATIENT
Start: 2022-09-21 | End: 2022-09-26

## 2022-09-21 RX ORDER — LIDOCAINE 4 G/G
1 PATCH TOPICAL DAILY
Status: DISCONTINUED | OUTPATIENT
Start: 2022-09-21 | End: 2022-09-22

## 2022-09-21 RX ADMIN — ACETAMINOPHEN 1000 MG: 500 TABLET ORAL at 12:30

## 2022-09-21 RX ADMIN — METHOCARBAMOL TABLETS 1000 MG: 500 TABLET, COATED ORAL at 21:19

## 2022-09-21 RX ADMIN — SENNOSIDES AND DOCUSATE SODIUM 2 TABLET: 50; 8.6 TABLET ORAL at 21:19

## 2022-09-21 RX ADMIN — DIAZEPAM 5 MG: 5 TABLET ORAL at 06:17

## 2022-09-21 RX ADMIN — NIFEDIPINE 30 MG: 30 TABLET, EXTENDED RELEASE ORAL at 08:22

## 2022-09-21 RX ADMIN — MELOXICAM 7.5 MG: 7.5 TABLET ORAL at 08:23

## 2022-09-21 RX ADMIN — ACETAMINOPHEN 1000 MG: 500 TABLET ORAL at 00:10

## 2022-09-21 RX ADMIN — METOPROLOL SUCCINATE 75 MG: 50 TABLET, FILM COATED, EXTENDED RELEASE ORAL at 08:22

## 2022-09-21 RX ADMIN — OXYCODONE 10 MG: 5 TABLET ORAL at 09:06

## 2022-09-21 RX ADMIN — METHOCARBAMOL TABLETS 1000 MG: 500 TABLET, COATED ORAL at 08:22

## 2022-09-21 RX ADMIN — ATORVASTATIN CALCIUM 40 MG: 40 TABLET, FILM COATED ORAL at 08:22

## 2022-09-21 RX ADMIN — HEPARIN SODIUM 5000 UNITS: 5000 INJECTION INTRAVENOUS; SUBCUTANEOUS at 06:17

## 2022-09-21 RX ADMIN — ACETAMINOPHEN 1000 MG: 500 TABLET ORAL at 18:21

## 2022-09-21 RX ADMIN — MAGNESIUM HYDROXIDE 30 ML: 400 SUSPENSION ORAL at 08:22

## 2022-09-21 RX ADMIN — ACETAMINOPHEN 1000 MG: 500 TABLET ORAL at 06:17

## 2022-09-21 RX ADMIN — SODIUM CHLORIDE, PRESERVATIVE FREE 10 ML: 5 INJECTION INTRAVENOUS at 21:20

## 2022-09-21 RX ADMIN — NALOXEGOL OXALATE 12.5 MG: 12.5 TABLET, FILM COATED ORAL at 06:17

## 2022-09-21 RX ADMIN — Medication 100 MG: at 08:22

## 2022-09-21 RX ADMIN — HEPARIN SODIUM 5000 UNITS: 5000 INJECTION INTRAVENOUS; SUBCUTANEOUS at 14:13

## 2022-09-21 RX ADMIN — HEPARIN SODIUM 5000 UNITS: 5000 INJECTION INTRAVENOUS; SUBCUTANEOUS at 21:20

## 2022-09-21 RX ADMIN — TAMSULOSIN HYDROCHLORIDE 0.4 MG: 0.4 CAPSULE ORAL at 08:22

## 2022-09-21 RX ADMIN — OXYCODONE 10 MG: 5 TABLET ORAL at 14:13

## 2022-09-21 RX ADMIN — LISINOPRIL 40 MG: 40 TABLET ORAL at 08:22

## 2022-09-21 RX ADMIN — OXYCODONE 10 MG: 5 TABLET ORAL at 21:19

## 2022-09-21 RX ADMIN — SODIUM CHLORIDE, PRESERVATIVE FREE 10 ML: 5 INJECTION INTRAVENOUS at 08:23

## 2022-09-21 RX ADMIN — METHOCARBAMOL TABLETS 1000 MG: 500 TABLET, COATED ORAL at 18:21

## 2022-09-21 RX ADMIN — METHOCARBAMOL TABLETS 1000 MG: 500 TABLET, COATED ORAL at 12:30

## 2022-09-21 RX ADMIN — DIAZEPAM 5 MG: 5 TABLET ORAL at 17:28

## 2022-09-21 ASSESSMENT — PAIN DESCRIPTION - LOCATION
LOCATION: SHOULDER
LOCATION: SHOULDER;NECK

## 2022-09-21 ASSESSMENT — PAIN SCALES - GENERAL
PAINLEVEL_OUTOF10: 8
PAINLEVEL_OUTOF10: 7
PAINLEVEL_OUTOF10: 8

## 2022-09-21 ASSESSMENT — PAIN - FUNCTIONAL ASSESSMENT
PAIN_FUNCTIONAL_ASSESSMENT: ACTIVITIES ARE NOT PREVENTED
PAIN_FUNCTIONAL_ASSESSMENT: ACTIVITIES ARE NOT PREVENTED

## 2022-09-21 ASSESSMENT — PAIN DESCRIPTION - ORIENTATION: ORIENTATION: RIGHT;LEFT

## 2022-09-21 ASSESSMENT — PAIN DESCRIPTION - DESCRIPTORS: DESCRIPTORS: ACHING

## 2022-09-21 NOTE — PROGRESS NOTES
NEUROSURGERY POST-OP PROGRESS NOTE    Patient Name: Aiden Medel YOB: 1955   Sex: Male Age: 74 yrs     Medical Record Number: [id number] Acct Number: [de-identified]   Room Number: 8680/3332-88 Hospital Day: Hospital Day: 13     Interval History:  Post-operative Day# 6 s/p Procedure(s) (LRB):  C2-T2 POSTERIOR DECOMPRESSION FUSION AND FIXATION (N/A)    Subjective: C-collar on, sitting up in bed. Eating breakfast     Objective:    VITAL SIGNS   BP (!) 155/88   Pulse 73   Temp 97.8 °F (36.6 °C) (Oral)   Resp 16   Ht 6' 2\" (1.88 m)   Wt 258 lb 12.8 oz (117.4 kg)   SpO2 96%   BMI 33.23 kg/m²    Height Height: 6' 2\" (188 cm)   Weight Weight: 258 lb 12.8 oz (117.4 kg)        Allergies No Known Allergies   NPO Status ADULT DIET; Regular   Isolation No active isolations     LABS   Basic Metabolic Profile Recent Labs     09/20/22  0035   *   CL 96*   CO2 28   BUN 14   CREATININE 0.6*   GLUCOSE 124*        Complete Blood Count No results for input(s): WBC, RBC, HEMOGLOBIN in the last 72 hours. Invalid input(s): HEMATOCRIT     Coagulation Studies No results for input(s): PTT, INR in the last 72 hours.     Invalid input(s): PLATELETS, PROA, PT, PTTA     MEDICATIONS   Inpatient Medications     lidocaine, 1 patch, TransDERmal, Daily    magnesium hydroxide, 30 mL, Oral, Daily    heparin (porcine), 5,000 Units, SubCUTAneous, 3 times per day    sodium chloride flush, 5-40 mL, IntraVENous, 2 times per day    polyethylene glycol, 17 g, Oral, Daily    [COMPLETED] methocarbamol IVPB, 1,000 mg, IntraVENous, Q8H **FOLLOWED BY** methocarbamol, 1,000 mg, Oral, 4x Daily    sennosides-docusate sodium, 2 tablet, Oral, BID    acetaminophen, 1,000 mg, Oral, Q6H    naloxegol, 12.5 mg, Oral, QAM AC    NIFEdipine, 30 mg, Oral, Daily    thiamine mononitrate, 100 mg, Oral, Daily    metoprolol succinate, 75 mg, Oral, Daily    tamsulosin, 0.4 mg, Oral, Daily    nicotine, 1 patch, TransDERmal, Daily    atorvastatin, 40 mg, Oral, Daily    lisinopril, 40 mg, Oral, Daily    meloxicam, 7.5 mg, Oral, Daily   Infusions    sodium chloride 25 mL/hr at 09/17/22 0433      Antibiotics   Recent Abx Admin        No antibiotic orders with administrations found. Neurologic Exam:  Mental status: awake and alert and oriented x4    Musculoskeletal:   Gait: Not tested   Tone: normal  Sensory: intact to all extremities  Motor strength:    Right  Left    Right  Left    Deltoid  4 4  Hip Flex  4 4   Biceps  4 4  Knee Extensors  4 4   Triceps  4 4  Knee Flexors  4 4   Wrist Ext  4 4  Ankle Dorsiflex. 4 4   Wrist Flex  4 4  Ankle Plantarflex. 4 4   Handgrip  4 4  Ext Celso Longus  4 4   Thumb Ext  4 4         Incision: intact, clean and dry  Drain: in upper back, 90/19=986 mL for last 24 hr.     Respiratory:  Unlabored respiratory pattern    Abdomen:   Soft, ND active BS  Last BM: 9/19/22    Cardiovascular:  Warm, well perfused    Assessment   Patient is a 75 yo male s/p Procedure(s) (LRB):  C2-T2 POSTERIOR DECOMPRESSION FUSION AND FIXATION (N/A)   Plan:  Neurologic exam frequency: Q4hr  C-collar when up  Mobility: PT/OT, up to chair   Diet: Adult  Drain: in upper back, 165mL out for last 24 hr.   DVT Prophylaxis: SCDs and heparin SQ  GI Prophylaxis: pepcid  Bowel Regimen: senokot and glycolax  Pain control:  tylenol and oxycodone   Muscle spasm: robaxin and valium  Incisional Care: dressing intact. Dispo Planning: remain in patient for pain control and work with PT for improved mobility, plan is for patient to go to [facility], waiting for precert. Patient was discussed with Dr. Barbra Muniz who agrees with above assessment and plan.      Electronically signed by: DOUGLAS Lewis CNP, 9/21/2022 11:04 AM   Neurosurgery Nurse Practitioner  [phone]

## 2022-09-21 NOTE — PROGRESS NOTES
Pt A&O x4, VSS. Surgical dressing CDI. Hemovacs putting out bloody drainage this shift. Pain being managed with medications per [facility] and has been effective for pt. Voiding adequately via urinal this shift. Tolerating diet and fluids with no issues. Fall precautions in place. Pt in bed with call light in reach.

## 2022-09-21 NOTE — PLAN OF CARE
Problem: Discharge Planning  Goal: Discharge to home or other facility with appropriate resources  9/21/2022 1121 by Shira Redmond RN  Outcome: Progressing  9/20/2022 2358 by Tish Mckay RN  Outcome: Progressing     Problem: Pain  Goal: Verbalizes/displays adequate comfort level or baseline comfort level  9/21/2022 1121 by Shira Redmond RN  Outcome: Progressing  9/20/2022 2358 by Tish Mckay RN  Outcome: Progressing     Problem: Safety - Adult  Goal: Free from fall injury  9/21/2022 1121 by Shira Redmond RN  Outcome: Progressing  9/20/2022 2358 by Tish Mckya RN  Outcome: Progressing     Problem: ABCDS Injury Assessment  Goal: Absence of physical injury  Outcome: Progressing     Problem: Neurosensory - Adult  Goal: Achieves stable or improved neurological status  9/21/2022 1121 by Shira Redmond RN  Outcome: Progressing  9/20/2022 2358 by Tish Mckay RN  Outcome: Progressing     Problem: Cardiovascular - Adult  Goal: Maintains optimal cardiac output and hemodynamic stability  Outcome: Progressing     Problem: Respiratory - Adult  Goal: Achieves optimal ventilation and oxygenation  9/21/2022 1121 by Shira Redmond RN  Outcome: Progressing  9/20/2022 2358 by Tish Mckay RN  Outcome: Progressing     Problem: Skin/Tissue Integrity  Goal: Absence of new skin breakdown  Description: 1. Monitor for areas of redness and/or skin breakdown  2. Assess vascular access sites hourly  3. Every 4-6 hours minimum:  Change oxygen saturation probe site  4. Every 4-6 hours:  If on nasal continuous positive airway pressure, respiratory therapy assess nares and determine need for appliance change or resting period.   Outcome: Progressing

## 2022-09-21 NOTE — PROGRESS NOTES
clubbing, cyanosis or edema bilaterally. Neurologic:  Grossly non-focal.  Psychiatric: Alert and oriented    Labs:     Recent Labs     09/20/22  0035   *   K 4.1   CL 96*   CO2 28   BUN 14   CREATININE 0.6*   CALCIUM 8.9       Urinalysis:      Lab Results   Component Value Date/Time    NITRU Negative 09/08/2022 11:30 PM    WBCUA 1 09/08/2022 11:30 PM    BACTERIA None Seen 09/08/2022 11:30 PM    RBCUA 1 09/08/2022 11:30 PM    BLOODU Negative 09/08/2022 11:30 PM    SPECGRAV 1.015 09/08/2022 11:30 PM    GLUCOSEU Negative 09/08/2022 11:30 PM       Radiology:  XR CERVICAL SPINE (2-3 VIEWS)   Final Result      Fusion in the cervical spine. XR CERVICAL SPINE (2-3 VIEWS)   Final Result   1. Intraoperative imaging using CT and fluoroscopy during posterior cervical decompression and fusion. FLUORO FOR SURGICAL PROCEDURES   Final Result   1. Intraoperative imaging using CT and fluoroscopy during posterior cervical decompression and fusion. CT CERVICAL SPINE WO CONTRAST   Final Result   1. Redemonstration of anterolisthesis of C2 on C3 without obvious fracture. There is severe facet arthrosis at this level   2. Redemonstration of anterolisthesis of C4-5 likely related to severe facet arthrosis at this level   3. Minimal retrolisthesis of C3 on C4 redemonstrated, likely related to severe degenerative disc disease at this level as well as associated facet arthrosis   4. Marked degenerative disc disease C3-4, C5-6, C6-7, C7-T1   5. Diffuse severe facet arthrosis            XR CERVICAL SPINE (2-3 VIEWS)   Final Result      Grade 1 anterolisthesis of C2 on C3. Multilevel moderate to severe degenerative disc disease. MRI LUMBAR SPINE WO CONTRAST   Final Result   1. Multilevel severe central canal and foraminal stenosis L2-3 through L5-S1, secondary to multifactorial degenerative changes and posterior epidural lipomatosis   2.  Redundancy in edematous nerve roots at the L1-2 level secondary to chronic lumbar central canal stenosis. 2. Extruded disc herniation L3-L4 with left lateral recess and subarticular migration      Compared prior study there is been progression of spinal stenosis secondary to chronic multifactorial degenerative changes posterior epidural fat. No change in the degree of facet arthropathy and foraminal stenosis. MRI THORACIC SPINE WO CONTRAST   Final Result      1. Mild scoliosis and degenerative disc disease concordant with prior CT   2. No abnormal cord signal, central canal, compression or canal stenosis         MRI CERVICAL SPINE WO CONTRAST   Final Result      1. Cord myelopathy, cord edema with hyperintense signal noted on STIR sequence at the C2-3 and C3 level   2. Severe central canal stenosis C2-C3 which is improved with the patient's collar on compared to prior study   3. Severe canal stenosis C3-C4 with central disc herniation, also slightly improved with the patient's collar on during the study   4. Multilevel bilateral foraminal stenosis             Assessment/Plan:    Cervical cord compression with edema s/p C2-T2 D/F/F  -Neurosurgery following, s/p C2-T2 posterior decompression fusion fixation on 9/15/2022.  - Continue pain control, muscle relaxants  -Drain still in place, neurosurgery monitoring output     Hypomagnesemia  Replaced. Hypertension  -Continue metoprolol, nifedipine, lisinopril     Paroxysmal atrial fibrillation  -Continue metoprolol. No full ac. Tobacco abuse  - Nicotine patch     BPH  - Continue Flomax    DVT Prophylaxis: Heparin subcut  Diet: ADULT DIET;  Regular  Code Status: Full Code  PT/OT Eval Status: 17/16    Dispo - inpatient rehab at [facility] once drains pulled and pre-cert obtained       [place] MD [place]

## 2022-09-21 NOTE — DISCHARGE INSTR - COC
Continuity of Care Form    Patient Name: Dain Zambrano   :  1955  MRN:  [id number]    Admit date:  2022  Discharge date:  ***    Code Status Order: Full Code   Advance Directives:     Admitting Physician:  Sagrario Velazquez MD  PCP: Vilma Owusu    Discharging Nurse: [facility] Unit/Room#: 7060/4361-95  Discharging Unit Phone Number: ***    Emergency Contact:   Extended Emergency Contact Information  Primary Emergency Contact:  Maggy montez, [address]  Home Phone: [phone]  Relation: Aunt/Uncle  Secondary Emergency Contact: Jayda Kateryna [place] [address] [address]  Phone: [phone]  Relation: Child    Past Surgical History:  Past Surgical History:   Procedure Laterality Date    BACK SURGERY  200    lower lumbar surgery    CERVICAL FUSION N/A 9/15/2022    C2-T2 POSTERIOR DECOMPRESSION FUSION AND FIXATION performed by Mabel Harry MD at [place] [address]         Immunization History:   Immunization History   Administered Date(s) Administered    Tdap (Boostrix, Adacel) 2020       Active Problems:  Patient Active Problem List   Diagnosis Code    CAD in native artery I25.10    Hyperlipidemia E78.5    Tobacco use Z72.0    Paroxysmal atrial fibrillation (HCC) I48.0    Tobacco abuse Z72.0    Typical atrial flutter I48.3    Coronary artery disease involving native coronary artery of native heart without angina pectoris I25.10    Morbid obesity due to excess calories (HCC) E66.01    Essential hypertension I10    Bilateral carpal tunnel syndrome G56.03    Chest pain R07.9    Anterolisthesis of cervical spine M43.12    Hyponatremia E87.1    Cord compression ([facility] [address] G95.20    Pre-op evaluation Z01.818       Isolation/Infection:   Isolation            No Isolation          Patient Infection Status       None to display            Nurse Assessment:  Last Vital Signs: BP (!) 140/82   Pulse 82   Temp 97.6 °F (36.4 °C) (Oral)   Resp 14   Ht 6' 2\" (1.88 m)   Wt 258 lb 12.8 oz (117.4 kg)   SpO2 96%   BMI 33.23 kg/m²     Last documented pain score (0-10 scale): Pain Level: 7  Last Weight:   Wt Readings from Last 1 Encounters:   09/14/22 258 lb 12.8 oz (117.4 kg)     Mental Status:  {IP PT MENTAL STATUS:86615}    IV Access:  { ELHAM IV ACCESS:602687322}    Nursing Mobility/ADLs:  Walking   {P DME BCID:102882124}  Transfer  {P DME TOZC:802749706}  Bathing  {P DME OQYF:054316125}  Dressing  {P DME AKSF:724943765}  Toileting  {[facility] DME WDNO:866591037}  Feeding  {[facility] DME LRSD:535355257}  Med Admin  {[facility] DME YZAT:749271053}  Med Delivery   { ELHAM MED Delivery:293344768}    Wound Care Documentation and Therapy:  Incision 09/15/22 Back Posterior (Active)   Dressing Status Clean;Dry; Intact 09/21/22 1050   Dressing/Treatment Dry dressing 09/21/22 1050   Closure Other (Comment) 09/21/22 1050   Drainage Amount None 09/21/22 1050   Odor None 09/21/22 1050   Number of days: 6        Elimination:  Continence: Bowel: {YES / WN:60965}  Bladder: {YES / RA:66113}  Urinary Catheter: {Urinary Catheter:416473914}   Colostomy/Ileostomy/Ileal Conduit: {YES / GB:03126}       Date of Last BM: ***    Intake/Output Summary (Last 24 hours) at 9/21/2022 1438  Last data filed at 9/21/2022 1212  Gross per 24 hour   Intake 500 ml   Output 1265 ml   Net -765 ml     I/O last 3 completed shifts:   In: 500 [P.O.:500]  Out: 36 [Urine:2950; Drains:240]    Safety Concerns:     [address] [employer] ELHAM Safety Concerns:388557134}    Impairments/Disabilities:      [address] [employer] Impairments/Disabilities:906311501}    Nutrition Therapy:  Current Nutrition Therapy:   [address] [employer] Diet List:042737160}    Routes of Feeding: {CHP DME Other Feedings:114305054}  Liquids: {Slp liquid thickness:09926}  Daily Fluid Restriction: {CHP DME Yes amt example:366848247}  Last Modified Barium Swallow with Video (Video Swallowing Test): {Done Not Done [facility]:243493397}    Treatments at the Time of

## 2022-09-21 NOTE — PROGRESS NOTES
Office : [phone]     Fax :[phone]       Nephrology  Note      Patient's Name: Marcela Blas  3:16 PM  9/21/2022    Reason for Consult:  hyponatremia       Requesting Physician: [id number]      09/21/22    Feels same   na stable       I/O last 3 completed shifts: In: 500 [P.O.:500]  Out: 36 [Urine:2950; Drains:240]    Past Medical History:   Diagnosis Date    Arthritis     Atrial fibrillation ([address]     CAD (coronary artery disease)     Hyperlipidemia     Hypertension        Past Surgical History:   Procedure Laterality Date    BACK SURGERY  200    lower lumbar surgery    CERVICAL FUSION N/A 9/15/2022    C2-T2 POSTERIOR DECOMPRESSION FUSION AND FIXATION performed by Augusta Beckford MD at [place] [address]  2012       Family History   Problem Relation Age of Onset    Heart Disease Mother     Diabetes Mother     Heart Disease Father     Diabetes Father     Diabetes Sister     Diabetes Brother     Diabetes Maternal Grandmother     Diabetes Maternal Grandfather     Diabetes Paternal Grandmother     Diabetes Paternal Grandfather         reports that he has been smoking cigarettes. He has a 40.00 pack-year smoking history. He has never used smokeless tobacco. He reports current alcohol use of about 20.0 standard drinks per week. He reports that he does not use drugs. Allergies:  Patient has no known allergies.     Current Medications:    lidocaine 4 % external patch 1 patch, Daily  magnesium hydroxide (MILK OF MAGNESIA) 400 MG/5ML suspension 30 mL, Daily  heparin (porcine) injection 5,000 Units, 3 times per day  sodium chloride flush 0.9 % injection 5-40 mL, 2 times per day  sodium chloride flush 0.9 % injection 5-40 mL, PRN  0.9 % sodium chloride infusion, PRN  naloxone (NARCAN) injection 0.2 mg, PRN  aluminum & magnesium hydroxide-simethicone (MAALOX) 200-200-20 MG/5ML suspension 15 mL, Q6H PRN  polyethylene glycol (GLYCOLAX) packet 17 g, Daily  bisacodyl (DULCOLAX) suppository 10 mg, Daily PRN  promethazine (PHENERGAN) tablet 12.5 mg, Q6H PRN   Or  ondansetron (ZOFRAN) injection 4 mg, Q6H PRN  oxyCODONE (ROXICODONE) immediate release tablet 5 mg, Q4H PRN   Or  oxyCODONE (ROXICODONE) immediate release tablet 10 mg, Q4H PRN  methocarbamol (ROBAXIN) tablet 1,000 mg, 4x Daily  sennosides-docusate sodium (SENOKOT-S) 8.6-50 MG tablet 2 tablet, BID  acetaminophen (TYLENOL) tablet 1,000 mg, Q6H  diazePAM (VALIUM) tablet 5 mg, Q6H PRN  naloxegol (MOVANTIK) tablet 12.5 mg, QAM AC  NIFEdipine (ADALAT CC) extended release tablet 30 mg, Daily  thiamine mononitrate tablet 100 mg, Daily  metoprolol succinate (TOPROL XL) extended release tablet 75 mg, Daily  tamsulosin (FLOMAX) capsule 0.4 mg, Daily  magnesium hydroxide (MILK OF MAGNESIA) 400 MG/5ML suspension 30 mL, Daily PRN  hydrALAZINE (APRESOLINE) injection 5 mg, Q6H PRN  nicotine (NICODERM CQ) 14 MG/24HR 1 patch, Daily  polyvinyl alcohol (LIQUIFILM TEARS) 1.4 % ophthalmic solution 1 drop, PRN  sodium chloride (OCEAN, BABY AYR) 0.65 % nasal spray 1 spray, PRN  atorvastatin (LIPITOR) tablet 40 mg, Daily  lisinopril (PRINIVIL;ZESTRIL) tablet 40 mg, Daily  meloxicam (MOBIC) tablet 7.5 mg, Daily  ondansetron (ZOFRAN-ODT) disintegrating tablet 4 mg, Q8H PRN   Or  ondansetron (ZOFRAN) injection 4 mg, Q6H PRN  polyethylene glycol (GLYCOLAX) packet 17 g, Daily PRN          Physical exam:     Vitals:  BP (!) 140/82   Pulse 82   Temp 97.6 °F (36.4 °C) (Oral)   Resp 14   Ht 6' 2\" (1.88 m)   Wt 258 lb 12.8 oz (117.4 kg)   SpO2 96%   BMI 33.23 kg/m²   Constitutional:  OAA X3 NAD  Skin: no rash, turgor wnl  Heent:  eomi, mmm  Neck: no bruits or jvd noted  Cardiovascular:  S1, S2 without m/r/g  Respiratory: CTA B without w/r/r  Abdomen:  +bs, soft, nt, nd  Ext: no  lower extremity edema  Psychiatric: mood and affect appropriate  Musculoskeletal:  Rom, muscular strength intact    Labs:  CBC:   No results for input(s): WBC, HGB, PLT in the last 72 hours. BMP:    Recent Labs     09/20/22  0035   *   K 4.1   CL 96*   CO2 28   BUN 14   CREATININE 0.6*   GLUCOSE 124*       Ca/Mg/Phos:   Recent Labs     09/20/22  0035   CALCIUM 8.9       Hepatic:   No results for input(s): AST, ALT, ALB, BILITOT, ALKPHOS in the last 72 hours. Troponin:   No results for input(s): TROPONINI in the last 72 hours. BNP: No results for input(s): BNP in the last 72 hours. Lipids: No results for input(s): CHOL, TRIG, HDL, LDLCALC, LABVLDL in the last 72 hours. ABGs: No results for input(s): PHART, PO2ART, GAL2RHF in the last 72 hours. INR: No results for input(s): INR in the last 72 hours. UA:  No results for input(s): Delene Belt, GLUCOSEU, BILIRUBINUR, KETUA, SPECGRAV, BLOODU, PHUR, PROTEINU, UROBILINOGEN, NITRU, LEUKOCYTESUR, [facility] [facility] in the last 72 hours. Urine Microscopic:   No results for input(s): LABCAST, BACTERIA, COMU, HYALCAST, WBCUA, RBCUA, EPIU in the last 72 hours. Urine Culture: No results for input(s): LABURIN in the last 72 hours. Urine Chemistry: No results for input(s): Manette Finders, PROTEINUR, NAUR in the last 72 hours. IMAGING:  XR CERVICAL SPINE (2-3 VIEWS)   Final Result      Fusion in the cervical spine. XR CERVICAL SPINE (2-3 VIEWS)   Final Result   1. Intraoperative imaging using CT and fluoroscopy during posterior cervical decompression and fusion. FLUORO FOR SURGICAL PROCEDURES   Final Result   1. Intraoperative imaging using CT and fluoroscopy during posterior cervical decompression and fusion. CT CERVICAL SPINE WO CONTRAST   Final Result   1. Redemonstration of anterolisthesis of C2 on C3 without obvious fracture.  There is severe facet arthrosis at this level   2. Redemonstration of anterolisthesis of C4-5 likely related to severe facet arthrosis at this level   3. Minimal retrolisthesis of C3 on C4 redemonstrated, likely related to severe degenerative disc disease at this level as well as associated facet arthrosis   4. Marked degenerative disc disease C3-4, C5-6, C6-7, C7-T1   5. Diffuse severe facet arthrosis            XR CERVICAL SPINE (2-3 VIEWS)   Final Result      Grade 1 anterolisthesis of C2 on C3. Multilevel moderate to severe degenerative disc disease. MRI LUMBAR SPINE WO CONTRAST   Final Result   1. Multilevel severe central canal and foraminal stenosis L2-3 through L5-S1, secondary to multifactorial degenerative changes and posterior epidural lipomatosis   2. Redundancy in edematous nerve roots at the L1-2 level secondary to chronic lumbar central canal stenosis. 2. Extruded disc herniation L3-L4 with left lateral recess and subarticular migration      Compared prior study there is been progression of spinal stenosis secondary to chronic multifactorial degenerative changes posterior epidural fat. No change in the degree of facet arthropathy and foraminal stenosis. MRI THORACIC SPINE WO CONTRAST   Final Result      1. Mild scoliosis and degenerative disc disease concordant with prior CT   2. No abnormal cord signal, central canal, compression or canal stenosis         MRI CERVICAL SPINE WO CONTRAST   Final Result      1. Cord myelopathy, cord edema with hyperintense signal noted on STIR sequence at the C2-3 and C3 level   2. Severe central canal stenosis C2-C3 which is improved with the patient's collar on compared to prior study   3. Severe canal stenosis C3-C4 with central disc herniation, also slightly improved with the patient's collar on during the study   4. Multilevel bilateral foraminal stenosis                 Assessment/Plan :      1. Hyponatremia.   Likely 2/2 fluid intake Etelvina Copping Drinks excess beer  Limit daily fluid intake to 1200 ml   Off IV fluids   Urine sodium and urine osmolality - reviewed     2. HTN. Off IVF   Pain control     3. Acute cervical cord compression with edema:  Neurosurgery consulted.   Sx done       Monitor sodium levels               Thank you for allowing us to participate in care of Marcelo Davis         Electronically signed by: Nelli Rodriguez MD, 9/21/2022, 3:16 PM      [facility] [address]  Office : [phone]  Fax :[phone]

## 2022-09-21 NOTE — CARE COORDINATION
CTN contacted Radha with [employer] [phone]. They have accepted this patient and will pull referral from [facility].  They will contact patient and make arrangements for [facility] by 9/23  Electronically signed by Sourav Garcia LPN on 2/16/3555 at 7:49 PM

## 2022-09-21 NOTE — PROGRESS NOTES
Occupational Therapy  Occupational Therapy  Daily Treatment Note  Patient Name: Luisa Georges  MRN: [id number]    Assessment:   Pt progressing this session with increased standing tolerance at sink. Pt requiring Vcs for safety and sequencing tasks and increased time for completion of all tasks. Functional mobility demonstrated with CGA and slow slightly unsteady gait. Pt would benefit from intensive inpt OT. Continue OT per POC. Discharge Recommendations:     Luisa Georges scored a 17/24 on the AM-PAC ADL Inpatient form. Current research shows that an AM-PAC score of 17 or less is typically not associated with a discharge to the patient's home setting. Based on the patient's AM-PAC score and their current ADL deficits, it is recommended that the patient have 5-7 sessions per week of Occupational Therapy at d/c to increase the patient's independence. At this time, this patient demonstrates complex nursing, medical, and rehabilitative needs, and would benefit from intensive rehabilitation services upon discharge from the Inpatient setting. This patient demonstrates the ability to participate in and benefit from an intensive therapy program with a coordinated interdisciplinary team approach to foster frequent, structured, and documented communication among disciplines, who will work together to establish, prioritize, and achieve treatment goals. Please see assessment section for further patient specific details. If patient discharges prior to next session this note will serve as a discharge summary. Please see below for the latest assessment towards goals.         Equipment Needs:  defer    Chart Reviewed: Yes       Other position/activity restrictions: up with assistance;  [place] J Collar -Cervical collar to be worn at all times, Cervical collar does NOT need to be worn when eating, bathing or showering, Cervical collar pads to be cleaned with soap & water, air dried, and changed daily     Additional Pertinent Hx: Pt is a 74 y.o. male adm 9/9 with cord compression. Pt presented to the [facility] ED complaining of having difficulty functioning or performing any ADL's or even walking. This is due to back pain and pain radiating into the arms and legs. He says the sx have progressed for weeks. MRI showed showing a C2-C3 cord compression with edema. Transferred to [facility] for further management. MRI Cspine:Cord myelopathy, cord edema with hyperintense signal noted on STIR sequence at the C2-3 and C3 level,  Severe central canal stenosis C2-C3 which is improved with the patient's collar on compared to prior study, Severe canal stenosis C3-C4 with central disc herniation, also slightly improved with the patient's collar on during the study. MRI Lspine:Multilevel severe central canal and foraminal stenosis L2-3 through L5-S1, secondary to multifactorial degenerative changes and posterior epidural lipomatosis, Redundancy in edematous nerve roots at the L1-2 level secondary to chronic lumbar central canal stenosis. Xray C spine:Grade 1 anterolisthesis of C2 on C3. Pt s/pC2-T2 POSTERIOR DECOMPRESSION FUSION AND FIXATION on 9/15. PMH:anterolisthesis of cervical spine, A.fib s/p ablation, HTN, HLD, CAD, BPH, Carpal Tunnel, and herniated disc      Diagnosis: cord compression  Treatment Diagnosis: impaired mobility, transfers, ADL    Subjective:   Pt met supine in bed and agreeable to OT treatment      Pain:   Increased pain with movement.  RN aware and pt repositioned to comfort at end of session      Social/Functional History  Lives With: Alone  Type of Home: Apartment (3rd floor)  Home Layout: One level  Home Access: Elevator, Stairs to enter with rails (elevator to thrid floor but then has a long way of walking + stairs to his wing of the complex/apartment)  Entrance Stairs - Number of Steps: 5-7 + 3-4 steps+ 2-3 (long hallway with steps)  Bathroom Shower/Tub: Tub/Shower unit  Bathroom Toilet: Handicap height  Bathroom Equipment: Shower chair, Grab bars in shower, Grab bars around toilet  Home Equipment:  (none)  Has the patient had two or more falls in the past year or any fall with injury in the past year?: No (has had falls in the past May 2021)  ADL Assistance: [address]: Independent  Ambulation Assistance: Independent  Transfer Assistance: Independent  Active : Yes  Additional Comments: Pt questionable historian, lethargic confused at times. Reports daughter may be able to stay wuth him at [facility]  Prior Function  ADL Assistance: Independent  Homemaking Assistance: Independent  Ambulation Assistance: Independent  Transfer Assistance: Independent  Additional Comments: Pt questionable historian, lethargic confused at times. Reports daughter may be able to stay wuth him at [place]    Objective:    Cognition/Orientation:  Impaired      Bed mobility   Rolling: CGA with Vcs for safe positioning  Supine to sit: CGA with HOB slight elevation  Scooting: CGA scooting back in chair     Functional Mobility   Sit to Stand: CGA to Min A from EOB with increased time for gaining balance. Vcs for safe hand placement  Stand to Sit: CGA to recliner chair with Vcs for safe positioning to transfer surface and hand placement  Bed to Chair Transfer: CGA from EOB to recliner chair with one seated rest break in bathroom on shower chair  Commode Transfer: decline  Other: Functional mobility to and from bathroom with RW and CGA. Pt with slow slightly unsteady gait requiring Vcs for upright stance and keeping RW close    ADLs   Grooming: CGA washing face and hands with Min A for oral care due to brace and water management  Bathing: UE washing completed seated in recliner chair with CGA  UB dressing: CGA donning gown seated in recliner chair  LB dressing: Min A pt using figure 4 for threading briefs  Toileting: Set up for use of urinal seated EOB  Other: Pt educated on replacing and cleaning pads on [place]. Pt would benefit from repetition of education. Activity Tolerance:  Pt limited by pain and fatigue. Patient Education:   Pt educated on replacement of pads on [facility], safe transfers and [facility] wearing schedule. Safety Devices in Place:  Pt left seated in recliner chair with alarm on and call light in reach      Second Session Therapy Time:   Individual Concurrent Group Co-treatment   Time In  1144         Time Out  1222         Minutes  38           Timed Code Treatment Minutes:38      Total Treatment Minutes:       Timed Code Treatment Minutes: Total Treatment Time:     Goals: (as determined and assessed by primary OT)   Short Term Goals  Time Frame for Short term goals: dc  Short Term Goal 1: supine to sit SPVN - ongoing  Short Term Goal 2: sit<>stand SBA - ongoing  Short Term Goal 3: Fx mobility SBA - ongoing  Short Term Goal 4: UB/LB dressing SBA + AE as needed - ongoing  Short Term Goal 5: toileting with SBA - ongoing         Plan:      Times per Week: 5-7   Times per Day: Daily    If patient is discharged prior to next treatment, this note will serve as the discharge summary.       [address] [place] [address], Ne, CRENSHAW/L

## 2022-09-21 NOTE — PROGRESS NOTES
Patient alert & oriented x4; VSS on room air. Pain controlled with PRN pain medications. Tolerating PO fluids and meals; voiding via urinal.     Plan to d/c tomorrow home with support.

## 2022-09-21 NOTE — PLAN OF CARE
Problem: Discharge Planning  Goal: Discharge to home or other facility with appropriate resources  Outcome: Progressing     Problem: Pain  Goal: Verbalizes/displays adequate comfort level or baseline comfort level  Outcome: Progressing     Problem: Safety - Adult  Goal: Free from fall injury  Outcome: Progressing     Problem: Neurosensory - Adult  Goal: Achieves stable or improved neurological status  Outcome: Progressing     Problem: Respiratory - Adult  Goal: Achieves optimal ventilation and oxygenation  Outcome: Progressing

## 2022-09-22 PROBLEM — M48.02 CERVICAL STENOSIS OF SPINE: Status: ACTIVE | Noted: 2022-09-22

## 2022-09-22 LAB
A/G RATIO: 1.5 (ref 1.1–2.2)
ALBUMIN SERPL-MCNC: 3.7 G/DL (ref 3.4–5)
ALP BLD-CCNC: 174 U/L (ref 40–129)
ALT SERPL-CCNC: 36 U/L (ref 10–40)
ANION GAP SERPL CALCULATED.3IONS-SCNC: 10 MMOL/L (ref 3–16)
AST SERPL-CCNC: 41 U/L (ref 15–37)
BASOPHILS ABSOLUTE: 0.1 K/UL (ref 0–0.2)
BASOPHILS RELATIVE PERCENT: 1.5 %
BILIRUB SERPL-MCNC: 0.3 MG/DL (ref 0–1)
BUN BLDV-MCNC: 14 MG/DL (ref 7–20)
CALCIUM SERPL-MCNC: 9.2 MG/DL (ref 8.3–10.6)
CHLORIDE BLD-SCNC: 97 MMOL/L (ref 99–110)
CO2: 28 MMOL/L (ref 21–32)
CREAT SERPL-MCNC: 1 MG/DL (ref 0.8–1.3)
EOSINOPHILS ABSOLUTE: 0.1 K/UL (ref 0–0.6)
EOSINOPHILS RELATIVE PERCENT: 3.5 %
GFR AFRICAN AMERICAN: >60
GFR NON-AFRICAN AMERICAN: >60
GLUCOSE BLD-MCNC: 103 MG/DL (ref 70–99)
HCT VFR BLD CALC: 38.4 % (ref 40.5–52.5)
HEMOGLOBIN: 12.9 G/DL (ref 13.5–17.5)
LYMPHOCYTES ABSOLUTE: 1 K/UL (ref 1–5.1)
LYMPHOCYTES RELATIVE PERCENT: 24.7 %
MCH RBC QN AUTO: 32.4 PG (ref 26–34)
MCHC RBC AUTO-ENTMCNC: 33.4 G/DL (ref 31–36)
MCV RBC AUTO: 96.9 FL (ref 80–100)
MONOCYTES ABSOLUTE: 0.4 K/UL (ref 0–1.3)
MONOCYTES RELATIVE PERCENT: 9.3 %
NEUTROPHILS ABSOLUTE: 2.4 K/UL (ref 1.7–7.7)
NEUTROPHILS RELATIVE PERCENT: 61 %
PDW BLD-RTO: 13.5 % (ref 12.4–15.4)
PHOSPHORUS: 4.3 MG/DL (ref 2.5–4.9)
PLATELET # BLD: 264 K/UL (ref 135–450)
PMV BLD AUTO: 7.2 FL (ref 5–10.5)
POTASSIUM REFLEX MAGNESIUM: 4.3 MMOL/L (ref 3.5–5.1)
POTASSIUM SERPL-SCNC: 4.3 MMOL/L (ref 3.5–5.1)
RBC # BLD: 3.97 M/UL (ref 4.2–5.9)
SODIUM BLD-SCNC: 135 MMOL/L (ref 136–145)
TOTAL PROTEIN: 6.1 G/DL (ref 6.4–8.2)
WBC # BLD: 4 K/UL (ref 4–11)

## 2022-09-22 PROCEDURE — 6370000000 HC RX 637 (ALT 250 FOR IP)

## 2022-09-22 PROCEDURE — 6370000000 HC RX 637 (ALT 250 FOR IP): Performed by: INTERNAL MEDICINE

## 2022-09-22 PROCEDURE — 6370000000 HC RX 637 (ALT 250 FOR IP): Performed by: NURSE PRACTITIONER

## 2022-09-22 PROCEDURE — 6370000000 HC RX 637 (ALT 250 FOR IP): Performed by: NEUROLOGICAL SURGERY

## 2022-09-22 PROCEDURE — 2580000003 HC RX 258: Performed by: NEUROLOGICAL SURGERY

## 2022-09-22 PROCEDURE — 85025 COMPLETE CBC W/AUTO DIFF WBC: CPT

## 2022-09-22 PROCEDURE — 97530 THERAPEUTIC ACTIVITIES: CPT

## 2022-09-22 PROCEDURE — APPNB30 APP NON BILLABLE TIME 0-30 MINS: Performed by: NURSE PRACTITIONER

## 2022-09-22 PROCEDURE — 80053 COMPREHEN METABOLIC PANEL: CPT

## 2022-09-22 PROCEDURE — 6360000002 HC RX W HCPCS: Performed by: NEUROLOGICAL SURGERY

## 2022-09-22 PROCEDURE — 1200000000 HC SEMI PRIVATE

## 2022-09-22 PROCEDURE — 6370000000 HC RX 637 (ALT 250 FOR IP): Performed by: STUDENT IN AN ORGANIZED HEALTH CARE EDUCATION/TRAINING PROGRAM

## 2022-09-22 PROCEDURE — 97535 SELF CARE MNGMENT TRAINING: CPT

## 2022-09-22 PROCEDURE — 36415 COLL VENOUS BLD VENIPUNCTURE: CPT

## 2022-09-22 PROCEDURE — 99232 SBSQ HOSP IP/OBS MODERATE 35: CPT | Performed by: INTERNAL MEDICINE

## 2022-09-22 RX ORDER — LIDOCAINE 4 G/G
2 PATCH TOPICAL DAILY
Status: DISCONTINUED | OUTPATIENT
Start: 2022-09-22 | End: 2022-09-23 | Stop reason: HOSPADM

## 2022-09-22 RX ADMIN — NIFEDIPINE 30 MG: 30 TABLET, EXTENDED RELEASE ORAL at 08:14

## 2022-09-22 RX ADMIN — HEPARIN SODIUM 5000 UNITS: 5000 INJECTION INTRAVENOUS; SUBCUTANEOUS at 12:39

## 2022-09-22 RX ADMIN — DIAZEPAM 5 MG: 5 TABLET ORAL at 06:35

## 2022-09-22 RX ADMIN — NALOXEGOL OXALATE 12.5 MG: 12.5 TABLET, FILM COATED ORAL at 06:35

## 2022-09-22 RX ADMIN — SENNOSIDES AND DOCUSATE SODIUM 2 TABLET: 50; 8.6 TABLET ORAL at 08:14

## 2022-09-22 RX ADMIN — METHOCARBAMOL TABLETS 1000 MG: 500 TABLET, COATED ORAL at 20:15

## 2022-09-22 RX ADMIN — OXYCODONE 10 MG: 5 TABLET ORAL at 16:42

## 2022-09-22 RX ADMIN — HEPARIN SODIUM 5000 UNITS: 5000 INJECTION INTRAVENOUS; SUBCUTANEOUS at 06:35

## 2022-09-22 RX ADMIN — OXYCODONE 10 MG: 5 TABLET ORAL at 04:05

## 2022-09-22 RX ADMIN — MAGNESIUM HYDROXIDE 30 ML: 400 SUSPENSION ORAL at 08:14

## 2022-09-22 RX ADMIN — Medication 100 MG: at 08:14

## 2022-09-22 RX ADMIN — DIAZEPAM 5 MG: 5 TABLET ORAL at 16:42

## 2022-09-22 RX ADMIN — OXYCODONE 10 MG: 5 TABLET ORAL at 20:15

## 2022-09-22 RX ADMIN — METHOCARBAMOL TABLETS 1000 MG: 500 TABLET, COATED ORAL at 16:42

## 2022-09-22 RX ADMIN — ACETAMINOPHEN 1000 MG: 500 TABLET ORAL at 12:39

## 2022-09-22 RX ADMIN — ACETAMINOPHEN 1000 MG: 500 TABLET ORAL at 04:05

## 2022-09-22 RX ADMIN — TAMSULOSIN HYDROCHLORIDE 0.4 MG: 0.4 CAPSULE ORAL at 08:14

## 2022-09-22 RX ADMIN — SODIUM CHLORIDE, PRESERVATIVE FREE 10 ML: 5 INJECTION INTRAVENOUS at 20:18

## 2022-09-22 RX ADMIN — METHOCARBAMOL TABLETS 1000 MG: 500 TABLET, COATED ORAL at 12:39

## 2022-09-22 RX ADMIN — HEPARIN SODIUM 5000 UNITS: 5000 INJECTION INTRAVENOUS; SUBCUTANEOUS at 20:15

## 2022-09-22 RX ADMIN — MELOXICAM 7.5 MG: 7.5 TABLET ORAL at 08:15

## 2022-09-22 RX ADMIN — ATORVASTATIN CALCIUM 40 MG: 40 TABLET, FILM COATED ORAL at 08:14

## 2022-09-22 RX ADMIN — ACETAMINOPHEN 1000 MG: 500 TABLET ORAL at 18:53

## 2022-09-22 RX ADMIN — DIAZEPAM 5 MG: 5 TABLET ORAL at 23:05

## 2022-09-22 RX ADMIN — SODIUM CHLORIDE, PRESERVATIVE FREE 5 ML: 5 INJECTION INTRAVENOUS at 08:16

## 2022-09-22 RX ADMIN — LISINOPRIL 40 MG: 40 TABLET ORAL at 08:14

## 2022-09-22 RX ADMIN — ACETAMINOPHEN 1000 MG: 500 TABLET ORAL at 23:05

## 2022-09-22 RX ADMIN — OXYCODONE 10 MG: 5 TABLET ORAL at 12:38

## 2022-09-22 RX ADMIN — METHOCARBAMOL TABLETS 1000 MG: 500 TABLET, COATED ORAL at 08:14

## 2022-09-22 ASSESSMENT — PAIN SCALES - GENERAL
PAINLEVEL_OUTOF10: 6
PAINLEVEL_OUTOF10: 10
PAINLEVEL_OUTOF10: 7
PAINLEVEL_OUTOF10: 8

## 2022-09-22 ASSESSMENT — PAIN DESCRIPTION - FREQUENCY: FREQUENCY: CONTINUOUS

## 2022-09-22 ASSESSMENT — PAIN - FUNCTIONAL ASSESSMENT: PAIN_FUNCTIONAL_ASSESSMENT: ACTIVITIES ARE NOT PREVENTED

## 2022-09-22 ASSESSMENT — PAIN DESCRIPTION - ORIENTATION: ORIENTATION: POSTERIOR

## 2022-09-22 ASSESSMENT — PAIN DESCRIPTION - PAIN TYPE: TYPE: CHRONIC PAIN

## 2022-09-22 ASSESSMENT — PAIN DESCRIPTION - LOCATION: LOCATION: BACK

## 2022-09-22 NOTE — PLAN OF CARE
Problem: Pain  Goal: Verbalizes/displays adequate comfort level or baseline comfort level  9/22/2022 1011 by Tarsha Wallis RN  Outcome: Progressing   Pt endorsing pain to neck. Being treated with PRN pain medication, rest, and frequent repositioning with pillow support for comfort and pressure relief. Pt reports some relief from pain with above interventions. Problem: Safety - Adult  Goal: Free from fall injury  9/22/2022 1011 by Tarsha Wallis RN  Outcome: Progressing   All fall precautions in place. Bed locked and in lowest position with alarm on. Overbed table and personal belonings within reach. Call light within reach and patient instructed to use call light for assistance. Non-skid socks on.

## 2022-09-22 NOTE — CARE COORDINATION
CM spoke with RN this morning, pt to stay another day per MDs due to increased drain output. Estelita aware of [place] [address] orders with [employer]. Michelet Sepulveda from [facility] contacted to drop a walker off to pt at bedside.     Electronically signed by Jose Jose RN on 9/22/2022 at 10:07 AM

## 2022-09-22 NOTE — PROGRESS NOTES
Occupational Therapy  Facility/Department: [facility]  Occupational Therapy Daily Treatment    Name: Mary Lou Spicer  : 1955  MRN: [id number]  Date of Service: 2022    Discharge Recommendations:  Mary Lou Spicer scored a 18/24 on the AM-PAC ADL Inpatient form. Current research shows that an AM-PAC score of 17 or less is typically not associated with a discharge to the patient's home setting. Based on the patient's AM-PAC score and their current ADL deficits, it is recommended that the patient have 5-7 sessions per week of Occupational Therapy at d/c to increase the patient's independence. At this time, this patient demonstrates complex nursing, medical, and rehabilitative needs, and would benefit from intensive rehabilitation services upon discharge from the Inpatient setting. This patient demonstrates the ability to participate in and benefit from an intensive therapy program with a coordinated interdisciplinary team approach to foster frequent, structured, and documented communication among disciplines, who will work together to establish, prioritize, and achieve treatment goals. Please see assessment section for further patient specific details. If patient discharges prior to next session this note will serve as a discharge summary. Please see below for the latest assessment towards goals. OT Equipment Recommendations  Equipment Needed: No  Other: defer to next level of care       Patient Diagnosis(es): The encounter diagnosis was Cervical stenosis of spine. Past Medical History:  has a past medical history of Arthritis, Atrial fibrillation ([address] CAD (coronary artery disease), Hyperlipidemia, and Hypertension. Past Surgical History:  has a past surgical history that includes back surgery (); Coronary angioplasty with stent (); and cervical fusion (N/A, 9/15/2022).     Treatment Diagnosis: impaired mobility, transfers, ADL      Assessment   Performance deficits / Impairments: Decreased functional mobility ; Decreased ADL status; Decreased endurance  Assessment: Pt completing mobility with CGA, slow and effortful with RW. Mobility in room and hallway. Pt demo LE dressing w/ Min A and grooming w/ setup. Pt fatigues quickly, requiring rest breaks. Pt would benefit from cont skilled OT while in acute care and cont OT upon dc to maximize safety and functional independence. Pt is from home and IND at baseline with steps to enter home. if dc home, pt would require 24hr direct, physical assist. Cont POC. Treatment Diagnosis: impaired mobility, transfers, ADL  Activity Tolerance  Activity Tolerance: Patient Tolerated treatment well        Plan   Plan  Times per Week: 5-7  Times per Day: Daily     Restrictions  Position Activity Restriction  Other position/activity restrictions: up with assistance;  [place] J Collar -Cervical collar to be worn at all times, Cervical collar does NOT need to be worn when eating, bathing or showering, Cervical collar pads to be cleaned with soap & water, air dried, and changed daily    Subjective   General  Chart Reviewed: Yes  Additional Pertinent Hx: 74 y.o. M with PMHx of anterolisthesis of cervical spine, A.fib s/p ablation, HTN, HLD, CAD, BPH, Carpal Tunnel, and herniated disc p/w back pain, numbness in arms and legs, and weakness in arms and legs. S/p C2-T2 POSTERIOR DECOMPRESSION FUSION AND FIXATION  Family / Caregiver Present: No  Referring Practitioner: Virginia Baltazar MD  Diagnosis: cord compression  Subjective  Subjective: Pt sleeping in bed upon arrival. Pt easily awakened and agreeable to therapy w/ encouragement. Pt initially frustrated, reporting \"What I wanted to do was sleep. But now, i'm up so fine. I will get up. \"       Social/Functional History  Social/Functional History  Lives With: Alone  Type of Home: Apartment (3rd floor)  Home Layout: One level  Home Access: Elevator, Stairs to enter with rails (elevator to thrid floor but then has a long way of walking + stairs to his wing of the complex/apartment)  Entrance Stairs - Number of Steps: 5-7 + 3-4 steps+ 2-3 (long hallway with steps)  Bathroom Shower/Tub: Tub/Shower unit  Bathroom Toilet: Handicap height  Bathroom Equipment: Shower chair, Grab bars in shower, Grab bars around toilet  Home Equipment:  (none)  Has the patient had two or more falls in the past year or any fall with injury in the past year?: No (has had falls in the past May 2021)  ADL Assistance: [address]: Independent  Ambulation Assistance: Independent  Transfer Assistance: Independent  Active : Yes  Additional Comments: Pt questionable historian, lethargic confused at times. Reports daughter may be able to stay wuth him at dc       Objective              Safety Devices  Type of Devices: Call light within reach; Chair alarm in place; Patient at risk for falls; Left in chair;Nurse notified;Gait belt; All fall risk precautions in place  Bed Mobility Training  Bed Mobility Training: Yes  Supine to Sit: Stand-by assistance (HOB elevated)  Transfer Training  Transfer Training: Yes  Sit to Stand: Contact-guard assistance  Stand to Sit: Contact-guard assistance  Gait  Overall Level of Assistance: Contact-guard assistance  Distance (ft): 100 Feet (fatigues quickly with multiple standing rest breaks taken throughout)  Assistive Device: Walker, rolling        ADL  Grooming: Setup  Grooming Skilled Clinical Factors: Pt seated EOB to wash face w/ washcloth  LE Dressing: Minimal assistance  LE Dressing Skilled Clinical Factors: donned socks via fig 4 seated EOB w/ SBA and completed clothing mgmt at hips w/ hospital pants w/ CGA for standing balance and Min A to tie in front           Transfers  Sit to stand: Contact guard assistance  Stand to sit: Contact guard assistance     Cognition  Overall Cognitive Status: WFL  Orientation  Overall Orientation Status: Within Functional Limits               Exercise Treatment: Pt requesting to complete \"exercises from other threapist\" while in stance at RW: 1) standing marches x20; 2) heel raises x15; 3) squat x15. Pt completed these exercises w/ CGA and BUE support on RW in order to increase activity tolerance for engagement in functional tasks  Education Given To: Patient  Education Provided: ADL Adaptive Strategies;Transfer Training; Fall Prevention Strategies;Precautions; Energy Conservation;Role of Therapy  Education Method: Demonstration;Verbal  Barriers to Learning: None  Education Outcome: Continued education needed                   AM-PAC Score        AM-PAC Inpatient Daily Activity Raw Score: 18 (09/22/22 1224)  AM-PAC Inpatient ADL T-Scale Score : 38.66 (09/22/22 1224)  ADL Inpatient CMS 0-100% Score: 46.65 (09/22/22 1224)  ADL Inpatient CMS G-Code Modifier : CK (09/22/22 1224)    Tinneti Score       Goals  Short Term Goals  Time Frame for Short term goals: dc  Short Term Goal 1: supine to sit SPVN - ongoing  Short Term Goal 2: sit<>stand SBA - ongoing  Short Term Goal 3: Fx mobility SBA - ongoing  Short Term Goal 4: UB/LB dressing SBA + AE as needed - partially met (LB) 9/18  Short Term Goal 5: toileting with SBA - ongoing       Therapy Time   Individual Concurrent Group Co-treatment   Time In 1028         Time Out 1051         Minutes [facility], OT

## 2022-09-22 NOTE — PROGRESS NOTES
VSS on room air. Aox4. No acute changes in Neuro status this shift. Ambulating assist x1 with rw and gb, tolerating well, denies dizziness. Voiding adequately via BRP/urinal, denies pain or difficulty when urinating. Skin C/D/I with exception to surgical site. Incision C/D/I, no drainage noted, painted with CHG, c-collar in place and aligned. Tolerating oral diet and PO fluids, denies N/V. All fall precautions in place.

## 2022-09-22 NOTE — PROGRESS NOTES
NEUROSURGERY POST-OP PROGRESS NOTE    Patient Name: Bubba Ken YOB: 1955   Sex: Male Age: 74 yrs     Medical Record Number: [id number] Acct Number: [de-identified]   Room Number: 1291/7787-76 Hospital Day: Hospital Day: 14     Interval History:  Post-operative Day# 7 s/p Procedure(s) (LRB):  C2-T2 POSTERIOR DECOMPRESSION FUSION AND FIXATION (N/A)    Subjective: cervical collar on, sitting up    Objective:    VITAL SIGNS   /66   Pulse 82   Temp 98.2 °F (36.8 °C) (Oral)   Resp 16   Ht 6' 2\" (1.88 m)   Wt 258 lb 12.8 oz (117.4 kg)   SpO2 96%   BMI 33.23 kg/m²    Height Height: 6' 2\" (188 cm)   Weight Weight: 258 lb 12.8 oz (117.4 kg)        Allergies No Known Allergies   NPO Status ADULT DIET; Regular   Isolation No active isolations     LABS   Basic Metabolic Profile Recent Labs     09/20/22  0035   *   CL 96*   CO2 28   BUN 14   CREATININE 0.6*   GLUCOSE 124*      Complete Blood Count No results for input(s): WBC, RBC, HEMOGLOBIN in the last 72 hours. Invalid input(s): HEMATOCRIT   Coagulation Studies No results for input(s): PTT, INR in the last 72 hours.     Invalid input(s): PLATELETS, PROA, PT, PTTA     MEDICATIONS   Inpatient Medications     lidocaine, 2 patch, TransDERmal, Daily    magnesium hydroxide, 30 mL, Oral, Daily    heparin (porcine), 5,000 Units, SubCUTAneous, 3 times per day    sodium chloride flush, 5-40 mL, IntraVENous, 2 times per day    polyethylene glycol, 17 g, Oral, Daily    [COMPLETED] methocarbamol IVPB, 1,000 mg, IntraVENous, Q8H **FOLLOWED BY** methocarbamol, 1,000 mg, Oral, 4x Daily    sennosides-docusate sodium, 2 tablet, Oral, BID    acetaminophen, 1,000 mg, Oral, Q6H    naloxegol, 12.5 mg, Oral, QAM AC    NIFEdipine, 30 mg, Oral, Daily    thiamine mononitrate, 100 mg, Oral, Daily metoprolol succinate, 75 mg, Oral, Daily    tamsulosin, 0.4 mg, Oral, Daily    nicotine, 1 patch, TransDERmal, Daily    atorvastatin, 40 mg, Oral, Daily    lisinopril, 40 mg, Oral, Daily    meloxicam, 7.5 mg, Oral, Daily   Infusions    sodium chloride 25 mL/hr at 09/17/22 0433      Antibiotics   Recent Abx Admin        No antibiotic orders with administrations found. Neurologic Exam:  Mental status: awake and alert and oriented x4        Musculoskeletal:   Gait: Not tested   Tone: normal  Sensory: intact to all extremities  Motor strength: Motor strength:     Right  Left      Right  Left    Deltoid  4 4   Hip Flex  4 4   Biceps  4 4   Knee Extensors  4 4   Triceps  4 4   Knee Flexors  4 4   Wrist Ext  4 4   Ankle Dorsiflex. 4 4   Wrist Flex  4 4   Ankle Plantarflex. 4 4   Handgrip  4 4   Ext Celso Longus  4 4   Thumb Ext  4 4             Incision: intact, clean and dry  Drain:r:120  L10    Respiratory:  Unlabored respiratory pattern    Abdomen:   Soft, ND       Cardiovascular:  Warm, well perfused    Assessment   Patient is a 75 yo male s/p Procedure(s) (LRB):  C2-T2 POSTERIOR DECOMPRESSION FUSION AND FIXATION (N/A)   Plan:  Neurologic exam frequency: Q4hr  Mobility: PT/OT, up to chair   C-collar when up  Drain: in upper back, 130mL out for last 24 hr. Remove left one today   DVT Prophylaxis: SCDs and heparin SQ  GI Prophylaxis: pepcid  Bowel Regimen: senokot and glycolax  Pain control:  tylenol and oxycodone   Muscle spasm: robaxin and valium  Incisional Care: open to air  Dispo Planning: remain in patient till drain slow down  Patient was seen with Dr. Rama Jones who agrees with above assessment and plan. Electronically signed by: DOUGLAS Calhoun CNP, 9/22/2022 12:45 PM   Neurosurgery Nurse Practitioner  [phone]   I spent 30 minutes in the care of this patient.   Over 50% of that time was in face-to-face counseling regarding disease process, diagnostic testing, preventative measures, and answering patient and family questions.

## 2022-09-22 NOTE — PROGRESS NOTES
Office : [phone]     Fax :[phone]       Nephrology  Note      Patient's Name: Aiden Medel  10:55 AM  9/22/2022    Reason for Consult:  hyponatremia       Requesting Physician: Saurabh Maldonado      09/22/22    Feels same   No labs today       I/O last 3 completed shifts: In: 12 [P.O.:240]  Out: 2115 [Urine:1900; Drains:215]    Past Medical History:   Diagnosis Date    Arthritis     Atrial fibrillation ([address]     CAD (coronary artery disease)     Hyperlipidemia     Hypertension        Past Surgical History:   Procedure Laterality Date    BACK SURGERY  200    lower lumbar surgery    CERVICAL FUSION N/A 9/15/2022    C2-T2 POSTERIOR DECOMPRESSION FUSION AND FIXATION performed by Kourtney Rojas MD at [place] [address]  2012       Family History   Problem Relation Age of Onset    Heart Disease Mother     Diabetes Mother     Heart Disease Father     Diabetes Father     Diabetes Sister     Diabetes Brother     Diabetes Maternal Grandmother     Diabetes Maternal Grandfather     Diabetes Paternal Grandmother     Diabetes Paternal Grandfather         reports that he has been smoking cigarettes. He has a 40.00 pack-year smoking history. He has never used smokeless tobacco. He reports current alcohol use of about 20.0 standard drinks per week. He reports that he does not use drugs. Allergies:  Patient has no known allergies.     Current Medications:    lidocaine 4 % external patch 1 patch, Daily  magnesium hydroxide (MILK OF MAGNESIA) 400 MG/5ML suspension 30 mL, Daily  heparin (porcine) injection 5,000 Units, 3 times per day  sodium chloride flush 0.9 % injection 5-40 mL, 2 times per day  sodium chloride flush 0.9 % injection 5-40 mL, PRN  0.9 % sodium chloride infusion, PRN  naloxone (NARCAN) injection 0.2 mg, PRN  aluminum & magnesium hydroxide-simethicone (MAALOX) 200-200-20 MG/5ML suspension 15 mL, Q6H PRN  polyethylene glycol (GLYCOLAX) packet 17 g, Daily  bisacodyl (DULCOLAX) suppository 10 mg, Daily PRN  promethazine (PHENERGAN) tablet 12.5 mg, Q6H PRN   Or  ondansetron (ZOFRAN) injection 4 mg, Q6H PRN  oxyCODONE (ROXICODONE) immediate release tablet 5 mg, Q4H PRN   Or  oxyCODONE (ROXICODONE) immediate release tablet 10 mg, Q4H PRN  methocarbamol (ROBAXIN) tablet 1,000 mg, 4x Daily  sennosides-docusate sodium (SENOKOT-S) 8.6-50 MG tablet 2 tablet, BID  acetaminophen (TYLENOL) tablet 1,000 mg, Q6H  diazePAM (VALIUM) tablet 5 mg, Q6H PRN  naloxegol (MOVANTIK) tablet 12.5 mg, QAM AC  NIFEdipine (ADALAT CC) extended release tablet 30 mg, Daily  thiamine mononitrate tablet 100 mg, Daily  metoprolol succinate (TOPROL XL) extended release tablet 75 mg, Daily  tamsulosin (FLOMAX) capsule 0.4 mg, Daily  magnesium hydroxide (MILK OF MAGNESIA) 400 MG/5ML suspension 30 mL, Daily PRN  hydrALAZINE (APRESOLINE) injection 5 mg, Q6H PRN  nicotine (NICODERM CQ) 14 MG/24HR 1 patch, Daily  polyvinyl alcohol (LIQUIFILM TEARS) 1.4 % ophthalmic solution 1 drop, PRN  sodium chloride (OCEAN, BABY AYR) 0.65 % nasal spray 1 spray, PRN  atorvastatin (LIPITOR) tablet 40 mg, Daily  lisinopril (PRINIVIL;ZESTRIL) tablet 40 mg, Daily  meloxicam (MOBIC) tablet 7.5 mg, Daily  ondansetron (ZOFRAN-ODT) disintegrating tablet 4 mg, Q8H PRN   Or  ondansetron (ZOFRAN) injection 4 mg, Q6H PRN  polyethylene glycol (GLYCOLAX) packet 17 g, Daily PRN          Physical exam:     Vitals:  /65   Pulse 80   Temp 97.6 °F (36.4 °C) (Oral)   Resp 16   Ht 6' 2\" (1.88 m)   Wt 258 lb 12.8 oz (117.4 kg)   SpO2 96%   BMI 33.23 kg/m²   Constitutional:  OAA X3 NAD  Skin: no rash, turgor wnl  Heent:  eomi, mmm  Neck: no bruits or jvd noted  Cardiovascular:  S1, S2 without m/r/g  Respiratory: CTA B without w/r/r  Abdomen:  +bs, soft, nt, nd  Ext: no  lower extremity edema  Psychiatric: mood and affect appropriate  Musculoskeletal:  Rom, muscular strength intact    Labs:  CBC:   No results for input(s): WBC, HGB, PLT in the last 72 hours. BMP:    Recent Labs     09/20/22  0035   *   K 4.1   CL 96*   CO2 28   BUN 14   CREATININE 0.6*   GLUCOSE 124*       Ca/Mg/Phos:   Recent Labs     09/20/22  0035   CALCIUM 8.9       Hepatic:   No results for input(s): AST, ALT, ALB, BILITOT, ALKPHOS in the last 72 hours. Troponin:   No results for input(s): TROPONINI in the last 72 hours. BNP: No results for input(s): BNP in the last 72 hours. Lipids: No results for input(s): CHOL, TRIG, HDL, LDLCALC, LABVLDL in the last 72 hours. ABGs: No results for input(s): PHART, PO2ART, XZP1RGW in the last 72 hours. INR: No results for input(s): INR in the last 72 hours. UA:  No results for input(s): Jillian Forte, GLUCOSEU, BILIRUBINUR, KETUA, SPECGRAV, BLOODU, PHUR, PROTEINU, UROBILINOGEN, NITRU, LEUKOCYTESUR, Liddie Cruise in the last 72 hours. Urine Microscopic:   No results for input(s): LABCAST, BACTERIA, COMU, HYALCAST, WBCUA, RBCUA, EPIU in the last 72 hours. Urine Culture: No results for input(s): LABURIN in the last 72 hours. Urine Chemistry: No results for input(s): Randy Bio, PROTEINUR, NAUR in the last 72 hours. IMAGING:  XR CERVICAL SPINE (2-3 VIEWS)   Final Result      Fusion in the cervical spine. XR CERVICAL SPINE (2-3 VIEWS)   Final Result   1. Intraoperative imaging using CT and fluoroscopy during posterior cervical decompression and fusion. FLUORO FOR SURGICAL PROCEDURES   Final Result   1. Intraoperative imaging using CT and fluoroscopy during posterior cervical decompression and fusion. CT CERVICAL SPINE WO CONTRAST   Final Result   1. Redemonstration of anterolisthesis of C2 on C3 without obvious fracture.  There is severe facet arthrosis at this level   2. Redemonstration of anterolisthesis of C4-5 likely related to severe facet arthrosis at this level   3. Minimal retrolisthesis of C3 on C4 redemonstrated, likely related to severe degenerative disc disease at this level as well as associated facet arthrosis   4. Marked degenerative disc disease C3-4, C5-6, C6-7, C7-T1   5. Diffuse severe facet arthrosis            XR CERVICAL SPINE (2-3 VIEWS)   Final Result      Grade 1 anterolisthesis of C2 on C3. Multilevel moderate to severe degenerative disc disease. MRI LUMBAR SPINE WO CONTRAST   Final Result   1. Multilevel severe central canal and foraminal stenosis L2-3 through L5-S1, secondary to multifactorial degenerative changes and posterior epidural lipomatosis   2. Redundancy in edematous nerve roots at the L1-2 level secondary to chronic lumbar central canal stenosis. 2. Extruded disc herniation L3-L4 with left lateral recess and subarticular migration      Compared prior study there is been progression of spinal stenosis secondary to chronic multifactorial degenerative changes posterior epidural fat. No change in the degree of facet arthropathy and foraminal stenosis. MRI THORACIC SPINE WO CONTRAST   Final Result      1. Mild scoliosis and degenerative disc disease concordant with prior CT   2. No abnormal cord signal, central canal, compression or canal stenosis         MRI CERVICAL SPINE WO CONTRAST   Final Result      1. Cord myelopathy, cord edema with hyperintense signal noted on STIR sequence at the C2-3 and C3 level   2. Severe central canal stenosis C2-C3 which is improved with the patient's collar on compared to prior study   3. Severe canal stenosis C3-C4 with central disc herniation, also slightly improved with the patient's collar on during the study   4. Multilevel bilateral foraminal stenosis                 Assessment/Plan :      1. Hyponatremia.   Likely 2/2 fluid intake Nelia Gupta Drinks excess beer  Limit daily fluid intake to 1200 ml   Off IV fluids   Urine sodium and urine osmolality - reviewed     2. HTN. Off IVF   Pain control     3. Acute cervical cord compression with edema:  Neurosurgery consulted.   Sx done       Monitor sodium levels               Thank you for allowing us to participate in care of Ana Rosa Baker         Electronically signed by: Ervin Craft MD, 9/22/2022, 10:55 AM      [facility] [address]  Office : [phone]  Fax :[phone]

## 2022-09-22 NOTE — PROGRESS NOTES
Hospitalist Progress Note      PCP: Tracie Romano    Date of Admission: 9/9/2022    Subjective:   Patient was seen and examined at [facility]  Denies specific complaints. Drains still in place. Medications:  Reviewed    Infusion Medications    sodium chloride 25 mL/hr at 09/17/22 0433     Scheduled Medications    lidocaine  1 patch TransDERmal Daily    magnesium hydroxide  30 mL Oral Daily    heparin (porcine)  5,000 Units SubCUTAneous 3 times per day    sodium chloride flush  5-40 mL IntraVENous 2 times per day    polyethylene glycol  17 g Oral Daily    methocarbamol  1,000 mg Oral 4x Daily    sennosides-docusate sodium  2 tablet Oral BID    acetaminophen  1,000 mg Oral Q6H    naloxegol  12.5 mg Oral QAM AC    NIFEdipine  30 mg Oral Daily    thiamine mononitrate  100 mg Oral Daily    metoprolol succinate  75 mg Oral Daily    tamsulosin  0.4 mg Oral Daily    nicotine  1 patch TransDERmal Daily    atorvastatin  40 mg Oral Daily    lisinopril  40 mg Oral Daily    meloxicam  7.5 mg Oral Daily     PRN Meds: sodium chloride flush, sodium chloride, naloxone, aluminum & magnesium hydroxide-simethicone, bisacodyl, promethazine **OR** ondansetron, oxyCODONE **OR** oxyCODONE, diazePAM, magnesium hydroxide, hydrALAZINE, polyvinyl alcohol, sodium chloride, ondansetron **OR** ondansetron, polyethylene glycol      Intake/Output Summary (Last 24 hours) at 9/22/2022 0819  Last data filed at 9/22/2022 0405  Gross per 24 hour   Intake --   Output 1340 ml   Net -1340 ml         Physical Exam Performed:    /65   Pulse 80   Temp 97.6 °F (36.4 °C) (Oral)   Resp 16   Ht 6' 2\" (1.88 m)   Wt 258 lb 12.8 oz (117.4 kg)   SpO2 96%   BMI 33.23 kg/m²     General appearance: No apparent distress  Neck: C collar in place, drains w/ sanguinous drainage  Respiratory:  Normal respiratory effort. Clear to auscultation, bilaterally without Rales/Wheezes/Rhonchi.   Cardiovascular: Regular rate and rhythm   Abdomen: Soft, non-tender  Musculoskeletal: No clubbing, cyanosis or edema bilaterally. Neurologic:  Grossly non-focal.  Psychiatric: Alert and oriented    Labs:     Recent Labs     09/20/22  0035   *   K 4.1   CL 96*   CO2 28   BUN 14   CREATININE 0.6*   CALCIUM 8.9         Urinalysis:      Lab Results   Component Value Date/Time    NITRU Negative 09/08/2022 11:30 PM    WBCUA 1 09/08/2022 11:30 PM    BACTERIA None Seen 09/08/2022 11:30 PM    RBCUA 1 09/08/2022 11:30 PM    BLOODU Negative 09/08/2022 11:30 PM    SPECGRAV 1.015 09/08/2022 11:30 PM    GLUCOSEU Negative 09/08/2022 11:30 PM       Radiology:  XR CERVICAL SPINE (2-3 VIEWS)   Final Result      Fusion in the cervical spine. XR CERVICAL SPINE (2-3 VIEWS)   Final Result   1. Intraoperative imaging using CT and fluoroscopy during posterior cervical decompression and fusion. FLUORO FOR SURGICAL PROCEDURES   Final Result   1. Intraoperative imaging using CT and fluoroscopy during posterior cervical decompression and fusion. CT CERVICAL SPINE WO CONTRAST   Final Result   1. Redemonstration of anterolisthesis of C2 on C3 without obvious fracture. There is severe facet arthrosis at this level   2. Redemonstration of anterolisthesis of C4-5 likely related to severe facet arthrosis at this level   3. Minimal retrolisthesis of C3 on C4 redemonstrated, likely related to severe degenerative disc disease at this level as well as associated facet arthrosis   4. Marked degenerative disc disease C3-4, C5-6, C6-7, C7-T1   5. Diffuse severe facet arthrosis            XR CERVICAL SPINE (2-3 VIEWS)   Final Result      Grade 1 anterolisthesis of C2 on C3. Multilevel moderate to severe degenerative disc disease. MRI LUMBAR SPINE WO CONTRAST   Final Result   1. Multilevel severe central canal and foraminal stenosis L2-3 through L5-S1, secondary to multifactorial degenerative changes and posterior epidural lipomatosis   2.  Redundancy in edematous nerve roots at the L1-2 level secondary to chronic lumbar central canal stenosis. 2. Extruded disc herniation L3-L4 with left lateral recess and subarticular migration      Compared prior study there is been progression of spinal stenosis secondary to chronic multifactorial degenerative changes posterior epidural fat. No change in the degree of facet arthropathy and foraminal stenosis. MRI THORACIC SPINE WO CONTRAST   Final Result      1. Mild scoliosis and degenerative disc disease concordant with prior CT   2. No abnormal cord signal, central canal, compression or canal stenosis         MRI CERVICAL SPINE WO CONTRAST   Final Result      1. Cord myelopathy, cord edema with hyperintense signal noted on STIR sequence at the C2-3 and C3 level   2. Severe central canal stenosis C2-C3 which is improved with the patient's collar on compared to prior study   3. Severe canal stenosis C3-C4 with central disc herniation, also slightly improved with the patient's collar on during the study   4. Multilevel bilateral foraminal stenosis             Assessment/Plan:    Cervical cord compression with edema s/p C2-T2 D/F/F  -Neurosurgery following, s/p C2-T2 posterior decompression fusion fixation on 9/15/2022.  - Continue pain control, muscle relaxants  -Drain still in place, neurosurgery monitoring output     Hypomagnesemia  Replaced. Hypertension  -Continue metoprolol, nifedipine, lisinopril     Paroxysmal atrial fibrillation  -Continue metoprolol. No full ac. Tobacco abuse  - Nicotine patch     BPH  - Continue Flomax    DVT Prophylaxis: Heparin subcut  Diet: ADULT DIET;  Regular  Code Status: Full Code  PT/OT Eval Status: 17/16    Dispo - inpatient rehab at [facility] once drains pulled and pre-cert obtained       Ariel Xiong MD

## 2022-09-22 NOTE — PLAN OF CARE
Problem: Discharge Planning  Goal: Discharge to home or other facility with appropriate resources  9/21/2022 2338 by Christopher Alfaro RN  Outcome: Progressing  9/21/2022 1121 by Jaelyn Redmond RN  Outcome: Progressing     Problem: Pain  Goal: Verbalizes/displays adequate comfort level or baseline comfort level  9/21/2022 2338 by Christopher Alfaro RN  Outcome: Progressing  9/21/2022 1121 by Jaelyn Redmond RN  Outcome: Progressing     Problem: Safety - Adult  Goal: Free from fall injury  9/21/2022 2338 by Christopher Alfaro RN  Outcome: Progressing  9/21/2022 1121 by Jaelyn Redmond RN  Outcome: Progressing     Problem: ABCDS Injury Assessment  Goal: Absence of physical injury  9/21/2022 2338 by Christopher Alfaro RN  Outcome: Progressing  9/21/2022 1121 by Jaelyn Redmond RN  Outcome: Progressing     Problem: Neurosensory - Adult  Goal: Achieves stable or improved neurological status  9/21/2022 2338 by Christopher Alfaro RN  Outcome: Progressing  9/21/2022 1121 by Jaelyn Redmond RN  Outcome: Progressing     Problem: Cardiovascular - Adult  Goal: Maintains optimal cardiac output and hemodynamic stability  9/21/2022 2338 by Christopher Alfaro RN  Outcome: Progressing  9/21/2022 1121 by Jaelyn Redmond RN  Outcome: Progressing     Problem: Respiratory - Adult  Goal: Achieves optimal ventilation and oxygenation  9/21/2022 2338 by Christopher Alfaro RN  Outcome: Progressing  9/21/2022 1121 by Jaelyn Redmond RN  Outcome: Progressing     Problem: Skin/Tissue Integrity  Goal: Absence of new skin breakdown  Description: 1. Monitor for areas of redness and/or skin breakdown  2. Assess vascular access sites hourly  3. Every 4-6 hours minimum:  Change oxygen saturation probe site  4. Every 4-6 hours:  If on nasal continuous positive airway pressure, respiratory therapy assess nares and determine need for appliance change or resting period.   9/21/2022 2338 by Christopher Alfaro RN  Outcome: Progressing  9/21/2022 1121 by Jaelyn Redmond RN  Outcome: Progressing

## 2022-09-23 VITALS
DIASTOLIC BLOOD PRESSURE: 88 MMHG | BODY MASS INDEX: 33.21 KG/M2 | WEIGHT: 258.8 LBS | RESPIRATION RATE: 18 BRPM | HEIGHT: 74 IN | HEART RATE: 75 BPM | TEMPERATURE: 97.7 F | SYSTOLIC BLOOD PRESSURE: 158 MMHG | OXYGEN SATURATION: 100 %

## 2022-09-23 PROCEDURE — 6370000000 HC RX 637 (ALT 250 FOR IP)

## 2022-09-23 PROCEDURE — 97530 THERAPEUTIC ACTIVITIES: CPT

## 2022-09-23 PROCEDURE — 6360000002 HC RX W HCPCS: Performed by: INTERNAL MEDICINE

## 2022-09-23 PROCEDURE — 6370000000 HC RX 637 (ALT 250 FOR IP): Performed by: NEUROLOGICAL SURGERY

## 2022-09-23 PROCEDURE — 6370000000 HC RX 637 (ALT 250 FOR IP): Performed by: INTERNAL MEDICINE

## 2022-09-23 PROCEDURE — 6370000000 HC RX 637 (ALT 250 FOR IP): Performed by: NURSE PRACTITIONER

## 2022-09-23 PROCEDURE — 2580000003 HC RX 258: Performed by: NEUROLOGICAL SURGERY

## 2022-09-23 PROCEDURE — 6370000000 HC RX 637 (ALT 250 FOR IP): Performed by: STUDENT IN AN ORGANIZED HEALTH CARE EDUCATION/TRAINING PROGRAM

## 2022-09-23 PROCEDURE — 6360000002 HC RX W HCPCS: Performed by: NEUROLOGICAL SURGERY

## 2022-09-23 PROCEDURE — 97116 GAIT TRAINING THERAPY: CPT

## 2022-09-23 RX ORDER — NIFEDIPINE 30 MG/1
30 TABLET, FILM COATED, EXTENDED RELEASE ORAL DAILY
Qty: 15 TABLET | Refills: 0 | Status: SHIPPED | OUTPATIENT
Start: 2022-09-24 | End: 2022-10-09

## 2022-09-23 RX ORDER — NICOTINE 21 MG/24HR
1 PATCH, TRANSDERMAL 24 HOURS TRANSDERMAL DAILY
Qty: 10 PATCH | Refills: 0 | Status: SHIPPED | OUTPATIENT
Start: 2022-09-23 | End: 2022-09-23 | Stop reason: SDUPTHER

## 2022-09-23 RX ORDER — TRAMADOL HYDROCHLORIDE 50 MG/1
50 TABLET ORAL EVERY 8 HOURS PRN
Qty: 9 TABLET | Refills: 0 | Status: SHIPPED | OUTPATIENT
Start: 2022-09-23 | End: 2022-09-26

## 2022-09-23 RX ORDER — NICOTINE 21 MG/24HR
1 PATCH, TRANSDERMAL 24 HOURS TRANSDERMAL DAILY
Qty: 10 PATCH | Refills: 0 | Status: SHIPPED | OUTPATIENT
Start: 2022-09-23 | End: 2022-10-03

## 2022-09-23 RX ORDER — NIFEDIPINE 30 MG/1
30 TABLET, FILM COATED, EXTENDED RELEASE ORAL DAILY
Qty: 15 TABLET | Refills: 0 | Status: SHIPPED | OUTPATIENT
Start: 2022-09-24 | End: 2022-09-23 | Stop reason: SDUPTHER

## 2022-09-23 RX ORDER — TRAMADOL HYDROCHLORIDE 50 MG/1
50 TABLET ORAL EVERY 8 HOURS PRN
Qty: 9 TABLET | Refills: 0 | Status: SHIPPED | OUTPATIENT
Start: 2022-09-23 | End: 2022-09-23 | Stop reason: SDUPTHER

## 2022-09-23 RX ADMIN — TAMSULOSIN HYDROCHLORIDE 0.4 MG: 0.4 CAPSULE ORAL at 07:50

## 2022-09-23 RX ADMIN — Medication 100 MG: at 07:50

## 2022-09-23 RX ADMIN — METHOCARBAMOL TABLETS 1000 MG: 500 TABLET, COATED ORAL at 12:21

## 2022-09-23 RX ADMIN — NALOXEGOL OXALATE 12.5 MG: 12.5 TABLET, FILM COATED ORAL at 07:48

## 2022-09-23 RX ADMIN — METOPROLOL SUCCINATE 75 MG: 50 TABLET, FILM COATED, EXTENDED RELEASE ORAL at 07:50

## 2022-09-23 RX ADMIN — NIFEDIPINE 30 MG: 30 TABLET, EXTENDED RELEASE ORAL at 07:50

## 2022-09-23 RX ADMIN — HEPARIN SODIUM 5000 UNITS: 5000 INJECTION INTRAVENOUS; SUBCUTANEOUS at 05:47

## 2022-09-23 RX ADMIN — SENNOSIDES AND DOCUSATE SODIUM 2 TABLET: 50; 8.6 TABLET ORAL at 07:50

## 2022-09-23 RX ADMIN — DIAZEPAM 5 MG: 5 TABLET ORAL at 05:47

## 2022-09-23 RX ADMIN — OXYCODONE 10 MG: 5 TABLET ORAL at 07:49

## 2022-09-23 RX ADMIN — DIAZEPAM 5 MG: 5 TABLET ORAL at 12:21

## 2022-09-23 RX ADMIN — ACETAMINOPHEN 1000 MG: 500 TABLET ORAL at 05:47

## 2022-09-23 RX ADMIN — HYDRALAZINE HYDROCHLORIDE 5 MG: 20 INJECTION INTRAMUSCULAR; INTRAVENOUS at 03:15

## 2022-09-23 RX ADMIN — ATORVASTATIN CALCIUM 40 MG: 40 TABLET, FILM COATED ORAL at 07:49

## 2022-09-23 RX ADMIN — METHOCARBAMOL TABLETS 1000 MG: 500 TABLET, COATED ORAL at 07:50

## 2022-09-23 RX ADMIN — OXYCODONE 10 MG: 5 TABLET ORAL at 12:20

## 2022-09-23 RX ADMIN — OXYCODONE 10 MG: 5 TABLET ORAL at 03:15

## 2022-09-23 RX ADMIN — MELOXICAM 7.5 MG: 7.5 TABLET ORAL at 07:50

## 2022-09-23 RX ADMIN — LISINOPRIL 40 MG: 40 TABLET ORAL at 07:49

## 2022-09-23 RX ADMIN — SODIUM CHLORIDE, PRESERVATIVE FREE 5 ML: 5 INJECTION INTRAVENOUS at 08:49

## 2022-09-23 ASSESSMENT — PAIN SCALES - GENERAL
PAINLEVEL_OUTOF10: 8

## 2022-09-23 NOTE — PROGRESS NOTES
Physical Therapy  Facility/Department: [facility]  Physical Therapy Treatment    Name: Mary Lou Spicer  : 1955  MRN: [id number]  Date of Service: 2022    Discharge Recommendations:  Mary Lou Spicer scored a 17/24 on the AM-PAC short mobility form. Current research shows that an AM-PAC score of 18 or greater is typically associated with a discharge to the patient's home setting. Based on the patient's AM-PAC score and their current functional mobility deficits, it is recommended that the patient have 2-3 sessions per week of Physical Therapy at d/c to increase the patient's independence. At this time, this patient demonstrates the endurance and safety to discharge home with home PT and a follow up treatment frequency of 2-3x/wk. Please see assessment section for further patient specific details. If patient discharges prior to next session this note will serve as a discharge summary. Please see below for the latest assessment towards goals. Patient would benefit from continued therapy after discharge   PT Equipment Recommendations  Equipment Needed:  (rolling walker if goes home)      Patient Diagnosis(es): The primary encounter diagnosis was Cord compression ([address] A diagnosis of Cervical stenosis of spine was also pertinent to this visit. Past Medical History:  has a past medical history of Arthritis, Atrial fibrillation ([address] CAD (coronary artery disease), Hyperlipidemia, and Hypertension. Past Surgical History:  has a past surgical history that includes back surgery (); Coronary angioplasty with stent (); and cervical fusion (N/A, 9/15/2022). Assessment   Assessment: Continues to be below baseline function s/p C2-T2 fusion and fixation. Pt needing CGA for transfers/gt. Needs cues for safety with gt - tends to push walker too far ahead. Pt at risk for falls and not safe to ambulate alone. Pt plans to return home.   Discussed with pt need for 24 hr assist and direct physical assist at this time. Rec cont skilled PT to maximize mobility and independence  Treatment Diagnosis: impaired functional mobility s/ppC2-T2 POSTERIOR DECOMPRESSION FUSION AND FIXATION  Requires PT Follow-Up: Yes     Plan   Plan  Plan: 5-7 times per week  Current Treatment Recommendations: Strengthening, Balance training, Functional mobility training, Gait training, Safety education & training, Patient/Caregiver education & training, Endurance training  Safety Devices  Type of Devices: Call light within reach, Chair alarm in place, Nurse notified, Left in chair     Restrictions  Position Activity Restriction  Other position/activity restrictions: up with assistance;  [place] J Collar -Cervical collar to be worn at all times, Cervical collar does NOT need to be worn when eating, bathing or showering, Cervical collar pads to be cleaned with soap & water, air dried, and changed daily     Subjective   General  Chart Reviewed: Yes  Additional Pertinent Hx: Pt is a 74 y.o. male adm 9/9 with cord compression. Pt presented to the [facility] ED complaining of having difficulty functioning or performing any ADL's or even walking. This is due to back pain and pain radiating into the arms and legs. He says the sx have progressed for weeks. MRI showed showing a C2-C3 cord compression with edema. Transferred to [facility] for further management. MRI Cspine:Cord myelopathy, cord edema with hyperintense signal noted on STIR sequence at the C2-3 and C3 level,  Severe central canal stenosis C2-C3 which is improved with the patient's collar on compared to prior study, Severe canal stenosis C3-C4 with central disc herniation, also slightly improved with the patient's collar on during the study.   MRI Lspine:Multilevel severe central canal and foraminal stenosis L2-3 through L5-S1, secondary to multifactorial degenerative changes and posterior epidural lipomatosis, Redundancy in edematous nerve roots at the L1-2 level secondary to chronic lumbar central canal stenosis. Xray C spine:Grade 1 anterolisthesis of C2 on C3. Pt s/pC2-T2 POSTERIOR DECOMPRESSION FUSION AND FIXATION on 9/15. PMH:anterolisthesis of cervical spine, A.fib s/p ablation, HTN, HLD, CAD, BPH, Carpal Tunnel, and herniated disc  Subjective  Subjective: Pt found supine. Lethargic. Agreeable to PT with encouragement. Reports going home and daughter will be \"in and out\" but reports son and neighbors can help him as well. Reports pain in neck and shoulders 7/10. RN informed         Social/Functional History  Social/Functional History  Lives With: Alone  Type of Home: Apartment (3rd floor)  Home Layout: One level  Home Access: Elevator, Stairs to enter with rails (elevator to thrid floor but then has a long way of walking + stairs to his wing of the complex/apartment)  Entrance Stairs - Number of Steps: 5-7 + 3-4 steps+ 2-3 (long hallway with steps)  Bathroom Shower/Tub: Tub/Shower unit  [employer]: Handicap height  Bathroom Equipment: Shower chair, Grab bars in shower, Grab bars around toilet  Home Equipment:  (none)  Has the patient had two or more falls in the past year or any fall with injury in the past year?: No (has had falls in the past May 2021)  ADL Assistance: Independent  Homemaking Assistance: Independent  Ambulation Assistance: Independent  Transfer Assistance: Independent  Active : Yes  Additional Comments: Pt questionable historian, lethargic confused at times.   Reports daughter may be able to stay wuth him at dc           Objective                                Bed mobility  Supine to Sit: Stand by assistance (HOB elevated)  Transfers  Sit to Stand: Contact guard assistance  Stand to sit: Contact guard assistance  Ambulation  Device: Rolling Walker  Assistance: Contact guard assistance  Quality of Gait: trunk flexed and tends to push walker too far ahead - cues for safety and to stay within walker, decreased bilat step length/height  Distance: 80'  Stairs/Curb  Stairs?: Yes (Up/down 2 steps with bilat UE support on rail CGA, step to pattern. Discussed using BUE support on 1 rail as option if does not have 2 rails at home)                 OutComes Score                                                  AM-PAC Score  AM-PAC Inpatient Mobility Raw Score : 17 (09/23/22 1342)  AM-PAC Inpatient T-Scale Score : 42.13 (09/23/22 1342)  Mobility Inpatient CMS 0-100% Score: 50.57 (09/23/22 1342)  Mobility Inpatient CMS G-Code Modifier : CK (09/23/22 1342)          Tinneti Score       Goals  Short Term Goals  Time Frame for Short term goals: By discharge  Short term goal 1: Sup to sit supervision. Ongoing  Short term goal 2: Pt will transfer sit to stand supervision. Ongoing  Short term goal 3: Pt will amb >50' with LRAD supervision.    Ongoing       Education  Patient Education  Education Given To: Patient  Education Provided: Role of Therapy;IADL Safety;Plan of Care  Education Provided Comments: need for 24 hr assist  Education Method: Verbal  Education Outcome: Verbalized understanding;Continued education needed      Therapy Time   Individual Concurrent Group Co-treatment   Time In 1325         Time Out 1350         Minutes 25               Timed Code Treatment Minutes:  25 Minutes    Total Treatment Minutes:  [address], PT

## 2022-09-23 NOTE — DISCHARGE SUMMARY
Discharge Summary    Name:  Sonja Frye /Age/Sex: 1955  (74 y.o. male)   MRN & CSN:  [id number] & [id number] Admission Date/Time: 2022  2:19 PM   Attending:  Aby Olivas MD Discharging Physician: Aby Olivas MD     Hospital Course:   Sonja Frye is a 74 y.o.  male  who presents with Cord compression [facility])    74 y.o. male who presented to [facility] with past medical history of arthritis, CAD, hypertension, hyperlipidemia, atrial fibrillation, BPH presented to the ED for difficulty functioning and ADL or even walking. Patient reported that the back pain started around 1 week ago progressively worsening    Was found to have :    Cervical cord compression with edema s/p C2-T2 D/F/F  -Neurosurgery following, s/p C2-T2 posterior decompression fusion fixation on 9/15/2022. Has cervical collar   - started pain control, muscle relaxants, continue on discharge   -R and left Drain was removed , cleared by neurosurgery to be d/c home   Will be discharge home with home health          Hypomagnesemia  Replaced. Hypertension  -Continue metoprolol, nifedipine, lisinopril  Follow up with PCP to adjust BP medication      Paroxysmal atrial fibrillation  -Continue metoprolol. No full ac. Tobacco abuse  - Nicotine patch     BPH  - Continue Flomax    The patient expressed appropriate understanding of and agreement with the discharge recommendations, medications, and plan.      Consults this admission:  IP CONSULT TO NEUROSURGERY  IP CONSULT TO SOCIAL WORK  IP CONSULT TO SPIRITUAL SERVICES  IP CONSULT TO CARDIOLOGY  IP CONSULT TO HOME CARE NEEDS  IP CONSULT TO HOME CARE NEEDS    Discharge Instruction:   Follow up appointments: follow up with neurosurgery as scheduled   Primary care physician:  within 1  weeks    Diet:  regular diet   Activity: activity as tolerated  Disposition: Discharged to:   []Home, [x]HHC, []SNF, []Acute Rehab, []Hospice   Condition on discharge: Stable    Discharge Medications:        Medication List        START taking these medications      methocarbamol 500 MG tablet  Commonly known as: ROBAXIN  Take 2 tablets by mouth 4 times daily for 5 days     nicotine 14 MG/24HR  Commonly known as: NICODERM CQ  Place 1 patch onto the skin daily for 10 days     NIFEdipine 30 MG extended release tablet  Commonly known as: ADALAT CC  Take 1 tablet by mouth daily for 15 days  Start taking on: September 24, 2022     traMADol 50 MG tablet  Commonly known as: ULTRAM  Take 1 tablet by mouth every 8 hours as needed for Pain for up to 3 days. CONTINUE taking these medications      atorvastatin 40 MG tablet  Commonly known as: Lipitor  Take 1 tablet by mouth daily.      lidocaine 5 %  Commonly known as: Lidoderm  Place 1 patch onto the skin daily 12 hours on, 12 hours off.     meloxicam 7.5 MG tablet  Commonly known as: MOBIC  Take 1 tablet by mouth daily     metoprolol succinate 50 MG extended release tablet  Commonly known as: TOPROL XL     Refresh Optive 1-0.9 % Gel  Generic drug: Carboxymethylcellul-Glycerin     tamsulosin 0.4 MG capsule  Commonly known as: FLOMAX            STOP taking these medications      aspirin 325 MG tablet     diclofenac sodium 1 % Gel  Commonly known as: VOLTAREN     ibuprofen 400 MG tablet  Commonly known as: IBU            ASK your doctor about these medications      Incruse Ellipta 62.5 MCG/INH Aepb  Generic drug: Umeclidinium Bromide     lisinopril 20 MG tablet  Commonly known as: PRINIVIL;ZESTRIL  Take 2 tablets by mouth daily               Where to Get Your Medications        These medications were sent to [facility] [id number], [address] [address]      Phone: [phone]   methocarbamol 500 MG tablet       You can get these medications from any pharmacy    Bring a paper prescription for each of these medications  nicotine 14

## 2022-09-23 NOTE — PROGRESS NOTES
NEUROSURGERY POST-OP PROGRESS NOTE    Patient Name: Amy Wilde YOB: 1955   Sex: Male Age: 74 yrs     Medical Record Number: [id number] Acct Number: [de-identified]   Room Number: 4697/7698-98 Hospital Day: Hospital Day: 15     Interval History:  Post-operative Day# 7 s/p Procedure(s) (LRB):  C2-T2 POSTERIOR DECOMPRESSION FUSION AND FIXATION (N/A)    Subjective: cervical collar on, sitting up    Objective:    VITAL SIGNS   BP (!) 158/88   Pulse 75   Temp 97.7 °F (36.5 °C) (Oral)   Resp 18   Ht 6' 2\" (1.88 m)   Wt 258 lb 12.8 oz (117.4 kg)   SpO2 100%   BMI 33.23 kg/m²    Height Height: 6' 2\" (188 cm)   Weight Weight: 258 lb 12.8 oz (117.4 kg)        Allergies No Known Allergies   NPO Status ADULT DIET; Regular   Isolation No active isolations     LABS   Basic Metabolic Profile Recent Labs     09/22/22  1342   *   CL 97*   CO2 28   BUN 14   CREATININE 1.0   GLUCOSE 103*   PHOS 4.3        Complete Blood Count Recent Labs     09/22/22  1342   WBC 4.0   RBC 3.97*      Coagulation Studies No results for input(s): PTT, INR in the last 72 hours.     Invalid input(s): PLATELETS, PROA, PT, PTTA     MEDICATIONS   Inpatient Medications     lidocaine, 2 patch, TransDERmal, Daily    magnesium hydroxide, 30 mL, Oral, Daily    heparin (porcine), 5,000 Units, SubCUTAneous, 3 times per day    sodium chloride flush, 5-40 mL, IntraVENous, 2 times per day    polyethylene glycol, 17 g, Oral, Daily    [COMPLETED] methocarbamol IVPB, 1,000 mg, IntraVENous, Q8H **FOLLOWED BY** methocarbamol, 1,000 mg, Oral, 4x Daily    sennosides-docusate sodium, 2 tablet, Oral, BID    acetaminophen, 1,000 mg, Oral, Q6H    naloxegol, 12.5 mg, Oral, QAM AC    NIFEdipine, 30 mg, Oral, Daily    thiamine mononitrate, 100 mg, Oral, Daily    metoprolol succinate, 75 mg, Oral, Daily    tamsulosin, 0.4 mg, Oral, Daily    nicotine, 1 patch, TransDERmal, Daily    atorvastatin, 40 mg, Oral, Daily    lisinopril, 40 mg, Oral, Daily    meloxicam, 7.5 mg, Oral, Daily   Infusions    sodium chloride 25 mL/hr at 09/17/22 0433      Antibiotics   Recent Abx Admin        No antibiotic orders with administrations found. Neurologic Exam:  Mental status: awake and alert and oriented x4      Musculoskeletal:   Gait: Not tested   Tone: normal  Sensory: intact to all extremities  Motor strength: Motor strength:     Right  Left      Right  Left    Deltoid  4 4   Hip Flex  4 4   Biceps  4 4   Knee Extensors  4 4   Triceps  4 4   Knee Flexors  4 4   Wrist Ext  4 4   Ankle Dorsiflex. 4 4   Wrist Flex  4 4   Ankle Plantarflex. 4 4   Handgrip  4 4   Ext Celso Longus  4 4   Thumb Ext  4 4             Incision: intact, clean and dry  Drain: 15 mL in 24 hr    Respiratory:  Unlabored respiratory pattern    Abdomen:   Soft, ND       Cardiovascular:  Warm, well perfused    Assessment   Patient is a 75 yo male s/p Procedure(s) (LRB):  C2-T2 POSTERIOR DECOMPRESSION FUSION AND FIXATION (N/A)   Plan:  Neurologic exam frequency: Q4hr  Mobility: PT/OT, up to chair   C-collar when up  Drain: Right drain in upper back removed today   DVT Prophylaxis: SCDs and heparin SQ  GI Prophylaxis: pepcid  Bowel Regimen: senokot and glycolax  Pain control:  tylenol and oxycodone   Muscle spasm: robaxin and valium  Incisional Care: open to air  Dispo Planning: plan for discharge to home today, home health consult for PT/OT  Patient was seen with Dr. Deborah Kim who agrees with above assessment and plan. Electronically signed by: DOUGLAS Garcia CNP, 9/23/2022 12:33 PM   Neurosurgery Nurse Practitioner  [phone]   I spent 30 minutes in the care of this patient.   Over 50% of that time was in face-to-face counseling regarding disease process, diagnostic testing, preventative measures, and answering patient and family questions.

## 2022-09-23 NOTE — PROGRESS NOTES
Hospitalist Progress Note      PCP: Desiree Jefferson    Date of Admission: 9/9/2022    Subjective:   Patient was seen and examined at [facility]  Denies specific complaints. Right Drains still in place. Medications:  Reviewed    Infusion Medications    sodium chloride 25 mL/hr at 09/17/22 0433     Scheduled Medications    lidocaine  2 patch TransDERmal Daily    magnesium hydroxide  30 mL Oral Daily    heparin (porcine)  5,000 Units SubCUTAneous 3 times per day    sodium chloride flush  5-40 mL IntraVENous 2 times per day    polyethylene glycol  17 g Oral Daily    methocarbamol  1,000 mg Oral 4x Daily    sennosides-docusate sodium  2 tablet Oral BID    acetaminophen  1,000 mg Oral Q6H    naloxegol  12.5 mg Oral QAM AC    NIFEdipine  30 mg Oral Daily    thiamine mononitrate  100 mg Oral Daily    metoprolol succinate  75 mg Oral Daily    tamsulosin  0.4 mg Oral Daily    nicotine  1 patch TransDERmal Daily    atorvastatin  40 mg Oral Daily    lisinopril  40 mg Oral Daily    meloxicam  7.5 mg Oral Daily     PRN Meds: sodium chloride flush, sodium chloride, naloxone, aluminum & magnesium hydroxide-simethicone, bisacodyl, promethazine **OR** ondansetron, oxyCODONE **OR** oxyCODONE, diazePAM, magnesium hydroxide, hydrALAZINE, polyvinyl alcohol, sodium chloride, ondansetron **OR** ondansetron, polyethylene glycol      Intake/Output Summary (Last 24 hours) at 9/23/2022 1032  Last data filed at 9/23/2022 0929  Gross per 24 hour   Intake 760 ml   Output 15 ml   Net 745 ml         Physical Exam Performed:    BP (!) 154/84   Pulse 86   Temp 97.4 °F (36.3 °C) (Oral)   Resp 18   Ht 6' 2\" (1.88 m)   Wt 258 lb 12.8 oz (117.4 kg)   SpO2 96%   BMI 33.23 kg/m²     General appearance: No apparent distress  Neck: C collar in place, drains w/ sanguinous drainage  Respiratory:  Normal respiratory effort. Clear to auscultation, bilaterally without Rales/Wheezes/Rhonchi.   Cardiovascular: Regular rate and rhythm Abdomen: Soft, non-tender  Musculoskeletal: No clubbing, cyanosis or edema bilaterally. Neurologic:  Grossly non-focal.  Psychiatric: Alert and oriented    Labs:     Recent Labs     09/22/22  1342   *   K 4.3  4.3   CL 97*   CO2 28   BUN 14   CREATININE 1.0   CALCIUM 9.2   PHOS 4.3         Urinalysis:      Lab Results   Component Value Date/Time    NITRU Negative 09/08/2022 11:30 PM    WBCUA 1 09/08/2022 11:30 PM    BACTERIA None Seen 09/08/2022 11:30 PM    RBCUA 1 09/08/2022 11:30 PM    BLOODU Negative 09/08/2022 11:30 PM    SPECGRAV 1.015 09/08/2022 11:30 PM    GLUCOSEU Negative 09/08/2022 11:30 PM       Radiology:  XR CERVICAL SPINE (2-3 VIEWS)   Final Result      Fusion in the cervical spine. XR CERVICAL SPINE (2-3 VIEWS)   Final Result   1. Intraoperative imaging using CT and fluoroscopy during posterior cervical decompression and fusion. FLUORO FOR SURGICAL PROCEDURES   Final Result   1. Intraoperative imaging using CT and fluoroscopy during posterior cervical decompression and fusion. CT CERVICAL SPINE WO CONTRAST   Final Result   1. Redemonstration of anterolisthesis of C2 on C3 without obvious fracture. There is severe facet arthrosis at this level   2. Redemonstration of anterolisthesis of C4-5 likely related to severe facet arthrosis at this level   3. Minimal retrolisthesis of C3 on C4 redemonstrated, likely related to severe degenerative disc disease at this level as well as associated facet arthrosis   4. Marked degenerative disc disease C3-4, C5-6, C6-7, C7-T1   5. Diffuse severe facet arthrosis            XR CERVICAL SPINE (2-3 VIEWS)   Final Result      Grade 1 anterolisthesis of C2 on C3. Multilevel moderate to severe degenerative disc disease. MRI LUMBAR SPINE WO CONTRAST   Final Result   1. Multilevel severe central canal and foraminal stenosis L2-3 through L5-S1, secondary to multifactorial degenerative changes and posterior epidural lipomatosis   2. Redundancy in edematous nerve roots at the L1-2 level secondary to chronic lumbar central canal stenosis. 2. Extruded disc herniation L3-L4 with left lateral recess and subarticular migration      Compared prior study there is been progression of spinal stenosis secondary to chronic multifactorial degenerative changes posterior epidural fat. No change in the degree of facet arthropathy and foraminal stenosis. MRI THORACIC SPINE WO CONTRAST   Final Result      1. Mild scoliosis and degenerative disc disease concordant with prior CT   2. No abnormal cord signal, central canal, compression or canal stenosis         MRI CERVICAL SPINE WO CONTRAST   Final Result      1. Cord myelopathy, cord edema with hyperintense signal noted on STIR sequence at the C2-3 and C3 level   2. Severe central canal stenosis C2-C3 which is improved with the patient's collar on compared to prior study   3. Severe canal stenosis C3-C4 with central disc herniation, also slightly improved with the patient's collar on during the study   4. Multilevel bilateral foraminal stenosis             Assessment/Plan:    Cervical cord compression with edema s/p C2-T2 D/F/F  -Neurosurgery following, s/p C2-T2 posterior decompression fusion fixation on 9/15/2022.  - Continue pain control, muscle relaxants  -R Drain still in place, neurosurgery monitoring output  Left drain was removed yesterday      Hypomagnesemia  Replaced. Hypertension  -Continue metoprolol, nifedipine, lisinopril     Paroxysmal atrial fibrillation  -Continue metoprolol. No full ac. Tobacco abuse  - Nicotine patch     BPH  - Continue Flomax    DVT Prophylaxis: Heparin subcut  Diet: ADULT DIET;  Regular  Code Status: Full Code  PT/OT Eval Status: 17/16    Dispo - inpatient rehab at [facility] once drains pulled and pre-cert obtained       Filemon Galvin MD

## 2022-09-23 NOTE — PLAN OF CARE
Problem: Pain  Goal: Verbalizes/displays adequate comfort level or baseline comfort level  9/23/2022 0854 by Ian Guerrero, RN  Outcome: Progressing   Pt endorsing pain to neck and both shoulders. Being treated with PRN pain medication, rest, and frequent repositioning with pillow support for comfort and pressure relief. Pt reports some relief from pain with above interventions. Problem: Safety - Adult  Goal: Free from fall injury  9/23/2022 0854 by Ian Guerrero, RN  Outcome: Progressing   All fall precautions in place. Bed locked and in lowest position with alarm on. Overbed table and personal belonings within reach. Call light within reach and patient instructed to use call light for assistance. Non-skid socks on.

## 2022-09-23 NOTE — PROGRESS NOTES
Patient alert and oriented X 4. VSS. Tolerating fluids and diet. Taking medication www. . Pain is managed per MAR. No c/o numbness or tingling. Skin CDI with exception of incision site. Cervical collar in place. All fall precaution in place. Will continue to monitor.

## 2022-09-23 NOTE — PROGRESS NOTES
Pt to d/c home with home health. D/c packet fully reviewed with Pt. Signed scripts given to  Pt. 2 PIV removed. 1 R hemovac drain removed, cleanse with CHG, covered with sterile gauze and covered with Tegaderm. All belongings with Pt.

## 2022-09-23 NOTE — PROGRESS NOTES
VSS on room air. Aox4. No acute changes in Neuro status this shift. Ambulating assist x1 with rw and gb, tolerating well, denies dizziness. Voiding adequately via BRP/urinal, denies pain or difficulty when urinating. Skin C/D/I with exception to surgical site. Incision C/D/I, no drainage noted, painted with CHG, c-collar in place and aligned. Tolerating oral diet and PO fluids, denies N/V.  All fall precautions in place

## 2022-09-23 NOTE — CARE COORDINATION
Case Management Assessment            Discharge Note                    Date / Time of Note: 9/23/2022 12:11 PM                  Discharge Note Completed by: Vivi Birmingahm RN    Patient Name: Luisa Georges   YOB: 1955  Diagnosis: Cord compression [facility]) [G95.20]   Date / Time: 9/9/2022  2:19 PM    Current PCP: Martin Velasco patient: No    Hospitalization in the last 30 days: Yes    Advance Directives:  Code Status: Full Code  [place] DNR form completed and on chart: Not Indicated    Financial:  Payor: Yael Number / Plan: Kate Case / Product Type: *No Product type* /      [facility]  [id number]  Phone: [phone] Fax: [phone]      Assistance purchasing medications?: Potential Assistance Purchasing Medications: No  Assistance provided by Case Management: None at this time    Does patient want to participate in local refill/ meds to beds program?: No    Meds To Beds General Rules:  1. Can ONLY be done Monday- Friday between 8:30am-5pm  2. Prescription(s) must be in pharmacy by 3pm to be filled same day  3. Copy of patient's insurance/ prescription drug card and patient face sheet must be sent along with the prescription(s)  4. Cost of Rx cannot be added to hospital bill. If financial assistance is needed, please contact unit  or ;  or  CANNOT provide pharmacy voucher for patients co-pays  5.  Patients can then  the prescription on their way out of the hospital at discharge, or pharmacy can deliver to the bedside if staff is available. (payment due at time of pick-up or delivery - cash, check, or card accepted)     Able to afford home medications/ co-pay costs: Yes    ADLS:  Current PT AM-PAC Score: 17 /24  Current OT AM-PAC Score: 18 /24      DISCHARGE Disposition: Home with Home Health Care: [facility]     LOC at discharge: Not Applicable  [place]HAM Completed: Not Indicated    Notification completed in HENS/PAS?:  Not Applicable    IMM Completed:   Yes, Case management has presented and reviewed IMM letter #2 to the patient and/or family/ POA. Patient and/or family/POA verbalized understanding of their medicare rights and appeal process if needed. Patient and/or family/POA has signed, initialed and placed today's date (9/23/22) and time ([phone]) on IMM letter #2 on the the appropriate lines. Patient and/or family/POA, copy of letter offered and they are aware that this original copy of IMM letter #2 is available prior to discharge from the paper chart on the unit. Electronic documentation has been entered into epic for IMM letter #2 and original paper copy has been added to the paper chart at the nurses station. Transportation:  Transportation PLAN for discharge: family   Mode of Transport: [address] ordered at discharge: Yes  [address]: [facility]  Phone: [phone]  Fax: [phone]  Orders faxed: Yes    Durable Medical Equipment:  DME Provider: Erik Patrick  Equipment obtained during hospitalization: walker      Referrals made at [employer] for outpatient continued care:  Not Applicable    Additional CM Notes: Pt to dc home with daughter for transport. [facility] to follow for Emigdio Yañez. Orders placed and faxed. Walker delivered to bedside per Caitlyn Arnold at [facility]. The Plan for Transition of Care is related to the following treatment goals of Cord compression [facility]) [G95.20]    The Patient and/or patient representative Ty Matias and his family were provided with a choice of provider and agrees with the discharge plan Yes    Freedom of choice list was provided with basic dialogue that supports the patient's individualized plan of care/goals and shares the quality data associated with the providers.  Yes    Care Transitions patient: Nathaly Jose RN  [facility]  Case Management Department  Ph: [phone]  Fax: [phone]

## 2022-09-23 NOTE — PLAN OF CARE
Problem: Discharge Planning  Goal: Discharge to home or other facility with appropriate resources  Outcome: Progressing     Problem: Pain  Goal: Verbalizes/displays adequate comfort level or baseline comfort level  Outcome: Progressing     Problem: Safety - Adult  Goal: Free from fall injury  Outcome: Progressing     Problem: ABCDS Injury Assessment  Goal: Absence of physical injury  Outcome: Progressing     Problem: Neurosensory - Adult  Goal: Achieves stable or improved neurological status  Outcome: Progressing     Problem: Cardiovascular - Adult  Goal: Maintains optimal cardiac output and hemodynamic stability  Outcome: Progressing     Problem: Respiratory - Adult  Goal: Achieves optimal ventilation and oxygenation  Outcome: Progressing     Problem: Skin/Tissue Integrity  Goal: Absence of new skin breakdown  Description: 1. Monitor for areas of redness and/or skin breakdown  2. Assess vascular access sites hourly  3. Every 4-6 hours minimum:  Change oxygen saturation probe site  4. Every 4-6 hours:  If on nasal continuous positive airway pressure, respiratory therapy assess nares and determine need for appliance change or resting period.   Outcome: Progressing

## 2022-10-13 PROBLEM — Z01.818 PRE-OP EVALUATION: Status: RESOLVED | Noted: 2022-09-13 | Resolved: 2022-10-13

## 2022-10-17 NOTE — PROGRESS NOTES
[address]   Cardiac Evaluation      Patient: Joann Zambrano  YOB: 1955         Chief Complaint   Patient presents with    6 Month Follow-Up    Coronary Artery Disease    Hyperlipidemia    Hypertension          Referring provider: Mai Perez    History of Present Illness:   Joann Zambrano is a 74 y.o. male presenting for follow up with a history of CAD, Htn, Hld, atrial fib/flutter post SVT ablation '16. Hctz stopped Jan '21 for hyponatremia. Today he is here for follow up. He had back/neck surgery last month and is in a neck brace still and using a walker. He reports that he is healing well from it. He denies any cardiac symptoms. With regard to medication therapy he/she has been compliant with prescribed regimen and has tolerated therapy to date. Past Medical History:   has a past medical history of Arthritis, Atrial fibrillation ([address] CAD (coronary artery disease), Hyperlipidemia, and Hypertension. Surgical History:   has a past surgical history that includes back surgery (200); Coronary angioplasty with stent (01/01/2012); cervical fusion (N/A, 09/15/2022); and X-stop implantation. Current Outpatient Medications   Medication Sig Dispense Refill    lisinopril (PRINIVIL;ZESTRIL) 40 MG tablet Take 1 tablet by mouth daily 90 tablet 3    aspirin EC 81 MG EC tablet Take 1 tablet by mouth daily 90 tablet 3    REFRESH OPTIVE 1-0.9 % GEL       tamsulosin (FLOMAX) 0.4 MG capsule Take 0.2 mg by mouth daily      metoprolol succinate (TOPROL XL) 50 MG extended release tablet Take 50 mg by mouth daily      atorvastatin (LIPITOR) 40 MG tablet Take 1 tablet by mouth daily.  30 tablet 5    NIFEdipine (ADALAT CC) 30 MG extended release tablet Take 1 tablet by mouth daily for 15 days (Patient not taking: Reported on 10/21/2022) 15 tablet 0    nicotine (NICODERM CQ) 14 MG/24HR Place 1 patch onto the skin daily for 10 days (Patient not taking: Reported on 10/21/2022) 10 patch 0    lidocaine (LIDODERM) 5 % Place 1 patch onto the skin daily 12 hours on, 12 hours off. (Patient not taking: Reported on 10/21/2022) 30 patch 0    meloxicam (MOBIC) 7.5 MG tablet Take 1 tablet by mouth daily (Patient not taking: Reported on 10/21/2022) 90 tablet 1    INCRUSE ELLIPTA 62.5 MCG/INH AEPB  (Patient not taking: No sig reported)       No current facility-administered medications for this visit. Allergies:  Patient has no known allergies. Social History:  Social History     Socioeconomic History    Marital status: Single     Spouse name: Not on file    Number of children: Not on file    Years of education: Not on file    Highest education level: Not on file   Occupational History    Not on file   Tobacco Use    Smoking status: Every Day     Packs/day: 1.00     Years: 40.00     Pack years: 40.00     Types: Cigarettes    Smokeless tobacco: Never   Vaping Use    Vaping Use: Never used    Passive vaping exposure: Yes   Substance and Sexual Activity    Alcohol use:  Yes     Alcohol/week: 20.0 standard drinks     Types: 20 Cans of beer per week     Comment: occasionally    Drug use: No    Sexual activity: Never   Other Topics Concern    Not on file   Social History Narrative    Not on file     Social Determinants of Health     Financial Resource Strain: Not on file   Food Insecurity: Not on file   Transportation Needs: Not on file   Physical Activity: Not on file   Stress: Not on file   Social Connections: Not on file   Intimate Partner Violence: Not on file   Housing Stability: Not on file       Family History:   Family History   Problem Relation Age of Onset    Heart Disease Mother     Diabetes Mother     Heart Disease Father     Diabetes Father     Diabetes Sister     Diabetes Brother     Diabetes Maternal Grandmother     Diabetes Maternal Grandfather     Diabetes Paternal Grandmother     Diabetes Paternal Grandfather      Family history has been reviewed and not pertinent except as noted above.     Review of Systems:   Constitutional: there has been no unanticipated weight loss. No change in energy or activity level   Eyes: No visual changes   ENT: No Headaches, hearing loss or vertigo. No mouth sores or sore throat. Cardiovascular: Reviewed in HPI  Respiratory: No cough or wheezing, no sputum production. Gastrointestinal: No abdominal pain, appetite loss, blood in stools. No change in bowel or bladder habits. Genitourinary: No nocturia, dysuria, trouble voiding  Musculoskeletal:  No gait disturbance, weakness or joint complaints. Integumentary: No rash or pruritis. Neurological: No headache, change in muscle strength, numbness or tingling. No change in gait, balance, coordination, mood, affect, memory, mentation, behavior. Psychiatric: No anxiety or depression  Endocrine: No malaise or fever  Hematologic/Lymphatic: No abnormal bruising or bleeding, blood clots or swollen lymph nodes. Allergic/Immunologic: No nasal congestion or hives. Physical Examination:    Vitals:    10/21/22 0914 10/21/22 0916   BP: (!) 156/84 (!) 152/82   Site: Left Upper Arm Left Upper Arm   Position: Sitting Sitting   Cuff Size: Large Adult Large Adult   Pulse: 57    SpO2: 97%    Weight: 241 lb 3.2 oz (109.4 kg)    Height: 6' 2\" (1.88 m)      Body mass index is 30.97 kg/m². Wt Readings from Last 3 Encounters:   10/21/22 241 lb 3.2 oz (109.4 kg)   09/14/22 258 lb 12.8 oz (117.4 kg)   09/08/22 250 lb (113.4 kg)      BP Readings from Last 3 Encounters:   10/21/22 (!) 152/82   09/23/22 (!) 158/88   09/09/22 (!) 170/94        Physical Examination:    CONSTITUTIONAL: Well developed, well nourished, wearing a neck brace  EYES: PERRLA. No xanthelasma, sclera non icteric  EARS,NOSE,MOUTH,THROAT:  Mucous membranes moist, normal hearing  NECK: Supple, JVP normal, thyroid not enlarged.  Carotids 2+ without bruits  RESPIRATORY: Normal effort, no rales or rhonchi  CARDIOVASCULAR: Normal PMI, regular rate and rhythm, no murmurs, rub or gallop. No edema. Radial pulses present and equal  CHEST: No scar or masses  ABDOMEN: Normal bowel sounds. No masses or tenderness. No bruit  MUSCULOSKELETAL: No clubbing or cyanosis. Moves all extremities well. Normal gait  SKIN:  Warm and dry. No rashes  NEUROLOGIC: Cranial nerves intact. Alert and oriented  PSYCHIATRIC: Calm affect. Appears to have normal judgement and insight    All testing and labs listed below were personally reviewed by myself. Cardiac Cath PCI, FFR: 2/2021 [facility])  Anatomy:   LM-nml   LAD-mid 60%. D1 prox 90%. D2 ostial occluded (stent skilled nursing) with R to L collaterals  Cx-dominant, normal  OM- nml  RCA-non dominant prox 99%  LPDA- nml     FFR LAD 0.87     Intervention  ~Successful PCI to D1 with 2.5x15 DORIE to 16tam. PCI to RCA with 2.5x12 DORIE to 18atm. Excellent Result. Contrast: 133  Flouro Time: 23.5  Access: R radial a     Impression  ~Coronary Angiography w/ Severe branch CAD  ~Successful complex angioplasty and stenting of D1, RCA     Recommendation  ~Aggressive medical treatment and risk factor modification  ~1. Stop heparin gtt. Post cath IVF. Bedrest.  2. Recommend beta blocker, high potency statin, aspirin and plavix for 6-12 months  3. Referral to cardiac rehab placed  4. Patient has been advised on the importance of regular exercise of at least 20-30 minutes daily. 5. Patient counseled about and offered assistance for smoking cessation   6. OK to discharge home today. IM to address hyponatremia. Follow up in 1-2 weeks with cardiology     Echo 2/2021  Summary  Normal left ventricle size and systolic function with an estimated ejection fraction of 55%. There is moderate to severe concentric left ventricular hypertrophy. Grade I diastolic dysfunction with normal LV filling pressures. Mild mitral regurgitation. There is a trivial localized near right ventricle pericardial effusion  noted. Assessment/Plan  1.  Coronary artery disease involving native coronary artery of native heart without angina pectoris    2. Essential hypertension    3. Mixed hyperlipidemia    4. Paroxysmal atrial fibrillation (HCC)        Coronary artery disease  Angina~ none   CCS class~ 1  Intervention  LHC ~ 2/2021 PCI to D1 and RCA   LHC~ PTCA LAD '12, s/p PTCA diag-1 & RCA Jan '21  Echo 2/2021 EF 55%  Current meds~ asa (he had stopped asa for his surgery and did not restart it)  Plan~ restart aspirin     Hypertension  BP (!) 152/82 (Site: Left Upper Arm, Position: Sitting, Cuff Size: Large Adult)   Pulse 57   Ht 6' 2\" (1.88 m)   Wt 241 lb 3.2 oz (109.4 kg)   SpO2 97%   BMI 30.97 kg/m²   Meds~ lisinopril / nifedipine / metoprolol   Plan~ stable     Hyperlipidemia   No recent labs available   Meds~ atorvastatin   Plan~ managed with pcp     Atrial fibrillation  S/p a flutter/SVT ablation 2016 by Dr. Neto Coleman  H/o non-compliance with asa therapy  [id number] score 3  Afib reoccurrence post op. Now in NSR. Follow up in 1 year     No orders of the defined types were placed in this encounter. Genaro Tony MD      Thank you for allowing to me to participate in the care of Bhaskar Horton. Scribe's Attestation: This note was scribed in the presence of Dr. Adin Mas MD by Maria Cordoba, STEPHANY.     I, Dr. Adin Mas, personally performed the services described in this documentation, as scribed by the above signed scribe in my presence. It is both accurate and complete to my knowledge. I agree with the details independently gathered by the clinical support staff, while the remaining scribed note accurately describes my personal service to the patient.

## 2022-10-21 ENCOUNTER — OFFICE VISIT (OUTPATIENT)
Dept: CARDIOLOGY CLINIC | Age: 67
End: 2022-10-21
Payer: MEDICARE

## 2022-10-21 VITALS
BODY MASS INDEX: 30.96 KG/M2 | OXYGEN SATURATION: 97 % | HEART RATE: 57 BPM | WEIGHT: 241.2 LBS | DIASTOLIC BLOOD PRESSURE: 82 MMHG | SYSTOLIC BLOOD PRESSURE: 152 MMHG | HEIGHT: 74 IN

## 2022-10-21 DIAGNOSIS — I48.0 PAROXYSMAL ATRIAL FIBRILLATION (HCC): Chronic | ICD-10-CM

## 2022-10-21 DIAGNOSIS — I25.10 CORONARY ARTERY DISEASE INVOLVING NATIVE CORONARY ARTERY OF NATIVE HEART WITHOUT ANGINA PECTORIS: Primary | ICD-10-CM

## 2022-10-21 DIAGNOSIS — E78.2 MIXED HYPERLIPIDEMIA: ICD-10-CM

## 2022-10-21 DIAGNOSIS — I10 ESSENTIAL HYPERTENSION: ICD-10-CM

## 2022-10-21 PROCEDURE — 1123F ACP DISCUSS/DSCN MKR DOCD: CPT | Performed by: INTERNAL MEDICINE

## 2022-10-21 PROCEDURE — 99214 OFFICE O/P EST MOD 30 MIN: CPT | Performed by: INTERNAL MEDICINE

## 2022-10-21 RX ORDER — ASPIRIN 81 MG/1
81 TABLET ORAL DAILY
Qty: 90 TABLET | Refills: 3 | Status: SHIPPED | OUTPATIENT
Start: 2022-10-21

## 2022-10-21 RX ORDER — LISINOPRIL 40 MG/1
40 TABLET ORAL DAILY
Qty: 90 TABLET | Refills: 3 | Status: SHIPPED | OUTPATIENT
Start: 2022-10-21

## 2022-10-25 ENCOUNTER — TELEPHONE (OUTPATIENT)
Dept: CASE MANAGEMENT | Age: 67
End: 2022-10-25

## 2022-11-01 ENCOUNTER — HOSPITAL ENCOUNTER (OUTPATIENT)
Age: 67
Discharge: HOME | End: 2022-11-01
Payer: MEDICARE

## 2022-11-01 ENCOUNTER — HOSPITAL ENCOUNTER (OUTPATIENT)
Dept: GENERAL RADIOLOGY | Age: 67
Discharge: HOME | End: 2022-11-01
Payer: MEDICARE

## 2022-11-01 DIAGNOSIS — Z98.1 ARTHRODESIS STATUS: ICD-10-CM

## 2022-11-01 PROCEDURE — 72040 X-RAY EXAM NECK SPINE 2-3 VW: CPT

## 2022-12-05 ENCOUNTER — HOSPITAL ENCOUNTER (OUTPATIENT)
Age: 67
Discharge: HOME | End: 2022-12-05
Payer: MEDICARE

## 2022-12-05 ENCOUNTER — HOSPITAL ENCOUNTER (OUTPATIENT)
Dept: GENERAL RADIOLOGY | Age: 67
Discharge: HOME | End: 2022-12-05
Payer: MEDICARE

## 2022-12-05 DIAGNOSIS — R52 PAIN: ICD-10-CM

## 2022-12-05 PROCEDURE — 72040 X-RAY EXAM NECK SPINE 2-3 VW: CPT

## 2022-12-12 ENCOUNTER — HOSPITAL ENCOUNTER (OUTPATIENT)
Dept: GENERAL RADIOLOGY | Age: 67
Discharge: HOME | End: 2022-12-12
Payer: MEDICARE

## 2022-12-12 ENCOUNTER — HOSPITAL ENCOUNTER (OUTPATIENT)
Dept: CT IMAGING | Age: 67
Discharge: HOME | End: 2022-12-12
Payer: MEDICARE

## 2022-12-12 ENCOUNTER — HOSPITAL ENCOUNTER (OUTPATIENT)
Dept: PHYSICAL THERAPY | Age: 67
Setting detail: THERAPIES SERIES
Discharge: HOME | End: 2022-12-12

## 2022-12-12 ENCOUNTER — HOSPITAL ENCOUNTER (OUTPATIENT)
Age: 67
Discharge: HOME | End: 2022-12-12
Payer: MEDICARE

## 2022-12-12 DIAGNOSIS — M53.2X2 CERVICAL SPINE INSTABILITY: ICD-10-CM

## 2022-12-12 DIAGNOSIS — T84.216A: ICD-10-CM

## 2022-12-12 PROCEDURE — 72125 CT NECK SPINE W/O DYE: CPT

## 2022-12-12 PROCEDURE — 72040 X-RAY EXAM NECK SPINE 2-3 VW: CPT

## 2022-12-12 NOTE — FLOWSHEET NOTE
[address]     Physical Therapy  Cancellation/No-show Note  Patient Name:  Joann Zambrano  :  1955   Date:  2022  Cancelled visits to date: 0  No-shows to date: 1    Patient status for today's appointment patient:  []  Cancelled  []  Rescheduled appointment  [x]  No-show (eval)     Reason given by patient:  []  Patient ill  []  Conflicting appointment  []  No transportation    []  Conflict with work  [x]  No reason given  []  Other:     Comments:      Phone call information:   []  Phone call made today to patient at _ time at number provided:      []  Patient answered, conversation as follows:    []  Patient did not answer, message left as follows:  [x]  Phone call not made today  []  Phone call not needed - pt contacted us to cancel and provided reason for cancellation.      Electronically signed by:  Lilliana Joya, PT, DPT

## 2023-01-03 ENCOUNTER — TELEPHONE (OUTPATIENT)
Dept: CASE MANAGEMENT | Age: 68
End: 2023-01-03

## 2023-01-16 ENCOUNTER — HOSPITAL ENCOUNTER (OUTPATIENT)
Dept: GENERAL RADIOLOGY | Age: 68
Discharge: HOME | End: 2023-01-16
Payer: MEDICARE

## 2023-01-16 ENCOUNTER — HOSPITAL ENCOUNTER (OUTPATIENT)
Age: 68
Discharge: HOME | End: 2023-01-16
Payer: MEDICARE

## 2023-01-16 DIAGNOSIS — R52 PAIN: ICD-10-CM

## 2023-01-16 PROCEDURE — 72040 X-RAY EXAM NECK SPINE 2-3 VW: CPT

## 2023-01-27 ENCOUNTER — HOSPITAL ENCOUNTER (OUTPATIENT)
Dept: PHYSICAL THERAPY | Age: 68
Setting detail: THERAPIES SERIES
Discharge: HOME | End: 2023-01-27

## 2023-01-27 NOTE — FLOWSHEET NOTE
[facility] [place]     Physical Therapy  Cancellation/No-show Note  Patient Name:  Reno Epps  :  1955   Date:  2023  Cancelled visits to date: 1  No-shows to date: 1    Patient status for today's appointment patient:  [x]  Cancelled:  eval  []  Rescheduled appointment  []  No-show (eval)     Reason given by patient:  []  Patient ill  []  Conflicting appointment  []  No transportation    []  Conflict with work  [x]  No reason given  []  Other:     Comments:      Phone call information:   []  Phone call made today to patient at _ time at number provided:      []  Patient answered, conversation as follows:    []  Patient did not answer, message left as follows:  []  Phone call not made today  [x]  Phone call not needed - pt contacted us to cancel and provided reason for cancellation.     Electronically signed by:  Kelsea Haynes, PT, DPT

## 2023-02-01 ENCOUNTER — HOSPITAL ENCOUNTER (OUTPATIENT)
Dept: PHYSICAL THERAPY | Age: 68
Setting detail: THERAPIES SERIES
Discharge: HOME | End: 2023-02-01

## 2023-02-01 NOTE — FLOWSHEET NOTE
[address]     Physical Therapy  Cancellation/No-show Note  Patient Name:  Ben Lemons  :  1955   Date:  2023  Cancelled visits to date: 1  No-shows to date: 2    Patient status for today's appointment patient:  []  Cancelled:  eval  []  Rescheduled appointment  [x]  No-show  (eval),  eval      Reason given by patient:  []  Patient ill  []  Conflicting appointment  []  No transportation    []  Conflict with work  []  No reason given  []  Other:     Comments:      Phone call information:   []  Phone call made today to patient at _ time at number provided:      []  Patient answered, conversation as follows:    []  Patient did not answer, message left as follows:  [x]  Phone call not made today  []  Phone call not needed - pt contacted us to cancel and provided reason for cancellation. Electronically signed by:   Veda Addison PT, DPT ATC

## 2023-02-09 ENCOUNTER — TELEPHONE (OUTPATIENT)
Dept: CASE MANAGEMENT | Age: 68
End: 2023-02-09

## 2023-02-09 NOTE — TELEPHONE ENCOUNTER
Patient due for annual CT Lung Screening. Reminder letter mailed.
Instruct patient to call for assistance

## 2023-03-28 ENCOUNTER — HOSPITAL ENCOUNTER (OUTPATIENT)
Dept: GENERAL RADIOLOGY | Age: 68
Discharge: HOME | End: 2023-03-28
Payer: MEDICARE

## 2023-03-28 ENCOUNTER — HOSPITAL ENCOUNTER (OUTPATIENT)
Age: 68
Discharge: HOME | End: 2023-03-28
Payer: MEDICARE

## 2023-03-28 DIAGNOSIS — M53.2X2 CERVICAL SPINE INSTABILITY: ICD-10-CM

## 2023-03-28 PROCEDURE — 72040 X-RAY EXAM NECK SPINE 2-3 VW: CPT

## 2023-04-28 ENCOUNTER — HOSPITAL ENCOUNTER (OUTPATIENT)
Dept: CT IMAGING | Age: 68
Discharge: HOME | End: 2023-04-28
Payer: MEDICARE

## 2023-04-28 DIAGNOSIS — T84.216D: ICD-10-CM

## 2023-04-28 PROCEDURE — 72125 CT NECK SPINE W/O DYE: CPT

## 2023-04-28 PROCEDURE — 72128 CT CHEST SPINE W/O DYE: CPT

## 2023-06-19 ENCOUNTER — HOSPITAL ENCOUNTER (OUTPATIENT)
Dept: MRI IMAGING | Age: 68
Discharge: HOME | End: 2023-06-19
Attending: STUDENT IN AN ORGANIZED HEALTH CARE EDUCATION/TRAINING PROGRAM

## 2023-06-19 DIAGNOSIS — M43.12 SPONDYLOLISTHESIS OF CERVICAL REGION: ICD-10-CM

## 2023-06-28 ENCOUNTER — HOSPITAL ENCOUNTER (OUTPATIENT)
Dept: MRI IMAGING | Age: 68
Discharge: HOME | End: 2023-06-28
Attending: STUDENT IN AN ORGANIZED HEALTH CARE EDUCATION/TRAINING PROGRAM
Payer: MEDICARE

## 2023-06-28 PROCEDURE — 72141 MRI NECK SPINE W/O DYE: CPT

## 2023-07-14 ENCOUNTER — TELEPHONE (OUTPATIENT)
Dept: CARDIOLOGY CLINIC | Age: 68
End: 2023-07-14

## 2023-07-20 ENCOUNTER — TELEPHONE (OUTPATIENT)
Dept: ENT CLINIC | Age: 68
End: 2023-07-20

## 2023-07-20 NOTE — TELEPHONE ENCOUNTER
Please call Dr Dayan Murcia office Luis New would like to speak with you about a Co Sx with dr Gema Rangel for this patient please call [phone] thank you Statement Selected

## 2023-08-03 ENCOUNTER — OFFICE VISIT (OUTPATIENT)
Dept: ENT CLINIC | Age: 68
End: 2023-08-03
Payer: MEDICARE

## 2023-08-03 VITALS
TEMPERATURE: 98.2 F | BODY MASS INDEX: 31.34 KG/M2 | WEIGHT: 244.2 LBS | DIASTOLIC BLOOD PRESSURE: 88 MMHG | SYSTOLIC BLOOD PRESSURE: 136 MMHG | HEIGHT: 74 IN | HEART RATE: 98 BPM

## 2023-08-03 DIAGNOSIS — M48.02 CERVICAL STENOSIS OF SPINE: Primary | ICD-10-CM

## 2023-08-03 PROCEDURE — 99204 OFFICE O/P NEW MOD 45 MIN: CPT | Performed by: OTOLARYNGOLOGY

## 2023-08-03 PROCEDURE — 1123F ACP DISCUSS/DSCN MKR DOCD: CPT | Performed by: OTOLARYNGOLOGY

## 2023-08-03 PROCEDURE — 3075F SYST BP GE 130 - 139MM HG: CPT | Performed by: OTOLARYNGOLOGY

## 2023-08-03 PROCEDURE — 3079F DIAST BP 80-89 MM HG: CPT | Performed by: OTOLARYNGOLOGY

## 2023-08-03 ASSESSMENT — ENCOUNTER SYMPTOMS
SINUS PAIN: 0
NAUSEA: 0
FACIAL SWELLING: 0
CHOKING: 0
VOICE CHANGE: 0
COUGH: 0
SORE THROAT: 0
TROUBLE SWALLOWING: 0
DIARRHEA: 0
SINUS PRESSURE: 0
EYE ITCHING: 0
EYE REDNESS: 0
SHORTNESS OF BREATH: 0
EYE PAIN: 0
RHINORRHEA: 0

## 2023-08-03 NOTE — PROGRESS NOTES
Subjective:      Patient ID: Frances Carnes is a 79 y.o. male. HPI  Chief Complaint   Patient presents with    Surgery consultation       History of Present Illness  Head/Neck    Jaye Mosley is a(n) 79 y.o. male who presents with cervical stenosis. Referred by Dr. Rajat Velarde. Revision.         Patient Active Problem List   Diagnosis    CAD in native artery    Hyperlipidemia    Tobacco use    Paroxysmal atrial fibrillation (HCC)    Tobacco abuse    Typical atrial flutter    Coronary artery disease involving native coronary artery of native heart without angina pectoris    Morbid obesity due to excess calories ([address])    Essential hypertension    Bilateral carpal tunnel syndrome    Chest pain    Anterolisthesis of cervical spine    Hyponatremia    Cord compression (HCC)    Cervical stenosis of spine     Past Surgical History:   Procedure Laterality Date    BACK SURGERY  200    lower lumbar surgery    CERVICAL FUSION N/A 09/15/2022    C2-T2 POSTERIOR DECOMPRESSION FUSION AND FIXATION performed by Shawna Samuels MD at [address] [place] [address]  01/01/2012    X-STOP IMPLANTATION       Family History   Problem Relation Age of Onset    Heart Disease Mother     Diabetes Mother     Heart Disease Father     Diabetes Father     Diabetes Sister     Diabetes Brother     Diabetes Maternal Grandmother     Diabetes Maternal Grandfather     Diabetes Paternal Grandmother     Diabetes Paternal Grandfather      Social History     Socioeconomic History    Marital status: Single     Spouse name: Not on file    Number of children: Not on file    Years of education: Not on file    Highest education level: Not on file   Occupational History    Not on file   Tobacco Use    Smoking status: Every Day     Packs/day: 1.00     Years: 40.00     Pack years: 40.00     Types: Cigarettes    Smokeless tobacco: Never   Vaping Use    Vaping Use: Never used    Passive vaping exposure: Yes   Substance and Sexual Activity    Alcohol

## 2023-08-31 RX ORDER — SPIRONOLACTONE 100 MG/1
100 TABLET, FILM COATED ORAL NIGHTLY
COMMUNITY
Start: 2023-07-08

## 2023-08-31 RX ORDER — CYCLOBENZAPRINE HCL 5 MG
TABLET ORAL
COMMUNITY
Start: 2023-06-09

## 2023-08-31 RX ORDER — AMITRIPTYLINE HYDROCHLORIDE 10 MG/1
10 TABLET, FILM COATED ORAL NIGHTLY
COMMUNITY
Start: 2023-07-19

## 2023-08-31 RX ORDER — TRAMADOL HYDROCHLORIDE 50 MG/1
50 TABLET ORAL EVERY 6 HOURS PRN
Status: ON HOLD | COMMUNITY
Start: 2022-10-03 | End: 2023-09-07 | Stop reason: HOSPADM

## 2023-08-31 NOTE — PROGRESS NOTES
[facility] PRE-SURGICAL TESTING INSTRUCTIONS                      PRIOR TO PROCEDURE DATE:    1. PLEASE FOLLOW ANY INSTRUCTIONS GIVEN TO YOU PER YOUR SURGEON. 2. Arrange for someone to drive you home and be with you for the first 24 hours after discharge for your safety after your procedure for which you received sedation. Ensure it is someone we can share information with regarding your discharge. NOTE: At this time ONLY 2 ADULTS may accompany you   One person ENCOURAGED to stay at hospital entire time if outpatient surgery      3. You must contact your surgeon for instructions IF:  You are taking any blood thinners, aspirin, anti-inflammatory or vitamins. There is a change in your physical condition such as a cold, fever, rash, cuts, sores, or any other infection, especially near your surgical site. 4. Do not drink alcohol the day before or day of your procedure. Do not use any recreational marijuana at least 24 hours or street drugs (heroin, cocaine) at minimum 5 days prior to your procedure. 5. A Pre-Surgical History and Physical MUST be completed WITHIN 30 DAYS OR LESS prior to your procedure. by your Physician or an Urgent Care        THE DAY OF YOUR PROCEDURE:  1. Follow instructions for ARRIVAL TIME as DIRECTED BY YOUR SURGEON. 2. Enter the MAIN entrance from [facility] and follow the signs to the free Parking [employer] or [employer] (offered free of charge 7 am-5pm). 3. Enter the Main Entrance of the hospital (do not enter from the lower level of the parking garage). Upon entrance, check in with the  at the surgical information desk on your LEFT. Bring your insurance card and photo ID to register      4. DO NOT EAT ANYTHING 8 hours prior to arrival for surgery. You may have up to 8 ounces of water 4 hours prior to your arrival for surgery.    NOTE: ALL Gastric, Bariatric & Bowel surgery patients - you MUST follow your surgeon's instructions regarding

## 2023-08-31 NOTE — PROGRESS NOTES
Place patient label inside box (if no patient label, complete below)  Name:  :      MR#:   Ramya Pay / PROCEDURE  I (we), Bi Evelio (Patient Name) authorize Rober Osorio MD AND DOLORES Steinberg MD (Provider / Yuli Montgomery) and/or such assistants as may be selected by him/her, to perform the following operation/procedure(s): CERVICAL 2-CERVICAL 3 ANTERIOR CERVICAL DISCECTOMY AND FUSION       Note: If unable to obtain consent prior to an emergent procedure, document the emergent reason in the medical record. This procedure has been explained to my (our) satisfaction and included in the explanation was: The intended benefit, nature, and extent of the procedure to be performed; The significant risks involved and the probability of success; Alternative procedures and methods of treatment; The dangers and probable consequences of such alternatives (including no procedure or treatment); The expected consequences of the procedure on my future health; Whether other qualified individuals would be performing important surgical tasks and/or whether  would be present to advise or support the procedure. I (we) understand that there are other risks of infection and other serious complications in the pre-operative/procedural and postoperative/procedural stages of my (our) care. I (we) have asked all of the questions which I (we) thought were important in deciding whether or not to undergo treatment or diagnosis. These questions have been answered to my (our) satisfaction. I (we) understand that no assurance can be given that the procedure will be a success, and no guarantee or warranty of success has been given to me (us).     It has been explained to me (us) that during the course of the operation/procedure, unforeseen conditions may be revealed that necessitate extension of the original procedure(s) or different procedure(s) than

## 2023-08-31 NOTE — PROGRESS NOTES
8/31/2023 1228PM labs done at [facility]. [phone] requested labs to be fax through [email or web address]db

## 2023-09-04 ENCOUNTER — ANESTHESIA EVENT (OUTPATIENT)
Dept: OPERATING ROOM | Age: 68
End: 2023-09-04
Payer: MEDICARE

## 2023-09-05 ENCOUNTER — APPOINTMENT (OUTPATIENT)
Dept: GENERAL RADIOLOGY | Age: 68
DRG: 472 | End: 2023-09-05
Attending: STUDENT IN AN ORGANIZED HEALTH CARE EDUCATION/TRAINING PROGRAM
Payer: MEDICARE

## 2023-09-05 ENCOUNTER — ANESTHESIA (OUTPATIENT)
Dept: OPERATING ROOM | Age: 68
End: 2023-09-05
Payer: MEDICARE

## 2023-09-05 ENCOUNTER — HOSPITAL ENCOUNTER (INPATIENT)
Age: 68
LOS: 2 days | Discharge: HOME | DRG: 472 | End: 2023-09-07
Attending: STUDENT IN AN ORGANIZED HEALTH CARE EDUCATION/TRAINING PROGRAM | Admitting: STUDENT IN AN ORGANIZED HEALTH CARE EDUCATION/TRAINING PROGRAM
Payer: MEDICARE

## 2023-09-05 DIAGNOSIS — Z98.1 S/P CERVICAL SPINAL FUSION: Primary | ICD-10-CM

## 2023-09-05 PROBLEM — M48.02 CERVICAL STENOSIS OF SPINAL CANAL: Status: ACTIVE | Noted: 2023-09-05

## 2023-09-05 PROBLEM — M53.2X2 CERVICAL SPINE INSTABILITY: Status: ACTIVE | Noted: 2023-09-05

## 2023-09-05 LAB
ABO + RH BLD: NORMAL
ALBUMIN SERPL-MCNC: 3.9 G/DL (ref 3.4–5)
ANION GAP SERPL CALCULATED.3IONS-SCNC: 14 MMOL/L (ref 3–16)
APTT BLD: 25.6 SEC (ref 22.7–35.9)
BASOPHILS # BLD: 0 K/UL (ref 0–0.2)
BASOPHILS NFR BLD: 0.6 %
BLD GP AB SCN SERPL QL: NORMAL
BUN SERPL-MCNC: 14 MG/DL (ref 7–20)
CALCIUM SERPL-MCNC: 9.1 MG/DL (ref 8.3–10.6)
CHLORIDE SERPL-SCNC: 95 MMOL/L (ref 99–110)
CO2 SERPL-SCNC: 21 MMOL/L (ref 21–32)
CREAT SERPL-MCNC: 0.9 MG/DL (ref 0.8–1.3)
DEPRECATED RDW RBC AUTO: 13.1 % (ref 12.4–15.4)
EOSINOPHIL # BLD: 0.1 K/UL (ref 0–0.6)
EOSINOPHIL NFR BLD: 1.7 %
GFR SERPLBLD CREATININE-BSD FMLA CKD-EPI: >60 ML/MIN/{1.73_M2}
GLUCOSE BLD-MCNC: 93 MG/DL (ref 70–99)
GLUCOSE SERPL-MCNC: 116 MG/DL (ref 70–99)
HCT VFR BLD AUTO: 40 % (ref 40.5–52.5)
HGB BLD-MCNC: 13.7 G/DL (ref 13.5–17.5)
INR PPP: 1.01 (ref 0.84–1.16)
LYMPHOCYTES # BLD: 1.2 K/UL (ref 1–5.1)
LYMPHOCYTES NFR BLD: 31.7 %
MCH RBC QN AUTO: 32.8 PG (ref 26–34)
MCHC RBC AUTO-ENTMCNC: 34.3 G/DL (ref 31–36)
MCV RBC AUTO: 95.6 FL (ref 80–100)
MONOCYTES # BLD: 0.5 K/UL (ref 0–1.3)
MONOCYTES NFR BLD: 12.2 %
NEUTROPHILS # BLD: 2.1 K/UL (ref 1.7–7.7)
NEUTROPHILS NFR BLD: 53.8 %
PERFORMED ON: NORMAL
PHOSPHATE SERPL-MCNC: 4.2 MG/DL (ref 2.5–4.9)
PLATELET # BLD AUTO: 337 K/UL (ref 135–450)
PMV BLD AUTO: 6.5 FL (ref 5–10.5)
POTASSIUM SERPL-SCNC: 4.7 MMOL/L (ref 3.5–5.1)
PROTHROMBIN TIME: 13.3 SEC (ref 11.5–14.8)
RBC # BLD AUTO: 4.19 M/UL (ref 4.2–5.9)
SODIUM SERPL-SCNC: 130 MMOL/L (ref 136–145)
WBC # BLD AUTO: 3.8 K/UL (ref 4–11)

## 2023-09-05 PROCEDURE — 6370000000 HC RX 637 (ALT 250 FOR IP): Performed by: STUDENT IN AN ORGANIZED HEALTH CARE EDUCATION/TRAINING PROGRAM

## 2023-09-05 PROCEDURE — 6360000002 HC RX W HCPCS: Performed by: ANESTHESIOLOGY

## 2023-09-05 PROCEDURE — 2580000003 HC RX 258: Performed by: STUDENT IN AN ORGANIZED HEALTH CARE EDUCATION/TRAINING PROGRAM

## 2023-09-05 PROCEDURE — 2720000010 HC SURG SUPPLY STERILE: Performed by: STUDENT IN AN ORGANIZED HEALTH CARE EDUCATION/TRAINING PROGRAM

## 2023-09-05 PROCEDURE — 0RB30ZZ EXCISION OF CERVICAL VERTEBRAL DISC, OPEN APPROACH: ICD-10-PCS | Performed by: STUDENT IN AN ORGANIZED HEALTH CARE EDUCATION/TRAINING PROGRAM

## 2023-09-05 PROCEDURE — 86900 BLOOD TYPING SEROLOGIC ABO: CPT

## 2023-09-05 PROCEDURE — C1734 ORTH/DEVIC/DRUG BN/BN,TIS/BN: HCPCS | Performed by: STUDENT IN AN ORGANIZED HEALTH CARE EDUCATION/TRAINING PROGRAM

## 2023-09-05 PROCEDURE — 2580000003 HC RX 258: Performed by: ANESTHESIOLOGY

## 2023-09-05 PROCEDURE — 6360000002 HC RX W HCPCS: Performed by: STUDENT IN AN ORGANIZED HEALTH CARE EDUCATION/TRAINING PROGRAM

## 2023-09-05 PROCEDURE — 85610 PROTHROMBIN TIME: CPT

## 2023-09-05 PROCEDURE — 2780000010 HC IMPLANT OTHER: Performed by: STUDENT IN AN ORGANIZED HEALTH CARE EDUCATION/TRAINING PROGRAM

## 2023-09-05 PROCEDURE — 2580000003 HC RX 258

## 2023-09-05 PROCEDURE — 22551 ARTHRD ANT NTRBDY CERVICAL: CPT | Performed by: OTOLARYNGOLOGY

## 2023-09-05 PROCEDURE — 7100000000 HC PACU RECOVERY - FIRST 15 MIN: Performed by: STUDENT IN AN ORGANIZED HEALTH CARE EDUCATION/TRAINING PROGRAM

## 2023-09-05 PROCEDURE — 00NW0ZZ RELEASE CERVICAL SPINAL CORD, OPEN APPROACH: ICD-10-PCS | Performed by: STUDENT IN AN ORGANIZED HEALTH CARE EDUCATION/TRAINING PROGRAM

## 2023-09-05 PROCEDURE — 7100000001 HC PACU RECOVERY - ADDTL 15 MIN: Performed by: STUDENT IN AN ORGANIZED HEALTH CARE EDUCATION/TRAINING PROGRAM

## 2023-09-05 PROCEDURE — 36415 COLL VENOUS BLD VENIPUNCTURE: CPT

## 2023-09-05 PROCEDURE — C1713 ANCHOR/SCREW BN/BN,TIS/BN: HCPCS | Performed by: STUDENT IN AN ORGANIZED HEALTH CARE EDUCATION/TRAINING PROGRAM

## 2023-09-05 PROCEDURE — 86901 BLOOD TYPING SEROLOGIC RH(D): CPT

## 2023-09-05 PROCEDURE — C1729 CATH, DRAINAGE: HCPCS | Performed by: STUDENT IN AN ORGANIZED HEALTH CARE EDUCATION/TRAINING PROGRAM

## 2023-09-05 PROCEDURE — 3600000004 HC SURGERY LEVEL 4 BASE: Performed by: STUDENT IN AN ORGANIZED HEALTH CARE EDUCATION/TRAINING PROGRAM

## 2023-09-05 PROCEDURE — 2580000003 HC RX 258: Performed by: FAMILY MEDICINE

## 2023-09-05 PROCEDURE — 2709999900 HC NON-CHARGEABLE SUPPLY: Performed by: STUDENT IN AN ORGANIZED HEALTH CARE EDUCATION/TRAINING PROGRAM

## 2023-09-05 PROCEDURE — 85730 THROMBOPLASTIN TIME PARTIAL: CPT

## 2023-09-05 PROCEDURE — 2500000003 HC RX 250 WO HCPCS

## 2023-09-05 PROCEDURE — 86850 RBC ANTIBODY SCREEN: CPT

## 2023-09-05 PROCEDURE — 01N10ZZ RELEASE CERVICAL NERVE, OPEN APPROACH: ICD-10-PCS | Performed by: STUDENT IN AN ORGANIZED HEALTH CARE EDUCATION/TRAINING PROGRAM

## 2023-09-05 PROCEDURE — 6360000002 HC RX W HCPCS

## 2023-09-05 PROCEDURE — 3700000000 HC ANESTHESIA ATTENDED CARE: Performed by: STUDENT IN AN ORGANIZED HEALTH CARE EDUCATION/TRAINING PROGRAM

## 2023-09-05 PROCEDURE — APPNB30 APP NON BILLABLE TIME 0-30 MINS: Performed by: NURSE PRACTITIONER

## 2023-09-05 PROCEDURE — 1200000000 HC SEMI PRIVATE

## 2023-09-05 PROCEDURE — 4A11X4G MONITORING OF PERIPHERAL NERVOUS ELECTRICAL ACTIVITY, INTRAOPERATIVE, EXTERNAL APPROACH: ICD-10-PCS | Performed by: STUDENT IN AN ORGANIZED HEALTH CARE EDUCATION/TRAINING PROGRAM

## 2023-09-05 PROCEDURE — 0RG10A0 FUSION OF CERVICAL VERTEBRAL JOINT WITH INTERBODY FUSION DEVICE, ANTERIOR APPROACH, ANTERIOR COLUMN, OPEN APPROACH: ICD-10-PCS | Performed by: STUDENT IN AN ORGANIZED HEALTH CARE EDUCATION/TRAINING PROGRAM

## 2023-09-05 PROCEDURE — 85025 COMPLETE CBC W/AUTO DIFF WBC: CPT

## 2023-09-05 PROCEDURE — A4217 STERILE WATER/SALINE, 500 ML: HCPCS | Performed by: STUDENT IN AN ORGANIZED HEALTH CARE EDUCATION/TRAINING PROGRAM

## 2023-09-05 PROCEDURE — 80069 RENAL FUNCTION PANEL: CPT

## 2023-09-05 PROCEDURE — 72040 X-RAY EXAM NECK SPINE 2-3 VW: CPT

## 2023-09-05 PROCEDURE — 3700000001 HC ADD 15 MINUTES (ANESTHESIA): Performed by: STUDENT IN AN ORGANIZED HEALTH CARE EDUCATION/TRAINING PROGRAM

## 2023-09-05 PROCEDURE — 3600000014 HC SURGERY LEVEL 4 ADDTL 15MIN: Performed by: STUDENT IN AN ORGANIZED HEALTH CARE EDUCATION/TRAINING PROGRAM

## 2023-09-05 DEVICE — IMPLANTABLE DEVICE
Type: IMPLANTABLE DEVICE | Site: SPINE CERVICAL | Status: FUNCTIONAL
Brand: INVIZIA®

## 2023-09-05 DEVICE — IMPLANTABLE DEVICE
Type: IMPLANTABLE DEVICE | Site: SPINE CERVICAL | Status: FUNCTIONAL
Brand: TRINICA® TRINICA®

## 2023-09-05 DEVICE — IMPLANTABLE DEVICE
Type: IMPLANTABLE DEVICE | Site: SPINE CERVICAL | Status: FUNCTIONAL
Brand: VISTA®-S

## 2023-09-05 DEVICE — PRIMAGEN 1CC: Type: IMPLANTABLE DEVICE | Site: ANTERIOR CERVICAL | Status: FUNCTIONAL

## 2023-09-05 RX ORDER — ONDANSETRON 2 MG/ML
INJECTION INTRAMUSCULAR; INTRAVENOUS PRN
Status: DISCONTINUED | OUTPATIENT
Start: 2023-09-05 | End: 2023-09-05 | Stop reason: SDUPTHER

## 2023-09-05 RX ORDER — OXYCODONE HYDROCHLORIDE 5 MG/1
10 TABLET ORAL EVERY 4 HOURS PRN
Status: DISCONTINUED | OUTPATIENT
Start: 2023-09-05 | End: 2023-09-07 | Stop reason: HOSPADM

## 2023-09-05 RX ORDER — HYDROMORPHONE HYDROCHLORIDE 2 MG/ML
INJECTION, SOLUTION INTRAMUSCULAR; INTRAVENOUS; SUBCUTANEOUS PRN
Status: DISCONTINUED | OUTPATIENT
Start: 2023-09-05 | End: 2023-09-05 | Stop reason: SDUPTHER

## 2023-09-05 RX ORDER — ACETAMINOPHEN 325 MG/1
650 TABLET ORAL EVERY 6 HOURS
Status: DISCONTINUED | OUTPATIENT
Start: 2023-09-05 | End: 2023-09-07 | Stop reason: HOSPADM

## 2023-09-05 RX ORDER — IPRATROPIUM BROMIDE AND ALBUTEROL SULFATE 2.5; .5 MG/3ML; MG/3ML
1 SOLUTION RESPIRATORY (INHALATION)
Status: DISCONTINUED | OUTPATIENT
Start: 2023-09-05 | End: 2023-09-05 | Stop reason: HOSPADM

## 2023-09-05 RX ORDER — PROPOFOL 10 MG/ML
INJECTION, EMULSION INTRAVENOUS CONTINUOUS PRN
Status: DISCONTINUED | OUTPATIENT
Start: 2023-09-05 | End: 2023-09-05 | Stop reason: SDUPTHER

## 2023-09-05 RX ORDER — SODIUM CHLORIDE 9 MG/ML
INJECTION, SOLUTION INTRAVENOUS PRN
Status: DISCONTINUED | OUTPATIENT
Start: 2023-09-05 | End: 2023-09-05 | Stop reason: HOSPADM

## 2023-09-05 RX ORDER — GLYCOPYRROLATE 0.2 MG/ML
INJECTION INTRAMUSCULAR; INTRAVENOUS PRN
Status: DISCONTINUED | OUTPATIENT
Start: 2023-09-05 | End: 2023-09-05 | Stop reason: SDUPTHER

## 2023-09-05 RX ORDER — DEXAMETHASONE SODIUM PHOSPHATE 4 MG/ML
INJECTION, SOLUTION INTRA-ARTICULAR; INTRALESIONAL; INTRAMUSCULAR; INTRAVENOUS; SOFT TISSUE PRN
Status: DISCONTINUED | OUTPATIENT
Start: 2023-09-05 | End: 2023-09-05 | Stop reason: SDUPTHER

## 2023-09-05 RX ORDER — METHOCARBAMOL 100 MG/ML
INJECTION, SOLUTION INTRAMUSCULAR; INTRAVENOUS PRN
Status: DISCONTINUED | OUTPATIENT
Start: 2023-09-05 | End: 2023-09-05 | Stop reason: SDUPTHER

## 2023-09-05 RX ORDER — HYDROMORPHONE HYDROCHLORIDE 1 MG/ML
0.5 INJECTION, SOLUTION INTRAMUSCULAR; INTRAVENOUS; SUBCUTANEOUS EVERY 5 MIN PRN
Status: DISCONTINUED | OUTPATIENT
Start: 2023-09-05 | End: 2023-09-05 | Stop reason: HOSPADM

## 2023-09-05 RX ORDER — ONDANSETRON 2 MG/ML
4 INJECTION INTRAMUSCULAR; INTRAVENOUS
Status: DISCONTINUED | OUTPATIENT
Start: 2023-09-05 | End: 2023-09-05 | Stop reason: HOSPADM

## 2023-09-05 RX ORDER — SODIUM CHLORIDE, SODIUM LACTATE, POTASSIUM CHLORIDE, CALCIUM CHLORIDE 600; 310; 30; 20 MG/100ML; MG/100ML; MG/100ML; MG/100ML
INJECTION, SOLUTION INTRAVENOUS CONTINUOUS PRN
Status: DISCONTINUED | OUTPATIENT
Start: 2023-09-05 | End: 2023-09-05 | Stop reason: SDUPTHER

## 2023-09-05 RX ORDER — LIDOCAINE HYDROCHLORIDE 20 MG/ML
INJECTION, SOLUTION EPIDURAL; INFILTRATION; INTRACAUDAL; PERINEURAL PRN
Status: DISCONTINUED | OUTPATIENT
Start: 2023-09-05 | End: 2023-09-05 | Stop reason: SDUPTHER

## 2023-09-05 RX ORDER — SODIUM CHLORIDE, SODIUM LACTATE, POTASSIUM CHLORIDE, CALCIUM CHLORIDE 600; 310; 30; 20 MG/100ML; MG/100ML; MG/100ML; MG/100ML
INJECTION, SOLUTION INTRAVENOUS CONTINUOUS
Status: DISCONTINUED | OUTPATIENT
Start: 2023-09-05 | End: 2023-09-05 | Stop reason: HOSPADM

## 2023-09-05 RX ORDER — LISINOPRIL 40 MG/1
40 TABLET ORAL DAILY
Status: DISCONTINUED | OUTPATIENT
Start: 2023-09-05 | End: 2023-09-07 | Stop reason: HOSPADM

## 2023-09-05 RX ORDER — POLYETHYLENE GLYCOL 3350 17 G/17G
17 POWDER, FOR SOLUTION ORAL DAILY
Status: DISCONTINUED | OUTPATIENT
Start: 2023-09-05 | End: 2023-09-07 | Stop reason: HOSPADM

## 2023-09-05 RX ORDER — KETAMINE HCL IN NACL, ISO-OSM 20 MG/2 ML
SYRINGE (ML) INJECTION PRN
Status: DISCONTINUED | OUTPATIENT
Start: 2023-09-05 | End: 2023-09-05 | Stop reason: SDUPTHER

## 2023-09-05 RX ORDER — FENTANYL CITRATE 50 UG/ML
25 INJECTION, SOLUTION INTRAMUSCULAR; INTRAVENOUS EVERY 5 MIN PRN
Status: DISCONTINUED | OUTPATIENT
Start: 2023-09-05 | End: 2023-09-05 | Stop reason: HOSPADM

## 2023-09-05 RX ORDER — ACETAMINOPHEN 325 MG/1
650 TABLET ORAL
Status: DISCONTINUED | OUTPATIENT
Start: 2023-09-05 | End: 2023-09-05 | Stop reason: HOSPADM

## 2023-09-05 RX ORDER — SODIUM CHLORIDE 0.9 % (FLUSH) 0.9 %
5-40 SYRINGE (ML) INJECTION EVERY 12 HOURS SCHEDULED
Status: DISCONTINUED | OUTPATIENT
Start: 2023-09-05 | End: 2023-09-07 | Stop reason: HOSPADM

## 2023-09-05 RX ORDER — VASOPRESSIN 20 U/ML
INJECTION PARENTERAL PRN
Status: DISCONTINUED | OUTPATIENT
Start: 2023-09-05 | End: 2023-09-05 | Stop reason: SDUPTHER

## 2023-09-05 RX ORDER — ENOXAPARIN SODIUM 100 MG/ML
40 INJECTION SUBCUTANEOUS DAILY
Status: DISCONTINUED | OUTPATIENT
Start: 2023-09-06 | End: 2023-09-06

## 2023-09-05 RX ORDER — ONDANSETRON 2 MG/ML
4 INJECTION INTRAMUSCULAR; INTRAVENOUS EVERY 6 HOURS PRN
Status: DISCONTINUED | OUTPATIENT
Start: 2023-09-05 | End: 2023-09-07 | Stop reason: HOSPADM

## 2023-09-05 RX ORDER — METHOCARBAMOL 750 MG/1
750 TABLET, FILM COATED ORAL EVERY 8 HOURS PRN
Status: DISPENSED | OUTPATIENT
Start: 2023-09-05 | End: 2023-09-06

## 2023-09-05 RX ORDER — ONDANSETRON 4 MG/1
4 TABLET, ORALLY DISINTEGRATING ORAL EVERY 8 HOURS PRN
Status: DISCONTINUED | OUTPATIENT
Start: 2023-09-05 | End: 2023-09-07 | Stop reason: HOSPADM

## 2023-09-05 RX ORDER — PROCHLORPERAZINE EDISYLATE 5 MG/ML
5 INJECTION INTRAMUSCULAR; INTRAVENOUS
Status: DISCONTINUED | OUTPATIENT
Start: 2023-09-05 | End: 2023-09-05 | Stop reason: HOSPADM

## 2023-09-05 RX ORDER — SODIUM CHLORIDE AND POTASSIUM CHLORIDE 150; 900 MG/100ML; MG/100ML
INJECTION, SOLUTION INTRAVENOUS CONTINUOUS
Status: DISCONTINUED | OUTPATIENT
Start: 2023-09-05 | End: 2023-09-07 | Stop reason: HOSPADM

## 2023-09-05 RX ORDER — MORPHINE SULFATE 2 MG/ML
2 INJECTION, SOLUTION INTRAMUSCULAR; INTRAVENOUS
Status: DISPENSED | OUTPATIENT
Start: 2023-09-05 | End: 2023-09-07

## 2023-09-05 RX ORDER — MIDAZOLAM HYDROCHLORIDE 1 MG/ML
INJECTION INTRAMUSCULAR; INTRAVENOUS PRN
Status: DISCONTINUED | OUTPATIENT
Start: 2023-09-05 | End: 2023-09-05 | Stop reason: SDUPTHER

## 2023-09-05 RX ORDER — SODIUM CHLORIDE 0.9 % (FLUSH) 0.9 %
5-40 SYRINGE (ML) INJECTION EVERY 12 HOURS SCHEDULED
Status: DISCONTINUED | OUTPATIENT
Start: 2023-09-05 | End: 2023-09-05 | Stop reason: HOSPADM

## 2023-09-05 RX ORDER — PROPOFOL 10 MG/ML
INJECTION, EMULSION INTRAVENOUS PRN
Status: DISCONTINUED | OUTPATIENT
Start: 2023-09-05 | End: 2023-09-05 | Stop reason: SDUPTHER

## 2023-09-05 RX ORDER — FENTANYL CITRATE 50 UG/ML
INJECTION, SOLUTION INTRAMUSCULAR; INTRAVENOUS PRN
Status: DISCONTINUED | OUTPATIENT
Start: 2023-09-05 | End: 2023-09-05 | Stop reason: SDUPTHER

## 2023-09-05 RX ORDER — OXYCODONE HYDROCHLORIDE 5 MG/1
5 TABLET ORAL EVERY 4 HOURS PRN
Status: DISCONTINUED | OUTPATIENT
Start: 2023-09-05 | End: 2023-09-07 | Stop reason: HOSPADM

## 2023-09-05 RX ORDER — SODIUM CHLORIDE 9 MG/ML
INJECTION, SOLUTION INTRAVENOUS PRN
Status: DISCONTINUED | OUTPATIENT
Start: 2023-09-05 | End: 2023-09-07 | Stop reason: HOSPADM

## 2023-09-05 RX ORDER — SODIUM CHLORIDE 0.9 % (FLUSH) 0.9 %
5-40 SYRINGE (ML) INJECTION PRN
Status: DISCONTINUED | OUTPATIENT
Start: 2023-09-05 | End: 2023-09-05 | Stop reason: HOSPADM

## 2023-09-05 RX ORDER — SODIUM CHLORIDE 0.9 % (FLUSH) 0.9 %
5-40 SYRINGE (ML) INJECTION PRN
Status: DISCONTINUED | OUTPATIENT
Start: 2023-09-05 | End: 2023-09-07 | Stop reason: HOSPADM

## 2023-09-05 RX ORDER — SUCCINYLCHOLINE CHLORIDE 20 MG/ML
INJECTION INTRAMUSCULAR; INTRAVENOUS PRN
Status: DISCONTINUED | OUTPATIENT
Start: 2023-09-05 | End: 2023-09-05 | Stop reason: SDUPTHER

## 2023-09-05 RX ADMIN — Medication 20 MG: at 10:00

## 2023-09-05 RX ADMIN — ACETAMINOPHEN 650 MG: 325 TABLET ORAL at 19:57

## 2023-09-05 RX ADMIN — PHENYLEPHRINE HYDROCHLORIDE 100 MCG: 10 INJECTION, SOLUTION INTRAMUSCULAR; INTRAVENOUS; SUBCUTANEOUS at 09:53

## 2023-09-05 RX ADMIN — PHENYLEPHRINE HYDROCHLORIDE 200 MCG: 10 INJECTION, SOLUTION INTRAMUSCULAR; INTRAVENOUS; SUBCUTANEOUS at 09:43

## 2023-09-05 RX ADMIN — OXYCODONE HYDROCHLORIDE 10 MG: 5 TABLET ORAL at 16:02

## 2023-09-05 RX ADMIN — REMIFENTANIL HYDROCHLORIDE 0.2 MCG/KG/MIN: 1 INJECTION, POWDER, LYOPHILIZED, FOR SOLUTION INTRAVENOUS at 09:22

## 2023-09-05 RX ADMIN — METHOCARBAMOL 100 MG: 100 INJECTION, SOLUTION INTRAMUSCULAR; INTRAVENOUS at 10:53

## 2023-09-05 RX ADMIN — METHOCARBAMOL 200 MG: 100 INJECTION, SOLUTION INTRAMUSCULAR; INTRAVENOUS at 11:09

## 2023-09-05 RX ADMIN — PHENYLEPHRINE HYDROCHLORIDE 100 MCG: 10 INJECTION, SOLUTION INTRAMUSCULAR; INTRAVENOUS; SUBCUTANEOUS at 09:32

## 2023-09-05 RX ADMIN — PHENYLEPHRINE HYDROCHLORIDE 200 MCG: 10 INJECTION, SOLUTION INTRAMUSCULAR; INTRAVENOUS; SUBCUTANEOUS at 10:18

## 2023-09-05 RX ADMIN — METHOCARBAMOL 200 MG: 100 INJECTION, SOLUTION INTRAMUSCULAR; INTRAVENOUS at 11:15

## 2023-09-05 RX ADMIN — CEFAZOLIN 2000 MG: 2 INJECTION, POWDER, FOR SOLUTION INTRAMUSCULAR; INTRAVENOUS at 09:30

## 2023-09-05 RX ADMIN — ONDANSETRON 4 MG: 2 INJECTION INTRAMUSCULAR; INTRAVENOUS at 09:47

## 2023-09-05 RX ADMIN — PHENYLEPHRINE HYDROCHLORIDE 100 MCG: 10 INJECTION, SOLUTION INTRAMUSCULAR; INTRAVENOUS; SUBCUTANEOUS at 09:49

## 2023-09-05 RX ADMIN — VASOPRESSIN 1 UNITS: 20 INJECTION INTRAVENOUS at 10:27

## 2023-09-05 RX ADMIN — HYDROMORPHONE HYDROCHLORIDE 0.5 MG: 1 INJECTION, SOLUTION INTRAMUSCULAR; INTRAVENOUS; SUBCUTANEOUS at 12:56

## 2023-09-05 RX ADMIN — DEXMEDETOMIDINE HYDROCHLORIDE 4 MCG: 100 INJECTION, SOLUTION INTRAVENOUS at 12:05

## 2023-09-05 RX ADMIN — PHENYLEPHRINE HYDROCHLORIDE 35 MCG/MIN: 10 INJECTION INTRAVENOUS at 09:39

## 2023-09-05 RX ADMIN — POTASSIUM CHLORIDE AND SODIUM CHLORIDE: 900; 150 INJECTION, SOLUTION INTRAVENOUS at 17:53

## 2023-09-05 RX ADMIN — PHENYLEPHRINE HYDROCHLORIDE 100 MCG: 10 INJECTION, SOLUTION INTRAMUSCULAR; INTRAVENOUS; SUBCUTANEOUS at 09:35

## 2023-09-05 RX ADMIN — SUCCINYLCHOLINE CHLORIDE 180 MG: 20 INJECTION, SOLUTION INTRAMUSCULAR; INTRAVENOUS; PARENTERAL at 09:20

## 2023-09-05 RX ADMIN — CEFAZOLIN 2000 MG: 2 INJECTION, POWDER, FOR SOLUTION INTRAMUSCULAR; INTRAVENOUS at 17:18

## 2023-09-05 RX ADMIN — PHENYLEPHRINE HYDROCHLORIDE 100 MCG: 10 INJECTION, SOLUTION INTRAMUSCULAR; INTRAVENOUS; SUBCUTANEOUS at 09:56

## 2023-09-05 RX ADMIN — SODIUM CHLORIDE, POTASSIUM CHLORIDE, SODIUM LACTATE AND CALCIUM CHLORIDE: 600; 310; 30; 20 INJECTION, SOLUTION INTRAVENOUS at 07:42

## 2023-09-05 RX ADMIN — PROPOFOL 200 MG: 10 INJECTION, EMULSION INTRAVENOUS at 09:20

## 2023-09-05 RX ADMIN — METHOCARBAMOL 200 MG: 100 INJECTION, SOLUTION INTRAMUSCULAR; INTRAVENOUS at 10:14

## 2023-09-05 RX ADMIN — POTASSIUM CHLORIDE AND SODIUM CHLORIDE: 900; 150 INJECTION, SOLUTION INTRAVENOUS at 16:39

## 2023-09-05 RX ADMIN — MIDAZOLAM HYDROCHLORIDE 2 MG: 2 INJECTION, SOLUTION INTRAMUSCULAR; INTRAVENOUS at 09:15

## 2023-09-05 RX ADMIN — FENTANYL CITRATE 50 MCG: 50 INJECTION, SOLUTION INTRAMUSCULAR; INTRAVENOUS at 09:29

## 2023-09-05 RX ADMIN — METHOCARBAMOL 750 MG: 750 TABLET ORAL at 17:45

## 2023-09-05 RX ADMIN — GLYCOPYRROLATE 0.2 MG: 0.2 INJECTION INTRAMUSCULAR; INTRAVENOUS at 10:32

## 2023-09-05 RX ADMIN — HYDROMORPHONE HYDROCHLORIDE 1 MG: 2 INJECTION, SOLUTION INTRAMUSCULAR; INTRAVENOUS; SUBCUTANEOUS at 12:00

## 2023-09-05 RX ADMIN — DEXAMETHASONE SODIUM PHOSPHATE 10 MG: 4 INJECTION, SOLUTION INTRAMUSCULAR; INTRAVENOUS at 09:47

## 2023-09-05 RX ADMIN — LIDOCAINE HYDROCHLORIDE 100 MG: 20 INJECTION, SOLUTION EPIDURAL; INFILTRATION; INTRACAUDAL; PERINEURAL at 09:20

## 2023-09-05 RX ADMIN — SODIUM CHLORIDE, SODIUM LACTATE, POTASSIUM CHLORIDE, AND CALCIUM CHLORIDE: .6; .31; .03; .02 INJECTION, SOLUTION INTRAVENOUS at 10:16

## 2023-09-05 RX ADMIN — POLYETHYLENE GLYCOL 3350 17 G: 17 POWDER, FOR SOLUTION ORAL at 16:02

## 2023-09-05 RX ADMIN — SODIUM CHLORIDE, PRESERVATIVE FREE 10 ML: 5 INJECTION INTRAVENOUS at 19:58

## 2023-09-05 RX ADMIN — FENTANYL CITRATE 50 MCG: 50 INJECTION, SOLUTION INTRAMUSCULAR; INTRAVENOUS at 09:20

## 2023-09-05 RX ADMIN — VASOPRESSIN 1 UNITS: 20 INJECTION INTRAVENOUS at 11:24

## 2023-09-05 RX ADMIN — VASOPRESSIN 1 UNITS: 20 INJECTION INTRAVENOUS at 11:39

## 2023-09-05 RX ADMIN — PHENYLEPHRINE HYDROCHLORIDE 100 MCG: 10 INJECTION, SOLUTION INTRAMUSCULAR; INTRAVENOUS; SUBCUTANEOUS at 10:24

## 2023-09-05 RX ADMIN — SODIUM CHLORIDE: 9 INJECTION, SOLUTION INTRAVENOUS at 17:18

## 2023-09-05 RX ADMIN — METHOCARBAMOL 100 MG: 100 INJECTION, SOLUTION INTRAMUSCULAR; INTRAVENOUS at 10:37

## 2023-09-05 RX ADMIN — HYDROMORPHONE HYDROCHLORIDE 0.5 MG: 1 INJECTION, SOLUTION INTRAMUSCULAR; INTRAVENOUS; SUBCUTANEOUS at 13:50

## 2023-09-05 RX ADMIN — DEXMEDETOMIDINE HYDROCHLORIDE 4 MCG: 100 INJECTION, SOLUTION INTRAVENOUS at 12:09

## 2023-09-05 RX ADMIN — METHOCARBAMOL 200 MG: 100 INJECTION, SOLUTION INTRAMUSCULAR; INTRAVENOUS at 11:18

## 2023-09-05 RX ADMIN — OXYCODONE HYDROCHLORIDE 10 MG: 5 TABLET ORAL at 19:57

## 2023-09-05 RX ADMIN — SODIUM CHLORIDE, SODIUM LACTATE, POTASSIUM CHLORIDE, AND CALCIUM CHLORIDE: .6; .31; .03; .02 INJECTION, SOLUTION INTRAVENOUS at 09:15

## 2023-09-05 RX ADMIN — PHENYLEPHRINE HYDROCHLORIDE 100 MCG: 10 INJECTION, SOLUTION INTRAMUSCULAR; INTRAVENOUS; SUBCUTANEOUS at 09:39

## 2023-09-05 RX ADMIN — PHENYLEPHRINE HYDROCHLORIDE 25 MCG/MIN: 10 INJECTION INTRAVENOUS at 09:34

## 2023-09-05 RX ADMIN — LISINOPRIL 40 MG: 40 TABLET ORAL at 18:32

## 2023-09-05 RX ADMIN — ACETAMINOPHEN 650 MG: 325 TABLET ORAL at 16:00

## 2023-09-05 RX ADMIN — VASOPRESSIN 1 UNITS: 20 INJECTION INTRAVENOUS at 10:57

## 2023-09-05 RX ADMIN — PROPOFOL 150 MCG/KG/MIN: 10 INJECTION, EMULSION INTRAVENOUS at 09:22

## 2023-09-05 RX ADMIN — VASOPRESSIN 1 UNITS: 20 INJECTION INTRAVENOUS at 10:47

## 2023-09-05 RX ADMIN — VASOPRESSIN 1 UNITS: 20 INJECTION INTRAVENOUS at 11:09

## 2023-09-05 RX ADMIN — HYDROMORPHONE HYDROCHLORIDE 1 MG: 2 INJECTION, SOLUTION INTRAMUSCULAR; INTRAVENOUS; SUBCUTANEOUS at 12:21

## 2023-09-05 RX ADMIN — SODIUM CHLORIDE, SODIUM LACTATE, POTASSIUM CHLORIDE, CALCIUM CHLORIDE: 600; 310; 30; 20 INJECTION, SOLUTION INTRAVENOUS at 09:15

## 2023-09-05 ASSESSMENT — PAIN DESCRIPTION - LOCATION
LOCATION: NECK
LOCATION: BACK;NECK
LOCATION: NECK
LOCATION: NECK;BACK

## 2023-09-05 ASSESSMENT — PAIN SCALES - GENERAL
PAINLEVEL_OUTOF10: 8
PAINLEVEL_OUTOF10: 4
PAINLEVEL_OUTOF10: 2
PAINLEVEL_OUTOF10: 7
PAINLEVEL_OUTOF10: 8
PAINLEVEL_OUTOF10: 8
PAINLEVEL_OUTOF10: 5
PAINLEVEL_OUTOF10: 4
PAINLEVEL_OUTOF10: 7
PAINLEVEL_OUTOF10: 6
PAINLEVEL_OUTOF10: 4
PAINLEVEL_OUTOF10: 7
PAINLEVEL_OUTOF10: 6

## 2023-09-05 ASSESSMENT — PAIN DESCRIPTION - ORIENTATION
ORIENTATION: RIGHT;ANTERIOR
ORIENTATION: ANTERIOR;MID
ORIENTATION: ANTERIOR;POSTERIOR
ORIENTATION: MID;UPPER
ORIENTATION: POSTERIOR
ORIENTATION: RIGHT;ANTERIOR
ORIENTATION: ANTERIOR;MID

## 2023-09-05 ASSESSMENT — ENCOUNTER SYMPTOMS
BACK PAIN: 1
RESPIRATORY NEGATIVE: 1
GASTROINTESTINAL NEGATIVE: 1

## 2023-09-05 ASSESSMENT — PAIN DESCRIPTION - PAIN TYPE
TYPE: CHRONIC PAIN
TYPE: SURGICAL PAIN

## 2023-09-05 ASSESSMENT — PAIN - FUNCTIONAL ASSESSMENT
PAIN_FUNCTIONAL_ASSESSMENT: PREVENTS OR INTERFERES SOME ACTIVE ACTIVITIES AND ADLS
PAIN_FUNCTIONAL_ASSESSMENT: ACTIVITIES ARE NOT PREVENTED

## 2023-09-05 ASSESSMENT — PAIN DESCRIPTION - FREQUENCY
FREQUENCY: CONTINUOUS

## 2023-09-05 ASSESSMENT — PAIN DESCRIPTION - DESCRIPTORS
DESCRIPTORS: ACHING
DESCRIPTORS: ACHING;SORE
DESCRIPTORS: ACHING
DESCRIPTORS: ACHING;SORE
DESCRIPTORS: DISCOMFORT;ACHING
DESCRIPTORS: ACHING

## 2023-09-05 ASSESSMENT — PAIN DESCRIPTION - ONSET
ONSET: ON-GOING
ONSET: ON-GOING
ONSET: AWAKENED FROM SLEEP
ONSET: ON-GOING

## 2023-09-05 NOTE — ANESTHESIA PRE PROCEDURE
Department of Anesthesiology  Preprocedure Note       Name:  Lacy Jimenez   Age:  79 y.o.  :  1955                                          MRN:  [id number]         Date:  2023      Surgeon: Randa Rowe):  MD Noemy Mesa MD    Procedure: Procedure(s):  CERVICAL 2-CERVICAL 3 ANTERIOR CERVICAL DISCECTOMY AND FUSION    Medications prior to admission:   Prior to Admission medications    Medication Sig Start Date End Date Taking? Authorizing Provider   cyclobenzaprine (FLEXERIL) 5 MG tablet take 1 tablet by oral route 3 times every day as needed 23  Yes Historical Provider, MD   Multiple Vitamins-Minerals (CENTRUM ADULTS PO) Take 1 tablet by mouth daily   Yes Historical Provider, MD   Ascorbic Acid (VITAMIN C PO) Take by mouth daily   Yes Historical Provider, MD   VITAMIN D PO Take 1 tablet by mouth daily   Yes Historical Provider, MD   traMADol (ULTRAM) 50 MG tablet Take 1 tablet by mouth every 6 hours as needed.  10/3/22  Yes Historical Provider, MD   VARENICLINE TARTRATE PO Take 1 tablet by mouth at bedtime   Yes Historical Provider, MD   amitriptyline (ELAVIL) 10 MG tablet Take 1 tablet by mouth nightly 23   Historical Provider, MD   spironolactone (ALDACTONE) 100 MG tablet Take 1 tablet by mouth at bedtime 23   Historical Provider, MD   lisinopril (PRINIVIL;ZESTRIL) 40 MG tablet Take 1 tablet by mouth daily  Patient taking differently: Take 1 tablet by mouth at bedtime 10/21/22   Mayra Diana MD   aspirin EC 81 MG EC tablet Take 1 tablet by mouth daily 10/21/22   Mayra Diana MD   nicotine (NICODERM CQ) 14 MG/24HR Place 1 patch onto the skin daily for 10 days  Patient taking differently: Place 1 patch onto the skin as needed 9/23/22 8/3/23  Lura Mcardle, MD   ibuprofen (IBU) 400 MG tablet Take 1 tablet by mouth every 6 hours as needed for Pain 22  AR Hester   lidocaine (LIDODERM) 5 % Place 1 patch onto the skin daily 12 hours on,

## 2023-09-05 NOTE — PROGRESS NOTES
Pt to \[facility]\"" for anterior cerivcal surgery. Pt is alert; oriented X 4; speech clear; breathing easily on RA; c/o pain in mid to upper back and neck of 7/10, chronic. Walks using a cane. Has cervical neck support which pt must wear at all times per surgeon who came to bedside; This RN scrubbed anterior neck and chest with CHG wipes. Has spinal stimulator, battery pack left lower back, and pt states that he thinks the leads are in mid and lower back. C/o issues with his right knee and had a sleeve on his knee. Pt has ETOH use of 6 to 12 cans (24 oz size) per week, last use a few days ago per pt report. Anesthesia and surgeon aware. Hx of afib with ablation, no blood thinners but ASA 81 mg per pt. Bld glc is 93. #18 IV placed in right forearm by Ty Browne RN and T&S X 2, CBC and pt/inr drawn and sent to lab. This RN placed #18 IV in left forearm. Pt voided 100 mg urine prior to surgery. Ancef 2 g IVPB to OR with pt. Pt now to surgery.

## 2023-09-05 NOTE — PLAN OF CARE
Problem: Pain  Goal: Verbalizes/displays adequate comfort level or baseline comfort level  Outcome: Progressing  Pt endorsing pain to anterior neck. Being treated with PRN pain medication, rest, and frequent repositioning with pillow support for comfort and pressure relief. Pt reports some relief from pain with above interventions. Problem: Safety - Adult  Goal: Free from fall injury  Outcome: Progressing  All fall precautions in place. Bed locked and in lowest position with alarm on. Overbed table and personal belonings within reach. Call light within reach and patient instructed to use call light for assistance. Non-skid socks on. Problem: Discharge Planning  Goal: Discharge to home or other facility with appropriate resources  Outcome: Progressing  Pt involved in discharge planning. Barriers to discharge discussed with pt. Discharge needs identified.  Discussed with pt any additional needed resources and transportation plans

## 2023-09-05 NOTE — PROGRESS NOTES
4 Eyes Skin Assessment     NAME:  Becky Mendez  YOB: 1955  MEDICAL RECORD NUMBER:  [id number]    The patient is being assessed for  Admission    I agree that at least one RN has performed a thorough Head to Toe Skin Assessment on the patient. ALL assessment sites listed below have been assessed. Areas assessed by both nurses:    Head, Face, Ears, Shoulders, Back, Chest, Arms, Elbows, Hands, Sacrum. Buttock, Coccyx, Ischium, Legs. Feet and Heels, Under Medical Devices , and Other          Does the Patient have a Wound?  No noted wound(s)       Adeel Prevention initiated by RN: Yes  Wound Care Orders initiated by RN: No    Pressure Injury (Stage 3,4, Unstageable, DTI, NWPT, and Complex wounds) if present, place Wound referral order by RN under : No    New Ostomies, if present place, Ostomy referral order under : No     Nurse 1 eSignature: Electronically signed by Luis Austin RN on 9/5/23 at 4:44 PM EDT    **SHARE this note so that the co-signing nurse can place an eSignature**    Nurse 2 eSignature: Electronically signed by Chantell Rangel RN on 9/5/23 at 7:24 PM EDT

## 2023-09-05 NOTE — CONSULTS
V2.0  [facility] Consult Note      Name:  Kelsie Shaikh /Age/Sex: 1955  (79 y.o. male)   MRN & CSN:  [id number] & [id number] Encounter Date/Time: 2023 3:48 PM EDT   Location:  [facility]- PCP: [facility] Evelio Gabriel MD  Consulting Provider: Reddy Unger, [address] [place] [address] Day: 1    Assessment and Recommendations   Kelsie Shaikh is a 79 y.o. male with pmh of w/ CAD w/ prior PCI , HTN, HLD, afib/flutter with ablation in  (not on anticoagulation)  who presents with Cervical stenosis of spinal canal    Hospital Problems             Last Modified POA    * (Principal) Cervical stenosis of spinal canal 2023 Yes    Cervical spine instability 2023 Yes   #CAD  #HTN  #HLD  #Afib s/p ablation , not on anticoagulation per my review of his records    Recommendations:   NSGY primary  PRN analgesia  PT/OT  Okay to resume home statin, anti-hypertensives  Restart ASA as soon as okay with surgical service      Diet ADULT DIET; Regular   DVT Prophylaxis [] Lovenox, []  Heparin, [] SCDs, [] Ambulation,  [] Eliquis, [] Xarelto   Code Status Full Code   Surrogate Decision Maker/ POA  pete Ludwig     Personally reviewed Lab Studies and Imaging       History From:    History obtained from the patient. History of Present Illness:      Chief Complaint: Neck Pain    Kelsie Shaikh is a 79 y.o. male w/ CAD w/ prior PCI , HTN, HLD, afib/flutter with ablation in  (not on anticoagulation) presented today for elective ACDF of C2-3 performed by Dr. Merlinda Grieves with neurosurgery. Tolerated surgery well no significant complications and now recovering post-op. Hospitalist service consulted for aid in management of medical comorbidities. Examined bedside. Patient currently feels well overall. Some soreness at surgical site otherwise no new symptomatic complaints.        Review of Systems:      Pertinent positives and negatives discussed in HPI    Objective:

## 2023-09-05 NOTE — PROGRESS NOTES
Pt is alert and oriented x4. VSS on RA with exception to elevated BP. Pt is voiding adequately via urinal. Tolerating PO meals and fluids appropriately without any issues swallowing. Cervical collar on and aligned and to be worn at all times except hygiene. Incision to anterior neck is CDI and closed with surgical glue. Closed suction drain to anterior neck in place and compressed.   Pain being managed with PRN medications per Mar. Plan of care to continue

## 2023-09-05 NOTE — OP NOTE
Operative Report    PATIENT NAME: Familia Mahajan  YOB: 1955  MEDICAL RECORD# [id number]  SURGERY DATE: 9/5/2023  Co-SURGEON:  1. Ignacio Carrasco MD     2. Kj Barker MD (Dr. Nancye Oppenheim performed the opening and dissection down to the C2/3 disc space. )  ASSISTANT:  Staff  DICTATED BY:  Ignacio Carrasco MD        PREOPERATIVE DIAGNOSIS:  1.  C2-3 mobile spondylolisthesis with failure of prior cervico-thoracic fusion with broken bilateral C2 screws    POSTOPERATIVE DIAGNOSIS:  1. Same    PROCEDURES PERFORMED:  1. Anterior cervical diskectomy C2-C3 with decompression of spinal cord and nerve roots  2. Anterior cervical arthrodesis with 14x14 mm PEEK graft filled with Primagen allograft C2-C3  3. Anterior cervical plating C2-C3 with Tomas plating  4. Microscopic dissection  5. Intraoperative fluoroscopy  6. Intraoperative neuromonitoring (MEP, SSEP, EEG)     ANESTHESIA:  General    IMPLANTS:   Tomas [place] S PEEK allograft cage, 14x14 mm  Tomas 16 mm (#4) titanium screws and Invizia plate    COMPLICATIONS:  None    INDICATIONS FOR SURGERY:  The patient is a 79 y.o. yo patient who initially was found to have a mobile C2-3 spondylolisthesis and severe cervical stenosis who underwent posterior cervicothoracic fusion. During follow up, it was noted that he initially fractured one, and then both, C2 screws. Motion was again seen on flex/ex at C2-3. Therefore, we discussed doing an anterior fusion to prevent spinal cord injury with the amount of motion he has at that level. We also discussed on every visit the need for him to stop smoking as this will impair fusion. I discussed the risks and benefits to include (but not limited to): Bleeding, infection, failure to relieve her pain, adjacent level degeneration, need for further surgery, spinal cord injury, numbness or weakness of the upper extremities, paralysis, esophageal injury, and hoarseness of voice.  Given his instability at C2-3 and potential to punch away the ligament in its entirety from the right foramen to the left foramen. All free disk fragments as well as bony spurs were drilled and punched away until both neural foramina were well decompressed. Following this, I was able to palpate superiorly and inferiorly underneath the endplates and they felt well decompressed. Endplates were then prepared drilling away the cartilaginous endplates to the subchondral bleeding bone for preparation for fusion. At this point, the wound was copiously irrigated with antibiotic solution. Anesthesia performed mild cervical traction, and a 7 mm spacer was tapped into the disk space. This fit appropriately. I therefore prepared a 7 mm Tomas [place] S PEEK graft filled with Primagen and tapped this into the disk space until approximately 1-2 mm countersunk. At this point Anesthesia released their traction and the graft fit very snugly. I then placed a Tomas Invizia spine plate on the anterior surface of the spine and it fit appropriately. Two 16 mm screws were placed into each of the C2 and C3 vertebral bodies with appropriate angulation. All screws were placed and checked with lateral fluoroscopy and confirmed to be in good position. The screws were then tightened down fully. The locking cams were then turned to fully tightened position with cam covering both screws at each level. Final lateral and AP fluoroscopy were obtained and confirmed the plate, screws and graft to be in good position. The fluoroscopy was then removed. Hemostasis was achieved with bipolar cautery and Floseal. Hemostasis was visually confirmed. The wound was copiously irrigated with antibiotic solution. A medium hemovac was placed in the prevertebral space and tunneled out and secured in standard fashion. The wound was then closed with 3-0 Vicryl suture to close the platysma and a 4-0 Monocryl running subcuticular stitch for the skin.   Dermabond was applied to the skin

## 2023-09-05 NOTE — PROGRESS NOTES
Pt admitted to room 5519. Safety precautions in place. Vitals taken.  4 eye skin assessment performed

## 2023-09-05 NOTE — ANESTHESIA POSTPROCEDURE EVALUATION
Department of Anesthesiology  Postprocedure Note    Patient: Clara Mejia  MRN: [id number]  YOB: 1955  Date of evaluation: 9/5/2023      Procedure Summary     Date: 09/05/23 Room / Location: [address] [place] [address] / [facility] [address] [facility]    Anesthesia Start: 0915 Anesthesia Stop: 4967    Procedure: CERVICAL 2-CERVICAL 3 ANTERIOR CERVICAL DISCECTOMY AND FUSION Diagnosis:       Acquired spondylolisthesis of cervical vertebra      (Acquired spondylolisthesis of cervical vertebra [M43.12])    Surgeons: Tim Culp MD Responsible Provider: Mil Teran MD    Anesthesia Type: general ASA Status: 3          Anesthesia Type: No value filed.     Charis Phase I: Charis Score: 6    Charis Phase II:        Anesthesia Post Evaluation    Patient location during evaluation: PACU  Patient participation: complete - patient participated  Level of consciousness: awake  Pain score: 0  Nausea & Vomiting: no nausea and no vomiting  Complications: no  Cardiovascular status: blood pressure returned to baseline  Respiratory status: acceptable  Hydration status: euvolemic  Pain management: adequate

## 2023-09-06 ENCOUNTER — APPOINTMENT (OUTPATIENT)
Dept: GENERAL RADIOLOGY | Age: 68
DRG: 472 | End: 2023-09-06
Attending: STUDENT IN AN ORGANIZED HEALTH CARE EDUCATION/TRAINING PROGRAM
Payer: MEDICARE

## 2023-09-06 PROCEDURE — 99024 POSTOP FOLLOW-UP VISIT: CPT | Performed by: NURSE PRACTITIONER

## 2023-09-06 PROCEDURE — 72040 X-RAY EXAM NECK SPINE 2-3 VW: CPT

## 2023-09-06 PROCEDURE — APPNB30 APP NON BILLABLE TIME 0-30 MINS: Performed by: NURSE PRACTITIONER

## 2023-09-06 PROCEDURE — 97166 OT EVAL MOD COMPLEX 45 MIN: CPT

## 2023-09-06 PROCEDURE — 96372 THER/PROPH/DIAG INJ SC/IM: CPT

## 2023-09-06 PROCEDURE — 6370000000 HC RX 637 (ALT 250 FOR IP): Performed by: STUDENT IN AN ORGANIZED HEALTH CARE EDUCATION/TRAINING PROGRAM

## 2023-09-06 PROCEDURE — 2580000003 HC RX 258: Performed by: STUDENT IN AN ORGANIZED HEALTH CARE EDUCATION/TRAINING PROGRAM

## 2023-09-06 PROCEDURE — 6360000002 HC RX W HCPCS: Performed by: STUDENT IN AN ORGANIZED HEALTH CARE EDUCATION/TRAINING PROGRAM

## 2023-09-06 PROCEDURE — 97535 SELF CARE MNGMENT TRAINING: CPT

## 2023-09-06 PROCEDURE — 97530 THERAPEUTIC ACTIVITIES: CPT

## 2023-09-06 PROCEDURE — 96374 THER/PROPH/DIAG INJ IV PUSH: CPT

## 2023-09-06 PROCEDURE — 1200000000 HC SEMI PRIVATE

## 2023-09-06 RX ORDER — ENOXAPARIN SODIUM 100 MG/ML
30 INJECTION SUBCUTANEOUS 2 TIMES DAILY
Status: DISCONTINUED | OUTPATIENT
Start: 2023-09-07 | End: 2023-09-07 | Stop reason: HOSPADM

## 2023-09-06 RX ADMIN — ACETAMINOPHEN 650 MG: 325 TABLET ORAL at 20:16

## 2023-09-06 RX ADMIN — POTASSIUM CHLORIDE AND SODIUM CHLORIDE: 900; 150 INJECTION, SOLUTION INTRAVENOUS at 18:40

## 2023-09-06 RX ADMIN — OXYCODONE HYDROCHLORIDE 10 MG: 5 TABLET ORAL at 17:41

## 2023-09-06 RX ADMIN — ACETAMINOPHEN 650 MG: 325 TABLET ORAL at 16:16

## 2023-09-06 RX ADMIN — METHOCARBAMOL 750 MG: 750 TABLET ORAL at 08:23

## 2023-09-06 RX ADMIN — OXYCODONE HYDROCHLORIDE 10 MG: 5 TABLET ORAL at 02:05

## 2023-09-06 RX ADMIN — MORPHINE SULFATE 2 MG: 2 INJECTION, SOLUTION INTRAMUSCULAR; INTRAVENOUS at 20:16

## 2023-09-06 RX ADMIN — SODIUM CHLORIDE, PRESERVATIVE FREE 10 ML: 5 INJECTION INTRAVENOUS at 20:17

## 2023-09-06 RX ADMIN — ACETAMINOPHEN 650 MG: 325 TABLET ORAL at 08:18

## 2023-09-06 RX ADMIN — POLYETHYLENE GLYCOL 3350 17 G: 17 POWDER, FOR SOLUTION ORAL at 08:19

## 2023-09-06 RX ADMIN — OXYCODONE HYDROCHLORIDE 10 MG: 5 TABLET ORAL at 08:18

## 2023-09-06 RX ADMIN — CEFAZOLIN 2000 MG: 2 INJECTION, POWDER, FOR SOLUTION INTRAMUSCULAR; INTRAVENOUS at 02:09

## 2023-09-06 RX ADMIN — LISINOPRIL 40 MG: 40 TABLET ORAL at 08:18

## 2023-09-06 RX ADMIN — ENOXAPARIN SODIUM 40 MG: 100 INJECTION SUBCUTANEOUS at 08:18

## 2023-09-06 RX ADMIN — ACETAMINOPHEN 650 MG: 325 TABLET ORAL at 02:05

## 2023-09-06 ASSESSMENT — PAIN DESCRIPTION - ONSET
ONSET: ON-GOING

## 2023-09-06 ASSESSMENT — PAIN DESCRIPTION - PAIN TYPE
TYPE: SURGICAL PAIN

## 2023-09-06 ASSESSMENT — PAIN DESCRIPTION - DESCRIPTORS
DESCRIPTORS: ACHING

## 2023-09-06 ASSESSMENT — PAIN DESCRIPTION - FREQUENCY
FREQUENCY: CONTINUOUS

## 2023-09-06 ASSESSMENT — PAIN SCALES - GENERAL
PAINLEVEL_OUTOF10: 4
PAINLEVEL_OUTOF10: 6
PAINLEVEL_OUTOF10: 7
PAINLEVEL_OUTOF10: 8
PAINLEVEL_OUTOF10: 7
PAINLEVEL_OUTOF10: 7
PAINLEVEL_OUTOF10: 9
PAINLEVEL_OUTOF10: 3
PAINLEVEL_OUTOF10: 5
PAINLEVEL_OUTOF10: 4

## 2023-09-06 ASSESSMENT — PAIN DESCRIPTION - LOCATION
LOCATION: NECK

## 2023-09-06 ASSESSMENT — PAIN DESCRIPTION - ORIENTATION
ORIENTATION: MID

## 2023-09-06 ASSESSMENT — PAIN - FUNCTIONAL ASSESSMENT
PAIN_FUNCTIONAL_ASSESSMENT: ACTIVITIES ARE NOT PREVENTED

## 2023-09-06 NOTE — PROGRESS NOTES
Occupational Therapy  Facility/Department: [facility] 5T ORTHO/NEURO  Occupational Therapy Initial Assessment and Treatment    Name: Yeny Lofton  : 1955  MRN: [id number]  Date of Service: 2023    Discharge Recommendations:  Yeny Lofton scored a 18/24 on the AM-PAC ADL Inpatient form. Current research shows that an AM-PAC score of 18 or greater is typically associated with a discharge to the patient's home setting. Based on the patient's AM-PAC score, and their current ADL deficits, it is recommended that the patient have 2-3 sessions per week of Occupational Therapy at d/c to increase the patient's independence. At this time, this patient demonstrates the endurance and safety to discharge home with home vs OP services) and a follow up treatment frequency of 2-3x/wk. Please see assessment section for further patient specific details. If patient discharges prior to next session this note will serve as a discharge summary. Please see below for the latest assessment towards goals. OT Equipment Recommendations  Equipment Needed: No  Other: pt has the recommended DME       Patient Diagnosis(es): There were no encounter diagnoses. Past Medical History:  has a past medical history of Arthritis, Atrial fibrillation ([address]), CAD (coronary artery disease), Diabetes mellitus ([address]), Dizziness, Hyperlipidemia, Hypertension, Wears glasses, and Wears partial dentures. Past Surgical History:  has a past surgical history that includes back surgery (200); Coronary angioplasty with stent (2012); cervical fusion (N/A, 09/15/2022); X-stop implantation; ablation of dysrhythmic focus; and cervical fusion (N/A, 2023). Treatment Diagnosis: Impaired ADLs, mobility      Assessment   Performance deficits / Impairments: Decreased functional mobility ; Decreased ADL status; Decreased strength;Decreased balance;Decreased endurance;Decreased high-level IADLs  Assessment: Pt from home alone and is independent at baseline, reports difficulty managing ADLs/IADLs and mobility at home since initial neck sx 1 year ago. Pt with fall hx. Pt demo CGA for mobility with cane this date, CGA for most ADL tasks while wearing cervical brace. Pt reports balance has improved since sx. Anticipate pt will progress well with increased activity this admission. Recommend initial 24hr assist for pt safety. Pt would benefit from OP therapy to further address balance and bilat shoulder strength  Treatment Diagnosis: Impaired ADLs, mobility  Prognosis: Good  Decision Making: Medium Complexity  REQUIRES OT FOLLOW-UP: Yes  Activity Tolerance  Activity Tolerance: Patient Tolerated treatment well        Plan   Occupational Therapy Plan  Times Per Week: 5-7  Current Treatment Recommendations: Balance training, Self-Care / ADL, Safety education & training, Functional mobility training, Endurance training, Patient/Caregiver education & training, Strengthening     Restrictions  Position Activity Restriction  Other position/activity restrictions: cervical brace at all times except hygiene; activity as tolerated    Subjective   General  Chart Reviewed: Yes  Additional Pertinent Hx: Pt admitted 9/5 s/p CERVICAL 2-CERVICAL 3 ANTERIOR CERVICAL DISCECTOMY AND FUSION  Family / Caregiver Present: No  Subjective  Subjective: Pt in bed, agreeable to OT evaluation.  \"My walking is much better compared to before surgery\"  Pain:  neck surgical site, RN aware       Social/Functional History  Social/Functional History  Lives With: Alone  Type of Home: Apartment  Home Layout: One level  Home Access: Stairs to enter with rails  Entrance Stairs - Number of Steps: 5 steps down to apt  Bathroom Shower/Tub: Tub/Shower unit  Bathroom Toilet: Handicap height  Bathroom Equipment: Grab bars in shower, Shower chair, Grab bars around toilet  Home Equipment: [place] Tisha, rolling, Lift chair  Has the patient had two or more falls in the past year or any fall with injury in the past

## 2023-09-06 NOTE — PLAN OF CARE
Problem: Discharge Planning  Goal: Discharge to home or other facility with appropriate resources  9/6/2023 1046 by Maia Mar RN  Outcome: Progressing  9/5/2023 2338 by Brittany Ballesteros RN  Outcome: Progressing  Flowsheets (Taken 9/5/2023 2338)  Discharge to home or other facility with appropriate resources:   Identify barriers to discharge with patient and caregiver   Arrange for needed discharge resources and transportation as appropriate     Problem: Chronic Conditions and Co-morbidities  Goal: Patient's chronic conditions and co-morbidity symptoms are monitored and maintained or improved  9/6/2023 1046 by Maia Mar RN  Outcome: Progressing  9/5/2023 2338 by Brittany Ballesteros RN  Outcome: Progressing  Flowsheets (Taken 9/5/2023 2338)  Care Plan - Patient's Chronic Conditions and Co-Morbidity Symptoms are Monitored and Maintained or Improved:   Monitor and assess patient's chronic conditions and comorbid symptoms for stability, deterioration, or improvement   Collaborate with multidisciplinary team to address chronic and comorbid conditions and prevent exacerbation or deterioration     Problem: Pain  Goal: Verbalizes/displays adequate comfort level or baseline comfort level  9/6/2023 1046 by Maia Mar RN  Outcome: Progressing  9/5/2023 2338 by Brittany Ballesteros RN  Outcome: Progressing  Flowsheets (Taken 9/5/2023 2338)  Verbalizes/displays adequate comfort level or baseline comfort level:   Encourage patient to monitor pain and request assistance   Assess pain using appropriate pain scale     Problem: Safety - Adult  Goal: Free from fall injury  9/5/2023 2338 by Brittany Ballesteros RN  Outcome: Progressing  Flowsheets (Taken 9/5/2023 2338)  Free From Fall Injury:   Instruct family/caregiver on patient safety   Based on caregiver fall risk screen, instruct family/caregiver to ask for assistance with transferring infant if caregiver noted to have fall risk factors

## 2023-09-06 NOTE — PROGRESS NOTES
RN came into patient's room approximately [phone]. Patient alerted RN that accordion drain was broken. RN assessed that the line was torn. RN called DOUGLAS Sebastian. RN was advised to remove drain entirely. Sutures removed in their entirety, bleeding controlled, tip of drain intact, and dressing applied. No complications at this time.

## 2023-09-06 NOTE — CARE COORDINATION
Case Management Assessment  Initial Evaluation    Date/Time of Evaluation: 9/6/2023 1:45 PM  Assessment Completed by: Jose Macias RN    If patient is discharged prior to next notation, then this note serves as note for discharge by case management. Patient Name: Bi Fraser                   YOB: 1955  Diagnosis: Acquired spondylolisthesis of cervical vertebra [M43.12]  Cervical stenosis of spinal canal [M48.02]  Cervical spine instability [M53.2X2]                   Date / Time: 9/5/2023  6:57 AM    Patient Admission Status: Inpatient   Readmission Risk (Low < 19, Mod (19-27), High > 27): Readmission Risk Score: 12    Current PCP: Luly Wills  PCP verified by CM? No    Chart Reviewed: Yes      History Provided by: Patient  Patient Orientation: Alert and Oriented    Patient Cognition: Alert    Hospitalization in the last 30 days (Readmission):  No    If yes, Readmission Assessment in CM Navigator will be completed. Advance Directives:      Code Status: Full Code   Patient's Primary Decision Maker is: Legal Next of Kin      Discharge Planning:    Patient lives with: Alone Type of Home: Apartment  Primary Care Giver: Self  Patient Support Systems include: Family Members   Current Financial resources: Medicare  Current community resources: None  Current services prior to admission: Durable Medical Equipment            Current DME: Vanna Members            Type of Home Care services:  None    ADLS  Prior functional level: Independent in ADLs/IADLs  Current functional level: Independent in ADLs/IADLs    PT AM-PAC:   /24  OT AM-PAC:   /24    Family can provide assistance at DC: No  Would you like Case Management to discuss the discharge plan with any other family members/significant others, and if so, who?  No  Plans to Return to Present Housing: Unknown at present  Other Identified Issues/Barriers to RETURNING to current housing: none  Potential Assistance needed at discharge: Home

## 2023-09-06 NOTE — PLAN OF CARE
Problem: Discharge Planning  Goal: Discharge to home or other facility with appropriate resources  9/5/2023 2338 by Raya King RN  Outcome: Progressing  Flowsheets (Taken 9/5/2023 2338)  Discharge to home or other facility with appropriate resources:   Identify barriers to discharge with patient and caregiver   Arrange for needed discharge resources and transportation as appropriate     Problem: Chronic Conditions and Co-morbidities  Goal: Patient's chronic conditions and co-morbidity symptoms are monitored and maintained or improved  9/5/2023 2338 by Raya King RN  Outcome: Progressing  Flowsheets (Taken 9/5/2023 2338)  Care Plan - Patient's Chronic Conditions and Co-Morbidity Symptoms are Monitored and Maintained or Improved:   Monitor and assess patient's chronic conditions and comorbid symptoms for stability, deterioration, or improvement   Collaborate with multidisciplinary team to address chronic and comorbid conditions and prevent exacerbation or deterioration     Problem: Pain  Goal: Verbalizes/displays adequate comfort level or baseline comfort level  9/5/2023 2338 by Raya King RN  Outcome: Progressing  Flowsheets (Taken 9/5/2023 2338)  Verbalizes/displays adequate comfort level or baseline comfort level:   Encourage patient to monitor pain and request assistance   Assess pain using appropriate pain scale     Problem: Safety - Adult  Goal: Free from fall injury  9/5/2023 2338 by Raya King RN  Outcome: Progressing  Flowsheets (Taken 9/5/2023 2338)  Free From Fall Injury:   Instruct family/caregiver on patient safety   Based on caregiver fall risk screen, instruct family/caregiver to ask for assistance with transferring infant if caregiver noted to have fall risk factors

## 2023-09-06 NOTE — PROGRESS NOTES
Patient is alert and oriented x4. VSS on RA, voiding adequately via urinal.no acute changes noted to patient. Drain in place and compressed. Brace in place. Standard safety measures in place. Plan of care ongoing.

## 2023-09-06 NOTE — PROGRESS NOTES
Patient Name: Frances Carnes  YOB: 1955  Medical Record Number:  [id number]  Billing Number:  [id number]  Date of Procedure: 9/6/2023  Time: 0915      Indications: The patient is a pleasant 79year old male with a history of cervical spine disease. He has been seen by Dr. Rajat Velarde, examined and imaging reviewed. The decision was made to go to the operating room for an ACDF. The risks, benefits, and indications for surgery were provided to the patient by Dr. Rajat Velarde and they agreed to proceed. Pre-operative Dx:  1. Anterior cervical spine disease levels C2-3. Post-operative Dx:  1. same       Procedure:  1. ACDF levels C2-3. 83277   Surgeon: Cayla Pham Md, PhD  Circulator: Hellen Strong RN  Surgical Assistant: Melissa Garcia  Radiology Technologist: Jaycob Garay  Scrub Person First: Chapin Bass  Scrub Person Second: Enzo Ordoñez  Circulator Assist: Bryson Cole RN  Anesthesia:  GETA  EBL:   10 ml on approach  Specimen:  None  Findings:  Non contributory  Complications  None  Antibiotics  2g Ancef    DETAILS OF PROCEDURE:  The patient came to the operating room, was placed in  supine position on the operating room table. General IV anesthesia was given  until a deep plane of anesthesia was obtained. At that point, an  endotracheal tube was placed by anesthesiology service without difficulty. This was a laryngeal nerve monitoring endotracheal tube. Monitoring was  performed throughout the entire procedure to avoid injury to the recurrent  laryngeal nerve. The patient was then prepped and draped in routine fashion. Surgery began with a horizontal incision on the right. This was at the mid  level of the neck. This was carried down into the subcutaneous tissues with  a 15 blade. Bovie cautery was used to cauterize down to the level of the  platysma. Subplatysmal planes were then elevated superiorly and inferiorly  and a Weitlaner was placed.   Fibrofatty tissue was then

## 2023-09-07 VITALS
SYSTOLIC BLOOD PRESSURE: 146 MMHG | OXYGEN SATURATION: 95 % | WEIGHT: 245.2 LBS | HEIGHT: 74 IN | HEART RATE: 85 BPM | RESPIRATION RATE: 16 BRPM | DIASTOLIC BLOOD PRESSURE: 74 MMHG | BODY MASS INDEX: 31.47 KG/M2 | TEMPERATURE: 98.1 F

## 2023-09-07 PROBLEM — Z98.1 S/P CERVICAL SPINAL FUSION: Status: ACTIVE | Noted: 2023-09-05

## 2023-09-07 LAB
ANION GAP SERPL CALCULATED.3IONS-SCNC: 9 MMOL/L (ref 3–16)
BUN SERPL-MCNC: 9 MG/DL (ref 7–20)
CALCIUM SERPL-MCNC: 9 MG/DL (ref 8.3–10.6)
CHLORIDE SERPL-SCNC: 102 MMOL/L (ref 99–110)
CO2 SERPL-SCNC: 25 MMOL/L (ref 21–32)
CREAT SERPL-MCNC: 0.7 MG/DL (ref 0.8–1.3)
GFR SERPLBLD CREATININE-BSD FMLA CKD-EPI: >60 ML/MIN/{1.73_M2}
GLUCOSE SERPL-MCNC: 92 MG/DL (ref 70–99)
POTASSIUM SERPL-SCNC: 5 MMOL/L (ref 3.5–5.1)
SODIUM SERPL-SCNC: 136 MMOL/L (ref 136–145)

## 2023-09-07 PROCEDURE — 96372 THER/PROPH/DIAG INJ SC/IM: CPT

## 2023-09-07 PROCEDURE — 6360000002 HC RX W HCPCS: Performed by: STUDENT IN AN ORGANIZED HEALTH CARE EDUCATION/TRAINING PROGRAM

## 2023-09-07 PROCEDURE — 97535 SELF CARE MNGMENT TRAINING: CPT

## 2023-09-07 PROCEDURE — 97530 THERAPEUTIC ACTIVITIES: CPT

## 2023-09-07 PROCEDURE — 6370000000 HC RX 637 (ALT 250 FOR IP): Performed by: STUDENT IN AN ORGANIZED HEALTH CARE EDUCATION/TRAINING PROGRAM

## 2023-09-07 PROCEDURE — 97116 GAIT TRAINING THERAPY: CPT

## 2023-09-07 PROCEDURE — 2580000003 HC RX 258: Performed by: STUDENT IN AN ORGANIZED HEALTH CARE EDUCATION/TRAINING PROGRAM

## 2023-09-07 PROCEDURE — 6360000002 HC RX W HCPCS: Performed by: NURSE PRACTITIONER

## 2023-09-07 PROCEDURE — 36415 COLL VENOUS BLD VENIPUNCTURE: CPT

## 2023-09-07 PROCEDURE — 97162 PT EVAL MOD COMPLEX 30 MIN: CPT

## 2023-09-07 PROCEDURE — 99024 POSTOP FOLLOW-UP VISIT: CPT | Performed by: NURSE PRACTITIONER

## 2023-09-07 PROCEDURE — APPNB30 APP NON BILLABLE TIME 0-30 MINS: Performed by: NURSE PRACTITIONER

## 2023-09-07 PROCEDURE — 6370000000 HC RX 637 (ALT 250 FOR IP): Performed by: NURSE PRACTITIONER

## 2023-09-07 PROCEDURE — 80048 BASIC METABOLIC PNL TOTAL CA: CPT

## 2023-09-07 RX ORDER — POLYETHYLENE GLYCOL 3350 17 G/17G
17 POWDER, FOR SOLUTION ORAL DAILY
Qty: 30 PACKET | Refills: 0 | COMMUNITY
Start: 2023-09-08 | End: 2023-10-08

## 2023-09-07 RX ORDER — LIDOCAINE 50 MG/G
1 PATCH TOPICAL DAILY
Qty: 30 PATCH | Refills: 0 | Status: SHIPPED | OUTPATIENT
Start: 2023-09-07

## 2023-09-07 RX ORDER — LIDOCAINE 4 G/G
1 PATCH TOPICAL DAILY
Status: DISCONTINUED | OUTPATIENT
Start: 2023-09-07 | End: 2023-09-07 | Stop reason: HOSPADM

## 2023-09-07 RX ORDER — OXYCODONE HYDROCHLORIDE 5 MG/1
5-10 TABLET ORAL EVERY 4 HOURS PRN
Qty: 42 TABLET | Refills: 0 | Status: SHIPPED | OUTPATIENT
Start: 2023-09-07 | End: 2023-09-14

## 2023-09-07 RX ORDER — METOPROLOL SUCCINATE 50 MG/1
50 TABLET, EXTENDED RELEASE ORAL DAILY
Status: DISCONTINUED | OUTPATIENT
Start: 2023-09-07 | End: 2023-09-07 | Stop reason: HOSPADM

## 2023-09-07 RX ADMIN — POTASSIUM CHLORIDE AND SODIUM CHLORIDE: 900; 150 INJECTION, SOLUTION INTRAVENOUS at 14:17

## 2023-09-07 RX ADMIN — SODIUM CHLORIDE, PRESERVATIVE FREE 10 ML: 5 INJECTION INTRAVENOUS at 09:15

## 2023-09-07 RX ADMIN — LISINOPRIL 40 MG: 40 TABLET ORAL at 09:14

## 2023-09-07 RX ADMIN — ENOXAPARIN SODIUM 30 MG: 100 INJECTION SUBCUTANEOUS at 09:16

## 2023-09-07 RX ADMIN — ACETAMINOPHEN 650 MG: 325 TABLET ORAL at 14:55

## 2023-09-07 RX ADMIN — OXYCODONE HYDROCHLORIDE 10 MG: 5 TABLET ORAL at 03:54

## 2023-09-07 RX ADMIN — OXYCODONE HYDROCHLORIDE 10 MG: 5 TABLET ORAL at 16:15

## 2023-09-07 RX ADMIN — POLYETHYLENE GLYCOL 3350 17 G: 17 POWDER, FOR SOLUTION ORAL at 09:19

## 2023-09-07 RX ADMIN — METOPROLOL SUCCINATE 50 MG: 50 TABLET, EXTENDED RELEASE ORAL at 06:06

## 2023-09-07 RX ADMIN — ACETAMINOPHEN 650 MG: 325 TABLET ORAL at 09:14

## 2023-09-07 RX ADMIN — OXYCODONE HYDROCHLORIDE 10 MG: 5 TABLET ORAL at 09:15

## 2023-09-07 ASSESSMENT — PAIN SCALES - GENERAL
PAINLEVEL_OUTOF10: 7
PAINLEVEL_OUTOF10: 4
PAINLEVEL_OUTOF10: 7
PAINLEVEL_OUTOF10: 6
PAINLEVEL_OUTOF10: 7
PAINLEVEL_OUTOF10: 8

## 2023-09-07 ASSESSMENT — PAIN DESCRIPTION - PAIN TYPE
TYPE: SURGICAL PAIN

## 2023-09-07 ASSESSMENT — PAIN DESCRIPTION - ORIENTATION
ORIENTATION: ANTERIOR
ORIENTATION: MID

## 2023-09-07 ASSESSMENT — PAIN DESCRIPTION - ONSET
ONSET: ON-GOING

## 2023-09-07 ASSESSMENT — PAIN DESCRIPTION - DESCRIPTORS
DESCRIPTORS: ACHING
DESCRIPTORS: DISCOMFORT

## 2023-09-07 ASSESSMENT — PAIN DESCRIPTION - LOCATION
LOCATION: NECK

## 2023-09-07 ASSESSMENT — PAIN - FUNCTIONAL ASSESSMENT
PAIN_FUNCTIONAL_ASSESSMENT: ACTIVITIES ARE NOT PREVENTED

## 2023-09-07 ASSESSMENT — PAIN DESCRIPTION - FREQUENCY
FREQUENCY: CONTINUOUS

## 2023-09-07 NOTE — CARE COORDINATION
CM spoke with patient at bedside today. He does not think he will need home care at discharge. Patient state he has needed equipment at home. CM will continue to follow.     Heriberto Ortez RN, BSN,    Ortho/Neuro   [phone]

## 2023-09-07 NOTE — PROGRESS NOTES
Physical Therapy  Facility/Department: [facility]  Physical Therapy Initial Assessment and Treatment    Name: Glen Zuniga  : 1955  MRN: [id number]  Date of Service: 2023    Discharge Recommendations:    Glen Zuniga scored a 18/24 on the AM-PAC short mobility form. Current research shows that an AM-PAC score of 18 or greater is typically associated with a discharge to the patient's home setting. Based on the patient's AM-PAC score and their current functional mobility deficits, it is recommended that the patient have 2-3 sessions per week of Physical Therapy at d/c to increase the patient's independence. At this time, this patient demonstrates the endurance and safety to discharge home with home vs OP services and a follow up treatment frequency of 2-3x/wk. Please see assessment section for further patient specific details. If patient discharges prior to next session this note will serve as a discharge summary. Please see below for the latest assessment towards goals. PT Equipment Recommendations  Equipment Needed: No (instructed pt to use walker initially at home)      Patient Diagnosis(es): There were no encounter diagnoses. Past Medical History:  has a past medical history of Arthritis, Atrial fibrillation ([address]), CAD (coronary artery disease), Diabetes mellitus ([address]), Dizziness, Hyperlipidemia, Hypertension, Wears glasses, and Wears partial dentures. Past Surgical History:  has a past surgical history that includes back surgery (200); Coronary angioplasty with stent (2012); cervical fusion (N/A, 09/15/2022); X-stop implantation; ablation of dysrhythmic focus; and cervical fusion (N/A, 2023). Assessment   Body Structures, Functions, Activity Limitations Requiring Skilled Therapeutic Intervention: Decreased functional mobility ; Decreased endurance;Decreased strength;Decreased balance  Assessment: Pt with decreased independent mobility from baseline s/pCERVICAL Handicap height  Bathroom Equipment: Grab bars in shower, Shower chair, Grab bars around toilet  Home Equipment: [place], rolling, Lift chair  Has the patient had two or more falls in the past year or any fall with injury in the past year?: Yes (5-6 \"lack of coordination and balance\", last fall was a few months ago)  ADL Assistance: Independent  Homemaking Assistance: Independent  Ambulation Assistance: Independent (with cane most recently)  Transfer Assistance: Independent  Active : Yes  Additional Comments: Pt had neck sx 1 year ago- reports having difficulty managing at home since. Has balance issues, bilat shoulder weakness. Has a friend who could help if needed but could not stay with him. Pt later states his daughter will take him home and she works from home. Pt resistant to having anyone stay with him. Vision/Hearing  Vision  Vision: Impaired  Vision Exceptions: Wears glasses for reading  Hearing  Hearing: Within functional limits    Cognition   Orientation  Overall Orientation Status: Within Normal Limits  Cognition  Overall Cognitive Status: WFL     Objective               AROM RLE (degrees)  RLE AROM: WFL  AROM LLE (degrees)  LLE AROM : WFL  Strength RLE  Strength RLE: WFL  Strength LLE  Strength LLE: WFL           Bed mobility  Supine to Sit: Stand by assistance (logsitting to EOB with HOB up. Reviewed technique from flat bed and to logroll. Pt verbalized understanding - declined to practice at this time)  Transfers  Sit to Stand: Contact guard assistance (from bed and from chair)  Stand to Sit: Contact guard assistance  Ambulation  Device: Single point cane  Assistance: Contact guard assistance  Quality of Gait: slightly unsteady but no LOB, decreased fabrizio, guarded  Distance: 15'  Comments: [id number]' with rolling walker with CGA. Cues to stay within walker and for upright posture.   Stairs/Curb  Stairs?: Yes (Up/down 2 steps with bilat rails CGA with reciprocal pattern)      Treatment

## 2023-09-07 NOTE — PROGRESS NOTES
Patient is alert and oriented x4. VSS on RA. No acute changes noted to patient. Pain managed per MAR. Voiding adequately via urinal. Standard safety measures in place. Plan of care ongoing.

## 2023-09-07 NOTE — PROGRESS NOTES
V2.0    [facility] [place]      Name:  Lacy Jimenez /Age/Sex: 1955  (79 y.o. male)   MRN & CSN:  [id number] & [id number] Encounter Date/Time: 2023 9:22 AM EDT   Location:  [facility]/3669-15 PCP: [facility] Evelio Gabriel MD       Hospital Day: 3    Assessment and Recommendations   Lacy Jimenez is a 79 y.o. male with pmh of w/ CAD w/ prior PCI , HTN, HLD, afib/flutter with ablation in  (not on anticoagulation)  who presents with Cervical stenosis of spinal canal     Hospital Problems               Last Modified POA     * (Principal) Cervical stenosis of spinal canal 2023 Yes     Cervical spine instability 2023 Yes   #CAD  #HTN  #HLD  #Afib s/p ablation , not on anticoagulation per my review of his records  #Hyponatremia, mild and asymptomatic likely 2/2 ADH stress response from recent surgery     Recommendations:   NSGY primary  PRN analgesia  PT/OT  Resume home statin, anti-hypertensives  Restart ASA as soon as okay with surgical service  Rechecking BMP; if Na stable no barrier to discharge      Diet ADULT DIET; Regular   DVT Prophylaxis [x] Lovenox, []  Heparin, [] SCDs, [] Ambulation,  [] Eliquis, [] Xarelto  [] Coumadin   Code Status Full Code             Personally reviewed Lab Studies and Imaging         Subjective:     Chief Complaint: Neck Pain     Lacy Jimenez is a 79 y.o. male w/ CAD w/ prior PCI , HTN, HLD, afib/flutter with ablation in  (not on anticoagulation) presented today for elective ACDF of C2-3 performed by Dr. Brayden Rodrigez with neurosurgery. Tolerated surgery well no significant complications and now recovering post-op. Hospitalist service consulted for aid in management of medical comorbidities. No acute clinical changes reported overnight. Vitals stable. Patient currently feels a little better today.  No new symptomatic complaints     Review of Systems:      Pertinent positives and negatives discussed in 2-CERVICAL 3 ANTERIOR CERVICAL DISCECTOMY AND FUSION, COMPARISON: None FINDINGS: Peak skin dose: 6.70 mGy Fluoroscopy was provided. No radiologist was in attendance. Please see operative report for further details. see above       CBC:   Recent Labs     09/05/23  0752   WBC 3.8*   HGB 13.7        BMP:    Recent Labs     09/05/23  1559   *   K 4.7   CL 95*   CO2 21   BUN 14   CREATININE 0.9   GLUCOSE 116*     Hepatic: No results for input(s): AST, ALT, ALB, BILITOT, ALKPHOS in the last 72 hours. Lipids:   Lab Results   Component Value Date/Time    CHOL 212 02/18/2014 08:37 AM    HDL 45 02/18/2014 08:37 AM    HDL 33 05/09/2012 05:05 AM    TRIG 151 02/18/2014 08:37 AM     Hemoglobin A1C:   Lab Results   Component Value Date/Time    LABA1C 8.9 05/07/2016 04:50 AM     TSH: No results found for: TSH  Troponin: No results found for: TROPONINT  Lactic Acid: No results for input(s): LACTA in the last 72 hours. BNP: No results for input(s): PROBNP in the last 72 hours.   UA:  Lab Results   Component Value Date/Time    NITRU Negative 09/08/2022 11:30 PM    COLORU Yellow 09/08/2022 11:30 PM    PHUR 6.0 09/08/2022 11:30 PM    PHUR 6.0 09/08/2022 11:30 PM    WBCUA 1 09/08/2022 11:30 PM    RBCUA 1 09/08/2022 11:30 PM    MUCUS 1+ 04/14/2016 12:55 PM    BACTERIA None Seen 09/08/2022 11:30 PM    CLARITYU Clear 09/08/2022 11:30 PM    SPECGRAV 1.015 09/08/2022 11:30 PM    LEUKOCYTESUR Negative 09/08/2022 11:30 PM    UROBILINOGEN 1.0 09/08/2022 11:30 PM    BILIRUBINUR Negative 09/08/2022 11:30 PM    BLOODU Negative 09/08/2022 11:30 PM    GLUCOSEU Negative 09/08/2022 11:30 PM    KETUA Negative 09/08/2022 11:30 PM     Urine Cultures:   Lab Results   Component Value Date/Time    LABURIN No growth at 18-36 hours 04/14/2016 12:55 PM     Blood Cultures: No results found for: BC  No results found for: BLOODCULT2  Organism: No results found for: [facility]      Electronically signed by Lulu Perez MD on 9/7/2023 at 11:01 AM

## 2023-09-07 NOTE — PROGRESS NOTES
Occupational Therapy  NO Treatment    Attempt made to see pt for therapy. Pt stating \"I just finished eating so I cant get up\" Pt educated on importance of activity. Pt then stating \"I did all that yesterday\". Despite encouragement pt continued to decline. Will attempt to see pt later as schedule allows. Continue OT per POC.      [address] [place]/L

## 2023-09-07 NOTE — PLAN OF CARE
Chronic Conditions and Co-morbidities  Goal: Patient's chronic conditions and co-morbidity symptoms are monitored and maintained or improved  Outcome: Progressing  Note: BP remains elevated at times; utilizing medication to manage pain and antihypertensives. Pain  Goal: Verbalizes/displays adequate comfort level or baseline comfort level  Outcome: Progressing  Note: PT continues to have discomfort; utilizing opiates to manage pain.

## 2023-09-07 NOTE — PLAN OF CARE
Problem: Discharge Planning  Goal: Discharge to home or other facility with appropriate resources  9/6/2023 2356 by Armaan Hardy RN  Outcome: Progressing  Flowsheets (Taken 9/6/2023 2356)  Discharge to home or other facility with appropriate resources:   Identify barriers to discharge with patient and caregiver   Arrange for needed discharge resources and transportation as appropriate     Problem: Pain  Goal: Verbalizes/displays adequate comfort level or baseline comfort level  9/6/2023 2356 by Armaan Hardy RN  Outcome: Progressing  Flowsheets (Taken 9/6/2023 2356)  Verbalizes/displays adequate comfort level or baseline comfort level:   Encourage patient to monitor pain and request assistance   Assess pain using appropriate pain scale     Problem: Safety - Adult  Goal: Free from fall injury  Outcome: Progressing  Flowsheets (Taken 9/6/2023 2356)  Free From Fall Injury:   Instruct family/caregiver on patient safety   Based on caregiver fall risk screen, instruct family/caregiver to ask for assistance with transferring infant if caregiver noted to have fall risk factors

## 2023-09-07 NOTE — PROGRESS NOTES
Occupational Therapy  Facility/Department: [facility] 5T ORTHO/NEURO  Daily Treatment Note  NAME: González De Los Santos  : 1955  MRN: [id number]    Date of Service: 2023    Discharge Recommendations:  González De Los Santos scored a 20/24 on the AM-PAC ADL Inpatient form. Current research shows that an AM-PAC score of 18 or greater is typically associated with a discharge to the patient's home setting. Based on the patient's AM-PAC score, and their current ADL deficits, it is recommended that the patient have 2-3 sessions per week of Occupational Therapy at d/c to increase the patient's independence. At this time, this patient demonstrates the endurance and safety to discharge home with 24hr assist and HHOT (home vs OP services) and a follow up treatment frequency of 2-3x/wk. Please see assessment section for further patient specific details. If patient discharges prior to next session this note will serve as a discharge summary. Please see below for the latest assessment towards goals. OT Equipment Recommendations  Other: pt has the recommended DME      Patient Diagnosis(es): There were no encounter diagnoses. Assessment    Assessment: Pt completeing ADLs washing and grooming seated and in stance at sink wtih good endurance and CGA. Pt requiring education on precautions and keeping collar on in stance at sink. Functional mobility and transfers completed with CGA and slow shuffing gait. LE dressing requiring CGA. Pt would benefit from 24hr assist and HHOT. Continue OT per POC. Activity Tolerance: Patient tolerated treatment well  Other: pt has the recommended DME      Plan   Occupational Therapy Plan  Times Per Week: 5-7  Current Treatment Recommendations: Balance training;Self-Care / ADL; Safety education & training;Functional mobility training; Endurance training;Patient/Caregiver education & training;Strengthening     Restrictions  Position Activity Restriction  Other position/activity restrictions: importance of keeping collar on when in stance.   Education Method: Demonstration;Verbal  Barriers to Learning: None  Education Outcome: Verbalized understanding;Demonstrated understanding    Goals  Short Term Goals  Time Frame for Short Term Goals: discharge  Short Term Goal 1: mod I toilet transfer - ongoing  Short Term Goal 2: mod I LB ADLs - ongoing  Short Term Goal 3: mod I UB ADLs - ongoing  Short Term Goal 4: independent grooming at sink - ongoing  Patient Goals   Patient goals : go home       Therapy Time   Individual Concurrent Group Co-treatment   Time In 1106         Time Out 1159         Minutes 53         Timed Code Treatment Minutes: [address], [address] [facility] [place] M[place]

## 2023-09-07 NOTE — PROGRESS NOTES
PT was discharged with all personal belongings; we reviewed his discharging medications and their purposes and side effects. Reviewed incision care and the importance of showering each day; PT provided with extra gauze/tegaderm and instructed on how to manage drainage if it occurs. All questions were answered.

## 2023-10-16 ENCOUNTER — HOSPITAL ENCOUNTER (OUTPATIENT)
Dept: GENERAL RADIOLOGY | Age: 68
Discharge: HOME | End: 2023-10-16
Payer: MEDICARE

## 2023-10-16 ENCOUNTER — HOSPITAL ENCOUNTER (OUTPATIENT)
Age: 68
Discharge: HOME | End: 2023-10-16
Payer: MEDICARE

## 2023-10-16 DIAGNOSIS — Z98.1 ARTHRODESIS STATUS: ICD-10-CM

## 2023-10-16 PROCEDURE — 72040 X-RAY EXAM NECK SPINE 2-3 VW: CPT

## 2023-11-01 ENCOUNTER — OFFICE VISIT (OUTPATIENT)
Dept: ORTHOPEDIC SURGERY | Age: 68
End: 2023-11-01
Payer: MEDICARE

## 2023-11-01 VITALS — WEIGHT: 245 LBS | BODY MASS INDEX: 31.44 KG/M2 | HEIGHT: 74 IN

## 2023-11-01 DIAGNOSIS — M25.511 RIGHT SHOULDER PAIN, UNSPECIFIED CHRONICITY: ICD-10-CM

## 2023-11-01 DIAGNOSIS — M19.019 SHOULDER ARTHRITIS: Primary | ICD-10-CM

## 2023-11-01 PROCEDURE — 99203 OFFICE O/P NEW LOW 30 MIN: CPT | Performed by: ORTHOPAEDIC SURGERY

## 2023-11-01 PROCEDURE — 1123F ACP DISCUSS/DSCN MKR DOCD: CPT | Performed by: ORTHOPAEDIC SURGERY

## 2023-11-01 PROCEDURE — G8427 DOCREV CUR MEDS BY ELIG CLIN: HCPCS | Performed by: ORTHOPAEDIC SURGERY

## 2023-11-01 PROCEDURE — 3017F COLORECTAL CA SCREEN DOC REV: CPT | Performed by: ORTHOPAEDIC SURGERY

## 2023-11-01 PROCEDURE — G8417 CALC BMI ABV UP PARAM F/U: HCPCS | Performed by: ORTHOPAEDIC SURGERY

## 2023-11-01 PROCEDURE — G8484 FLU IMMUNIZE NO ADMIN: HCPCS | Performed by: ORTHOPAEDIC SURGERY

## 2023-11-01 PROCEDURE — 4004F PT TOBACCO SCREEN RCVD TLK: CPT | Performed by: ORTHOPAEDIC SURGERY

## 2023-11-01 RX ORDER — TRAMADOL HYDROCHLORIDE 50 MG/1
50 TABLET ORAL EVERY 6 HOURS PRN
Qty: 30 TABLET | Refills: 0 | Status: SHIPPED | OUTPATIENT
Start: 2023-11-01 | End: 2023-11-09

## 2023-11-01 NOTE — PROGRESS NOTES
[address] [place] [address]  History and Physical  Shoulder Pain    Date:  2023    Name:  Rob Dan  Address:  [address] [place] [address]    :  1955      Age:   79 y.o.    SSN:  [id number]      Medical Record Number:  [id number]    Reason for Visit:    Shoulder Pain (NP RIGHT SHOULDER)      HPI:   Rob Dan is a 79 y.o. male who presents to our office today for evaluation of the right shoulder. The patient reports for the last 1 year or so he been having increasing right shoulder pain. Most of his pain is associated with range of motion or active motion of the shoulder. Less pain associated with passive motion. He does report that approximately a year ago he had some form of a cyst overlying his right AC joint with purulent drainage which was a chronic issue. He did eventually undergo open surgical cyst removal with irrigation and debridement. He does report he was on antibiotics for approximately 1 week postoperatively but does not remember the exact antibiotic. At this point, his main complaints are trouble with sleeping as well as pain with overhead activity. He has been using over the counter naproxen as needed and currently rates his pain as a 8 out of 10. He also has a chronic history of cervical myelopathy as well as sensation deficit to the right upper extremity for which she underwent a C2-C3 decompression with anterior cervical discectomy and anterior plate fusion in September.       Pain Assessment  Location of Pain: Shoulder  Location Modifiers: Right  Severity of Pain: 8  Quality of Pain: Sharp, Popping  Duration of Pain: Persistent  Frequency of Pain: Constant  Aggravating Factors: Straightening, Other (Comment) (abd/IR)  Limiting Behavior: Yes  Relieving Factors: Nsaids  Result of Injury: No  Work-Related Injury: No  Are there other pain locations you wish to document?: No    No notes on file    Review of Systems:  A 14

## 2023-11-03 ENCOUNTER — HOSPITAL ENCOUNTER (OUTPATIENT)
Dept: PHYSICAL THERAPY | Age: 68
Setting detail: THERAPIES SERIES
Discharge: HOME | End: 2023-11-03

## 2023-11-03 DIAGNOSIS — M54.2 PAINFUL CERVICAL ROM: ICD-10-CM

## 2023-11-03 DIAGNOSIS — M54.2 NECK PAIN: Primary | ICD-10-CM

## 2023-11-03 NOTE — PROGRESS NOTES
[facility]     Physical Therapy  Cancellation/No-show Note  Patient Name:  Joseph Rodrigues  :  1955   Date:  11/3/2023  Cancelled visits to date:  1  No-shows to date: 0    Patient status for today's appointment patient:  [x]  Cancelled 11/3 eval  []  Rescheduled appointment  []  No-show     Reason given by patient:  []  Patient ill  []  Conflicting appointment  [x]  No transportation    []  Conflict with work  []  No reason given  []  Other: car was repo'd     Comments:      Phone call information:   []  Phone call made today to patient at _ time at number provided:      []  Patient answered, conversation as follows:    []  Patient did not answer, message left as follows:  []  Phone call not made today  []  Phone call not needed - pt contacted us to cancel and provided reason for cancellation. Electronically signed by:   Scotty Maya PT DPT ATC

## 2023-11-09 ENCOUNTER — TELEPHONE (OUTPATIENT)
Dept: ORTHOPEDIC SURGERY | Age: 68
End: 2023-11-09

## 2023-11-10 DIAGNOSIS — M19.019 SHOULDER ARTHRITIS: ICD-10-CM

## 2023-11-12 RX ORDER — TRAMADOL HYDROCHLORIDE 50 MG/1
50 TABLET ORAL EVERY 6 HOURS PRN
Qty: 30 TABLET | Refills: 0 | Status: SHIPPED | OUTPATIENT
Start: 2023-11-12 | End: 2023-11-20

## 2023-11-13 NOTE — PROGRESS NOTES
[facility]   Cardiac Evaluation      Patient: Kelsie Shaikh  YOB: 1955         Chief Complaint   Patient presents with    1 Year Follow Up     No c/c          Referring provider: Rebekah Nieto    History of Present Illness:   Kelsie Shaikh is a 79 y.o. male presenting for follow up with a history of CAD, Htn, Hld, atrial fib/flutter post SVT ablation '16. Hctz stopped Jan '21 for hyponatremia. Today he is here alone. Reports he has been doing well. Feels he has \"gotten out of shape\" and gets SOB a little easier because he has been inactive since surgery. With regard to medication therapy he/she has been compliant with prescribed regimen and has tolerated therapy to date. Past Medical History:   has a past medical history of Arthritis, Atrial fibrillation ([address]), CAD (coronary artery disease), Diabetes mellitus ([address]), Dizziness, Hyperlipidemia, Hypertension, Wears glasses, and Wears partial dentures. Surgical History:   has a past surgical history that includes back surgery (200); Coronary angioplasty with stent (01/01/2012); cervical fusion (N/A, 09/15/2022); X-stop implantation; ablation of dysrhythmic focus; and cervical fusion (N/A, 9/5/2023). Current Outpatient Medications   Medication Sig Dispense Refill    traMADol (ULTRAM) 50 MG tablet Take 1 tablet by mouth every 6 hours as needed for Pain for up to 8 days. Max Daily Amount: 200 mg 30 tablet 0    aspirin EC 81 MG EC tablet Take 1 tablet by mouth daily 90 tablet 3    lidocaine (LIDODERM) 5 % Place 1 patch onto the skin daily 12 hours on, 12 hours off.  30 patch 0    amitriptyline (ELAVIL) 10 MG tablet Take 1 tablet by mouth nightly      cyclobenzaprine (FLEXERIL) 5 MG tablet take 1 tablet by oral route 3 times every day as needed      spironolactone (ALDACTONE) 100 MG tablet Take 1 tablet by mouth at bedtime      VARENICLINE TARTRATE PO Take 1 tablet by mouth at bedtime      lisinopril

## 2023-11-14 ENCOUNTER — OFFICE VISIT (OUTPATIENT)
Dept: CARDIOLOGY CLINIC | Age: 68
End: 2023-11-14

## 2023-11-14 VITALS
HEIGHT: 74 IN | DIASTOLIC BLOOD PRESSURE: 70 MMHG | OXYGEN SATURATION: 97 % | HEART RATE: 63 BPM | SYSTOLIC BLOOD PRESSURE: 120 MMHG | WEIGHT: 249.6 LBS | BODY MASS INDEX: 32.03 KG/M2

## 2023-11-14 DIAGNOSIS — I10 ESSENTIAL HYPERTENSION: ICD-10-CM

## 2023-11-14 DIAGNOSIS — E78.2 MIXED HYPERLIPIDEMIA: ICD-10-CM

## 2023-11-14 DIAGNOSIS — I48.3 TYPICAL ATRIAL FLUTTER (HCC): ICD-10-CM

## 2023-11-14 DIAGNOSIS — I25.10 CAD IN NATIVE ARTERY: Primary | ICD-10-CM

## 2023-11-21 ENCOUNTER — HOSPITAL ENCOUNTER (OUTPATIENT)
Dept: MRI IMAGING | Age: 68
Discharge: HOME | End: 2023-11-21
Payer: MEDICARE

## 2023-11-21 ENCOUNTER — HOSPITAL ENCOUNTER (OUTPATIENT)
Dept: MRI IMAGING | Age: 68
Discharge: HOME | End: 2023-11-21
Attending: ORTHOPAEDIC SURGERY

## 2023-11-21 DIAGNOSIS — M54.16 LUMBAR RADICULOPATHY: ICD-10-CM

## 2023-11-21 DIAGNOSIS — M25.511 RIGHT SHOULDER PAIN, UNSPECIFIED CHRONICITY: ICD-10-CM

## 2023-11-21 DIAGNOSIS — M19.019 SHOULDER ARTHRITIS: ICD-10-CM

## 2023-11-21 PROCEDURE — 72148 MRI LUMBAR SPINE W/O DYE: CPT

## 2023-12-04 ENCOUNTER — HOSPITAL ENCOUNTER (OUTPATIENT)
Dept: GENERAL RADIOLOGY | Age: 68
Discharge: HOME | End: 2023-12-04
Payer: MEDICARE

## 2023-12-04 ENCOUNTER — HOSPITAL ENCOUNTER (OUTPATIENT)
Age: 68
Discharge: HOME | End: 2023-12-04
Payer: MEDICARE

## 2023-12-04 DIAGNOSIS — Z98.1 ARTHRODESIS STATUS: ICD-10-CM

## 2023-12-04 PROCEDURE — 72040 X-RAY EXAM NECK SPINE 2-3 VW: CPT

## 2023-12-14 ENCOUNTER — HOSPITAL ENCOUNTER (OUTPATIENT)
Dept: MRI IMAGING | Age: 68
Discharge: HOME | End: 2023-12-14
Attending: ORTHOPAEDIC SURGERY
Payer: MEDICARE

## 2023-12-14 PROCEDURE — 73221 MRI JOINT UPR EXTREM W/O DYE: CPT

## 2024-01-09 ENCOUNTER — OFFICE VISIT (OUTPATIENT)
Dept: ORTHOPEDIC SURGERY | Age: 69
End: 2024-01-09
Payer: MEDICARE

## 2024-01-09 VITALS — BODY MASS INDEX: 31.95 KG/M2 | WEIGHT: 249 LBS | HEIGHT: 74 IN

## 2024-01-09 DIAGNOSIS — M25.511 RIGHT SHOULDER PAIN, UNSPECIFIED CHRONICITY: Primary | ICD-10-CM

## 2024-01-09 DIAGNOSIS — M19.011 PRIMARY OSTEOARTHRITIS OF RIGHT SHOULDER: ICD-10-CM

## 2024-01-09 PROCEDURE — 1123F ACP DISCUSS/DSCN MKR DOCD: CPT | Performed by: ORTHOPAEDIC SURGERY

## 2024-01-09 PROCEDURE — 99214 OFFICE O/P EST MOD 30 MIN: CPT | Performed by: ORTHOPAEDIC SURGERY

## 2024-01-09 PROCEDURE — 4004F PT TOBACCO SCREEN RCVD TLK: CPT | Performed by: ORTHOPAEDIC SURGERY

## 2024-01-09 PROCEDURE — L3670 SO ACRO/CLAV CAN WEB PRE OTS: HCPCS | Performed by: ORTHOPAEDIC SURGERY

## 2024-01-09 PROCEDURE — G8484 FLU IMMUNIZE NO ADMIN: HCPCS | Performed by: ORTHOPAEDIC SURGERY

## 2024-01-09 PROCEDURE — 3017F COLORECTAL CA SCREEN DOC REV: CPT | Performed by: ORTHOPAEDIC SURGERY

## 2024-01-09 PROCEDURE — G8427 DOCREV CUR MEDS BY ELIG CLIN: HCPCS | Performed by: ORTHOPAEDIC SURGERY

## 2024-01-09 PROCEDURE — G8417 CALC BMI ABV UP PARAM F/U: HCPCS | Performed by: ORTHOPAEDIC SURGERY

## 2024-01-09 RX ORDER — TRAMADOL HYDROCHLORIDE 50 MG/1
50 TABLET ORAL EVERY 6 HOURS PRN
Qty: 20 TABLET | Refills: 0 | Status: SHIPPED | OUTPATIENT
Start: 2024-01-09 | End: 2024-01-16

## 2024-01-16 NOTE — PROGRESS NOTES
Date of Encounter: 1/26/2024  Patient Name:Reno Epps  Medical Record Number: [id number]    Chief Complaint   Patient presents with    Follow-up     Right knee pain and swelling       History of Present Illness:  Reno Epps is a 68 y.o. male here for evaluation of his right knee arthritis and effusion.  I saw him at [facility] twice last year for his severe knee arthritis.  He underwent an aspiration and cortisone injection in February.  In September he started planning for elective right total knee arthroplasty but they should be on that office and he was unable to continue care until I had re- established my practice with [facility].  He said no new trauma.  Pain is averaging 7-8/10 and he ambulates with a cane.  He does have a pending thoracolumbar spine surgery next week at [facility] with Dr. Borrero (L3-S1 anterior interbody fusion and T10-Pelvis posterior decompression and removal of spinal cord stimulator) pre-op notes were reviewed using care everywhere.  No recent falls or direct trauma.    Past Medical History:   Diagnosis Date    Arthritis     Atrial fibrillation (HCC)     had after a surgery; had an ablation;    CAD (coronary artery disease)     stents x 3    Diabetes mellitus (HCC)     states in \"remission\"    Dizziness     Hyperlipidemia     Hypertension     Wears glasses     Wears partial dentures     upper and lower partials       Past Surgical History:   Procedure Laterality Date    ABLATION OF DYSRHYTHMIC FOCUS      for at fib    BACK SURGERY  200    lower lumbar surgery    CERVICAL FUSION N/A 09/15/2022    C2-T2 POSTERIOR DECOMPRESSION FUSION AND FIXATION performed by Sarah Pierre MD at [facility] OR    CERVICAL FUSION N/A 9/5/2023    CERVICAL 2-CERVICAL 3 ANTERIOR CERVICAL DISCECTOMY AND FUSION performed by Sarah Pierre MD at [facility] OR    CORONARY ANGIOPLASTY WITH STENT PLACEMENT  01/01/2012    stents x 3    X-STOP IMPLANTATION      spinal implant device -- BATTERY

## 2024-01-16 NOTE — PROGRESS NOTES
History of Present Illness:  Reno Epps returns for follow-up of his right shoulder. He underwent CT and MRI and returns to review these. He has severe pain and dysfunction and does not wish to continue with his right shoulder the way it is.       Medical History:  Patient's medications, allergies, past medical, surgical, social and family histories were reviewed and updated as appropriate.    Pertinent items are noted in HPI  Review of systems reviewed from Patient History Form dated on 11/1/23 and available in the patient's chart under the Media tab.     Vital Signs:  There were no vitals filed for this visit.  Constitutional: in no distress.      Right Shoulder Examination:    Inspection:  no deformity or atrophy.    Palpation:  crepitus. Tenderness anteriorly and over the posterior joint line    Active Range of Motion: 90 FE, 90 AB, IR to back pocket    Passive Range of Motion:  deferred.    Strength:  Deltoid 4/5. Negative ER lag    Special Tests:  deferred.    Radiology:     CT right shoulder demonstrates severe glenohumeral arthritis with some volume loss of SS/IS muscles. Moderate retroversion    MRI demonstrates no obvious FT RCT. Confirms advanced DJD right shoulder         Assessment :  Reno Epps has end stage glenohumeral osteoarthritis. He is a good candidate for TSA/rTSA depending on amount of glenoid deformity and health of the cuff at surgery.    Impression:  Encounter Diagnoses   Name Primary?    Right shoulder pain, unspecified chronicity Yes    Primary osteoarthritis of right shoulder        Office Procedures:  Orders Placed This Encounter   Procedures    DJO ultrasling Shoulder Sling     Patient was prescribed a DJO Ultrasling IV Shoulder Brace.   The right shoulder will require stabilization / immobilization from this orthosis.  The orthosis will assist in protecting the affected area, provide functional support and facilitate healing.  The device was ordered and fit on 01/09/24.    The

## 2024-01-17 ENCOUNTER — TELEPHONE (OUTPATIENT)
Dept: CARDIOLOGY CLINIC | Age: 69
End: 2024-01-17

## 2024-01-17 NOTE — TELEPHONE ENCOUNTER
CARDIAC CLEARANCE     What type of procedure are you having?  Anterior l3-s1 inner body fusion with posterior [id number]   Which physician is performing your procedure?  Dr padilla   When is your procedure scheduled for?  1/30/24  Where are you having this procedure?  [facility]   Are you taking Blood Thinners?   If so what? (Name/dose/frequesncy)     Does the surgeon want you to stop your blood thinner?  If so for how long?    Phone Number and Contact Name for Physicians office:  [phone] [facility]   Fax number to send information:  [phone]

## 2024-01-19 ENCOUNTER — TELEPHONE (OUTPATIENT)
Dept: CARDIOLOGY CLINIC | Age: 69
End: 2024-01-19

## 2024-01-26 ENCOUNTER — OFFICE VISIT (OUTPATIENT)
Dept: ORTHOPEDIC SURGERY | Age: 69
End: 2024-01-26

## 2024-01-26 VITALS — HEIGHT: 73 IN | BODY MASS INDEX: 33.4 KG/M2 | WEIGHT: 252 LBS

## 2024-01-26 DIAGNOSIS — M25.461 EFFUSION OF RIGHT KNEE: ICD-10-CM

## 2024-01-26 DIAGNOSIS — M17.11 PRIMARY OSTEOARTHRITIS OF RIGHT KNEE: Primary | ICD-10-CM

## 2024-01-26 DIAGNOSIS — M25.561 CHRONIC PAIN OF RIGHT KNEE: ICD-10-CM

## 2024-01-26 DIAGNOSIS — G89.29 CHRONIC PAIN OF RIGHT KNEE: ICD-10-CM

## 2024-01-26 RX ORDER — TRAMADOL HYDROCHLORIDE 50 MG/1
50 TABLET ORAL DAILY
COMMUNITY

## 2024-02-06 ENCOUNTER — TELEPHONE (OUTPATIENT)
Dept: ORTHOPEDIC SURGERY | Age: 69
End: 2024-02-06

## 2024-02-06 NOTE — TELEPHONE ENCOUNTER
Surgery and/or Procedure Scheduling     Contact Name: MichReno  Surgical/Procedure Request:   Patient Contact Number: [phone]      THE PT WOULD LIKE THE SURG  TO CALL HIM ABOUT DATES FOR HIS SX, BECAUSE HE HAS TO HAVE A DIFFERENT HEALTH ISSUE TAKEN CARE OF FIRST.

## 2024-04-17 NOTE — PROGRESS NOTES
Reno Epps  [id number]  Encounter Date: 4/19/2024  YOB: 1955    Chief Complaint   Patient presents with    Knee Pain     Right Knee pain again       History:Mr. Reno Epps is here in follow up regarding his right knee pain.  He completed his spine surgery rehab after T-10 to pelvis lumbar decompression and fusion 2/28/2024 and feels ready to tackle right total knee arthroplasty.  He had a long history of severe arthritic changes on his x-rays and is trialed multiple conservative treatments including aspiration and injection.  Knee effusion and pain limit his daily functions despite all conservative efforts.  No recent falls or direct trauma to the knee.  His current symptoms are as described below.    Pain Assessment  Location of Pain: Knee  Location Modifiers: Right  Severity of Pain: 9  Quality of Pain: Sharp, Dull    Exam:  Ht 1.854 m (6' 1\")   Wt 114.3 kg (252 lb)   BMI 33.25 kg/m²   General: Alert and oriented x3, No acute distress, Cooperative and conversant.  Obesity Present.  Poorly groomed and Cimex lectularius noted  Mood and affect are appropriate.  Right knee:    Inspection:  Knee shows mild valgus alignment  Normal muscle bulk and tone for his age and activity level.  No erythema.  + significant effusion.     Palpation: No warmth.  Tenderness to palpation along the joint line with palpable crepitation on range of motion.     Range of Motion: 5 to 100 with moderate pain     Strength:  Quad 4+/ 5  ; Hamstrings 4+/ 5.  Gross motor to hip and ankle intact, no pain with logroll the hip.  Stinchfield examination negative.     Special Tests:   no posterior sag   Positive patellar ballottement  Pseudolaxity to varus stress.  negative Homans   Capillary refill is brisk sensation is intact.     Skin: There are no rashes, ulcerations or lesions.     Gait: Single-point cane with decreased right ankle weight shift favoring his right side.    X-rays:  from 1/26/2024  4 Right  Knee with

## 2024-04-19 ENCOUNTER — OFFICE VISIT (OUTPATIENT)
Dept: ORTHOPEDIC SURGERY | Age: 69
End: 2024-04-19

## 2024-04-19 VITALS — WEIGHT: 252 LBS | BODY MASS INDEX: 33.4 KG/M2 | HEIGHT: 73 IN

## 2024-04-19 DIAGNOSIS — M17.11 PRIMARY OSTEOARTHRITIS OF RIGHT KNEE: Primary | ICD-10-CM

## 2024-04-19 DIAGNOSIS — B88.8 INFESTATION BY BED BUG: ICD-10-CM

## 2024-04-22 ENCOUNTER — PREP FOR PROCEDURE (OUTPATIENT)
Age: 69
End: 2024-04-22

## 2024-05-10 ENCOUNTER — TELEPHONE (OUTPATIENT)
Age: 69
End: 2024-05-10

## 2024-05-10 NOTE — TELEPHONE ENCOUNTER
I called but could not leave message due to mail box is full.  I was trying to call pt for up coming surgery.  We have going to need to postpone his surgery.  Due to have bed bugs.  aLng spoke with Festus JOYNER at [facility] and he stated that they will not do surgery when pt has bed bugs.  Once they are treated and has a letter/paperwork stating that he is bed bug free then we could reschedule pt for surgery.

## 2024-05-10 NOTE — TELEPHONE ENCOUNTER
I spoke with the patient and he is aware of the postpone surgery due to bed bugs.  He stated that he does not have bed bugs.  I told him per the office note the last two time he was there they were seen.  He was very upset about it.  He stated that the apartment does the inspection yearly and that was done in Jan 2024.  I told him that I understand that and he would need to get another inspection and a letter send to us stating that he does not have them.  I did give the fax number.  He stated that he could just go and get another doctor to do the surgery.  I told him that was up to him if he wanted to do that.  We also talked about the labs that were due 2 weeks before surgery and the H&P done by PCP and Cardio.  He stated that he was never told that he needed a cardio clearance.  I told him per the orders he needed that done and labs.  He stated that he is see his PCP on 5/20/2024  but does not have cardio apt.  I told him that he needs to work on the letter stated that there are not bed bugs. And then we can go from there.  Pt did stated that he feels that someone from the office the day that they were see should have said something.  I told him that Im sorry that did not happen.  He is going to work on the letter for us.

## 2024-05-23 ENCOUNTER — TELEPHONE (OUTPATIENT)
Dept: CARDIOLOGY CLINIC | Age: 69
End: 2024-05-23

## 2024-05-23 NOTE — TELEPHONE ENCOUNTER
CARDIAC CLEARANCE     What type of procedure are you having?  Knee replacement / General anesthesia  Which physician is performing your procedure?  Dr Hao Hoffman  When is your procedure scheduled for?  6/12  Where are you having this procedure?  [facility]  Are you taking Blood Thinners? yes   If so what? aspirin (Name/dose/frequesncy)     Does the surgeon want you to stop your blood thinner? yes If so for how long? 7 days prior    Phone Number and Contact Name for Physicians office:  [phone]  Fax number to send information:  [phone]

## 2024-05-31 NOTE — TELEPHONE ENCOUNTER
[facility] Pre sx is requesting 5/23/24 clearance letter. Please fax to [phone]     Any questions call Macarena at  [facility] [phone]

## 2024-05-31 NOTE — TELEPHONE ENCOUNTER
Pt is having surgery with different surgeon, I don't see letter in Pt chart to re-fax to new physician preforming surgery. Please advise and I can fax to new surgeon.

## 2024-07-17 ENCOUNTER — TELEPHONE (OUTPATIENT)
Dept: ORTHOPEDIC SURGERY | Age: 69
End: 2024-07-17

## 2024-07-22 ENCOUNTER — TELEPHONE (OUTPATIENT)
Dept: ORTHOPEDIC SURGERY | Age: 69
End: 2024-07-22

## 2024-08-06 ENCOUNTER — PREP FOR PROCEDURE (OUTPATIENT)
Dept: ORTHOPEDIC SURGERY | Age: 69
End: 2024-08-06

## 2024-08-06 ENCOUNTER — OFFICE VISIT (OUTPATIENT)
Dept: ORTHOPEDIC SURGERY | Age: 69
End: 2024-08-06
Payer: MEDICARE

## 2024-08-06 VITALS — BODY MASS INDEX: 33.4 KG/M2 | WEIGHT: 252 LBS | HEIGHT: 73 IN

## 2024-08-06 DIAGNOSIS — M12.811 ROTATOR CUFF TEAR ARTHROPATHY, RIGHT: ICD-10-CM

## 2024-08-06 DIAGNOSIS — M25.511 RIGHT SHOULDER PAIN, UNSPECIFIED CHRONICITY: Primary | ICD-10-CM

## 2024-08-06 DIAGNOSIS — M75.101 ROTATOR CUFF TEAR ARTHROPATHY, RIGHT: ICD-10-CM

## 2024-08-06 DIAGNOSIS — M19.011 ARTHRITIS OF RIGHT SHOULDER REGION: ICD-10-CM

## 2024-08-06 PROCEDURE — 99214 OFFICE O/P EST MOD 30 MIN: CPT | Performed by: ORTHOPAEDIC SURGERY

## 2024-08-06 PROCEDURE — 3017F COLORECTAL CA SCREEN DOC REV: CPT | Performed by: ORTHOPAEDIC SURGERY

## 2024-08-06 PROCEDURE — 1123F ACP DISCUSS/DSCN MKR DOCD: CPT | Performed by: ORTHOPAEDIC SURGERY

## 2024-08-06 PROCEDURE — G8417 CALC BMI ABV UP PARAM F/U: HCPCS | Performed by: ORTHOPAEDIC SURGERY

## 2024-08-06 PROCEDURE — 4004F PT TOBACCO SCREEN RCVD TLK: CPT | Performed by: ORTHOPAEDIC SURGERY

## 2024-08-06 PROCEDURE — G8428 CUR MEDS NOT DOCUMENT: HCPCS | Performed by: ORTHOPAEDIC SURGERY

## 2024-08-06 RX ORDER — TRAMADOL HYDROCHLORIDE 50 MG/1
50 TABLET ORAL EVERY 6 HOURS PRN
Qty: 28 TABLET | Refills: 0 | Status: SHIPPED | OUTPATIENT
Start: 2024-08-06 | End: 2024-08-20

## 2024-08-06 NOTE — PROGRESS NOTES
History of Present Illness:  Reno Epps returns for follow-up of his right shoulder.  He continues to have severe pain and dysfunction and does not wish to continue with his right shoulder the way it is.  Of note he said he underwent low back surgery in March 2024 as well as a total knee on June 2024 with Dr. Ann.  He says he is recovering well from the surgeries but is still having debilitating right shoulder pain.       Medical History:  Patient's medications, allergies, past medical, surgical, social and family histories were reviewed and updated as appropriate.    Pertinent items are noted in HPI  Review of systems reviewed from Patient History Form dated on 11/1/23 and available in the patient's chart under the Media tab.     Vital Signs:  There were no vitals filed for this visit.  Constitutional: in no distress.      Right Shoulder Examination:    Inspection:  no deformity or atrophy.    Palpation:  crepitus. Tenderness anteriorly and over the posterior joint line    Active Range of Motion: 90 FE, 90 AB, IR to back pocket    Passive Range of Motion:  deferred.    Strength:  Deltoid 4/5. Negative ER lag    Special Tests:  deferred.    Radiology:     CT right shoulder demonstrates severe glenohumeral arthritis with some volume loss of SS/IS muscles. Moderate retroversion    MRI demonstrates no obvious FT RCT. Confirms advanced DJD right shoulder    X-rays of the right shoulder obtained today: 3 views right shoulder, severe glenohumeral osteoarthritis, no fractures or dislocations         Assessment :  Reno Epps has end stage cuff tear arthropathy. He is a good candidate for rTSA   Impression:  Encounter Diagnoses   Name Primary?    Right shoulder pain, unspecified chronicity Yes    Rotator cuff tear arthropathy, right        Office Procedures:  Orders Placed This Encounter   Procedures    XR SHOULDER RIGHT (MIN 2 VIEWS)     Standing Status:   Future     Number of Occurrences:   1     Standing

## 2024-08-07 ENCOUNTER — TELEPHONE (OUTPATIENT)
Dept: ORTHOPEDIC SURGERY | Age: 69
End: 2024-08-07

## 2024-08-07 NOTE — TELEPHONE ENCOUNTER
Surgery and/or Procedure Scheduling     Contact Name: Reno Epps   Surgical/Procedure Request:   Patient Contact Number: [phone]     THE PT STATES THAT HE HAS TO RESCHEDULE HIS SX AND WANTS TO SPEAK WITH [facility]

## 2024-08-13 ENCOUNTER — TELEPHONE (OUTPATIENT)
Dept: ORTHOPEDIC SURGERY | Age: 69
End: 2024-08-13

## 2024-08-13 NOTE — TELEPHONE ENCOUNTER
Surgery and/or Procedure Scheduling     Contact Name: Reno Epps   Surgical/Procedure Request: SHOULDER  Patient Contact Number:     The pt can't get his pre-op physical from his primary until Aug 21, so the sx has to be scheduled for after that date.    The patient would like Ivana to call him back please.

## 2024-08-14 ENCOUNTER — TELEPHONE (OUTPATIENT)
Dept: ORTHOPEDIC SURGERY | Age: 69
End: 2024-08-14

## 2024-08-14 NOTE — TELEPHONE ENCOUNTER
Surgery and/or Procedure Scheduling     Contact Name: CHELSIE SEGURA  Surgical/Procedure Request: RT SHOULDER SX  Patient Contact Number: [phone]    PATIENT CALLED TO SPEAK WITH [facility]-TO SCHEDULE RT SHOULDER SX.     PLEASE ADVISE

## 2024-08-16 NOTE — TELEPHONE ENCOUNTER
OTHER    PATIENT CALLED TO SPEAK WITH CLINICAL-[facility] TO SCHEDULE SURGERY. LOOKING TO SCHEDULE FOR SEPTEMBER 5TH.    PLEASE ADVISE

## 2024-08-20 ENCOUNTER — PREP FOR PROCEDURE (OUTPATIENT)
Dept: ORTHOPEDIC SURGERY | Age: 69
End: 2024-08-20

## 2024-08-20 DIAGNOSIS — M25.511 RIGHT SHOULDER PAIN, UNSPECIFIED CHRONICITY: Primary | ICD-10-CM

## 2024-08-23 ENCOUNTER — TELEPHONE (OUTPATIENT)
Dept: CARDIOLOGY CLINIC | Age: 69
End: 2024-08-23

## 2024-08-23 NOTE — TELEPHONE ENCOUNTER
Letter written and signed by [facility]. Faxed to [facility].

## 2024-08-23 NOTE — TELEPHONE ENCOUNTER
CARDIAC CLEARANCE     What type of procedure are you having?  Shoulder surgery  Which physician is performing your procedure?  Dr. Claudia Joshi  When is your procedure scheduled for?  9/9/24  Where are you having this procedure?  [facility]  Are you taking Blood Thinners?   If so what? (Name/dose/frequesncy)  No   Does the surgeon want you to stop your blood thinner?  If so for how long?    Phone Number and Contact Name for Physicians office:  [phone]  Fax number to send information:  [phone]

## 2024-08-29 ENCOUNTER — TELEPHONE (OUTPATIENT)
Dept: ORTHOPEDIC SURGERY | Age: 69
End: 2024-08-29

## 2024-08-29 NOTE — TELEPHONE ENCOUNTER
Surgery and/or Procedure Scheduling     Contact Name: Reno Epps   Surgical/Procedure Request: REQUESTING A CALL REGARDING PRE OP PACKAGE  Patient Contact Number: [phone]

## 2024-08-30 ENCOUNTER — TELEPHONE (OUTPATIENT)
Dept: ORTHOPEDIC SURGERY | Age: 69
End: 2024-08-30

## 2024-08-30 RX ORDER — ALBUTEROL SULFATE 90 UG/1
2 AEROSOL, METERED RESPIRATORY (INHALATION) EVERY 6 HOURS PRN
COMMUNITY
Start: 2024-06-02

## 2024-08-30 RX ORDER — SPIRONOLACTONE 25 MG/1
25 TABLET ORAL DAILY
COMMUNITY
Start: 2024-08-21

## 2024-08-30 RX ORDER — SODIUM CHLORIDE 20 MG/ML
1 SOLUTION OPHTHALMIC NIGHTLY
COMMUNITY

## 2024-08-30 RX ORDER — BRINZOLAMIDE/BRIMONIDINE TARTRATE 10; 2 MG/ML; MG/ML
1 SUSPENSION/ DROPS OPHTHALMIC NIGHTLY
COMMUNITY
Start: 2024-08-09

## 2024-08-30 RX ORDER — CELECOXIB 200 MG/1
200 CAPSULE ORAL DAILY
COMMUNITY
Start: 2024-08-22

## 2024-08-30 RX ORDER — BACLOFEN 10 MG/1
1 TABLET ORAL NIGHTLY
COMMUNITY
Start: 2024-01-25

## 2024-08-30 NOTE — TELEPHONE ENCOUNTER
Prescription Refill     Medication Name: TRAMADOL 50 MG    Pharmacy: [facility] [place]   Patient Contact Number:  [phone]       PATIENT STATED OUT OF RX

## 2024-09-03 ENCOUNTER — HOSPITAL ENCOUNTER (OUTPATIENT)
Age: 69
Discharge: HOME | End: 2024-09-03
Payer: MEDICARE

## 2024-09-03 ENCOUNTER — HOSPITAL ENCOUNTER (OUTPATIENT)
Age: 69
End: 2024-09-03
Payer: MEDICARE

## 2024-09-03 DIAGNOSIS — M25.511 RIGHT SHOULDER PAIN, UNSPECIFIED CHRONICITY: Primary | ICD-10-CM

## 2024-09-03 LAB
ALBUMIN SERPL-MCNC: 4.1 G/DL (ref 3.4–5)
ALBUMIN/GLOB SERPL: 1.6 {RATIO} (ref 1.1–2.2)
ALP SERPL-CCNC: 179 U/L (ref 40–129)
ALT SERPL-CCNC: 49 U/L (ref 10–40)
ANION GAP SERPL CALCULATED.3IONS-SCNC: 8 MMOL/L (ref 3–16)
APTT BLD: 23.1 SEC (ref 22.1–36.4)
AST SERPL-CCNC: 53 U/L (ref 15–37)
BASOPHILS # BLD: 0 K/UL (ref 0–0.2)
BASOPHILS NFR BLD: 0.8 %
BILIRUB SERPL-MCNC: 0.5 MG/DL (ref 0–1)
BUN SERPL-MCNC: 12 MG/DL (ref 7–20)
CALCIUM SERPL-MCNC: 9.4 MG/DL (ref 8.3–10.6)
CHLORIDE SERPL-SCNC: 99 MMOL/L (ref 99–110)
CO2 SERPL-SCNC: 25 MMOL/L (ref 21–32)
CREAT SERPL-MCNC: 0.9 MG/DL (ref 0.8–1.3)
DEPRECATED RDW RBC AUTO: 15.5 % (ref 12.4–15.4)
EOSINOPHIL # BLD: 0.1 K/UL (ref 0–0.6)
EOSINOPHIL NFR BLD: 4.4 %
EST. AVERAGE GLUCOSE BLD GHB EST-MCNC: 96.8 MG/DL
GFR SERPLBLD CREATININE-BSD FMLA CKD-EPI: >90 ML/MIN/{1.73_M2}
GLUCOSE SERPL-MCNC: 77 MG/DL (ref 70–99)
HBA1C MFR BLD: 5 %
HCT VFR BLD AUTO: 40.2 % (ref 40.5–52.5)
HGB BLD-MCNC: 13.4 G/DL (ref 13.5–17.5)
INR PPP: 0.94 (ref 0.85–1.15)
LYMPHOCYTES # BLD: 1.1 K/UL (ref 1–5.1)
LYMPHOCYTES NFR BLD: 39.8 %
MCH RBC QN AUTO: 30.7 PG (ref 26–34)
MCHC RBC AUTO-ENTMCNC: 33.4 G/DL (ref 31–36)
MCV RBC AUTO: 92 FL (ref 80–100)
MONOCYTES # BLD: 0.3 K/UL (ref 0–1.3)
MONOCYTES NFR BLD: 9.4 %
NEUTROPHILS # BLD: 1.3 K/UL (ref 1.7–7.7)
NEUTROPHILS NFR BLD: 45.6 %
PLATELET # BLD AUTO: 258 K/UL (ref 135–450)
PMV BLD AUTO: 8 FL (ref 5–10.5)
POTASSIUM SERPL-SCNC: 4.9 MMOL/L (ref 3.5–5.1)
PROT SERPL-MCNC: 6.6 G/DL (ref 6.4–8.2)
PROTHROMBIN TIME: 12.8 SEC (ref 11.9–14.9)
RBC # BLD AUTO: 4.37 M/UL (ref 4.2–5.9)
SODIUM SERPL-SCNC: 132 MMOL/L (ref 136–145)
WBC # BLD AUTO: 2.8 K/UL (ref 4–11)

## 2024-09-03 PROCEDURE — 85730 THROMBOPLASTIN TIME PARTIAL: CPT

## 2024-09-03 PROCEDURE — 85025 COMPLETE CBC W/AUTO DIFF WBC: CPT

## 2024-09-03 PROCEDURE — 85610 PROTHROMBIN TIME: CPT

## 2024-09-03 PROCEDURE — 83036 HEMOGLOBIN GLYCOSYLATED A1C: CPT

## 2024-09-03 PROCEDURE — 36415 COLL VENOUS BLD VENIPUNCTURE: CPT

## 2024-09-03 PROCEDURE — 80053 COMPREHEN METABOLIC PANEL: CPT

## 2024-09-03 RX ORDER — TRAMADOL HYDROCHLORIDE 50 MG/1
50 TABLET ORAL EVERY 6 HOURS PRN
Qty: 20 TABLET | Refills: 0 | Status: SHIPPED | OUTPATIENT
Start: 2024-09-03 | End: 2024-09-10

## 2024-09-04 ENCOUNTER — TELEPHONE (OUTPATIENT)
Dept: ORTHOPEDIC SURGERY | Age: 69
End: 2024-09-04

## 2024-09-04 NOTE — TELEPHONE ENCOUNTER
Surgery and/or Procedure Scheduling     Contact Name: Mich Reno   Surgical/Procedure Request: 9-9-2024 SX   Patient Contact Number: [phone]       PT CALLING IN REGARDING NEEDING THE TIME OF HIS SX THAT'S ON 9-9-2024 WITH MADI.     PLEASE CALL BACK PT AT THE ABOVE NUMBER

## 2024-09-05 ENCOUNTER — TELEPHONE (OUTPATIENT)
Dept: ORTHOPEDIC SURGERY | Age: 69
End: 2024-09-05

## 2024-09-05 NOTE — TELEPHONE ENCOUNTER
Orthopedic Nurse Navigator Summary    Patient Name:  Reno Epps  Anticipated Date of Surgery:  09/09/24  Attended Pre-op Education Class:  Video sent to patient email 08/08/24  PCP: Tasneem Mart MD  Date of PCP visit for H&P: 08/21/24  Is patient in a Pain Management program:  Review of Medical history reveals history of: HTN, Hyperlipidemia, Diabetes, Atrial flutter, Atrial fib, Coronary atherosclerosis, Stents X3, Atrial ablation, Cervical radiculopathy, Cervical fusion- C2-C3- screw broke and had new surgery fusion C2-T2    Critical Lab Values  - Hemoglobin (g/dL):  Date: 09/03/24 Value 13.4  - Hematocrit(%): Date: 09/03/24  Value 40.2  - HgbA1C:  Date: 09/03/24 Value 5.0  - Albumin:  Date: 09/03/24  Value 4.1  - BUN:  Date: 09/03/24   Value 12  - Creatinine:  Date: 09/03/24  Value 0.9    Coronary Artery Disease/HTN/CHF history: Yes  Does the patient see a Cardiologist: Brian Preciado MD  Date of most recent cardiac appt: 08/23/24  On any anticoagulation:  Aspirin 81 mg QD    Diabetes History:  Yes  Most recent HgbA1C: 5.0  Pulmonary:  COPD/Emphysema/Use of home oxygen: No  Alcohol use: Yes    BMI greater than 40 at time of scheduling:  No  Additional medical concerns:  Additional recommendations for above concerns:  Attended Pre-Hab program:    Anticipated Discharge Disposition:  Home with OPT  Who will be with patient at home following discharge:    Equipment patient already has:  sling  Bedroom on first or second floor:  first  Bathroom on first or second floor:  first  Weight bearing status:  praveen GOMEZ  Pre-op ambulatory status: no issues  Number of entry steps:    Caregiver assistance:  part time    Rachel Johnson RN  Date:  09/04/24

## 2024-09-09 ENCOUNTER — HOSPITAL ENCOUNTER (OUTPATIENT)
Age: 69
Setting detail: OBSERVATION
Discharge: HOME | End: 2024-09-10
Attending: ORTHOPAEDIC SURGERY | Admitting: ORTHOPAEDIC SURGERY
Payer: MEDICARE

## 2024-09-09 ENCOUNTER — ANESTHESIA (OUTPATIENT)
Dept: OPERATING ROOM | Age: 69
End: 2024-09-09
Payer: MEDICARE

## 2024-09-09 ENCOUNTER — ANESTHESIA EVENT (OUTPATIENT)
Dept: OPERATING ROOM | Age: 69
End: 2024-09-09
Payer: MEDICARE

## 2024-09-09 ENCOUNTER — APPOINTMENT (OUTPATIENT)
Dept: GENERAL RADIOLOGY | Age: 69
End: 2024-09-09
Attending: ORTHOPAEDIC SURGERY
Payer: MEDICARE

## 2024-09-09 DIAGNOSIS — M19.011 ARTHRITIS OF RIGHT SHOULDER REGION: Primary | ICD-10-CM

## 2024-09-09 LAB
GLUCOSE BLD-MCNC: 102 MG/DL (ref 70–99)
GLUCOSE BLD-MCNC: 104 MG/DL (ref 70–99)
PERFORMED ON: ABNORMAL
PERFORMED ON: ABNORMAL

## 2024-09-09 PROCEDURE — 2580000003 HC RX 258: Performed by: NURSE ANESTHETIST, CERTIFIED REGISTERED

## 2024-09-09 PROCEDURE — G0378 HOSPITAL OBSERVATION PER HR: HCPCS

## 2024-09-09 PROCEDURE — 2580000003 HC RX 258: Performed by: STUDENT IN AN ORGANIZED HEALTH CARE EDUCATION/TRAINING PROGRAM

## 2024-09-09 PROCEDURE — C1776 JOINT DEVICE (IMPLANTABLE): HCPCS | Performed by: ORTHOPAEDIC SURGERY

## 2024-09-09 PROCEDURE — 2720000010 HC SURG SUPPLY STERILE: Performed by: ORTHOPAEDIC SURGERY

## 2024-09-09 PROCEDURE — 7100000001 HC PACU RECOVERY - ADDTL 15 MIN: Performed by: ORTHOPAEDIC SURGERY

## 2024-09-09 PROCEDURE — 2709999900 HC NON-CHARGEABLE SUPPLY: Performed by: ORTHOPAEDIC SURGERY

## 2024-09-09 PROCEDURE — 2500000003 HC RX 250 WO HCPCS: Performed by: NURSE ANESTHETIST, CERTIFIED REGISTERED

## 2024-09-09 PROCEDURE — A4217 STERILE WATER/SALINE, 500 ML: HCPCS | Performed by: ORTHOPAEDIC SURGERY

## 2024-09-09 PROCEDURE — 6360000002 HC RX W HCPCS: Performed by: PHYSICIAN ASSISTANT

## 2024-09-09 PROCEDURE — 6360000002 HC RX W HCPCS: Performed by: STUDENT IN AN ORGANIZED HEALTH CARE EDUCATION/TRAINING PROGRAM

## 2024-09-09 PROCEDURE — 2580000003 HC RX 258: Performed by: ANESTHESIOLOGY

## 2024-09-09 PROCEDURE — 6370000000 HC RX 637 (ALT 250 FOR IP): Performed by: PHYSICIAN ASSISTANT

## 2024-09-09 PROCEDURE — 6360000002 HC RX W HCPCS: Performed by: ORTHOPAEDIC SURGERY

## 2024-09-09 PROCEDURE — 2580000003 HC RX 258: Performed by: ORTHOPAEDIC SURGERY

## 2024-09-09 PROCEDURE — 6370000000 HC RX 637 (ALT 250 FOR IP): Performed by: STUDENT IN AN ORGANIZED HEALTH CARE EDUCATION/TRAINING PROGRAM

## 2024-09-09 PROCEDURE — 73020 X-RAY EXAM OF SHOULDER: CPT

## 2024-09-09 PROCEDURE — 3700000000 HC ANESTHESIA ATTENDED CARE: Performed by: ORTHOPAEDIC SURGERY

## 2024-09-09 PROCEDURE — 6360000002 HC RX W HCPCS: Performed by: ANESTHESIOLOGY

## 2024-09-09 PROCEDURE — 2580000003 HC RX 258: Performed by: PHYSICIAN ASSISTANT

## 2024-09-09 PROCEDURE — 6360000002 HC RX W HCPCS: Performed by: NURSE ANESTHETIST, CERTIFIED REGISTERED

## 2024-09-09 PROCEDURE — 3600000014 HC SURGERY LEVEL 4 ADDTL 15MIN: Performed by: ORTHOPAEDIC SURGERY

## 2024-09-09 PROCEDURE — 64415 NJX AA&/STRD BRCH PLXS IMG: CPT | Performed by: ANESTHESIOLOGY

## 2024-09-09 PROCEDURE — 3700000001 HC ADD 15 MINUTES (ANESTHESIA): Performed by: ORTHOPAEDIC SURGERY

## 2024-09-09 PROCEDURE — 3600000004 HC SURGERY LEVEL 4 BASE: Performed by: ORTHOPAEDIC SURGERY

## 2024-09-09 PROCEDURE — 7100000000 HC PACU RECOVERY - FIRST 15 MIN: Performed by: ORTHOPAEDIC SURGERY

## 2024-09-09 DEVICE — SCREW, LOCKING BONE, RSP, 5X22
Type: IMPLANTABLE DEVICE | Site: SHOULDER | Status: FUNCTIONAL
Brand: DJO SURGICAL

## 2024-09-09 DEVICE — IMPL CAPPED SHOULDER S3 TOTAL ADV REVERSE DJO: Type: IMPLANTABLE DEVICE | Site: SHOULDER | Status: FUNCTIONAL

## 2024-09-09 DEVICE — ALTIVATE REVERSE, HUMERAL STEM, STANDARD SHELL, SZ 18X48MM
Type: IMPLANTABLE DEVICE | Site: SHOULDER | Status: FUNCTIONAL
Brand: DJO SURGICAL

## 2024-09-09 DEVICE — RSP BASEPLATE, 30MM, W/P2 COATING
Type: IMPLANTABLE DEVICE | Site: SHOULDER | Status: FUNCTIONAL
Brand: DJO SURGICAL

## 2024-09-09 DEVICE — RSP STANDARD HUMERAL SOCKET INSERT, 40MM, HXE-PLUS
Type: IMPLANTABLE DEVICE | Site: SHOULDER | Status: FUNCTIONAL
Brand: DJO SURGICAL

## 2024-09-09 DEVICE — SCREW, LOCKING BONE, RSP, 5X26
Type: IMPLANTABLE DEVICE | Site: SHOULDER | Status: FUNCTIONAL
Brand: DJO SURGICAL

## 2024-09-09 RX ORDER — ALBUTEROL SULFATE 90 UG/1
2 AEROSOL, METERED RESPIRATORY (INHALATION) EVERY 6 HOURS PRN
Status: DISCONTINUED | OUTPATIENT
Start: 2024-09-09 | End: 2024-09-09

## 2024-09-09 RX ORDER — METOPROLOL SUCCINATE 50 MG/1
50 TABLET, EXTENDED RELEASE ORAL NIGHTLY
Status: DISCONTINUED | OUTPATIENT
Start: 2024-09-09 | End: 2024-09-10 | Stop reason: HOSPADM

## 2024-09-09 RX ORDER — SODIUM CHLORIDE 0.9 % (FLUSH) 0.9 %
5-40 SYRINGE (ML) INJECTION PRN
Status: DISCONTINUED | OUTPATIENT
Start: 2024-09-09 | End: 2024-09-09 | Stop reason: HOSPADM

## 2024-09-09 RX ORDER — SODIUM CHLORIDE 9 MG/ML
INJECTION, SOLUTION INTRAVENOUS
Status: DISPENSED
Start: 2024-09-09 | End: 2024-09-09

## 2024-09-09 RX ORDER — FENTANYL CITRATE 50 UG/ML
INJECTION, SOLUTION INTRAMUSCULAR; INTRAVENOUS PRN
Status: DISCONTINUED | OUTPATIENT
Start: 2024-09-09 | End: 2024-09-09 | Stop reason: SDUPTHER

## 2024-09-09 RX ORDER — BUPIVACAINE HYDROCHLORIDE 5 MG/ML
INJECTION, SOLUTION EPIDURAL; INTRACAUDAL PRN
Status: DISCONTINUED | OUTPATIENT
Start: 2024-09-09 | End: 2024-09-09 | Stop reason: SDUPTHER

## 2024-09-09 RX ORDER — SODIUM CHLORIDE 0.9 % (FLUSH) 0.9 %
5-40 SYRINGE (ML) INJECTION PRN
Status: DISCONTINUED | OUTPATIENT
Start: 2024-09-09 | End: 2024-09-10 | Stop reason: HOSPADM

## 2024-09-09 RX ORDER — BISACODYL 5 MG/1
5 TABLET, DELAYED RELEASE ORAL DAILY PRN
Status: DISCONTINUED | OUTPATIENT
Start: 2024-09-09 | End: 2024-09-10 | Stop reason: HOSPADM

## 2024-09-09 RX ORDER — NALOXONE HYDROCHLORIDE 0.4 MG/ML
INJECTION, SOLUTION INTRAMUSCULAR; INTRAVENOUS; SUBCUTANEOUS PRN
Status: DISCONTINUED | OUTPATIENT
Start: 2024-09-09 | End: 2024-09-09 | Stop reason: HOSPADM

## 2024-09-09 RX ORDER — MIDAZOLAM HYDROCHLORIDE 1 MG/ML
INJECTION INTRAMUSCULAR; INTRAVENOUS
Status: COMPLETED
Start: 2024-09-09 | End: 2024-09-09

## 2024-09-09 RX ORDER — FENTANYL CITRATE 50 UG/ML
25 INJECTION, SOLUTION INTRAMUSCULAR; INTRAVENOUS EVERY 5 MIN PRN
Status: DISCONTINUED | OUTPATIENT
Start: 2024-09-09 | End: 2024-09-09 | Stop reason: HOSPADM

## 2024-09-09 RX ORDER — ALBUTEROL SULFATE 0.83 MG/ML
2.5 SOLUTION RESPIRATORY (INHALATION) EVERY 6 HOURS PRN
Status: DISCONTINUED | OUTPATIENT
Start: 2024-09-09 | End: 2024-09-10 | Stop reason: HOSPADM

## 2024-09-09 RX ORDER — OXYCODONE HYDROCHLORIDE 5 MG/1
5 TABLET ORAL EVERY 4 HOURS PRN
Status: DISCONTINUED | OUTPATIENT
Start: 2024-09-09 | End: 2024-09-10 | Stop reason: HOSPADM

## 2024-09-09 RX ORDER — LISINOPRIL 40 MG/1
40 TABLET ORAL DAILY
Status: DISCONTINUED | OUTPATIENT
Start: 2024-09-09 | End: 2024-09-10 | Stop reason: HOSPADM

## 2024-09-09 RX ORDER — TAMSULOSIN HYDROCHLORIDE 0.4 MG/1
0.4 CAPSULE ORAL NIGHTLY
Status: DISCONTINUED | OUTPATIENT
Start: 2024-09-09 | End: 2024-09-10 | Stop reason: HOSPADM

## 2024-09-09 RX ORDER — SODIUM CHLORIDE 9 MG/ML
INJECTION, SOLUTION INTRAVENOUS PRN
Status: DISCONTINUED | OUTPATIENT
Start: 2024-09-09 | End: 2024-09-09 | Stop reason: HOSPADM

## 2024-09-09 RX ORDER — ACETAMINOPHEN 500 MG
1000 TABLET ORAL ONCE
Status: COMPLETED | OUTPATIENT
Start: 2024-09-09 | End: 2024-09-09

## 2024-09-09 RX ORDER — ACETAMINOPHEN 325 MG/1
650 TABLET ORAL EVERY 6 HOURS
Status: DISCONTINUED | OUTPATIENT
Start: 2024-09-09 | End: 2024-09-10 | Stop reason: HOSPADM

## 2024-09-09 RX ORDER — SODIUM CHLORIDE 0.9 % (FLUSH) 0.9 %
5-40 SYRINGE (ML) INJECTION EVERY 12 HOURS SCHEDULED
Status: DISCONTINUED | OUTPATIENT
Start: 2024-09-09 | End: 2024-09-10 | Stop reason: HOSPADM

## 2024-09-09 RX ORDER — PROPOFOL 10 MG/ML
INJECTION, EMULSION INTRAVENOUS PRN
Status: DISCONTINUED | OUTPATIENT
Start: 2024-09-09 | End: 2024-09-09 | Stop reason: SDUPTHER

## 2024-09-09 RX ORDER — HYDRALAZINE HYDROCHLORIDE 20 MG/ML
10 INJECTION INTRAMUSCULAR; INTRAVENOUS
Status: DISCONTINUED | OUTPATIENT
Start: 2024-09-09 | End: 2024-09-09 | Stop reason: HOSPADM

## 2024-09-09 RX ORDER — OXYCODONE HYDROCHLORIDE 5 MG/1
10 TABLET ORAL EVERY 4 HOURS PRN
Status: DISCONTINUED | OUTPATIENT
Start: 2024-09-09 | End: 2024-09-10 | Stop reason: HOSPADM

## 2024-09-09 RX ORDER — SODIUM CHLORIDE, SODIUM LACTATE, POTASSIUM CHLORIDE, CALCIUM CHLORIDE 600; 310; 30; 20 MG/100ML; MG/100ML; MG/100ML; MG/100ML
INJECTION, SOLUTION INTRAVENOUS CONTINUOUS
Status: DISCONTINUED | OUTPATIENT
Start: 2024-09-09 | End: 2024-09-09 | Stop reason: HOSPADM

## 2024-09-09 RX ORDER — HYDROMORPHONE HYDROCHLORIDE 1 MG/ML
0.5 INJECTION, SOLUTION INTRAMUSCULAR; INTRAVENOUS; SUBCUTANEOUS EVERY 5 MIN PRN
Status: DISCONTINUED | OUTPATIENT
Start: 2024-09-09 | End: 2024-09-09 | Stop reason: HOSPADM

## 2024-09-09 RX ORDER — ONDANSETRON 2 MG/ML
INJECTION INTRAMUSCULAR; INTRAVENOUS PRN
Status: DISCONTINUED | OUTPATIENT
Start: 2024-09-09 | End: 2024-09-09 | Stop reason: SDUPTHER

## 2024-09-09 RX ORDER — ONDANSETRON 4 MG/1
4 TABLET, ORALLY DISINTEGRATING ORAL EVERY 8 HOURS PRN
Status: DISCONTINUED | OUTPATIENT
Start: 2024-09-09 | End: 2024-09-10 | Stop reason: HOSPADM

## 2024-09-09 RX ORDER — HYDROCODONE BITARTRATE AND ACETAMINOPHEN 5; 325 MG/1; MG/1
1 TABLET ORAL EVERY 6 HOURS PRN
Qty: 20 TABLET | Refills: 0 | Status: SHIPPED | OUTPATIENT
Start: 2024-09-09 | End: 2024-09-16

## 2024-09-09 RX ORDER — AMITRIPTYLINE HYDROCHLORIDE 10 MG/1
10 TABLET ORAL NIGHTLY PRN
Status: DISCONTINUED | OUTPATIENT
Start: 2024-09-09 | End: 2024-09-10 | Stop reason: HOSPADM

## 2024-09-09 RX ORDER — CELECOXIB 200 MG/1
400 CAPSULE ORAL ONCE
Status: COMPLETED | OUTPATIENT
Start: 2024-09-09 | End: 2024-09-09

## 2024-09-09 RX ORDER — SODIUM CHLORIDE, SODIUM LACTATE, POTASSIUM CHLORIDE, CALCIUM CHLORIDE 600; 310; 30; 20 MG/100ML; MG/100ML; MG/100ML; MG/100ML
INJECTION, SOLUTION INTRAVENOUS CONTINUOUS
Status: DISCONTINUED | OUTPATIENT
Start: 2024-09-09 | End: 2024-09-09

## 2024-09-09 RX ORDER — BUPIVACAINE HYDROCHLORIDE 5 MG/ML
INJECTION, SOLUTION EPIDURAL; INTRACAUDAL
Status: COMPLETED
Start: 2024-09-09 | End: 2024-09-09

## 2024-09-09 RX ORDER — LIDOCAINE HYDROCHLORIDE 10 MG/ML
1 INJECTION, SOLUTION EPIDURAL; INFILTRATION; INTRACAUDAL; PERINEURAL
Status: DISCONTINUED | OUTPATIENT
Start: 2024-09-09 | End: 2024-09-09 | Stop reason: HOSPADM

## 2024-09-09 RX ORDER — SODIUM CHLORIDE 9 MG/ML
INJECTION, SOLUTION INTRAVENOUS PRN
Status: DISCONTINUED | OUTPATIENT
Start: 2024-09-09 | End: 2024-09-10 | Stop reason: HOSPADM

## 2024-09-09 RX ORDER — EPHEDRINE SULFATE 50 MG/ML
INJECTION INTRAVENOUS PRN
Status: DISCONTINUED | OUTPATIENT
Start: 2024-09-09 | End: 2024-09-09 | Stop reason: SDUPTHER

## 2024-09-09 RX ORDER — PREGABALIN 150 MG/1
150 CAPSULE ORAL ONCE
Status: COMPLETED | OUTPATIENT
Start: 2024-09-09 | End: 2024-09-09

## 2024-09-09 RX ORDER — SODIUM CHLORIDE 0.9 % (FLUSH) 0.9 %
5-40 SYRINGE (ML) INJECTION EVERY 12 HOURS SCHEDULED
Status: DISCONTINUED | OUTPATIENT
Start: 2024-09-09 | End: 2024-09-09 | Stop reason: HOSPADM

## 2024-09-09 RX ORDER — ROCURONIUM BROMIDE 10 MG/ML
INJECTION, SOLUTION INTRAVENOUS PRN
Status: DISCONTINUED | OUTPATIENT
Start: 2024-09-09 | End: 2024-09-09 | Stop reason: SDUPTHER

## 2024-09-09 RX ORDER — DOXYCYCLINE HYCLATE 100 MG
100 TABLET ORAL 2 TIMES DAILY
Qty: 10 TABLET | Refills: 0 | Status: SHIPPED | OUTPATIENT
Start: 2024-09-09 | End: 2024-09-14

## 2024-09-09 RX ORDER — VANCOMYCIN HYDROCHLORIDE 1 G/20ML
INJECTION, POWDER, LYOPHILIZED, FOR SOLUTION INTRAVENOUS PRN
Status: DISCONTINUED | OUTPATIENT
Start: 2024-09-09 | End: 2024-09-09 | Stop reason: HOSPADM

## 2024-09-09 RX ORDER — ONDANSETRON 2 MG/ML
4 INJECTION INTRAMUSCULAR; INTRAVENOUS
Status: DISCONTINUED | OUTPATIENT
Start: 2024-09-09 | End: 2024-09-09 | Stop reason: HOSPADM

## 2024-09-09 RX ORDER — SODIUM CHLORIDE, SODIUM LACTATE, POTASSIUM CHLORIDE, CALCIUM CHLORIDE 600; 310; 30; 20 MG/100ML; MG/100ML; MG/100ML; MG/100ML
INJECTION, SOLUTION INTRAVENOUS CONTINUOUS PRN
Status: DISCONTINUED | OUTPATIENT
Start: 2024-09-09 | End: 2024-09-09 | Stop reason: SDUPTHER

## 2024-09-09 RX ORDER — ASPIRIN 325 MG
325 TABLET ORAL 2 TIMES DAILY
Qty: 28 TABLET | Refills: 0 | Status: SHIPPED | OUTPATIENT
Start: 2024-09-09 | End: 2024-09-23

## 2024-09-09 RX ORDER — PROCHLORPERAZINE EDISYLATE 5 MG/ML
5 INJECTION INTRAMUSCULAR; INTRAVENOUS
Status: DISCONTINUED | OUTPATIENT
Start: 2024-09-09 | End: 2024-09-09 | Stop reason: HOSPADM

## 2024-09-09 RX ORDER — FENTANYL CITRATE 50 UG/ML
INJECTION, SOLUTION INTRAMUSCULAR; INTRAVENOUS
Status: COMPLETED
Start: 2024-09-09 | End: 2024-09-09

## 2024-09-09 RX ORDER — SPIRONOLACTONE 25 MG/1
25 TABLET ORAL DAILY
Status: DISCONTINUED | OUTPATIENT
Start: 2024-09-09 | End: 2024-09-10 | Stop reason: HOSPADM

## 2024-09-09 RX ORDER — VARENICLINE TARTRATE 0.5 MG/1
0.5 TABLET, FILM COATED ORAL NIGHTLY
Status: DISCONTINUED | OUTPATIENT
Start: 2024-09-09 | End: 2024-09-10 | Stop reason: HOSPADM

## 2024-09-09 RX ORDER — DOXYCYCLINE 50 MG/1
50 CAPSULE ORAL EVERY 12 HOURS SCHEDULED
Status: DISCONTINUED | OUTPATIENT
Start: 2024-09-10 | End: 2024-09-10 | Stop reason: HOSPADM

## 2024-09-09 RX ORDER — ONDANSETRON 2 MG/ML
4 INJECTION INTRAMUSCULAR; INTRAVENOUS EVERY 6 HOURS PRN
Status: DISCONTINUED | OUTPATIENT
Start: 2024-09-09 | End: 2024-09-10 | Stop reason: HOSPADM

## 2024-09-09 RX ORDER — ATORVASTATIN CALCIUM 40 MG/1
40 TABLET, FILM COATED ORAL DAILY
Status: DISCONTINUED | OUTPATIENT
Start: 2024-09-09 | End: 2024-09-10 | Stop reason: HOSPADM

## 2024-09-09 RX ORDER — GLYCOPYRROLATE 0.2 MG/ML
INJECTION INTRAMUSCULAR; INTRAVENOUS PRN
Status: DISCONTINUED | OUTPATIENT
Start: 2024-09-09 | End: 2024-09-09 | Stop reason: SDUPTHER

## 2024-09-09 RX ORDER — ONDANSETRON 4 MG/1
4 TABLET, FILM COATED ORAL 3 TIMES DAILY PRN
Qty: 15 TABLET | Refills: 0 | Status: SHIPPED | OUTPATIENT
Start: 2024-09-09

## 2024-09-09 RX ORDER — DEXAMETHASONE SODIUM PHOSPHATE 4 MG/ML
INJECTION, SOLUTION INTRA-ARTICULAR; INTRALESIONAL; INTRAMUSCULAR; INTRAVENOUS; SOFT TISSUE PRN
Status: DISCONTINUED | OUTPATIENT
Start: 2024-09-09 | End: 2024-09-09 | Stop reason: SDUPTHER

## 2024-09-09 RX ORDER — MIDAZOLAM HYDROCHLORIDE 1 MG/ML
INJECTION INTRAMUSCULAR; INTRAVENOUS PRN
Status: DISCONTINUED | OUTPATIENT
Start: 2024-09-09 | End: 2024-09-09 | Stop reason: SDUPTHER

## 2024-09-09 RX ORDER — SENNOSIDES 8.6 MG
1 TABLET ORAL DAILY
Qty: 30 TABLET | Refills: 0 | Status: SHIPPED | OUTPATIENT
Start: 2024-09-09

## 2024-09-09 RX ORDER — PHENYLEPHRINE HYDROCHLORIDE 10 MG/ML
INJECTION INTRAVENOUS PRN
Status: DISCONTINUED | OUTPATIENT
Start: 2024-09-09 | End: 2024-09-09 | Stop reason: SDUPTHER

## 2024-09-09 RX ORDER — ASPIRIN 325 MG
325 TABLET, DELAYED RELEASE (ENTERIC COATED) ORAL 2 TIMES DAILY
Status: DISCONTINUED | OUTPATIENT
Start: 2024-09-09 | End: 2024-09-10 | Stop reason: HOSPADM

## 2024-09-09 RX ADMIN — MIDAZOLAM HYDROCHLORIDE 2 MG: 2 INJECTION, SOLUTION INTRAMUSCULAR; INTRAVENOUS at 08:45

## 2024-09-09 RX ADMIN — SPIRONOLACTONE 25 MG: 25 TABLET ORAL at 17:20

## 2024-09-09 RX ADMIN — SODIUM CHLORIDE, PRESERVATIVE FREE 10 ML: 5 INJECTION INTRAVENOUS at 20:19

## 2024-09-09 RX ADMIN — ACETAMINOPHEN 650 MG: 325 TABLET ORAL at 20:18

## 2024-09-09 RX ADMIN — SODIUM CHLORIDE, SODIUM LACTATE, POTASSIUM CHLORIDE, AND CALCIUM CHLORIDE: .6; .31; .03; .02 INJECTION, SOLUTION INTRAVENOUS at 09:52

## 2024-09-09 RX ADMIN — ACETAMINOPHEN 1000 MG: 500 TABLET ORAL at 08:32

## 2024-09-09 RX ADMIN — ONDANSETRON 4 MG: 2 INJECTION INTRAMUSCULAR; INTRAVENOUS at 11:43

## 2024-09-09 RX ADMIN — PREGABALIN 150 MG: 150 CAPSULE ORAL at 08:33

## 2024-09-09 RX ADMIN — PHENYLEPHRINE HYDROCHLORIDE 100 MCG: 10 INJECTION, SOLUTION INTRAVENOUS at 10:13

## 2024-09-09 RX ADMIN — FENTANYL CITRATE 50 MCG: 50 INJECTION, SOLUTION INTRAMUSCULAR; INTRAVENOUS at 12:32

## 2024-09-09 RX ADMIN — EPHEDRINE SULFATE 5 MG: 50 INJECTION INTRAVENOUS at 10:26

## 2024-09-09 RX ADMIN — PHENYLEPHRINE HYDROCHLORIDE 100 MCG: 10 INJECTION, SOLUTION INTRAVENOUS at 10:20

## 2024-09-09 RX ADMIN — SODIUM CHLORIDE 1500 MG: 9 INJECTION, SOLUTION INTRAVENOUS at 09:52

## 2024-09-09 RX ADMIN — FENTANYL CITRATE 50 MCG: 50 INJECTION, SOLUTION INTRAMUSCULAR; INTRAVENOUS at 09:54

## 2024-09-09 RX ADMIN — ATORVASTATIN CALCIUM 40 MG: 40 TABLET, FILM COATED ORAL at 17:21

## 2024-09-09 RX ADMIN — BUPIVACAINE HYDROCHLORIDE 30 ML: 5 INJECTION, SOLUTION EPIDURAL; INTRACAUDAL; PERINEURAL at 08:45

## 2024-09-09 RX ADMIN — GLYCOPYRROLATE 0.2 MG: 0.2 INJECTION INTRAMUSCULAR; INTRAVENOUS at 10:13

## 2024-09-09 RX ADMIN — ROCURONIUM BROMIDE 30 MG: 10 INJECTION, SOLUTION INTRAVENOUS at 10:58

## 2024-09-09 RX ADMIN — ASPIRIN 325 MG: 325 TABLET, COATED ORAL at 20:18

## 2024-09-09 RX ADMIN — TAMSULOSIN HYDROCHLORIDE 0.4 MG: 0.4 CAPSULE ORAL at 20:18

## 2024-09-09 RX ADMIN — EPHEDRINE SULFATE 5 MG: 50 INJECTION INTRAVENOUS at 10:23

## 2024-09-09 RX ADMIN — SODIUM CHLORIDE 1500 MG: 9 INJECTION, SOLUTION INTRAVENOUS at 09:19

## 2024-09-09 RX ADMIN — DEXAMETHASONE SODIUM PHOSPHATE 10 MG: 4 INJECTION INTRA-ARTICULAR; INTRALESIONAL; INTRAMUSCULAR; INTRAVENOUS; SOFT TISSUE at 10:15

## 2024-09-09 RX ADMIN — SODIUM CHLORIDE, POTASSIUM CHLORIDE, SODIUM LACTATE AND CALCIUM CHLORIDE: 600; 310; 30; 20 INJECTION, SOLUTION INTRAVENOUS at 09:18

## 2024-09-09 RX ADMIN — SUGAMMADEX 300 MG: 100 INJECTION, SOLUTION INTRAVENOUS at 12:30

## 2024-09-09 RX ADMIN — FENTANYL CITRATE 100 MCG: 50 INJECTION, SOLUTION INTRAMUSCULAR; INTRAVENOUS at 08:45

## 2024-09-09 RX ADMIN — PROPOFOL 160 MG: 10 INJECTION, EMULSION INTRAVENOUS at 09:57

## 2024-09-09 RX ADMIN — LISINOPRIL 40 MG: 40 TABLET ORAL at 17:20

## 2024-09-09 RX ADMIN — PROPOFOL 20 MG: 10 INJECTION, EMULSION INTRAVENOUS at 12:38

## 2024-09-09 RX ADMIN — CELECOXIB 400 MG: 200 CAPSULE ORAL at 08:32

## 2024-09-09 RX ADMIN — CEFTRIAXONE SODIUM 2000 MG: 2 INJECTION, POWDER, FOR SOLUTION INTRAMUSCULAR; INTRAVENOUS at 10:19

## 2024-09-09 RX ADMIN — ROCURONIUM BROMIDE 70 MG: 10 INJECTION, SOLUTION INTRAVENOUS at 09:58

## 2024-09-09 RX ADMIN — PHENYLEPHRINE HYDROCHLORIDE 20 MCG/MIN: 10 INJECTION, SOLUTION INTRAVENOUS at 10:26

## 2024-09-09 RX ADMIN — ROCURONIUM BROMIDE 20 MG: 10 INJECTION, SOLUTION INTRAVENOUS at 11:37

## 2024-09-09 RX ADMIN — METOPROLOL SUCCINATE 50 MG: 50 TABLET, EXTENDED RELEASE ORAL at 20:18

## 2024-09-09 RX ADMIN — PHENYLEPHRINE HYDROCHLORIDE 100 MCG: 10 INJECTION, SOLUTION INTRAVENOUS at 10:09

## 2024-09-09 RX ADMIN — TRANEXAMIC ACID 1000 MG: 100 INJECTION, SOLUTION INTRAVENOUS at 12:10

## 2024-09-09 RX ADMIN — WATER 2000 MG: 1 INJECTION INTRAMUSCULAR; INTRAVENOUS; SUBCUTANEOUS at 17:20

## 2024-09-09 RX ADMIN — ACETAMINOPHEN 650 MG: 325 TABLET ORAL at 17:20

## 2024-09-09 RX ADMIN — PHENYLEPHRINE HYDROCHLORIDE 100 MCG: 10 INJECTION, SOLUTION INTRAVENOUS at 10:18

## 2024-09-09 ASSESSMENT — PAIN - FUNCTIONAL ASSESSMENT
PAIN_FUNCTIONAL_ASSESSMENT: 0-10
PAIN_FUNCTIONAL_ASSESSMENT: PREVENTS OR INTERFERES SOME ACTIVE ACTIVITIES AND ADLS

## 2024-09-09 ASSESSMENT — PAIN SCALES - GENERAL
PAINLEVEL_OUTOF10: 9
PAINLEVEL_OUTOF10: 0

## 2024-09-09 ASSESSMENT — PAIN DESCRIPTION - DESCRIPTORS
DESCRIPTORS: ACHING
DESCRIPTORS: ACHING

## 2024-09-09 ASSESSMENT — LIFESTYLE VARIABLES: SMOKING_STATUS: 1

## 2024-09-09 ASSESSMENT — PAIN DESCRIPTION - LOCATION: LOCATION: SHOULDER

## 2024-09-09 ASSESSMENT — PAIN DESCRIPTION - ORIENTATION: ORIENTATION: RIGHT

## 2024-09-10 VITALS
HEART RATE: 68 BPM | RESPIRATION RATE: 16 BRPM | DIASTOLIC BLOOD PRESSURE: 77 MMHG | BODY MASS INDEX: 31.83 KG/M2 | WEIGHT: 240.2 LBS | TEMPERATURE: 97.2 F | HEIGHT: 73 IN | SYSTOLIC BLOOD PRESSURE: 131 MMHG | OXYGEN SATURATION: 99 %

## 2024-09-10 LAB
ANION GAP SERPL CALCULATED.3IONS-SCNC: 8 MMOL/L (ref 3–16)
BUN SERPL-MCNC: 23 MG/DL (ref 7–20)
CALCIUM SERPL-MCNC: 8.7 MG/DL (ref 8.3–10.6)
CHLORIDE SERPL-SCNC: 102 MMOL/L (ref 99–110)
CO2 SERPL-SCNC: 25 MMOL/L (ref 21–32)
CREAT SERPL-MCNC: 1.1 MG/DL (ref 0.8–1.3)
DEPRECATED RDW RBC AUTO: 15.3 % (ref 12.4–15.4)
GFR SERPLBLD CREATININE-BSD FMLA CKD-EPI: 73 ML/MIN/{1.73_M2}
GLUCOSE SERPL-MCNC: 122 MG/DL (ref 70–99)
HCT VFR BLD AUTO: 35.1 % (ref 40.5–52.5)
HGB BLD-MCNC: 11.6 G/DL (ref 13.5–17.5)
MCH RBC QN AUTO: 31 PG (ref 26–34)
MCHC RBC AUTO-ENTMCNC: 33.1 G/DL (ref 31–36)
MCV RBC AUTO: 93.7 FL (ref 80–100)
PLATELET # BLD AUTO: 269 K/UL (ref 135–450)
PMV BLD AUTO: 7.3 FL (ref 5–10.5)
POTASSIUM SERPL-SCNC: 4.7 MMOL/L (ref 3.5–5.1)
RBC # BLD AUTO: 3.75 M/UL (ref 4.2–5.9)
SODIUM SERPL-SCNC: 135 MMOL/L (ref 136–145)
WBC # BLD AUTO: 6.6 K/UL (ref 4–11)

## 2024-09-10 PROCEDURE — 85027 COMPLETE CBC AUTOMATED: CPT

## 2024-09-10 PROCEDURE — 97166 OT EVAL MOD COMPLEX 45 MIN: CPT

## 2024-09-10 PROCEDURE — 36415 COLL VENOUS BLD VENIPUNCTURE: CPT

## 2024-09-10 PROCEDURE — G0378 HOSPITAL OBSERVATION PER HR: HCPCS

## 2024-09-10 PROCEDURE — 6360000002 HC RX W HCPCS: Performed by: STUDENT IN AN ORGANIZED HEALTH CARE EDUCATION/TRAINING PROGRAM

## 2024-09-10 PROCEDURE — 97530 THERAPEUTIC ACTIVITIES: CPT

## 2024-09-10 PROCEDURE — 2580000003 HC RX 258: Performed by: STUDENT IN AN ORGANIZED HEALTH CARE EDUCATION/TRAINING PROGRAM

## 2024-09-10 PROCEDURE — 6370000000 HC RX 637 (ALT 250 FOR IP): Performed by: STUDENT IN AN ORGANIZED HEALTH CARE EDUCATION/TRAINING PROGRAM

## 2024-09-10 PROCEDURE — 97535 SELF CARE MNGMENT TRAINING: CPT

## 2024-09-10 PROCEDURE — 80048 BASIC METABOLIC PNL TOTAL CA: CPT

## 2024-09-10 PROCEDURE — 97116 GAIT TRAINING THERAPY: CPT

## 2024-09-10 PROCEDURE — 97162 PT EVAL MOD COMPLEX 30 MIN: CPT

## 2024-09-10 RX ADMIN — DOXYCYCLINE 50 MG: 50 CAPSULE ORAL at 09:39

## 2024-09-10 RX ADMIN — WATER 2000 MG: 1 INJECTION INTRAMUSCULAR; INTRAVENOUS; SUBCUTANEOUS at 01:01

## 2024-09-10 RX ADMIN — ATORVASTATIN CALCIUM 40 MG: 40 TABLET, FILM COATED ORAL at 09:39

## 2024-09-10 RX ADMIN — ACETAMINOPHEN 650 MG: 325 TABLET ORAL at 03:27

## 2024-09-10 RX ADMIN — OXYCODONE HYDROCHLORIDE 10 MG: 5 TABLET ORAL at 13:39

## 2024-09-10 RX ADMIN — OXYCODONE HYDROCHLORIDE 5 MG: 5 TABLET ORAL at 09:39

## 2024-09-10 RX ADMIN — ACETAMINOPHEN 650 MG: 325 TABLET ORAL at 09:38

## 2024-09-10 RX ADMIN — ASPIRIN 325 MG: 325 TABLET, COATED ORAL at 09:39

## 2024-09-10 RX ADMIN — SODIUM CHLORIDE, PRESERVATIVE FREE 10 ML: 5 INJECTION INTRAVENOUS at 09:40

## 2024-09-10 ASSESSMENT — PAIN DESCRIPTION - LOCATION
LOCATION: SHOULDER
LOCATION: SHOULDER

## 2024-09-10 ASSESSMENT — PAIN DESCRIPTION - DESCRIPTORS
DESCRIPTORS: ACHING
DESCRIPTORS: ACHING

## 2024-09-10 ASSESSMENT — PAIN DESCRIPTION - PAIN TYPE
TYPE: SURGICAL PAIN
TYPE: SURGICAL PAIN

## 2024-09-10 ASSESSMENT — PAIN SCALES - GENERAL
PAINLEVEL_OUTOF10: 10
PAINLEVEL_OUTOF10: 8
PAINLEVEL_OUTOF10: 0
PAINLEVEL_OUTOF10: 5

## 2024-09-10 ASSESSMENT — PAIN - FUNCTIONAL ASSESSMENT
PAIN_FUNCTIONAL_ASSESSMENT: PREVENTS OR INTERFERES SOME ACTIVE ACTIVITIES AND ADLS
PAIN_FUNCTIONAL_ASSESSMENT: PREVENTS OR INTERFERES SOME ACTIVE ACTIVITIES AND ADLS

## 2024-09-10 ASSESSMENT — PAIN DESCRIPTION - ORIENTATION
ORIENTATION: RIGHT
ORIENTATION: RIGHT

## 2024-09-10 ASSESSMENT — PAIN DESCRIPTION - FREQUENCY
FREQUENCY: CONTINUOUS
FREQUENCY: CONTINUOUS

## 2024-09-10 ASSESSMENT — PAIN DESCRIPTION - ONSET
ONSET: PROGRESSIVE
ONSET: PROGRESSIVE

## 2024-09-11 ENCOUNTER — TELEPHONE (OUTPATIENT)
Dept: ORTHOPEDIC SURGERY | Age: 69
End: 2024-09-11

## 2024-09-26 ENCOUNTER — INITIAL CONSULT (OUTPATIENT)
Dept: SURGERY | Age: 69
End: 2024-09-26
Payer: MEDICARE

## 2024-09-26 VITALS — BODY MASS INDEX: 32.46 KG/M2 | WEIGHT: 246 LBS

## 2024-09-26 DIAGNOSIS — L72.3 INFECTED SEBACEOUS CYST: Primary | ICD-10-CM

## 2024-09-26 DIAGNOSIS — L08.9 INFECTED SEBACEOUS CYST: Primary | ICD-10-CM

## 2024-09-26 PROCEDURE — 99202 OFFICE O/P NEW SF 15 MIN: CPT | Performed by: SURGERY

## 2024-09-26 PROCEDURE — 1123F ACP DISCUSS/DSCN MKR DOCD: CPT | Performed by: SURGERY

## 2024-09-26 PROCEDURE — 4004F PT TOBACCO SCREEN RCVD TLK: CPT | Performed by: SURGERY

## 2024-09-26 PROCEDURE — G8427 DOCREV CUR MEDS BY ELIG CLIN: HCPCS | Performed by: SURGERY

## 2024-09-26 PROCEDURE — 11403 EXC TR-EXT B9+MARG 2.1-3CM: CPT | Performed by: SURGERY

## 2024-09-26 PROCEDURE — G8417 CALC BMI ABV UP PARAM F/U: HCPCS | Performed by: SURGERY

## 2024-09-26 PROCEDURE — 3017F COLORECTAL CA SCREEN DOC REV: CPT | Performed by: SURGERY

## 2024-09-30 ENCOUNTER — TELEPHONE (OUTPATIENT)
Dept: ORTHOPEDIC SURGERY | Age: 69
End: 2024-09-30

## 2024-10-07 ENCOUNTER — TELEPHONE (OUTPATIENT)
Dept: ORTHOPEDIC SURGERY | Age: 69
End: 2024-10-07

## 2024-10-07 DIAGNOSIS — M25.511 RIGHT SHOULDER PAIN, UNSPECIFIED CHRONICITY: Primary | ICD-10-CM

## 2024-10-07 RX ORDER — TRAMADOL HYDROCHLORIDE 50 MG/1
50 TABLET ORAL EVERY 6 HOURS PRN
Qty: 20 TABLET | Refills: 0 | Status: SHIPPED | OUTPATIENT
Start: 2024-10-07 | End: 2024-10-14

## 2024-10-07 NOTE — TELEPHONE ENCOUNTER
Prescription Refill     Medication Name:  TRAMADOL  Ph[facility]: [facility]  Patient Contact Number:  [phone]

## 2024-10-18 ENCOUNTER — TELEPHONE (OUTPATIENT)
Dept: ORTHOPEDIC SURGERY | Age: 69
End: 2024-10-18

## 2024-10-18 DIAGNOSIS — M25.511 RIGHT SHOULDER PAIN, UNSPECIFIED CHRONICITY: ICD-10-CM

## 2024-10-18 RX ORDER — TRAMADOL HYDROCHLORIDE 50 MG/1
50 TABLET ORAL EVERY 6 HOURS PRN
Qty: 20 TABLET | Refills: 0 | Status: SHIPPED | OUTPATIENT
Start: 2024-10-18 | End: 2024-10-25

## 2024-10-18 NOTE — TELEPHONE ENCOUNTER
Prescription Refill     Medication Name:  TRAMADOL OR SOMETHING STRONGER  Pharmacy: [facility] [address]  Patient Contact Number:  [phone]     SHOULDER IS POPPING IN AND OUT OF JOINT AND IT'S VERY PAINFUL WHEN HE HAS TO POP IT BACK IN.

## 2024-10-18 NOTE — TELEPHONE ENCOUNTER
Patient called back. Patient reports that doing his normal activities and chores throughout the day has caused his surgically repaired shoulder to dislocate. Patient reports he has been wearing sling. Patient reports that shoulder is in place at the moment. He reports normal sensation of hand and upper extremity. It was emphasized to the patient the importance of keeping his arm in his sling and not using the affected arm. We are seeing patient in clinic on Monday. Patient was asked to try not to eat or drink anything before the appointment if possible in the event he would need to be sent over to [facility] for shoulder to be reduced. Patient reported understanding. Patient was also informed that I would send over a refill of the tramadol to Sushma Nagar, PA to fill but I am unsure if she will send another pain medication refill until we see him back in the office. He reports understanding and medication was sent over to be signed.

## 2024-10-21 ENCOUNTER — OFFICE VISIT (OUTPATIENT)
Dept: ORTHOPEDIC SURGERY | Age: 69
End: 2024-10-21

## 2024-10-21 VITALS — HEIGHT: 73 IN | WEIGHT: 246 LBS | BODY MASS INDEX: 32.6 KG/M2

## 2024-10-21 DIAGNOSIS — Z96.611 STATUS POST REVERSE TOTAL REPLACEMENT OF RIGHT SHOULDER: Primary | ICD-10-CM

## 2024-10-21 PROCEDURE — 99024 POSTOP FOLLOW-UP VISIT: CPT | Performed by: PHYSICIAN ASSISTANT

## 2024-10-28 ENCOUNTER — TELEPHONE (OUTPATIENT)
Dept: CARDIOLOGY CLINIC | Age: 69
End: 2024-10-28

## 2024-10-28 NOTE — TELEPHONE ENCOUNTER
CARDIAC CLEARANCE     What type of procedure are you having?  Glaucoma Sx    Which physician is performing your procedure?  Dr Suarez  [address]     When is your procedure scheduled for?  11/6/24    Where are you having this procedure?  [facility]    Are you taking Blood Thinners? No   If so what? (Name/dose/frequesncy)     Does the surgeon want you to stop your blood thinner?  If so for how long?    Phone Number and Contact Name for Physicians office:  [phone]    Fax number to send information:  [phone]

## 2024-10-30 NOTE — PROGRESS NOTES
within this office note.  If so, please bring any errors to my attention for an addendum.  All efforts were made to ensure that this office note is accurate.

## 2024-11-01 ENCOUNTER — HOSPITAL ENCOUNTER (OUTPATIENT)
Dept: PHYSICAL THERAPY | Age: 69
Setting detail: THERAPIES SERIES
Discharge: HOME | End: 2024-11-01
Payer: MEDICARE

## 2024-11-01 DIAGNOSIS — Z96.611 STATUS POST REVERSE TOTAL REPLACEMENT OF RIGHT SHOULDER: Primary | ICD-10-CM

## 2024-11-01 DIAGNOSIS — M25.511 ACUTE PAIN OF RIGHT SHOULDER: ICD-10-CM

## 2024-11-01 PROCEDURE — 97110 THERAPEUTIC EXERCISES: CPT

## 2024-11-01 PROCEDURE — 97161 PT EVAL LOW COMPLEX 20 MIN: CPT

## 2024-11-01 NOTE — PLAN OF CARE
Table slides                                                                        [x] Rhomboid pinch  [] Elbow (flex, ext, pron, sup)                                                [] Lat. Pull downs                      [] Medial epicondylitis program                                             [] Forward punch   [] Lateral epicondylitis program                                             [] Internal rotators                     [] Progressive resistive exercises                 [] Bench Press                                                                          [] Bench press plus  Activities:                                                       [] Lateral pull-downs  [] Rowing                                                       [x] Progressive two-hand supine press   (progressive incline deltoid strengthening program)  [] Stepper/Exercise bike                                [] Biceps: curls/supination  [] Swimming  [] Water exercises     Modalities: PRN                                             Return to Sport:  [] Ultrasound                                                  [] Plyometrics  [] Iontophoresis                                                         [] Rhythmic stabilization  [] Moist heat                                                   [] Core strengthening              [] Massage                                                     [] Sports specific program:   [x] Cryotherapy                                                 [] Electrical stimulation                                    [] Paraffin  [] Whirlpool  [] TENS     [x] Home exercise program (copy to patient).   Perform exercises for:   15     minutes    2-3      times/day  [x] Supervised physical therapy  Frequency:      []  1x week  [x] 2x week  [] 3x week  [] Other:   Duration:         [] 2 weeks   [] 4 weeks  [x] 6 weeks  [] Other:      Additional Instructions:   D/c ultrasling brace   AA to AROM in all directions   Rowing and

## 2024-11-04 ENCOUNTER — TELEPHONE (OUTPATIENT)
Dept: CARDIOLOGY CLINIC | Age: 69
End: 2024-11-04

## 2024-11-04 NOTE — TELEPHONE ENCOUNTER
Em called to request the Pt ov notes, recent EKG before 11/06.  Please fax to:  [phone].  Thank you

## 2024-11-08 ENCOUNTER — HOSPITAL ENCOUNTER (OUTPATIENT)
Dept: PHYSICAL THERAPY | Age: 69
Setting detail: THERAPIES SERIES
Discharge: HOME | End: 2024-11-08
Payer: MEDICARE

## 2024-11-08 PROCEDURE — 97110 THERAPEUTIC EXERCISES: CPT | Performed by: SPECIALIST/TECHNOLOGIST

## 2024-11-08 PROCEDURE — 97140 MANUAL THERAPY 1/> REGIONS: CPT | Performed by: SPECIALIST/TECHNOLOGIST

## 2024-11-08 NOTE — FLOWSHEET NOTE
[facility] - [facility] and Therapy [address], [place] [address] office: [phone] fax: [phone]         Physical Therapy: TREATMENT/PROGRESS NOTE   Patient: Reno Epps (69 y.o. male)   Examination Date: 2024   :  1955 MRN: [id number]   Visit #: 2   Insurance Allowable Auth Needed   BMN []Yes    [x]No    Insurance: Payor: [facility] MEDICARE / Plan: [employer] DUAL COMPLETE / Product Type: *No Product type* /   Insurance ID: [id number] - (Medicare Managed)  Secondary Insurance (if applicable): MEDICAID [place]   Treatment Diagnosis:     ICD-10-CM    1. Status post reverse total replacement of right shoulder  Z96.611       2. Acute pain of right shoulder  M25.511          Medical Diagnosis:  Status post reverse total replacement of right shoulder [Z96.611]   Referring Physician: Nagar, Sushma, PA-C  PCP: Tasneem Mart MD     Plan of care signed (Y/N):     Date of Patient follow up with Physician: Adi      Plan of Care Report: no  POC update due: (10 visits /OR AUTH LIMITS, whichever is less)  2024                                             Medical History:  Comorbidities:  Hypertension  Osteoporosis/Osteopenia  Relevant Medical History: TKA ( - , about 1 month prior to shoulder); lumbar decompression ()                                          Precautions/ Contra-indications:           Latex allergy:  NO  Pacemaker:    NO  Contraindications for Manipulation: recent surgical history (relative)  Date of Surgery: 24 rTSA   Other:    Precautions: Subscap      Post Op Instructions:  [] Continuous passive motion (CPM)                        [x] Elbow range of motion  [] Exercise in plane of scapula                                  []  Strengthening                [] Pulley and instruction                                             [x] Home exercise program (copy to patient)   [] D/c ultrasling brace   [] Sling or brace

## 2024-11-14 ENCOUNTER — APPOINTMENT (OUTPATIENT)
Dept: PHYSICAL THERAPY | Age: 69
End: 2024-11-14
Payer: MEDICARE

## 2024-11-19 ENCOUNTER — HOSPITAL ENCOUNTER (OUTPATIENT)
Dept: CT IMAGING | Age: 69
Discharge: HOME | End: 2024-11-19
Payer: MEDICARE

## 2024-11-19 DIAGNOSIS — Z98.1 S/P LUMBAR FUSION: ICD-10-CM

## 2024-11-19 PROCEDURE — 72128 CT CHEST SPINE W/O DYE: CPT

## 2024-11-19 PROCEDURE — 72131 CT LUMBAR SPINE W/O DYE: CPT

## 2025-01-09 DIAGNOSIS — M25.511 RIGHT SHOULDER PAIN, UNSPECIFIED CHRONICITY: ICD-10-CM

## 2025-01-10 ENCOUNTER — TELEPHONE (OUTPATIENT)
Dept: ORTHOPEDIC SURGERY | Age: 70
End: 2025-01-10

## 2025-01-10 RX ORDER — TRAMADOL HYDROCHLORIDE 50 MG/1
50 TABLET ORAL EVERY 8 HOURS PRN
Qty: 20 TABLET | Refills: 0 | Status: SHIPPED | OUTPATIENT
Start: 2025-01-10 | End: 2025-01-17

## 2025-01-10 NOTE — TELEPHONE ENCOUNTER
General Question     Subject: MEDICATION QUESTION  Patient and /or Facility Request: Reno Epps   Contact Number: [phone]    MAHENDRA CALLING FROM [facility] IN [place] TO ADVISED THAT THE PATIENT HAD TRAMADOL 50 MG SUBMITTED OVER BY DR. SON ON YESTERDAY AROUND 5:00.THEY FILLED 14 PILLS    PATIENT HAD THE SAME MEDICATION SUBMITTED TODAY AROUND 1:40 FROM SUSHMA NAGAR FOR 14 PILLS AS WELL.    MAHENDRA WANTED TO NOTIFY THE OFFICE TO LET THEM KNOW, JUST IN CASE THIS IS NOT LEGIT WITH THIS BEING A CONTROL MEDICATION.     PLEASE CALL MAHENDRA AT THE ABOVE NUMBER TO GIVE CLARITY IF THIS MEDICATION SHOULD BE FILLED AT THE ABOVE NUMBER.

## 2025-01-30 ENCOUNTER — OFFICE VISIT (OUTPATIENT)
Dept: CARDIOLOGY CLINIC | Age: 70
End: 2025-01-30
Payer: MEDICARE

## 2025-01-30 VITALS
OXYGEN SATURATION: 99 % | SYSTOLIC BLOOD PRESSURE: 90 MMHG | BODY MASS INDEX: 33.46 KG/M2 | DIASTOLIC BLOOD PRESSURE: 62 MMHG | HEIGHT: 72 IN | WEIGHT: 247 LBS | HEART RATE: 75 BPM

## 2025-01-30 DIAGNOSIS — I10 ESSENTIAL HYPERTENSION: ICD-10-CM

## 2025-01-30 DIAGNOSIS — E78.2 MIXED HYPERLIPIDEMIA: ICD-10-CM

## 2025-01-30 DIAGNOSIS — I48.3 TYPICAL ATRIAL FLUTTER (HCC): ICD-10-CM

## 2025-01-30 DIAGNOSIS — I25.10 CORONARY ARTERY DISEASE INVOLVING NATIVE CORONARY ARTERY OF NATIVE HEART WITHOUT ANGINA PECTORIS: Primary | ICD-10-CM

## 2025-01-30 PROCEDURE — 3078F DIAST BP <80 MM HG: CPT | Performed by: NURSE PRACTITIONER

## 2025-01-30 PROCEDURE — 3017F COLORECTAL CA SCREEN DOC REV: CPT | Performed by: NURSE PRACTITIONER

## 2025-01-30 PROCEDURE — 4004F PT TOBACCO SCREEN RCVD TLK: CPT | Performed by: NURSE PRACTITIONER

## 2025-01-30 PROCEDURE — 1160F RVW MEDS BY RX/DR IN RCRD: CPT | Performed by: NURSE PRACTITIONER

## 2025-01-30 PROCEDURE — 3074F SYST BP LT 130 MM HG: CPT | Performed by: NURSE PRACTITIONER

## 2025-01-30 PROCEDURE — 99214 OFFICE O/P EST MOD 30 MIN: CPT | Performed by: NURSE PRACTITIONER

## 2025-01-30 PROCEDURE — 1123F ACP DISCUSS/DSCN MKR DOCD: CPT | Performed by: NURSE PRACTITIONER

## 2025-01-30 PROCEDURE — 93000 ELECTROCARDIOGRAM COMPLETE: CPT | Performed by: NURSE PRACTITIONER

## 2025-01-30 PROCEDURE — G8417 CALC BMI ABV UP PARAM F/U: HCPCS | Performed by: NURSE PRACTITIONER

## 2025-01-30 PROCEDURE — 1159F MED LIST DOCD IN RCRD: CPT | Performed by: NURSE PRACTITIONER

## 2025-01-30 PROCEDURE — G8427 DOCREV CUR MEDS BY ELIG CLIN: HCPCS | Performed by: NURSE PRACTITIONER

## 2025-01-30 RX ORDER — LISINOPRIL 40 MG/1
20 TABLET ORAL DAILY
Qty: 90 TABLET | Refills: 3 | Status: SHIPPED
Start: 2025-01-30

## 2025-01-30 NOTE — PATIENT INSTRUCTIONS
Decrease lisinopril to 1/2 tablet = 20 mg daily     Labs today    Appt in 1 week for a BP check     Appt in 2 months

## 2025-01-30 NOTE — PROGRESS NOTES
[facility]     Outpatient Follow Up Note    Reno Epps is 69 y.o. male who presents today with a history of  CAD s/p PTCA LAD '12, s/p PTCA diag-1 & RCA Jan '21, HTN, hyperlipidemia and atrial fib / flutter s/p SVT ablation '16.    His other hx includes: hyponatremia with level 129, HCTZ stopped Jan '21    CHIEF COMPLAINT / HPI:  Follow Up secondary to CAD    Subjective:   He denies significant chest pain. There is no SOB/HERNANDEZ. The patient denies orthopnea/PND. He feels tired at times. The patient does not have swelling. The patients weight is stable. The patient is not experiencing palpitations or dizziness.     These symptoms show no change since the last OV Nov '23.   With regard to medication therapy the patient has been compliant with prescribed regimen. They have tolerated therapy to date.     Past Medical History:   Diagnosis Date    Arthritis     Atrial fibrillation (HCC)     had after a surgery; had an ablation;    CAD (coronary artery disease)     stents x 3    Diabetes mellitus (HCC)     states in \"remission\"    Dizziness     Hyperlipidemia     Hypertension     Wears glasses     Wears partial dentures     upper and lower partials     Social History:    Social History     Tobacco Use   Smoking Status Every Day    Current packs/day: 0.50    Average packs/day: 0.5 packs/day for 40.0 years (20.0 ttl pk-yrs)    Types: Cigarettes   Smokeless Tobacco Never   Tobacco Comments    8/24/24 Maybe 5-10 cigarettes daily with the Chantix.      Current Medications:  Current Outpatient Medications   Medication Sig Dispense Refill    diclofenac sodium (VOLTAREN) 1 % GEL       aspirin (TRI ASPIRIN) 325 MG tablet Take 1 tablet by mouth in the morning and at bedtime for 14 days 28 tablet 0    ondansetron (ZOFRAN) 4 MG tablet Take 1 tablet by mouth 3 times daily as needed for Nausea or Vomiting 15 tablet 0    senna (SENOKOT) 8.6 MG TABS tablet Take 1 tablet by mouth daily 30 tablet 0    celecoxib (CELEBREX) 200

## 2025-02-05 ENCOUNTER — HOSPITAL ENCOUNTER (OUTPATIENT)
Age: 70
Discharge: HOME | End: 2025-02-05
Payer: MEDICARE

## 2025-02-05 DIAGNOSIS — E78.2 MIXED HYPERLIPIDEMIA: ICD-10-CM

## 2025-02-05 LAB
ALBUMIN SERPL-MCNC: 4.2 G/DL (ref 3.4–5)
ALBUMIN/GLOB SERPL: 1.6 {RATIO} (ref 1.1–2.2)
ALP SERPL-CCNC: 142 U/L (ref 40–129)
ALT SERPL-CCNC: 31 U/L (ref 10–40)
ANION GAP SERPL CALCULATED.3IONS-SCNC: 9 MMOL/L (ref 3–16)
AST SERPL-CCNC: 35 U/L (ref 15–37)
BILIRUB SERPL-MCNC: 0.6 MG/DL (ref 0–1)
BUN SERPL-MCNC: 22 MG/DL (ref 7–20)
CALCIUM SERPL-MCNC: 9.2 MG/DL (ref 8.3–10.6)
CHLORIDE SERPL-SCNC: 100 MMOL/L (ref 99–110)
CHOLEST SERPL-MCNC: 139 MG/DL (ref 0–199)
CO2 SERPL-SCNC: 24 MMOL/L (ref 21–32)
CREAT SERPL-MCNC: 1.1 MG/DL (ref 0.8–1.3)
GFR SERPLBLD CREATININE-BSD FMLA CKD-EPI: 73 ML/MIN/{1.73_M2}
GLUCOSE SERPL-MCNC: 102 MG/DL (ref 70–99)
HDLC SERPL-MCNC: 63 MG/DL (ref 40–60)
LDLC SERPL CALC-MCNC: 65 MG/DL
POTASSIUM SERPL-SCNC: 4.3 MMOL/L (ref 3.5–5.1)
PROT SERPL-MCNC: 6.9 G/DL (ref 6.4–8.2)
SODIUM SERPL-SCNC: 133 MMOL/L (ref 136–145)
TRIGL SERPL-MCNC: 54 MG/DL (ref 0–150)
VLDLC SERPL CALC-MCNC: 11 MG/DL

## 2025-02-05 PROCEDURE — 80061 LIPID PANEL: CPT

## 2025-02-05 PROCEDURE — 36415 COLL VENOUS BLD VENIPUNCTURE: CPT

## 2025-02-05 PROCEDURE — 80053 COMPREHEN METABOLIC PANEL: CPT

## 2025-02-06 ENCOUNTER — TELEPHONE (OUTPATIENT)
Dept: CARDIOLOGY CLINIC | Age: 70
End: 2025-02-06

## 2025-02-06 ENCOUNTER — NURSE ONLY (OUTPATIENT)
Dept: CARDIOLOGY CLINIC | Age: 70
End: 2025-02-06

## 2025-02-06 VITALS — SYSTOLIC BLOOD PRESSURE: 136 MMHG | DIASTOLIC BLOOD PRESSURE: 80 MMHG

## 2025-02-06 DIAGNOSIS — I10 ESSENTIAL HYPERTENSION: Primary | ICD-10-CM

## 2025-02-06 NOTE — TELEPHONE ENCOUNTER
Pt was in for BP check today. His BP was WNL. He was advised to keep a log for the next week or so. He stated that he had recent lab work. He requested results and wanted them sent to PCP and would like a copy mailed to him.

## 2025-02-06 NOTE — PROGRESS NOTES
Pt BP taken, WNL, I spoke with NPTS. She wanted pt to remain on current dose and keek a BP log for the next week to 10 days and get back with us. He verbalzed understanding.

## 2025-03-18 DIAGNOSIS — M25.511 RIGHT SHOULDER PAIN, UNSPECIFIED CHRONICITY: ICD-10-CM

## 2025-03-21 RX ORDER — TRAMADOL HYDROCHLORIDE 50 MG/1
50 TABLET ORAL EVERY 8 HOURS PRN
Qty: 20 TABLET | Refills: 0 | Status: SHIPPED | OUTPATIENT
Start: 2025-03-21 | End: 2025-03-26

## 2025-04-14 DIAGNOSIS — M25.511 RIGHT SHOULDER PAIN, UNSPECIFIED CHRONICITY: ICD-10-CM

## 2025-04-15 RX ORDER — TRAMADOL HYDROCHLORIDE 50 MG/1
50 TABLET ORAL EVERY 8 HOURS PRN
Qty: 10 TABLET | Refills: 0 | Status: SHIPPED | OUTPATIENT
Start: 2025-04-15 | End: 2025-04-20

## (undated) DEVICE — TOWEL,STOP FLAG GOLD N-W: Brand: MEDLINE

## (undated) DEVICE — AEGIS 1" DISK 4MM HOLE, PEEL OPEN: Brand: MEDLINE

## (undated) DEVICE — GOWN,SIRUS,POLYRNF,BRTHSLV,XL,30/CS: Brand: MEDLINE

## (undated) DEVICE — NEURO SPONGES: Brand: DEROYAL

## (undated) DEVICE — SUTURE MONOCRYL + SZ 4-0 L27IN ABSRB UD L19MM PS-2 3/8 CIR MCP426H

## (undated) DEVICE — STAPLER SKIN LEG L3.9MM DIA0.53MM WIDE ROT HD FOR WND CLSR

## (undated) DEVICE — PROTECTOR ULN NRV PUR FOAM HK LOOP STRP ANATOMICALLY

## (undated) DEVICE — 3M™ TEGADERM™ CHG CHLORHEXIDINE GLUCONATE GEL PAD 1664, 25 EACH/CARTON, 4 CARTONS/CASE: Brand: 3M™ TEGADERM™

## (undated) DEVICE — DORO DISP SKULL PINS - A

## (undated) DEVICE — APPLIER CLP L9.38IN M LIG TI DISP STR RNG HNDL LIGACLP

## (undated) DEVICE — C-ARMOR C-ARM EQUIPMENT COVERS CLEAR STERILE UNIVERSAL FIT 12 PER CASE: Brand: C-ARMOR

## (undated) DEVICE — GLOVE SURG SZ 65 L12IN FNGR THK79MIL GRN LTX FREE

## (undated) DEVICE — SUTURE VCRL + SZ 3-0 L27IN ABSRB UD L26MM SH 1/2 CIR VCP416H

## (undated) DEVICE — TOOL MR8-14MH30 MR8 14CM MATCH 3MM: Brand: MIDAS REX MR8

## (undated) DEVICE — TOTAL SHOULDER: Brand: MEDLINE INDUSTRIES, INC.

## (undated) DEVICE — ELECTRODE PT RET AD L9FT HI MOIST COND ADH HYDRGEL CORDED

## (undated) DEVICE — PAD,NON-ADHERENT,3X8,STERILE,LF,1/PK: Brand: MEDLINE

## (undated) DEVICE — 3M™ DURAPORE™ SURGICAL TAPE 1538-3, 3 INCH X 10 YARD (7,5CM X 9,1M), 4 ROLLS/BOX: Brand: 3M™ DURAPORE™

## (undated) DEVICE — SUTURE VCRL SZ 3-0 L18IN ABSRB UD L26MM SH 1/2 CIR J864D

## (undated) DEVICE — SOLUTION IV 1000ML 0.9% SOD CHL

## (undated) DEVICE — Device: Brand: VIRAGE®

## (undated) DEVICE — Device: Brand: VIRAGE™

## (undated) DEVICE — MAT FLR W32XL58IN

## (undated) DEVICE — SUTURE VICRYL + SZ 0 L18IN ABSRB UD L36MM CT-1 1/2 CIR VCP840D

## (undated) DEVICE — GLOVE ORANGE PI 8   MSG9080

## (undated) DEVICE — LIQUIBAND RAPID ADHESIVE 36/CS 0.8ML: Brand: MEDLINE

## (undated) DEVICE — NEPTUNE E-SEP SMOKE EVACUATION PENCIL, COATED, 70MM BLADE, PUSH BUTTON SWITCH: Brand: NEPTUNE E-SEP

## (undated) DEVICE — SUTURE PERMAHAND SZ 2-0 L12X18IN NONABSORBABLE BLK SILK A185H

## (undated) DEVICE — DISSECTOR SPNG PNUT HOLDER 3/8 IN XR DETECTABLE STRL

## (undated) DEVICE — SUTURE VICRYL SZ 2-0 L18IN ABSRB UD CT-1 L36MM 1/2 CIR J839D

## (undated) DEVICE — SUTURE MCRYL SZ 4-0 L27IN ABSRB UD L19MM PS-2 1/2 CIR PRIM Y426H

## (undated) DEVICE — ADHESIVE SKIN CLOSURE WND 8.661X1.5 IN 22 CM LIQUIBAND SECUR

## (undated) DEVICE — GLOVE SURG SZ 65 THK91MIL LTX FREE SYN POLYISOPRENE

## (undated) DEVICE — GOWN,REINFRCE,POLY,ECLIPSE,SLV,3XLG: Brand: MEDLINE

## (undated) DEVICE — SUTURE ETHLN SZ 3-0 L18IN NONABSORBABLE BLK L24MM FS-1 3/8 663G

## (undated) DEVICE — SUTURE VCRL + SZ 2-0 L18IN ABSRB VLT L26MM SH 1/2 CIR VCP775D

## (undated) DEVICE — COVER LT HNDL CAM BLU DISP W/ SURG CTRL

## (undated) DEVICE — SHEET,DRAPE,53X77,STERILE: Brand: MEDLINE

## (undated) DEVICE — CUFF RESTRN WRST OR ANK 45FT AD FOAM

## (undated) DEVICE — CODMAN® SURGICAL PATTIES 3/4" X 3/4" (1.91CM X 1.91CM): Brand: CODMAN®

## (undated) DEVICE — GOWN,SIRUS,POLYRNF,BRTHSLV,XLN/XXL,18/CS: Brand: MEDLINE

## (undated) DEVICE — ELECTRODE ELECSURG L 10.2 CM PTFE COAT MONOPOLAR BLADE OPN

## (undated) DEVICE — SUTURE VCRL SZ 0 L18IN ABSRB UD L36MM CT-1 1/2 CIR J840D

## (undated) DEVICE — STAPLER SKIN H3.9MM WIRE DIA0.58MM CRWN 6.9MM 35 STPL ROT

## (undated) DEVICE — DRAPE MICSCP W54XL150IN W/ 4 BINOC GLS LENS LEICA

## (undated) DEVICE — GLOVE SURG SZ 65 CRM LTX FREE POLYISOPRENE POLYMER BEAD ANTI

## (undated) DEVICE — SHEET,T,THYROID,STERILE: Brand: MEDLINE

## (undated) DEVICE — SUTURE MCRYL + SZ 4-0 L27IN ABSRB UD L19MM PS-2 3/8 CIR MCP426H

## (undated) DEVICE — 3M™ TEGADERM™ TRANSPARENT FILM DRESSING FRAME STYLE, 1626, 4 IN X 4-3/4 IN (10 CM X 12 CM), 50/CT 4CT/CASE: Brand: 3M™ TEGADERM™

## (undated) DEVICE — KIT EVAC 400CC DIA1/8IN H PAT 12.5IN 3 SPR RND SHP PVC DRN

## (undated) DEVICE — BLANKET WRM W40.2XL55.9IN IORT LO BODY + MISTRAL AIR

## (undated) DEVICE — AGENT HEMOSTATIC SURGIFLOW MATRIX KIT W/THROMBIN

## (undated) DEVICE — TOOL 14MH30 LEGEND 14CM 3MM: Brand: MIDAS REX ™

## (undated) DEVICE — UNDERGLOVE SURG SZ 8 BLU LTX FREE SYN POLYISOPRENE POLYMER

## (undated) DEVICE — LAMINECTOMY: Brand: MEDLINE INDUSTRIES, INC.

## (undated) DEVICE — SUTURE VCRL SZ 2-0 L18IN ABSRB UD CT-1 L36MM 1/2 CIR J839D

## (undated) DEVICE — SUTURE ETHIBOND EXCEL SZ 2 L30IN NONABSORBABLE GRN L40MM V-37 MX69G

## (undated) DEVICE — 3M™ COBAN™ NL STERILE NON-LATEX SELF-ADHERENT WRAP, 2086S, 6 IN X 5 YD (15 CM X 4,5 M), 12 ROLLS/CASE: Brand: 3M™ COBAN™

## (undated) DEVICE — SOLUTION IRRIG 1000ML STRL H2O USP PLAS POUR BTL

## (undated) DEVICE — COUNTER NDL 40 COUNT HLD 70 NUM FOAM BLK SGL MAG W BLDE REMV

## (undated) DEVICE — SOLUTION IRRIG 3000ML 0.9% SOD CHL USP UROMATIC PLAS CONT

## (undated) DEVICE — BIT DRL L230MM DIA2.5MM ST 3 FLUT QUIK CPL NONRADIOPAQUE

## (undated) DEVICE — SPONGE,PEANUT,XRAY,ST,SM,3/8",5/CARD: Brand: MEDLINE INDUSTRIES, INC.

## (undated) DEVICE — S/M FLEXIBLE ALEXIS ORTHOPAEDIC PROTECTOR: Brand: ALEXIS® ORTHOPAEDIC PROTECTOR